# Patient Record
Sex: MALE | Race: WHITE | Employment: UNEMPLOYED | URBAN - METROPOLITAN AREA
[De-identification: names, ages, dates, MRNs, and addresses within clinical notes are randomized per-mention and may not be internally consistent; named-entity substitution may affect disease eponyms.]

---

## 2017-01-01 ENCOUNTER — CLINICAL SUPPORT (OUTPATIENT)
Dept: CARDIOLOGY CLINIC | Age: 52
End: 2017-01-01

## 2017-01-01 ENCOUNTER — HOME HEALTH ADMISSION (OUTPATIENT)
Dept: HOME HEALTH SERVICES | Facility: HOME HEALTH | Age: 52
End: 2017-01-01
Payer: MEDICARE

## 2017-01-01 ENCOUNTER — APPOINTMENT (OUTPATIENT)
Dept: CT IMAGING | Age: 52
DRG: 871 | End: 2017-01-01
Attending: EMERGENCY MEDICINE
Payer: MEDICARE

## 2017-01-01 ENCOUNTER — HOME CARE VISIT (OUTPATIENT)
Dept: SCHEDULING | Facility: HOME HEALTH | Age: 52
End: 2017-01-01
Payer: MEDICARE

## 2017-01-01 ENCOUNTER — HOSPITAL ENCOUNTER (INPATIENT)
Age: 52
LOS: 4 days | Discharge: HOME HEALTH | DRG: 871 | End: 2017-11-24
Attending: EMERGENCY MEDICINE | Admitting: INTERNAL MEDICINE
Payer: MEDICARE

## 2017-01-01 ENCOUNTER — APPOINTMENT (OUTPATIENT)
Dept: GENERAL RADIOLOGY | Age: 52
DRG: 871 | End: 2017-01-01
Attending: INTERNAL MEDICINE
Payer: MEDICARE

## 2017-01-01 ENCOUNTER — HOSPITAL ENCOUNTER (EMERGENCY)
Age: 52
Discharge: HOME | End: 2017-06-19
Attending: EMERGENCY MEDICINE
Payer: MEDICARE

## 2017-01-01 ENCOUNTER — APPOINTMENT (OUTPATIENT)
Dept: ULTRASOUND IMAGING | Age: 52
DRG: 603 | End: 2017-01-01
Attending: EMERGENCY MEDICINE
Payer: MEDICARE

## 2017-01-01 ENCOUNTER — HOSPITAL ENCOUNTER (INPATIENT)
Age: 52
LOS: 8 days | Discharge: HOME HEALTH | DRG: 603 | End: 2017-06-27
Attending: EMERGENCY MEDICINE | Admitting: HOSPITALIST
Payer: MEDICARE

## 2017-01-01 ENCOUNTER — APPOINTMENT (OUTPATIENT)
Dept: GENERAL RADIOLOGY | Age: 52
DRG: 603 | End: 2017-01-01
Attending: EMERGENCY MEDICINE
Payer: MEDICARE

## 2017-01-01 ENCOUNTER — APPOINTMENT (OUTPATIENT)
Dept: GENERAL RADIOLOGY | Age: 52
DRG: 603 | End: 2017-01-01
Attending: INTERNAL MEDICINE
Payer: MEDICARE

## 2017-01-01 ENCOUNTER — APPOINTMENT (OUTPATIENT)
Dept: GENERAL RADIOLOGY | Age: 52
DRG: 871 | End: 2017-01-01
Attending: EMERGENCY MEDICINE
Payer: MEDICARE

## 2017-01-01 ENCOUNTER — OFFICE VISIT (OUTPATIENT)
Dept: CARDIOLOGY CLINIC | Age: 52
End: 2017-01-01

## 2017-01-01 VITALS
HEIGHT: 64 IN | DIASTOLIC BLOOD PRESSURE: 86 MMHG | HEART RATE: 76 BPM | OXYGEN SATURATION: 94 % | SYSTOLIC BLOOD PRESSURE: 120 MMHG | RESPIRATION RATE: 20 BRPM

## 2017-01-01 VITALS
TEMPERATURE: 97.5 F | OXYGEN SATURATION: 93 % | DIASTOLIC BLOOD PRESSURE: 68 MMHG | BODY MASS INDEX: 53.78 KG/M2 | HEART RATE: 75 BPM | SYSTOLIC BLOOD PRESSURE: 112 MMHG | WEIGHT: 315 LBS | HEIGHT: 64 IN

## 2017-01-01 VITALS
HEART RATE: 74 BPM | TEMPERATURE: 96.9 F | OXYGEN SATURATION: 93 % | RESPIRATION RATE: 18 BRPM | DIASTOLIC BLOOD PRESSURE: 70 MMHG | SYSTOLIC BLOOD PRESSURE: 110 MMHG

## 2017-01-01 VITALS
RESPIRATION RATE: 16 BRPM | DIASTOLIC BLOOD PRESSURE: 60 MMHG | SYSTOLIC BLOOD PRESSURE: 120 MMHG | TEMPERATURE: 97.3 F | HEART RATE: 75 BPM | OXYGEN SATURATION: 98 %

## 2017-01-01 VITALS
SYSTOLIC BLOOD PRESSURE: 130 MMHG | OXYGEN SATURATION: 95 % | TEMPERATURE: 97.5 F | RESPIRATION RATE: 18 BRPM | DIASTOLIC BLOOD PRESSURE: 70 MMHG | HEART RATE: 76 BPM

## 2017-01-01 VITALS — HEART RATE: 88 BPM | SYSTOLIC BLOOD PRESSURE: 124 MMHG | OXYGEN SATURATION: 98 % | DIASTOLIC BLOOD PRESSURE: 76 MMHG

## 2017-01-01 VITALS
DIASTOLIC BLOOD PRESSURE: 70 MMHG | TEMPERATURE: 98.3 F | OXYGEN SATURATION: 95 % | HEART RATE: 68 BPM | SYSTOLIC BLOOD PRESSURE: 130 MMHG | RESPIRATION RATE: 18 BRPM

## 2017-01-01 VITALS
OXYGEN SATURATION: 97 % | RESPIRATION RATE: 18 BRPM | SYSTOLIC BLOOD PRESSURE: 97 MMHG | HEART RATE: 75 BPM | DIASTOLIC BLOOD PRESSURE: 55 MMHG | TEMPERATURE: 96 F | BODY MASS INDEX: 54.11 KG/M2 | WEIGHT: 315 LBS

## 2017-01-01 VITALS
OXYGEN SATURATION: 93 % | DIASTOLIC BLOOD PRESSURE: 58 MMHG | HEART RATE: 76 BPM | SYSTOLIC BLOOD PRESSURE: 119 MMHG | RESPIRATION RATE: 16 BRPM

## 2017-01-01 VITALS
HEART RATE: 74 BPM | OXYGEN SATURATION: 93 % | RESPIRATION RATE: 18 BRPM | TEMPERATURE: 97.3 F | SYSTOLIC BLOOD PRESSURE: 115 MMHG | DIASTOLIC BLOOD PRESSURE: 80 MMHG

## 2017-01-01 VITALS
SYSTOLIC BLOOD PRESSURE: 120 MMHG | RESPIRATION RATE: 16 BRPM | DIASTOLIC BLOOD PRESSURE: 82 MMHG | OXYGEN SATURATION: 93 % | HEART RATE: 84 BPM

## 2017-01-01 VITALS
SYSTOLIC BLOOD PRESSURE: 118 MMHG | HEART RATE: 79 BPM | DIASTOLIC BLOOD PRESSURE: 58 MMHG | RESPIRATION RATE: 16 BRPM | OXYGEN SATURATION: 98 %

## 2017-01-01 VITALS
DIASTOLIC BLOOD PRESSURE: 68 MMHG | TEMPERATURE: 97.6 F | OXYGEN SATURATION: 95 % | HEART RATE: 76 BPM | SYSTOLIC BLOOD PRESSURE: 112 MMHG | RESPIRATION RATE: 18 BRPM

## 2017-01-01 VITALS
RESPIRATION RATE: 18 BRPM | OXYGEN SATURATION: 95 % | TEMPERATURE: 97 F | HEART RATE: 76 BPM | SYSTOLIC BLOOD PRESSURE: 130 MMHG | DIASTOLIC BLOOD PRESSURE: 60 MMHG

## 2017-01-01 VITALS
OXYGEN SATURATION: 95 % | TEMPERATURE: 96.8 F | DIASTOLIC BLOOD PRESSURE: 70 MMHG | SYSTOLIC BLOOD PRESSURE: 108 MMHG | HEART RATE: 75 BPM

## 2017-01-01 VITALS
DIASTOLIC BLOOD PRESSURE: 60 MMHG | HEART RATE: 76 BPM | OXYGEN SATURATION: 97 % | RESPIRATION RATE: 15 BRPM | SYSTOLIC BLOOD PRESSURE: 119 MMHG

## 2017-01-01 VITALS
SYSTOLIC BLOOD PRESSURE: 130 MMHG | OXYGEN SATURATION: 95 % | HEART RATE: 76 BPM | TEMPERATURE: 97 F | DIASTOLIC BLOOD PRESSURE: 60 MMHG

## 2017-01-01 VITALS
SYSTOLIC BLOOD PRESSURE: 110 MMHG | HEART RATE: 77 BPM | DIASTOLIC BLOOD PRESSURE: 70 MMHG | OXYGEN SATURATION: 97 % | TEMPERATURE: 96.9 F

## 2017-01-01 VITALS
SYSTOLIC BLOOD PRESSURE: 101 MMHG | WEIGHT: 315 LBS | OXYGEN SATURATION: 90 % | DIASTOLIC BLOOD PRESSURE: 54 MMHG | HEART RATE: 75 BPM | BODY MASS INDEX: 53.78 KG/M2 | TEMPERATURE: 98 F | HEIGHT: 64 IN | RESPIRATION RATE: 18 BRPM

## 2017-01-01 DIAGNOSIS — D72.829 LEUKOCYTOSIS, UNSPECIFIED TYPE: ICD-10-CM

## 2017-01-01 DIAGNOSIS — R00.1 BRADYCARDIA: ICD-10-CM

## 2017-01-01 DIAGNOSIS — Z95.0 PACEMAKER: ICD-10-CM

## 2017-01-01 DIAGNOSIS — Z95.0 S/P CARDIAC PACEMAKER PROCEDURE: Primary | ICD-10-CM

## 2017-01-01 DIAGNOSIS — Q87.11 PRADER-WILLI SYNDROME: ICD-10-CM

## 2017-01-01 DIAGNOSIS — I49.5 SSS (SICK SINUS SYNDROME) (HCC): Primary | ICD-10-CM

## 2017-01-01 DIAGNOSIS — E03.9 HYPOTHYROIDISM, UNSPECIFIED TYPE: ICD-10-CM

## 2017-01-01 DIAGNOSIS — I48.91 ATRIAL FIBRILLATION WITH SLOW VENTRICULAR RESPONSE (HCC): ICD-10-CM

## 2017-01-01 DIAGNOSIS — L03.115 CELLULITIS OF RIGHT LEG: Primary | ICD-10-CM

## 2017-01-01 DIAGNOSIS — T68.XXXA HYPOTHERMIA, INITIAL ENCOUNTER: Primary | ICD-10-CM

## 2017-01-01 LAB
ALBUMIN SERPL BCP-MCNC: 1.9 G/DL (ref 3.5–5)
ALBUMIN SERPL-MCNC: 2.2 G/DL (ref 3.5–5)
ALBUMIN SERPL-MCNC: 2.4 G/DL (ref 3.5–5)
ALBUMIN/GLOB SERPL: 0.3 {RATIO} (ref 1.1–2.2)
ALBUMIN/GLOB SERPL: 0.4 {RATIO} (ref 1.1–2.2)
ALBUMIN/GLOB SERPL: 0.5 {RATIO} (ref 1.1–2.2)
ALP SERPL-CCNC: 76 U/L (ref 45–117)
ALP SERPL-CCNC: 79 U/L (ref 45–117)
ALP SERPL-CCNC: 82 U/L (ref 45–117)
ALT SERPL-CCNC: 33 U/L (ref 12–78)
ALT SERPL-CCNC: 34 U/L (ref 12–78)
ALT SERPL-CCNC: 36 U/L (ref 12–78)
AMMONIA PLAS-SCNC: 47 UMOL/L
ANION GAP BLD CALC-SCNC: 1 MMOL/L (ref 5–15)
ANION GAP BLD CALC-SCNC: 16 MMOL/L (ref 5–15)
ANION GAP BLD CALC-SCNC: 3 MMOL/L (ref 5–15)
ANION GAP BLD CALC-SCNC: 4 MMOL/L (ref 5–15)
ANION GAP BLD CALC-SCNC: 4 MMOL/L (ref 5–15)
ANION GAP BLD CALC-SCNC: 5 MMOL/L (ref 5–15)
ANION GAP BLD CALC-SCNC: 5 MMOL/L (ref 5–15)
ANION GAP BLD CALC-SCNC: 6 MMOL/L (ref 5–15)
ANION GAP SERPL CALC-SCNC: 3 MMOL/L (ref 5–15)
ANION GAP SERPL CALC-SCNC: 4 MMOL/L (ref 5–15)
ANION GAP SERPL CALC-SCNC: 4 MMOL/L (ref 5–15)
ANION GAP SERPL CALC-SCNC: 5 MMOL/L (ref 5–15)
ANION GAP SERPL CALC-SCNC: 6 MMOL/L (ref 5–15)
APPEARANCE UR: CLEAR
AST SERPL W P-5'-P-CCNC: 47 U/L (ref 15–37)
AST SERPL-CCNC: 39 U/L (ref 15–37)
AST SERPL-CCNC: 48 U/L (ref 15–37)
ATRIAL RATE: 78 BPM
BACTERIA SPEC CULT: ABNORMAL
BACTERIA SPEC CULT: ABNORMAL
BACTERIA SPEC CULT: NORMAL
BASOPHILS # BLD AUTO: 0 K/UL (ref 0–0.1)
BASOPHILS # BLD: 0 % (ref 0–1)
BASOPHILS # BLD: 0 K/UL (ref 0–0.1)
BASOPHILS NFR BLD: 0 % (ref 0–1)
BILIRUB SERPL-MCNC: 0.3 MG/DL (ref 0.2–1)
BILIRUB SERPL-MCNC: 0.4 MG/DL (ref 0.2–1)
BILIRUB SERPL-MCNC: 0.6 MG/DL (ref 0.2–1)
BILIRUB UR QL: NEGATIVE
BUN SERPL-MCNC: 16 MG/DL (ref 6–20)
BUN SERPL-MCNC: 17 MG/DL (ref 6–20)
BUN SERPL-MCNC: 18 MG/DL (ref 6–20)
BUN SERPL-MCNC: 20 MG/DL (ref 6–20)
BUN SERPL-MCNC: 21 MG/DL (ref 6–20)
BUN SERPL-MCNC: 22 MG/DL (ref 6–20)
BUN SERPL-MCNC: 23 MG/DL (ref 6–20)
BUN SERPL-MCNC: 25 MG/DL (ref 6–20)
BUN/CREAT SERPL: 25 (ref 12–20)
BUN/CREAT SERPL: 33 (ref 12–20)
BUN/CREAT SERPL: 35 (ref 12–20)
BUN/CREAT SERPL: 37 (ref 12–20)
BUN/CREAT SERPL: 39 (ref 12–20)
BUN/CREAT SERPL: 42 (ref 12–20)
BUN/CREAT SERPL: 42 (ref 12–20)
BUN/CREAT SERPL: 46 (ref 12–20)
BUN/CREAT SERPL: 49 (ref 12–20)
BUN/CREAT SERPL: 50 (ref 12–20)
BUN/CREAT SERPL: 51 (ref 12–20)
BUN/CREAT SERPL: 51 (ref 12–20)
BUN/CREAT SERPL: 52 (ref 12–20)
CALCIUM SERPL-MCNC: 7.7 MG/DL (ref 8.5–10.1)
CALCIUM SERPL-MCNC: 8 MG/DL (ref 8.5–10.1)
CALCIUM SERPL-MCNC: 8 MG/DL (ref 8.5–10.1)
CALCIUM SERPL-MCNC: 8.1 MG/DL (ref 8.5–10.1)
CALCIUM SERPL-MCNC: 8.1 MG/DL (ref 8.5–10.1)
CALCIUM SERPL-MCNC: 8.2 MG/DL (ref 8.5–10.1)
CALCIUM SERPL-MCNC: 8.2 MG/DL (ref 8.5–10.1)
CALCIUM SERPL-MCNC: 8.3 MG/DL (ref 8.5–10.1)
CALCIUM SERPL-MCNC: 8.4 MG/DL (ref 8.5–10.1)
CALCIUM SERPL-MCNC: 8.5 MG/DL (ref 8.5–10.1)
CALCIUM SERPL-MCNC: 8.7 MG/DL (ref 8.5–10.1)
CALCIUM SERPL-MCNC: 8.7 MG/DL (ref 8.5–10.1)
CALCIUM SERPL-MCNC: 9.1 MG/DL (ref 8.5–10.1)
CALCULATED R AXIS, ECG10: 29 DEGREES
CALCULATED T AXIS, ECG11: 77 DEGREES
CHLORIDE SERPL-SCNC: 101 MMOL/L (ref 97–108)
CHLORIDE SERPL-SCNC: 102 MMOL/L (ref 97–108)
CHLORIDE SERPL-SCNC: 102 MMOL/L (ref 97–108)
CHLORIDE SERPL-SCNC: 103 MMOL/L (ref 97–108)
CHLORIDE SERPL-SCNC: 104 MMOL/L (ref 97–108)
CHLORIDE SERPL-SCNC: 104 MMOL/L (ref 97–108)
CHLORIDE SERPL-SCNC: 105 MMOL/L (ref 97–108)
CHLORIDE SERPL-SCNC: 105 MMOL/L (ref 97–108)
CHLORIDE SERPL-SCNC: 106 MMOL/L (ref 97–108)
CHLORIDE SERPL-SCNC: 107 MMOL/L (ref 97–108)
CHLORIDE SERPL-SCNC: 110 MMOL/L (ref 97–108)
CHLORIDE SERPL-SCNC: 111 MMOL/L (ref 97–108)
CHLORIDE SERPL-SCNC: 99 MMOL/L (ref 97–108)
CO2 SERPL-SCNC: 21 MMOL/L (ref 21–32)
CO2 SERPL-SCNC: 30 MMOL/L (ref 21–32)
CO2 SERPL-SCNC: 30 MMOL/L (ref 21–32)
CO2 SERPL-SCNC: 31 MMOL/L (ref 21–32)
CO2 SERPL-SCNC: 32 MMOL/L (ref 21–32)
CO2 SERPL-SCNC: 33 MMOL/L (ref 21–32)
CO2 SERPL-SCNC: 34 MMOL/L (ref 21–32)
CO2 SERPL-SCNC: 36 MMOL/L (ref 21–32)
CO2 SERPL-SCNC: 36 MMOL/L (ref 21–32)
COLOR UR: NORMAL
CORTIS 1H P CHAL SERPL-MCNC: 25.6 UG/DL
CORTIS 30M P CHAL SERPL-MCNC: 19.8 UG/DL
CORTIS BS SERPL-MCNC: 9.1 UG/DL
CREAT SERPL-MCNC: 0.41 MG/DL (ref 0.7–1.3)
CREAT SERPL-MCNC: 0.43 MG/DL (ref 0.7–1.3)
CREAT SERPL-MCNC: 0.43 MG/DL (ref 0.7–1.3)
CREAT SERPL-MCNC: 0.44 MG/DL (ref 0.7–1.3)
CREAT SERPL-MCNC: 0.46 MG/DL (ref 0.7–1.3)
CREAT SERPL-MCNC: 0.47 MG/DL (ref 0.7–1.3)
CREAT SERPL-MCNC: 0.48 MG/DL (ref 0.7–1.3)
CREAT SERPL-MCNC: 0.52 MG/DL (ref 0.7–1.3)
CREAT SERPL-MCNC: 0.52 MG/DL (ref 0.7–1.3)
CREAT SERPL-MCNC: 0.59 MG/DL (ref 0.7–1.3)
CREAT SERPL-MCNC: 0.65 MG/DL (ref 0.7–1.3)
DATE LAST DOSE: ABNORMAL
DIAGNOSIS, 93000: NORMAL
DIFFERENTIAL METHOD BLD: ABNORMAL
EOSINOPHIL # BLD: 0 K/UL (ref 0–0.4)
EOSINOPHIL # BLD: 0.1 K/UL (ref 0–0.4)
EOSINOPHIL # BLD: 0.2 K/UL (ref 0–0.4)
EOSINOPHIL # BLD: 0.3 K/UL (ref 0–0.4)
EOSINOPHIL NFR BLD: 0 % (ref 0–7)
EOSINOPHIL NFR BLD: 1 % (ref 0–7)
EOSINOPHIL NFR BLD: 1 % (ref 0–7)
EOSINOPHIL NFR BLD: 2 % (ref 0–7)
EOSINOPHIL NFR BLD: 3 % (ref 0–7)
EOSINOPHIL NFR BLD: 4 % (ref 0–7)
ERYTHROCYTE [DISTWIDTH] IN BLOOD BY AUTOMATED COUNT: 14.9 % (ref 11.5–14.5)
ERYTHROCYTE [DISTWIDTH] IN BLOOD BY AUTOMATED COUNT: 15 % (ref 11.5–14.5)
ERYTHROCYTE [DISTWIDTH] IN BLOOD BY AUTOMATED COUNT: 15.2 % (ref 11.5–14.5)
ERYTHROCYTE [DISTWIDTH] IN BLOOD BY AUTOMATED COUNT: 15.3 % (ref 11.5–14.5)
ERYTHROCYTE [DISTWIDTH] IN BLOOD BY AUTOMATED COUNT: 15.3 % (ref 11.5–14.5)
ERYTHROCYTE [DISTWIDTH] IN BLOOD BY AUTOMATED COUNT: 15.7 % (ref 11.5–14.5)
ERYTHROCYTE [DISTWIDTH] IN BLOOD BY AUTOMATED COUNT: 15.8 % (ref 11.5–14.5)
ERYTHROCYTE [DISTWIDTH] IN BLOOD BY AUTOMATED COUNT: 15.9 % (ref 11.5–14.5)
GLOBULIN SER CALC-MCNC: 4.9 G/DL (ref 2–4)
GLOBULIN SER CALC-MCNC: 5 G/DL (ref 2–4)
GLOBULIN SER CALC-MCNC: 5.9 G/DL (ref 2–4)
GLUCOSE BLD STRIP.AUTO-MCNC: 108 MG/DL (ref 65–100)
GLUCOSE BLD STRIP.AUTO-MCNC: 110 MG/DL (ref 65–100)
GLUCOSE BLD STRIP.AUTO-MCNC: 139 MG/DL (ref 65–100)
GLUCOSE BLD STRIP.AUTO-MCNC: 139 MG/DL (ref 65–100)
GLUCOSE BLD STRIP.AUTO-MCNC: 71 MG/DL (ref 65–100)
GLUCOSE BLD STRIP.AUTO-MCNC: 72 MG/DL (ref 65–100)
GLUCOSE BLD STRIP.AUTO-MCNC: 79 MG/DL (ref 65–100)
GLUCOSE BLD STRIP.AUTO-MCNC: 81 MG/DL (ref 65–100)
GLUCOSE BLD STRIP.AUTO-MCNC: 84 MG/DL (ref 65–100)
GLUCOSE BLD STRIP.AUTO-MCNC: 93 MG/DL (ref 65–100)
GLUCOSE BLD STRIP.AUTO-MCNC: 96 MG/DL (ref 65–100)
GLUCOSE SERPL-MCNC: 124 MG/DL (ref 65–100)
GLUCOSE SERPL-MCNC: 51 MG/DL (ref 65–100)
GLUCOSE SERPL-MCNC: 57 MG/DL (ref 65–100)
GLUCOSE SERPL-MCNC: 62 MG/DL (ref 65–100)
GLUCOSE SERPL-MCNC: 62 MG/DL (ref 65–100)
GLUCOSE SERPL-MCNC: 66 MG/DL (ref 65–100)
GLUCOSE SERPL-MCNC: 80 MG/DL (ref 65–100)
GLUCOSE SERPL-MCNC: 81 MG/DL (ref 65–100)
GLUCOSE SERPL-MCNC: 84 MG/DL (ref 65–100)
GLUCOSE SERPL-MCNC: 84 MG/DL (ref 65–100)
GLUCOSE SERPL-MCNC: 91 MG/DL (ref 65–100)
GLUCOSE UR STRIP.AUTO-MCNC: NEGATIVE MG/DL
HCT VFR BLD AUTO: 31.1 % (ref 36.6–50.3)
HCT VFR BLD AUTO: 33.5 % (ref 36.6–50.3)
HCT VFR BLD AUTO: 33.7 % (ref 36.6–50.3)
HCT VFR BLD AUTO: 34 % (ref 36.6–50.3)
HCT VFR BLD AUTO: 34 % (ref 36.6–50.3)
HCT VFR BLD AUTO: 35.3 % (ref 36.6–50.3)
HCT VFR BLD AUTO: 36.5 % (ref 36.6–50.3)
HCT VFR BLD AUTO: 37.4 % (ref 36.6–50.3)
HGB BLD-MCNC: 10.5 G/DL (ref 12.1–17)
HGB BLD-MCNC: 10.7 G/DL (ref 12.1–17)
HGB BLD-MCNC: 11 G/DL (ref 12.1–17)
HGB BLD-MCNC: 11.2 G/DL (ref 12.1–17)
HGB BLD-MCNC: 11.4 G/DL (ref 12.1–17)
HGB BLD-MCNC: 12.4 G/DL (ref 12.1–17)
HGB UR QL STRIP: NEGATIVE
INR PPP: 1.2 (ref 0.9–1.1)
KETONES UR QL STRIP.AUTO: NEGATIVE MG/DL
LACTATE SERPL-SCNC: 1.2 MMOL/L (ref 0.4–2)
LACTATE SERPL-SCNC: 1.3 MMOL/L (ref 0.4–2)
LEUKOCYTE ESTERASE UR QL STRIP.AUTO: NEGATIVE
LYMPHOCYTES # BLD AUTO: 2 % (ref 12–49)
LYMPHOCYTES # BLD AUTO: 7 % (ref 12–49)
LYMPHOCYTES # BLD AUTO: 8 % (ref 12–49)
LYMPHOCYTES # BLD: 0.3 K/UL (ref 0.8–3.5)
LYMPHOCYTES # BLD: 0.5 K/UL (ref 0.8–3.5)
LYMPHOCYTES # BLD: 0.6 K/UL (ref 0.8–3.5)
LYMPHOCYTES # BLD: 0.6 K/UL (ref 0.8–3.5)
LYMPHOCYTES # BLD: 0.7 K/UL (ref 0.8–3.5)
LYMPHOCYTES # BLD: 0.7 K/UL (ref 0.8–3.5)
LYMPHOCYTES # BLD: 0.8 K/UL (ref 0.8–3.5)
LYMPHOCYTES # BLD: 0.9 K/UL (ref 0.8–3.5)
LYMPHOCYTES NFR BLD: 11 % (ref 12–49)
LYMPHOCYTES NFR BLD: 16 % (ref 12–49)
LYMPHOCYTES NFR BLD: 17 % (ref 12–49)
LYMPHOCYTES NFR BLD: 17 % (ref 12–49)
LYMPHOCYTES NFR BLD: 19 % (ref 12–49)
MAGNESIUM SERPL-MCNC: 2 MG/DL (ref 1.6–2.4)
MAGNESIUM SERPL-MCNC: 2.1 MG/DL (ref 1.6–2.4)
MAGNESIUM SERPL-MCNC: 2.1 MG/DL (ref 1.6–2.4)
MAGNESIUM SERPL-MCNC: 2.2 MG/DL (ref 1.6–2.4)
MCH RBC QN AUTO: 29 PG (ref 26–34)
MCH RBC QN AUTO: 29.3 PG (ref 26–34)
MCH RBC QN AUTO: 29.6 PG (ref 26–34)
MCH RBC QN AUTO: 30.8 PG (ref 26–34)
MCH RBC QN AUTO: 30.9 PG (ref 26–34)
MCH RBC QN AUTO: 31.2 PG (ref 26–34)
MCH RBC QN AUTO: 31.3 PG (ref 26–34)
MCH RBC QN AUTO: 31.9 PG (ref 26–34)
MCHC RBC AUTO-ENTMCNC: 31.2 G/DL (ref 30–36.5)
MCHC RBC AUTO-ENTMCNC: 31.5 G/DL (ref 30–36.5)
MCHC RBC AUTO-ENTMCNC: 31.7 G/DL (ref 30–36.5)
MCHC RBC AUTO-ENTMCNC: 32.4 G/DL (ref 30–36.5)
MCHC RBC AUTO-ENTMCNC: 32.6 G/DL (ref 30–36.5)
MCHC RBC AUTO-ENTMCNC: 32.8 G/DL (ref 30–36.5)
MCHC RBC AUTO-ENTMCNC: 33.2 G/DL (ref 30–36.5)
MCHC RBC AUTO-ENTMCNC: 33.8 G/DL (ref 30–36.5)
MCV RBC AUTO: 92.9 FL (ref 80–99)
MCV RBC AUTO: 93.2 FL (ref 80–99)
MCV RBC AUTO: 93.4 FL (ref 80–99)
MCV RBC AUTO: 94.2 FL (ref 80–99)
MCV RBC AUTO: 94.5 FL (ref 80–99)
MCV RBC AUTO: 94.7 FL (ref 80–99)
MCV RBC AUTO: 95.2 FL (ref 80–99)
MCV RBC AUTO: 95.2 FL (ref 80–99)
MONOCYTES # BLD: 0.2 K/UL (ref 0–1)
MONOCYTES # BLD: 0.3 K/UL (ref 0–1)
MONOCYTES # BLD: 0.3 K/UL (ref 0–1)
MONOCYTES # BLD: 0.5 K/UL (ref 0–1)
MONOCYTES # BLD: 0.6 K/UL (ref 0–1)
MONOCYTES # BLD: 0.8 K/UL (ref 0–1)
MONOCYTES NFR BLD AUTO: 2 % (ref 5–13)
MONOCYTES NFR BLD AUTO: 5 % (ref 5–13)
MONOCYTES NFR BLD AUTO: 7 % (ref 5–13)
MONOCYTES NFR BLD: 11 % (ref 5–13)
MONOCYTES NFR BLD: 12 % (ref 5–13)
MONOCYTES NFR BLD: 12 % (ref 5–13)
MONOCYTES NFR BLD: 5 % (ref 5–13)
MONOCYTES NFR BLD: 6 % (ref 5–13)
NEUTS SEG # BLD: 14.3 K/UL (ref 1.8–8)
NEUTS SEG # BLD: 2.4 K/UL (ref 1.8–8)
NEUTS SEG # BLD: 2.8 K/UL (ref 1.8–8)
NEUTS SEG # BLD: 2.9 K/UL (ref 1.8–8)
NEUTS SEG # BLD: 3.1 K/UL (ref 1.8–8)
NEUTS SEG # BLD: 3.9 K/UL (ref 1.8–8)
NEUTS SEG # BLD: 7.7 K/UL (ref 1.8–8)
NEUTS SEG # BLD: 9.2 K/UL (ref 1.8–8)
NEUTS SEG NFR BLD AUTO: 82 % (ref 32–75)
NEUTS SEG NFR BLD AUTO: 87 % (ref 32–75)
NEUTS SEG NFR BLD AUTO: 96 % (ref 32–75)
NEUTS SEG NFR BLD: 66 % (ref 32–75)
NEUTS SEG NFR BLD: 68 % (ref 32–75)
NEUTS SEG NFR BLD: 70 % (ref 32–75)
NEUTS SEG NFR BLD: 75 % (ref 32–75)
NEUTS SEG NFR BLD: 82 % (ref 32–75)
NITRITE UR QL STRIP.AUTO: NEGATIVE
NRBC # BLD: 0 K/UL (ref 0–0.01)
NRBC BLD-RTO: 0 PER 100 WBC
PH UR STRIP: 7 [PH] (ref 5–8)
PLATELET # BLD AUTO: 100 K/UL (ref 150–400)
PLATELET # BLD AUTO: 105 K/UL (ref 150–400)
PLATELET # BLD AUTO: 110 K/UL (ref 150–400)
PLATELET # BLD AUTO: 110 K/UL (ref 150–400)
PLATELET # BLD AUTO: 113 K/UL (ref 150–400)
PLATELET # BLD AUTO: 120 K/UL (ref 150–400)
PLATELET # BLD AUTO: 163 K/UL (ref 150–400)
PLATELET # BLD AUTO: 85 K/UL (ref 150–400)
POTASSIUM SERPL-SCNC: 3.1 MMOL/L (ref 3.5–5.1)
POTASSIUM SERPL-SCNC: 3.3 MMOL/L (ref 3.5–5.1)
POTASSIUM SERPL-SCNC: 3.6 MMOL/L (ref 3.5–5.1)
POTASSIUM SERPL-SCNC: 3.7 MMOL/L (ref 3.5–5.1)
POTASSIUM SERPL-SCNC: 3.8 MMOL/L (ref 3.5–5.1)
POTASSIUM SERPL-SCNC: 3.9 MMOL/L (ref 3.5–5.1)
POTASSIUM SERPL-SCNC: 4 MMOL/L (ref 3.5–5.1)
POTASSIUM SERPL-SCNC: 4.3 MMOL/L (ref 3.5–5.1)
POTASSIUM SERPL-SCNC: 4.8 MMOL/L (ref 3.5–5.1)
PROT SERPL-MCNC: 7.1 G/DL (ref 6.4–8.2)
PROT SERPL-MCNC: 7.4 G/DL (ref 6.4–8.2)
PROT SERPL-MCNC: 7.8 G/DL (ref 6.4–8.2)
PROT UR STRIP-MCNC: NEGATIVE MG/DL
PROTHROMBIN TIME: 12 SEC (ref 9–11.1)
Q-T INTERVAL, ECG07: 478 MS
QRS DURATION, ECG06: 164 MS
QTC CALCULATION (BEZET), ECG08: 533 MS
RBC # BLD AUTO: 3.29 M/UL (ref 4.1–5.7)
RBC # BLD AUTO: 3.52 M/UL (ref 4.1–5.7)
RBC # BLD AUTO: 3.56 M/UL (ref 4.1–5.7)
RBC # BLD AUTO: 3.57 M/UL (ref 4.1–5.7)
RBC # BLD AUTO: 3.65 M/UL (ref 4.1–5.7)
RBC # BLD AUTO: 3.78 M/UL (ref 4.1–5.7)
RBC # BLD AUTO: 3.93 M/UL (ref 4.1–5.7)
RBC # BLD AUTO: 3.97 M/UL (ref 4.1–5.7)
RBC MORPH BLD: ABNORMAL
RBC MORPH BLD: ABNORMAL
REPORTED DOSE,DOSE: ABNORMAL UNITS
REPORTED DOSE/TIME,TMG: 900
SERVICE CMNT-IMP: ABNORMAL
SERVICE CMNT-IMP: NORMAL
SODIUM SERPL-SCNC: 139 MMOL/L (ref 136–145)
SODIUM SERPL-SCNC: 140 MMOL/L (ref 136–145)
SODIUM SERPL-SCNC: 141 MMOL/L (ref 136–145)
SODIUM SERPL-SCNC: 142 MMOL/L (ref 136–145)
SODIUM SERPL-SCNC: 146 MMOL/L (ref 136–145)
SODIUM SERPL-SCNC: 146 MMOL/L (ref 136–145)
SP GR UR REFRACTOMETRY: 1.01 (ref 1–1.03)
T4 FREE SERPL-MCNC: 1.4 NG/DL (ref 0.8–1.5)
TROPONIN I SERPL-MCNC: <0.04 NG/ML
TSH SERPL DL<=0.05 MIU/L-ACNC: 2.12 UIU/ML (ref 0.36–3.74)
UROBILINOGEN UR QL STRIP.AUTO: 1 EU/DL (ref 0.2–1)
VANCOMYCIN TROUGH SERPL-MCNC: 24 UG/ML (ref 5–10)
VENTRICULAR RATE, ECG03: 75 BPM
WBC # BLD AUTO: 11.2 K/UL (ref 4.1–11.1)
WBC # BLD AUTO: 14.9 K/UL (ref 4.1–11.1)
WBC # BLD AUTO: 3.3 K/UL (ref 4.1–11.1)
WBC # BLD AUTO: 4.2 K/UL (ref 4.1–11.1)
WBC # BLD AUTO: 4.2 K/UL (ref 4.1–11.1)
WBC # BLD AUTO: 4.6 K/UL (ref 4.1–11.1)
WBC # BLD AUTO: 4.7 K/UL (ref 4.1–11.1)
WBC # BLD AUTO: 8.9 K/UL (ref 4.1–11.1)

## 2017-01-01 PROCEDURE — 74011250637 HC RX REV CODE- 250/637: Performed by: INTERNAL MEDICINE

## 2017-01-01 PROCEDURE — 77030018719 HC DRSG PTCH ANTIMIC J&J -A

## 2017-01-01 PROCEDURE — 74011250637 HC RX REV CODE- 250/637: Performed by: HOSPITALIST

## 2017-01-01 PROCEDURE — 36415 COLL VENOUS BLD VENIPUNCTURE: CPT | Performed by: INTERNAL MEDICINE

## 2017-01-01 PROCEDURE — 3331090001 HH PPS REVENUE CREDIT

## 2017-01-01 PROCEDURE — 65270000015 HC RM PRIVATE ONCOLOGY

## 2017-01-01 PROCEDURE — 02HV33Z INSERTION OF INFUSION DEVICE INTO SUPERIOR VENA CAVA, PERCUTANEOUS APPROACH: ICD-10-PCS | Performed by: HOSPITALIST

## 2017-01-01 PROCEDURE — 87040 BLOOD CULTURE FOR BACTERIA: CPT | Performed by: EMERGENCY MEDICINE

## 2017-01-01 PROCEDURE — 80048 BASIC METABOLIC PNL TOTAL CA: CPT | Performed by: HOSPITALIST

## 2017-01-01 PROCEDURE — 83735 ASSAY OF MAGNESIUM: CPT | Performed by: INTERNAL MEDICINE

## 2017-01-01 PROCEDURE — 71010 XR CHEST PORT: CPT

## 2017-01-01 PROCEDURE — 3331090002 HH PPS REVENUE DEBIT

## 2017-01-01 PROCEDURE — 36415 COLL VENOUS BLD VENIPUNCTURE: CPT | Performed by: HOSPITALIST

## 2017-01-01 PROCEDURE — 74011250636 HC RX REV CODE- 250/636: Performed by: EMERGENCY MEDICINE

## 2017-01-01 PROCEDURE — 74011250636 HC RX REV CODE- 250/636: Performed by: HOSPITALIST

## 2017-01-01 PROCEDURE — 74011000258 HC RX REV CODE- 258: Performed by: INTERNAL MEDICINE

## 2017-01-01 PROCEDURE — 97116 GAIT TRAINING THERAPY: CPT | Performed by: PHYSICAL THERAPIST

## 2017-01-01 PROCEDURE — 82962 GLUCOSE BLOOD TEST: CPT

## 2017-01-01 PROCEDURE — 85025 COMPLETE CBC W/AUTO DIFF WBC: CPT | Performed by: INTERNAL MEDICINE

## 2017-01-01 PROCEDURE — G0151 HHCP-SERV OF PT,EA 15 MIN: HCPCS

## 2017-01-01 PROCEDURE — 85610 PROTHROMBIN TIME: CPT | Performed by: EMERGENCY MEDICINE

## 2017-01-01 PROCEDURE — 80048 BASIC METABOLIC PNL TOTAL CA: CPT | Performed by: INTERNAL MEDICINE

## 2017-01-01 PROCEDURE — 97530 THERAPEUTIC ACTIVITIES: CPT | Performed by: PHYSICAL THERAPIST

## 2017-01-01 PROCEDURE — 80202 ASSAY OF VANCOMYCIN: CPT | Performed by: INTERNAL MEDICINE

## 2017-01-01 PROCEDURE — 96375 TX/PRO/DX INJ NEW DRUG ADDON: CPT

## 2017-01-01 PROCEDURE — G0157 HHC PT ASSISTANT EA 15: HCPCS

## 2017-01-01 PROCEDURE — 74011000258 HC RX REV CODE- 258: Performed by: EMERGENCY MEDICINE

## 2017-01-01 PROCEDURE — 65660000000 HC RM CCU STEPDOWN

## 2017-01-01 PROCEDURE — 74011000250 HC RX REV CODE- 250: Performed by: INTERNAL MEDICINE

## 2017-01-01 PROCEDURE — 93971 EXTREMITY STUDY: CPT

## 2017-01-01 PROCEDURE — 74011250636 HC RX REV CODE- 250/636: Performed by: INTERNAL MEDICINE

## 2017-01-01 PROCEDURE — 77010033678 HC OXYGEN DAILY

## 2017-01-01 PROCEDURE — 97161 PT EVAL LOW COMPLEX 20 MIN: CPT | Performed by: PHYSICAL THERAPIST

## 2017-01-01 PROCEDURE — 74011000258 HC RX REV CODE- 258: Performed by: HOSPITALIST

## 2017-01-01 PROCEDURE — 73552 X-RAY EXAM OF FEMUR 2/>: CPT

## 2017-01-01 PROCEDURE — 82140 ASSAY OF AMMONIA: CPT | Performed by: EMERGENCY MEDICINE

## 2017-01-01 PROCEDURE — 80053 COMPREHEN METABOLIC PANEL: CPT | Performed by: EMERGENCY MEDICINE

## 2017-01-01 PROCEDURE — 99283 EMERGENCY DEPT VISIT LOW MDM: CPT

## 2017-01-01 PROCEDURE — G8979 MOBILITY GOAL STATUS: HCPCS

## 2017-01-01 PROCEDURE — 71020 XR CHEST PA LAT: CPT

## 2017-01-01 PROCEDURE — 36592 COLLECT BLOOD FROM PICC: CPT

## 2017-01-01 PROCEDURE — 74011000250 HC RX REV CODE- 250: Performed by: EMERGENCY MEDICINE

## 2017-01-01 PROCEDURE — 81003 URINALYSIS AUTO W/O SCOPE: CPT | Performed by: EMERGENCY MEDICINE

## 2017-01-01 PROCEDURE — G8980 MOBILITY D/C STATUS: HCPCS

## 2017-01-01 PROCEDURE — 36569 INSJ PICC 5 YR+ W/O IMAGING: CPT | Performed by: INTERNAL MEDICINE

## 2017-01-01 PROCEDURE — G0152 HHCP-SERV OF OT,EA 15 MIN: HCPCS

## 2017-01-01 PROCEDURE — 96365 THER/PROPH/DIAG IV INF INIT: CPT

## 2017-01-01 PROCEDURE — 84443 ASSAY THYROID STIM HORMONE: CPT | Performed by: INTERNAL MEDICINE

## 2017-01-01 PROCEDURE — C1751 CATH, INF, PER/CENT/MIDLINE: HCPCS

## 2017-01-01 PROCEDURE — 82533 TOTAL CORTISOL: CPT | Performed by: INTERNAL MEDICINE

## 2017-01-01 PROCEDURE — 76937 US GUIDE VASCULAR ACCESS: CPT

## 2017-01-01 PROCEDURE — G8978 MOBILITY CURRENT STATUS: HCPCS | Performed by: PHYSICAL THERAPIST

## 2017-01-01 PROCEDURE — 93005 ELECTROCARDIOGRAM TRACING: CPT

## 2017-01-01 PROCEDURE — 97162 PT EVAL MOD COMPLEX 30 MIN: CPT

## 2017-01-01 PROCEDURE — 85025 COMPLETE CBC W/AUTO DIFF WBC: CPT | Performed by: EMERGENCY MEDICINE

## 2017-01-01 PROCEDURE — 97530 THERAPEUTIC ACTIVITIES: CPT

## 2017-01-01 PROCEDURE — 96367 TX/PROPH/DG ADDL SEQ IV INF: CPT

## 2017-01-01 PROCEDURE — 77030018786 HC NDL GD F/USND BARD -B

## 2017-01-01 PROCEDURE — 400013 HH SOC

## 2017-01-01 PROCEDURE — 70450 CT HEAD/BRAIN W/O DYE: CPT

## 2017-01-01 PROCEDURE — 36415 COLL VENOUS BLD VENIPUNCTURE: CPT | Performed by: EMERGENCY MEDICINE

## 2017-01-01 PROCEDURE — 83735 ASSAY OF MAGNESIUM: CPT | Performed by: EMERGENCY MEDICINE

## 2017-01-01 PROCEDURE — 94761 N-INVAS EAR/PLS OXIMETRY MLT: CPT

## 2017-01-01 PROCEDURE — 97116 GAIT TRAINING THERAPY: CPT

## 2017-01-01 PROCEDURE — 84439 ASSAY OF FREE THYROXINE: CPT | Performed by: INTERNAL MEDICINE

## 2017-01-01 PROCEDURE — 80053 COMPREHEN METABOLIC PANEL: CPT | Performed by: INTERNAL MEDICINE

## 2017-01-01 PROCEDURE — 83605 ASSAY OF LACTIC ACID: CPT | Performed by: EMERGENCY MEDICINE

## 2017-01-01 PROCEDURE — G0299 HHS/HOSPICE OF RN EA 15 MIN: HCPCS

## 2017-01-01 PROCEDURE — G8978 MOBILITY CURRENT STATUS: HCPCS

## 2017-01-01 PROCEDURE — G8979 MOBILITY GOAL STATUS: HCPCS | Performed by: PHYSICAL THERAPIST

## 2017-01-01 PROCEDURE — 75810000275 HC EMERGENCY DEPT VISIT NO LEVEL OF CARE

## 2017-01-01 PROCEDURE — 99285 EMERGENCY DEPT VISIT HI MDM: CPT

## 2017-01-01 PROCEDURE — 84484 ASSAY OF TROPONIN QUANT: CPT | Performed by: EMERGENCY MEDICINE

## 2017-01-01 PROCEDURE — 96366 THER/PROPH/DIAG IV INF ADDON: CPT

## 2017-01-01 PROCEDURE — 3331090003 HH PPS REVENUE ADJ

## 2017-01-01 RX ORDER — DEXTROSE 50 % IN WATER (D50W) INTRAVENOUS SYRINGE
25
Status: COMPLETED | OUTPATIENT
Start: 2017-01-01 | End: 2017-01-01

## 2017-01-01 RX ORDER — SODIUM CHLORIDE 0.9 % (FLUSH) 0.9 %
10 SYRINGE (ML) INJECTION AS NEEDED
Status: DISCONTINUED | OUTPATIENT
Start: 2017-01-01 | End: 2017-01-01 | Stop reason: HOSPADM

## 2017-01-01 RX ORDER — VANCOMYCIN 2 GRAM/500 ML IN 0.9 % SODIUM CHLORIDE INTRAVENOUS
2000 ONCE
Status: DISCONTINUED | OUTPATIENT
Start: 2017-01-01 | End: 2017-01-01

## 2017-01-01 RX ORDER — OXYCODONE HYDROCHLORIDE 5 MG/1
5 TABLET ORAL
Status: DISCONTINUED | OUTPATIENT
Start: 2017-01-01 | End: 2017-01-01 | Stop reason: HOSPADM

## 2017-01-01 RX ORDER — ACETAMINOPHEN 325 MG/1
650 TABLET ORAL
Status: DISCONTINUED | OUTPATIENT
Start: 2017-01-01 | End: 2017-01-01 | Stop reason: HOSPADM

## 2017-01-01 RX ORDER — MICONAZOLE NITRATE 20 MG/G
CREAM TOPICAL 2 TIMES DAILY
Status: DISCONTINUED | OUTPATIENT
Start: 2017-01-01 | End: 2017-01-01 | Stop reason: HOSPADM

## 2017-01-01 RX ORDER — SODIUM CHLORIDE 0.9 % (FLUSH) 0.9 %
10 SYRINGE (ML) INJECTION EVERY 24 HOURS
Status: DISCONTINUED | OUTPATIENT
Start: 2017-01-01 | End: 2017-01-01 | Stop reason: HOSPADM

## 2017-01-01 RX ORDER — POTASSIUM CHLORIDE 1.5 G/1.77G
40 POWDER, FOR SOLUTION ORAL 2 TIMES DAILY WITH MEALS
Status: DISCONTINUED | OUTPATIENT
Start: 2017-01-01 | End: 2017-01-01 | Stop reason: HOSPADM

## 2017-01-01 RX ORDER — MELATONIN
1000 DAILY
Status: DISCONTINUED | OUTPATIENT
Start: 2017-01-01 | End: 2017-01-01 | Stop reason: HOSPADM

## 2017-01-01 RX ORDER — LEVOTHYROXINE SODIUM 100 UG/1
100 TABLET ORAL
COMMUNITY
End: 2017-01-01

## 2017-01-01 RX ORDER — BISACODYL 5 MG
5 TABLET, DELAYED RELEASE (ENTERIC COATED) ORAL DAILY PRN
Status: DISCONTINUED | OUTPATIENT
Start: 2017-01-01 | End: 2017-01-01 | Stop reason: HOSPADM

## 2017-01-01 RX ORDER — POTASSIUM CHLORIDE 750 MG/1
40 TABLET, FILM COATED, EXTENDED RELEASE ORAL EVERY 4 HOURS
Status: COMPLETED | OUTPATIENT
Start: 2017-01-01 | End: 2017-01-01

## 2017-01-01 RX ORDER — BUMETANIDE 0.25 MG/ML
1 INJECTION INTRAMUSCULAR; INTRAVENOUS EVERY 12 HOURS
Status: DISCONTINUED | OUTPATIENT
Start: 2017-01-01 | End: 2017-01-01 | Stop reason: HOSPADM

## 2017-01-01 RX ORDER — NYSTATIN 100000 [USP'U]/G
POWDER TOPICAL
COMMUNITY
End: 2017-01-01 | Stop reason: SDUPTHER

## 2017-01-01 RX ORDER — DEXTROSE 50 % IN WATER (D50W) INTRAVENOUS SYRINGE
12.5-25 AS NEEDED
Status: DISCONTINUED | OUTPATIENT
Start: 2017-01-01 | End: 2017-01-01 | Stop reason: RX

## 2017-01-01 RX ORDER — LANOLIN ALCOHOL/MO/W.PET/CERES
325 CREAM (GRAM) TOPICAL
Status: DISCONTINUED | OUTPATIENT
Start: 2017-01-01 | End: 2017-01-01 | Stop reason: HOSPADM

## 2017-01-01 RX ORDER — POTASSIUM CHLORIDE 750 MG/1
40 TABLET, FILM COATED, EXTENDED RELEASE ORAL DAILY
Status: DISCONTINUED | OUTPATIENT
Start: 2017-01-01 | End: 2017-01-01

## 2017-01-01 RX ORDER — SODIUM CHLORIDE 9 MG/ML
1000 INJECTION, SOLUTION INTRAVENOUS ONCE
Status: COMPLETED | OUTPATIENT
Start: 2017-01-01 | End: 2017-01-01

## 2017-01-01 RX ORDER — SODIUM CHLORIDE 0.9 % (FLUSH) 0.9 %
5-10 SYRINGE (ML) INJECTION EVERY 8 HOURS
Status: DISCONTINUED | OUTPATIENT
Start: 2017-01-01 | End: 2017-01-01 | Stop reason: HOSPADM

## 2017-01-01 RX ORDER — LEVOTHYROXINE SODIUM 75 UG/1
100 TABLET ORAL
Status: ON HOLD | COMMUNITY
End: 2017-01-01

## 2017-01-01 RX ORDER — SODIUM CHLORIDE 9 MG/ML
75 INJECTION, SOLUTION INTRAVENOUS CONTINUOUS
Status: DISPENSED | OUTPATIENT
Start: 2017-01-01 | End: 2017-01-01

## 2017-01-01 RX ORDER — CEFUROXIME AXETIL 500 MG/1
500 TABLET ORAL 2 TIMES DAILY
Qty: 10 TAB | Refills: 0 | Status: SHIPPED | OUTPATIENT
Start: 2017-01-01 | End: 2018-01-01

## 2017-01-01 RX ORDER — DOXYCYCLINE HYCLATE 100 MG
100 TABLET ORAL EVERY 12 HOURS
Status: DISCONTINUED | OUTPATIENT
Start: 2017-01-01 | End: 2017-01-01 | Stop reason: HOSPADM

## 2017-01-01 RX ORDER — ENOXAPARIN SODIUM 100 MG/ML
40 INJECTION SUBCUTANEOUS EVERY 12 HOURS
Status: DISCONTINUED | OUTPATIENT
Start: 2017-01-01 | End: 2017-01-01 | Stop reason: HOSPADM

## 2017-01-01 RX ORDER — DEXTROSE MONOHYDRATE AND SODIUM CHLORIDE 5; .45 G/100ML; G/100ML
100 INJECTION, SOLUTION INTRAVENOUS CONTINUOUS
Status: DISCONTINUED | OUTPATIENT
Start: 2017-01-01 | End: 2017-01-01

## 2017-01-01 RX ORDER — NYSTATIN 100000 [USP'U]/G
POWDER TOPICAL 2 TIMES DAILY
Status: DISCONTINUED | OUTPATIENT
Start: 2017-01-01 | End: 2017-01-01

## 2017-01-01 RX ORDER — SODIUM CHLORIDE 0.9 % (FLUSH) 0.9 %
5-10 SYRINGE (ML) INJECTION AS NEEDED
Status: DISCONTINUED | OUTPATIENT
Start: 2017-01-01 | End: 2017-01-01 | Stop reason: HOSPADM

## 2017-01-01 RX ORDER — SODIUM CHLORIDE 0.9 % (FLUSH) 0.9 %
10-30 SYRINGE (ML) INJECTION AS NEEDED
Status: DISCONTINUED | OUTPATIENT
Start: 2017-01-01 | End: 2017-01-01 | Stop reason: HOSPADM

## 2017-01-01 RX ORDER — ENOXAPARIN SODIUM 100 MG/ML
40 INJECTION SUBCUTANEOUS EVERY 24 HOURS
Status: DISCONTINUED | OUTPATIENT
Start: 2017-01-01 | End: 2017-01-01 | Stop reason: DRUGHIGH

## 2017-01-01 RX ORDER — LEVOTHYROXINE SODIUM 75 UG/1
75 TABLET ORAL
Status: DISCONTINUED | OUTPATIENT
Start: 2017-01-01 | End: 2017-01-01 | Stop reason: HOSPADM

## 2017-01-01 RX ORDER — DEXTROSE MONOHYDRATE 100 MG/ML
125-250 INJECTION, SOLUTION INTRAVENOUS AS NEEDED
Status: DISCONTINUED | OUTPATIENT
Start: 2017-01-01 | End: 2017-01-01 | Stop reason: HOSPADM

## 2017-01-01 RX ORDER — MAGNESIUM SULFATE 100 %
4 CRYSTALS MISCELLANEOUS AS NEEDED
Status: DISCONTINUED | OUTPATIENT
Start: 2017-01-01 | End: 2017-01-01 | Stop reason: HOSPADM

## 2017-01-01 RX ORDER — ONDANSETRON 2 MG/ML
4 INJECTION INTRAMUSCULAR; INTRAVENOUS
Status: DISCONTINUED | OUTPATIENT
Start: 2017-01-01 | End: 2017-01-01 | Stop reason: HOSPADM

## 2017-01-01 RX ORDER — VANCOMYCIN/0.9 % SOD CHLORIDE 1.5G/250ML
1500 PLASTIC BAG, INJECTION (ML) INTRAVENOUS EVERY 8 HOURS
Status: DISCONTINUED | OUTPATIENT
Start: 2017-01-01 | End: 2017-01-01

## 2017-01-01 RX ORDER — POTASSIUM CHLORIDE 750 MG/1
40 TABLET, FILM COATED, EXTENDED RELEASE ORAL 2 TIMES DAILY
Status: DISCONTINUED | OUTPATIENT
Start: 2017-01-01 | End: 2017-01-01

## 2017-01-01 RX ORDER — SODIUM CHLORIDE 0.9 % (FLUSH) 0.9 %
10-40 SYRINGE (ML) INJECTION EVERY 8 HOURS
Status: DISCONTINUED | OUTPATIENT
Start: 2017-01-01 | End: 2017-01-01 | Stop reason: HOSPADM

## 2017-01-01 RX ORDER — NYSTATIN 100000 [USP'U]/G
100000 POWDER TOPICAL 2 TIMES DAILY
Status: DISCONTINUED | OUTPATIENT
Start: 2017-01-01 | End: 2017-01-01 | Stop reason: HOSPADM

## 2017-01-01 RX ORDER — DOCUSATE SODIUM 100 MG/1
100 CAPSULE, LIQUID FILLED ORAL 2 TIMES DAILY
Status: DISCONTINUED | OUTPATIENT
Start: 2017-01-01 | End: 2017-01-01 | Stop reason: HOSPADM

## 2017-01-01 RX ORDER — HEPARIN 100 UNIT/ML
300 SYRINGE INTRAVENOUS AS NEEDED
Status: DISCONTINUED | OUTPATIENT
Start: 2017-01-01 | End: 2017-01-01 | Stop reason: HOSPADM

## 2017-01-01 RX ORDER — LEVOFLOXACIN 5 MG/ML
750 INJECTION, SOLUTION INTRAVENOUS
Status: DISCONTINUED | OUTPATIENT
Start: 2017-01-01 | End: 2017-01-01 | Stop reason: ALTCHOICE

## 2017-01-01 RX ORDER — VANCOMYCIN 2 GRAM/500 ML IN 0.9 % SODIUM CHLORIDE INTRAVENOUS
2000
Status: DISCONTINUED | OUTPATIENT
Start: 2017-01-01 | End: 2017-01-01

## 2017-01-01 RX ORDER — ENOXAPARIN SODIUM 100 MG/ML
40 INJECTION SUBCUTANEOUS EVERY 24 HOURS
Status: DISCONTINUED | OUTPATIENT
Start: 2017-01-01 | End: 2017-01-01

## 2017-01-01 RX ORDER — NYSTATIN 100000 [USP'U]/G
POWDER TOPICAL 4 TIMES DAILY
Qty: 1 BOTTLE | Refills: 1 | Status: SHIPPED | OUTPATIENT
Start: 2017-01-01 | End: 2017-01-01

## 2017-01-01 RX ORDER — CEPHALEXIN 750 MG/1
750 CAPSULE ORAL 4 TIMES DAILY
Qty: 20 CAP | Refills: 0 | Status: SHIPPED | OUTPATIENT
Start: 2017-01-01 | End: 2017-01-01

## 2017-01-01 RX ORDER — DOXYCYCLINE 100 MG/1
100 CAPSULE ORAL 2 TIMES DAILY
Qty: 10 CAP | Refills: 0 | Status: SHIPPED | OUTPATIENT
Start: 2017-01-01 | End: 2018-01-01

## 2017-01-01 RX ORDER — BISMUTH SUBSALICYLATE 262 MG
1 TABLET,CHEWABLE ORAL DAILY
COMMUNITY
End: 2017-01-01

## 2017-01-01 RX ORDER — LEVOTHYROXINE SODIUM 100 UG/1
100 TABLET ORAL
Status: DISCONTINUED | OUTPATIENT
Start: 2017-01-01 | End: 2017-01-01 | Stop reason: HOSPADM

## 2017-01-01 RX ORDER — GLUCOSAMINE SULFATE 1500 MG
1000 POWDER IN PACKET (EA) ORAL DAILY
COMMUNITY
End: 2018-01-01

## 2017-01-01 RX ORDER — DEXAMETHASONE SODIUM PHOSPHATE 4 MG/ML
4 INJECTION, SOLUTION INTRA-ARTICULAR; INTRALESIONAL; INTRAMUSCULAR; INTRAVENOUS; SOFT TISSUE EVERY 6 HOURS
Status: DISCONTINUED | OUTPATIENT
Start: 2017-01-01 | End: 2017-01-01

## 2017-01-01 RX ADMIN — CEFTRIAXONE 1 G: 1 INJECTION, POWDER, FOR SOLUTION INTRAMUSCULAR; INTRAVENOUS at 18:10

## 2017-01-01 RX ADMIN — DEXTROSE MONOHYDRATE 12.5 G: 25 INJECTION, SOLUTION INTRAVENOUS at 12:01

## 2017-01-01 RX ADMIN — ENOXAPARIN SODIUM 40 MG: 40 INJECTION SUBCUTANEOUS at 20:15

## 2017-01-01 RX ADMIN — SODIUM CHLORIDE 3 G: 900 INJECTION, SOLUTION INTRAVENOUS at 06:50

## 2017-01-01 RX ADMIN — POTASSIUM CHLORIDE 40 MEQ: 750 TABLET, FILM COATED, EXTENDED RELEASE ORAL at 21:38

## 2017-01-01 RX ADMIN — DOCUSATE SODIUM 100 MG: 100 CAPSULE, LIQUID FILLED ORAL at 08:26

## 2017-01-01 RX ADMIN — VITAMIN D, TAB 1000IU (100/BT) 1000 UNITS: 25 TAB at 09:25

## 2017-01-01 RX ADMIN — MICONAZOLE NITRATE: 20 CREAM TOPICAL at 09:32

## 2017-01-01 RX ADMIN — CEFTRIAXONE 1 G: 1 INJECTION, POWDER, FOR SOLUTION INTRAMUSCULAR; INTRAVENOUS at 18:59

## 2017-01-01 RX ADMIN — Medication 10 ML: at 22:15

## 2017-01-01 RX ADMIN — BUMETANIDE 1 MG: 0.25 INJECTION INTRAMUSCULAR; INTRAVENOUS at 05:12

## 2017-01-01 RX ADMIN — NYSTATIN 100000 UNITS: 100000 POWDER TOPICAL at 08:54

## 2017-01-01 RX ADMIN — POTASSIUM CHLORIDE 40 MEQ: 1.5 POWDER, FOR SOLUTION ORAL at 08:14

## 2017-01-01 RX ADMIN — Medication 1 CAPSULE: at 13:10

## 2017-01-01 RX ADMIN — LEVOTHYROXINE SODIUM 75 MCG: 75 TABLET ORAL at 09:32

## 2017-01-01 RX ADMIN — Medication 10 ML: at 03:48

## 2017-01-01 RX ADMIN — Medication 10 ML: at 22:11

## 2017-01-01 RX ADMIN — LEVOTHYROXINE SODIUM 75 MCG: 75 TABLET ORAL at 08:15

## 2017-01-01 RX ADMIN — MICONAZOLE NITRATE: 20 CREAM TOPICAL at 16:16

## 2017-01-01 RX ADMIN — Medication 1 CAPSULE: at 09:21

## 2017-01-01 RX ADMIN — SODIUM CHLORIDE 75 ML/HR: 900 INJECTION, SOLUTION INTRAVENOUS at 20:55

## 2017-01-01 RX ADMIN — Medication 10 ML: at 22:13

## 2017-01-01 RX ADMIN — Medication 1 CAPSULE: at 09:07

## 2017-01-01 RX ADMIN — Medication 10 ML: at 15:51

## 2017-01-01 RX ADMIN — Medication 10 ML: at 23:13

## 2017-01-01 RX ADMIN — NYSTATIN: 100000 POWDER TOPICAL at 09:44

## 2017-01-01 RX ADMIN — Medication 10 ML: at 21:31

## 2017-01-01 RX ADMIN — Medication 10 ML: at 14:59

## 2017-01-01 RX ADMIN — DOCUSATE SODIUM 100 MG: 100 CAPSULE, LIQUID FILLED ORAL at 08:32

## 2017-01-01 RX ADMIN — Medication 10 ML: at 22:02

## 2017-01-01 RX ADMIN — FERROUS SULFATE TAB 325 MG (65 MG ELEMENTAL FE) 325 MG: 325 (65 FE) TAB at 17:55

## 2017-01-01 RX ADMIN — Medication 10 ML: at 21:13

## 2017-01-01 RX ADMIN — ENOXAPARIN SODIUM 40 MG: 40 INJECTION SUBCUTANEOUS at 04:11

## 2017-01-01 RX ADMIN — LEVOTHYROXINE SODIUM 75 MCG: 75 TABLET ORAL at 08:02

## 2017-01-01 RX ADMIN — Medication 10 ML: at 06:50

## 2017-01-01 RX ADMIN — Medication 10 ML: at 04:16

## 2017-01-01 RX ADMIN — VANCOMYCIN HYDROCHLORIDE 1500 MG: 10 INJECTION, POWDER, LYOPHILIZED, FOR SOLUTION INTRAVENOUS at 09:14

## 2017-01-01 RX ADMIN — VANCOMYCIN HYDROCHLORIDE 1500 MG: 10 INJECTION, POWDER, LYOPHILIZED, FOR SOLUTION INTRAVENOUS at 18:56

## 2017-01-01 RX ADMIN — Medication 10 ML: at 19:00

## 2017-01-01 RX ADMIN — BUMETANIDE 1 MG: 0.25 INJECTION INTRAMUSCULAR; INTRAVENOUS at 04:20

## 2017-01-01 RX ADMIN — Medication 20 ML: at 04:56

## 2017-01-01 RX ADMIN — Medication 10 ML: at 22:10

## 2017-01-01 RX ADMIN — CEFTRIAXONE SODIUM 2 G: 2 INJECTION, POWDER, FOR SOLUTION INTRAMUSCULAR; INTRAVENOUS at 14:50

## 2017-01-01 RX ADMIN — SODIUM CHLORIDE 3 G: 900 INJECTION, SOLUTION INTRAVENOUS at 09:36

## 2017-01-01 RX ADMIN — FERROUS SULFATE TAB 325 MG (65 MG ELEMENTAL FE) 325 MG: 325 (65 FE) TAB at 18:10

## 2017-01-01 RX ADMIN — VITAMIN D, TAB 1000IU (100/BT) 1000 UNITS: 25 TAB at 08:54

## 2017-01-01 RX ADMIN — Medication 40 ML: at 05:47

## 2017-01-01 RX ADMIN — SODIUM CHLORIDE 3 G: 900 INJECTION, SOLUTION INTRAVENOUS at 22:06

## 2017-01-01 RX ADMIN — SODIUM CHLORIDE 3 G: 900 INJECTION, SOLUTION INTRAVENOUS at 04:12

## 2017-01-01 RX ADMIN — MICONAZOLE NITRATE: 20 CREAM TOPICAL at 17:25

## 2017-01-01 RX ADMIN — VANCOMYCIN HYDROCHLORIDE 1500 MG: 10 INJECTION, POWDER, LYOPHILIZED, FOR SOLUTION INTRAVENOUS at 00:51

## 2017-01-01 RX ADMIN — Medication 1 CAPSULE: at 09:36

## 2017-01-01 RX ADMIN — NYSTATIN: 100000 POWDER TOPICAL at 09:06

## 2017-01-01 RX ADMIN — ENOXAPARIN SODIUM 40 MG: 40 INJECTION SUBCUTANEOUS at 09:32

## 2017-01-01 RX ADMIN — ENOXAPARIN SODIUM 40 MG: 40 INJECTION SUBCUTANEOUS at 02:40

## 2017-01-01 RX ADMIN — NYSTATIN: 100000 POWDER TOPICAL at 17:59

## 2017-01-01 RX ADMIN — ENOXAPARIN SODIUM 40 MG: 40 INJECTION SUBCUTANEOUS at 18:10

## 2017-01-01 RX ADMIN — LEVOTHYROXINE SODIUM 100 MCG: 100 TABLET ORAL at 09:25

## 2017-01-01 RX ADMIN — VITAMIN D, TAB 1000IU (100/BT) 1000 UNITS: 25 TAB at 08:27

## 2017-01-01 RX ADMIN — NYSTATIN 100000 UNITS: 100000 POWDER TOPICAL at 22:39

## 2017-01-01 RX ADMIN — LEVOTHYROXINE SODIUM 75 MCG: 75 TABLET ORAL at 09:07

## 2017-01-01 RX ADMIN — ENOXAPARIN SODIUM 40 MG: 40 INJECTION SUBCUTANEOUS at 22:05

## 2017-01-01 RX ADMIN — Medication 10 ML: at 18:04

## 2017-01-01 RX ADMIN — ENOXAPARIN SODIUM 40 MG: 40 INJECTION SUBCUTANEOUS at 08:15

## 2017-01-01 RX ADMIN — ENOXAPARIN SODIUM 40 MG: 40 INJECTION SUBCUTANEOUS at 18:58

## 2017-01-01 RX ADMIN — Medication 1 CAPSULE: at 09:32

## 2017-01-01 RX ADMIN — Medication 10 ML: at 21:30

## 2017-01-01 RX ADMIN — DOCUSATE SODIUM 100 MG: 100 CAPSULE, LIQUID FILLED ORAL at 19:00

## 2017-01-01 RX ADMIN — ENOXAPARIN SODIUM 40 MG: 40 INJECTION SUBCUTANEOUS at 09:21

## 2017-01-01 RX ADMIN — ENOXAPARIN SODIUM 40 MG: 40 INJECTION SUBCUTANEOUS at 08:33

## 2017-01-01 RX ADMIN — NYSTATIN 100000 UNITS: 100000 POWDER TOPICAL at 09:33

## 2017-01-01 RX ADMIN — CEFTRIAXONE 1 G: 1 INJECTION, POWDER, FOR SOLUTION INTRAMUSCULAR; INTRAVENOUS at 17:45

## 2017-01-01 RX ADMIN — ENOXAPARIN SODIUM 40 MG: 40 INJECTION SUBCUTANEOUS at 20:02

## 2017-01-01 RX ADMIN — DOXYCYCLINE HYCLATE 100 MG: 100 TABLET, COATED ORAL at 08:27

## 2017-01-01 RX ADMIN — BUMETANIDE 1 MG: 0.25 INJECTION INTRAMUSCULAR; INTRAVENOUS at 04:55

## 2017-01-01 RX ADMIN — SODIUM CHLORIDE 3 G: 900 INJECTION, SOLUTION INTRAVENOUS at 03:18

## 2017-01-01 RX ADMIN — Medication 10 ML: at 15:00

## 2017-01-01 RX ADMIN — SODIUM CHLORIDE 3 G: 900 INJECTION, SOLUTION INTRAVENOUS at 08:32

## 2017-01-01 RX ADMIN — Medication 10 ML: at 17:25

## 2017-01-01 RX ADMIN — Medication 10 ML: at 04:17

## 2017-01-01 RX ADMIN — Medication 10 ML: at 16:15

## 2017-01-01 RX ADMIN — ENOXAPARIN SODIUM 40 MG: 40 INJECTION SUBCUTANEOUS at 19:31

## 2017-01-01 RX ADMIN — Medication 10 ML: at 05:00

## 2017-01-01 RX ADMIN — ENOXAPARIN SODIUM 40 MG: 40 INJECTION SUBCUTANEOUS at 17:51

## 2017-01-01 RX ADMIN — POTASSIUM CHLORIDE 40 MEQ: 1.5 POWDER, FOR SOLUTION ORAL at 17:23

## 2017-01-01 RX ADMIN — Medication 10 ML: at 13:49

## 2017-01-01 RX ADMIN — CEFTRIAXONE SODIUM 2 G: 2 INJECTION, POWDER, FOR SOLUTION INTRAMUSCULAR; INTRAVENOUS at 16:09

## 2017-01-01 RX ADMIN — ACETAMINOPHEN 650 MG: 325 TABLET, FILM COATED ORAL at 21:38

## 2017-01-01 RX ADMIN — DEXTROSE MONOHYDRATE AND SODIUM CHLORIDE 100 ML/HR: 5; .45 INJECTION, SOLUTION INTRAVENOUS at 12:37

## 2017-01-01 RX ADMIN — SODIUM CHLORIDE 3 G: 900 INJECTION, SOLUTION INTRAVENOUS at 14:07

## 2017-01-01 RX ADMIN — NYSTATIN: 100000 POWDER TOPICAL at 11:37

## 2017-01-01 RX ADMIN — SODIUM CHLORIDE 3 G: 900 INJECTION, SOLUTION INTRAVENOUS at 02:37

## 2017-01-01 RX ADMIN — Medication 10 ML: at 16:10

## 2017-01-01 RX ADMIN — Medication 10 ML: at 22:32

## 2017-01-01 RX ADMIN — CEFTRIAXONE SODIUM 2 G: 2 INJECTION, POWDER, FOR SOLUTION INTRAMUSCULAR; INTRAVENOUS at 14:59

## 2017-01-01 RX ADMIN — ENOXAPARIN SODIUM 40 MG: 40 INJECTION SUBCUTANEOUS at 20:26

## 2017-01-01 RX ADMIN — VITAMIN D, TAB 1000IU (100/BT) 1000 UNITS: 25 TAB at 09:32

## 2017-01-01 RX ADMIN — SODIUM CHLORIDE 75 ML/HR: 900 INJECTION, SOLUTION INTRAVENOUS at 10:40

## 2017-01-01 RX ADMIN — VANCOMYCIN HYDROCHLORIDE 3000 MG: 10 INJECTION, POWDER, LYOPHILIZED, FOR SOLUTION INTRAVENOUS at 16:53

## 2017-01-01 RX ADMIN — LEVOTHYROXINE SODIUM 75 MCG: 75 TABLET ORAL at 08:26

## 2017-01-01 RX ADMIN — POTASSIUM CHLORIDE 40 MEQ: 750 TABLET, FILM COATED, EXTENDED RELEASE ORAL at 17:57

## 2017-01-01 RX ADMIN — ENOXAPARIN SODIUM 40 MG: 40 INJECTION SUBCUTANEOUS at 14:35

## 2017-01-01 RX ADMIN — SODIUM CHLORIDE 3 G: 900 INJECTION, SOLUTION INTRAVENOUS at 13:39

## 2017-01-01 RX ADMIN — ENOXAPARIN SODIUM 40 MG: 40 INJECTION SUBCUTANEOUS at 13:11

## 2017-01-01 RX ADMIN — ENOXAPARIN SODIUM 40 MG: 40 INJECTION SUBCUTANEOUS at 05:39

## 2017-01-01 RX ADMIN — ENOXAPARIN SODIUM 40 MG: 40 INJECTION SUBCUTANEOUS at 06:49

## 2017-01-01 RX ADMIN — Medication 1 CAPSULE: at 08:15

## 2017-01-01 RX ADMIN — ENOXAPARIN SODIUM 40 MG: 40 INJECTION SUBCUTANEOUS at 06:03

## 2017-01-01 RX ADMIN — Medication 10 ML: at 16:16

## 2017-01-01 RX ADMIN — Medication 10 ML: at 05:01

## 2017-01-01 RX ADMIN — BUMETANIDE 1 MG: 0.25 INJECTION INTRAMUSCULAR; INTRAVENOUS at 15:31

## 2017-01-01 RX ADMIN — DEXTROSE MONOHYDRATE AND SODIUM CHLORIDE 100 ML/HR: 5; .45 INJECTION, SOLUTION INTRAVENOUS at 20:07

## 2017-01-01 RX ADMIN — LEVOTHYROXINE SODIUM 100 MCG: 100 TABLET ORAL at 06:49

## 2017-01-01 RX ADMIN — SODIUM CHLORIDE 3 G: 900 INJECTION, SOLUTION INTRAVENOUS at 21:38

## 2017-01-01 RX ADMIN — Medication 10 ML: at 14:08

## 2017-01-01 RX ADMIN — ENOXAPARIN SODIUM 40 MG: 40 INJECTION SUBCUTANEOUS at 09:36

## 2017-01-01 RX ADMIN — Medication 10 ML: at 22:09

## 2017-01-01 RX ADMIN — Medication 10 ML: at 05:39

## 2017-01-01 RX ADMIN — Medication 60 ML: at 03:19

## 2017-01-01 RX ADMIN — Medication 10 ML: at 07:26

## 2017-01-01 RX ADMIN — VANCOMYCIN HYDROCHLORIDE 2000 MG: 10 INJECTION, POWDER, LYOPHILIZED, FOR SOLUTION INTRAVENOUS at 10:24

## 2017-01-01 RX ADMIN — CEFTRIAXONE SODIUM 2 G: 2 INJECTION, POWDER, FOR SOLUTION INTRAMUSCULAR; INTRAVENOUS at 16:14

## 2017-01-01 RX ADMIN — Medication 10 ML: at 13:11

## 2017-01-01 RX ADMIN — BUMETANIDE 1 MG: 0.25 INJECTION INTRAMUSCULAR; INTRAVENOUS at 16:14

## 2017-01-01 RX ADMIN — DOXYCYCLINE HYCLATE 100 MG: 100 TABLET, COATED ORAL at 09:25

## 2017-01-01 RX ADMIN — DOXYCYCLINE HYCLATE 100 MG: 100 TABLET, COATED ORAL at 16:15

## 2017-01-01 RX ADMIN — LEVOTHYROXINE SODIUM 75 MCG: 75 TABLET ORAL at 09:36

## 2017-01-01 RX ADMIN — SODIUM CHLORIDE 3 G: 900 INJECTION, SOLUTION INTRAVENOUS at 20:33

## 2017-01-01 RX ADMIN — Medication 10 ML: at 16:11

## 2017-01-01 RX ADMIN — POTASSIUM CHLORIDE 40 MEQ: 750 TABLET, FILM COATED, EXTENDED RELEASE ORAL at 16:10

## 2017-01-01 RX ADMIN — SODIUM CHLORIDE 3 G: 900 INJECTION, SOLUTION INTRAVENOUS at 09:06

## 2017-01-01 RX ADMIN — NYSTATIN 100000 UNITS: 100000 POWDER TOPICAL at 18:25

## 2017-01-01 RX ADMIN — ENOXAPARIN SODIUM 40 MG: 40 INJECTION SUBCUTANEOUS at 21:01

## 2017-01-01 RX ADMIN — DOXYCYCLINE HYCLATE 100 MG: 100 TABLET, COATED ORAL at 22:10

## 2017-01-01 RX ADMIN — Medication 1 CAPSULE: at 08:30

## 2017-01-01 RX ADMIN — MICONAZOLE NITRATE: 20 CREAM TOPICAL at 08:16

## 2017-01-01 RX ADMIN — BUMETANIDE 1 MG: 0.25 INJECTION INTRAMUSCULAR; INTRAVENOUS at 15:00

## 2017-01-01 RX ADMIN — FERROUS SULFATE TAB 325 MG (65 MG ELEMENTAL FE) 325 MG: 325 (65 FE) TAB at 18:25

## 2017-01-01 RX ADMIN — NYSTATIN 100000 UNITS: 100000 POWDER TOPICAL at 18:11

## 2017-01-01 RX ADMIN — MICONAZOLE NITRATE: 20 CREAM TOPICAL at 08:35

## 2017-01-01 RX ADMIN — CEFTRIAXONE 1 G: 1 INJECTION, POWDER, FOR SOLUTION INTRAMUSCULAR; INTRAVENOUS at 19:31

## 2017-01-01 RX ADMIN — SODIUM CHLORIDE 1000 ML: 900 INJECTION, SOLUTION INTRAVENOUS at 16:53

## 2017-01-01 RX ADMIN — ENOXAPARIN SODIUM 40 MG: 40 INJECTION SUBCUTANEOUS at 14:06

## 2017-01-01 RX ADMIN — LEVOTHYROXINE SODIUM 75 MCG: 75 TABLET ORAL at 08:08

## 2017-01-01 RX ADMIN — NYSTATIN: 100000 POWDER TOPICAL at 18:02

## 2017-01-01 RX ADMIN — POTASSIUM CHLORIDE 40 MEQ: 750 TABLET, FILM COATED, EXTENDED RELEASE ORAL at 16:25

## 2017-01-01 RX ADMIN — Medication 30 ML: at 02:37

## 2017-01-01 RX ADMIN — MICONAZOLE NITRATE: 20 CREAM TOPICAL at 12:26

## 2017-01-01 RX ADMIN — CEFEPIME HYDROCHLORIDE 2 G: 2 INJECTION, POWDER, FOR SOLUTION INTRAVENOUS at 14:40

## 2017-01-01 RX ADMIN — Medication 20 ML: at 04:26

## 2017-01-01 RX ADMIN — SODIUM CHLORIDE 3 G: 900 INJECTION, SOLUTION INTRAVENOUS at 21:15

## 2017-01-01 RX ADMIN — COSYNTROPIN 0.25 MG: 0.25 INJECTION, POWDER, LYOPHILIZED, FOR SOLUTION INTRAMUSCULAR; INTRAVENOUS at 21:09

## 2017-01-01 RX ADMIN — MICONAZOLE NITRATE: 20 CREAM TOPICAL at 16:30

## 2017-01-01 RX ADMIN — NYSTATIN 100000 UNITS: 100000 POWDER TOPICAL at 22:11

## 2017-01-01 RX ADMIN — VANCOMYCIN HYDROCHLORIDE 2000 MG: 10 INJECTION, POWDER, LYOPHILIZED, FOR SOLUTION INTRAVENOUS at 05:16

## 2017-01-01 RX ADMIN — SODIUM CHLORIDE 3 G: 900 INJECTION, SOLUTION INTRAVENOUS at 14:10

## 2017-01-01 RX ADMIN — CEFTRIAXONE 1 G: 1 INJECTION, POWDER, FOR SOLUTION INTRAMUSCULAR; INTRAVENOUS at 16:14

## 2017-01-01 RX ADMIN — NYSTATIN 100000 UNITS: 100000 POWDER TOPICAL at 09:26

## 2017-01-01 RX ADMIN — NYSTATIN 100000 UNITS: 100000 POWDER TOPICAL at 08:33

## 2017-01-01 RX ADMIN — Medication 10 ML: at 21:38

## 2017-01-01 RX ADMIN — Medication 10 ML: at 21:14

## 2017-01-01 RX ADMIN — Medication 10 ML: at 05:11

## 2017-01-01 RX ADMIN — Medication 10 ML: at 18:03

## 2017-01-01 RX ADMIN — BUMETANIDE 1 MG: 0.25 INJECTION INTRAMUSCULAR; INTRAVENOUS at 16:10

## 2017-01-01 RX ADMIN — ENOXAPARIN SODIUM 40 MG: 40 INJECTION SUBCUTANEOUS at 07:26

## 2017-01-01 RX ADMIN — DOXYCYCLINE HYCLATE 100 MG: 100 TABLET, COATED ORAL at 21:30

## 2017-01-01 RX ADMIN — LEVOTHYROXINE SODIUM 100 MCG: 100 TABLET ORAL at 08:27

## 2017-01-01 RX ADMIN — Medication 30 ML: at 05:11

## 2017-01-01 RX ADMIN — LEVOTHYROXINE SODIUM 100 MCG: 100 TABLET ORAL at 09:32

## 2017-06-19 PROBLEM — L03.90 CELLULITIS: Status: ACTIVE | Noted: 2017-01-01

## 2017-06-19 NOTE — ED PROVIDER NOTES
HPI     Past Medical History:   Diagnosis Date    A-fib ([facility] [address]     Hypothyroid     Other ill-defined conditions(799.89)     obesity    Other ill-defined conditions(799.89)     mentally disabled    Prader-Willi syndrome     S/P cardiac pacemaker procedure 2/15/2013    heart block       Past Surgical History:   Procedure Laterality Date    HX HEENT      eye surgery as a baby    HX PACEMAKER           Family History:   Problem Relation Age of Onset    Hypertension Mother     Heart Disease Father        Social History     Social History    Marital status: SINGLE     Spouse name: N/A    Number of children: N/A    Years of education: N/A     Occupational History    Not on file. Social History Main Topics    Smoking status: Never Smoker    Smokeless tobacco: Not on file    Alcohol use No    Drug use: No    Sexual activity: No     Other Topics Concern    Not on file     Social History Narrative         ALLERGIES: Review of patient's allergies indicates no known allergies. Review of Systems    There were no vitals filed for this visit.          Physical Exam     MDM  ED Course       Procedures

## 2017-06-19 NOTE — PROGRESS NOTES
Pharmacy Automatic Renal Dosing Protocol - Antimicrobials      Indication for Antimicrobials: Cellulitis  Current Regimen of Each Antimicrobial (Start Day & Day of Therapy):  Vancomycin 3000 mg then 1500 mg Q8H ( day 1)  Unasyn 3 grams Q8H ( day 1)    Significant cultures: None        CAPD, Intermittent Hemodialysis or Renal Replacement Therapy: no  Paralysis, amputations, malnutrition: No  Recent Labs      17   1152   CREA  0.65*   BUN  16   WBC  14.9*     Temp (24hrs), Av.7 °F (36.5 °C), Min:97.7 °F (36.5 °C), Max:97.7 °F (36.5 °C)    Creatinine Clearance (ml/min): 112      Goal Vancomycin Level(s): 10-15 mcg/ml      Impression/Plan: Vancomycin 3000 mg loading dose then 1500 mg Q8H with anticipated trough of 15 mcg/ml. Levels be ordered twice weekly due to extreme weight. Pharmacy will follow daily and adjust doses of monitored medications as appropriate for renal function and/or serum levels.     Thank you,  Sonido Stanford, [facility]

## 2017-06-19 NOTE — IP AVS SNAPSHOT
[address] [facility] 
[phone] Patient: Iesha Daily MRN: [id number] :1965 You are allergic to the following No active allergies Recent Documentation Height Weight BMI  
  
  
 1.626 m 148.5 kg 56.2 kg/m2 Emergency Contacts  (Rel.) Home Phone Work Phone Mobile Phone Kelly Ellis (Mother) [phone] -- -- About your hospitalization You were admitted on:  2017 You last received care in the:  [address] You were discharged on:  2017 Why you were hospitalized Your primary diagnosis was:  Not on File Your diagnoses also included:  Cellulitis Providers Seen During Your Hospitalizations Provider Role Specialty Primary office phone Kanu Ruby MD Attending Provider Emergency Medicine [phone] Tr Ruiz MD Attending Provider Internal Medicine [phone] Erika Heath MD Attending Provider Internal Medicine [phone] Your Primary Care Physician (PCP) Primary Care Physician Office Phone Office Fax Leti Sol [phone] Follow-up Information Follow up With Details Comments Contact Info Kelle Adler MD On 7/10/2017 Dr. Mauricio Laura will call patient with appointment time [address] [facility] 
[phone] Current Discharge Medication List  
  
START taking these medications Dose & Instructions Dispensing Information Comments Morning Noon Evening Bedtime  
 cephalexin 750 mg capsule Commonly known as:  Mamta Villalpando Your last dose was: Your next dose is:    
   
   
 Dose:  750 mg Take 1 Cap by mouth four (4) times daily for 5 days. Quantity:  20 Cap Refills:  0 L. acidoph & paracasei- S therm- Bifido 8 billion cell Cap cap Commonly known as:  MARCO-Q/RISAQUAD Your last dose was: Your next dose is:    
   
   
 Dose:  1 Cap Take 1 Cap by mouth daily for 5 days. Quantity:  5 Cap Refills:  0 CONTINUE these medications which have CHANGED Dose & Instructions Dispensing Information Comments Morning Noon Evening Bedtime * nystatin powder Commonly known as:  MYCOSTATIN What changed:  Another medication with the same name was added. Make sure you understand how and when to take each. Your last dose was: Your next dose is:    
   
   
 Apply  to affected area four (4) times daily as needed. Refills:  0  
     
   
   
   
  
 * nystatin powder Commonly known as:  MYCOSTATIN What changed: You were already taking a medication with the same name, and this prescription was added. Make sure you understand how and when to take each. Your last dose was: Your next dose is:    
   
   
 Apply  to affected area four (4) times daily. Quantity:  1 Bottle Refills:  1  
     
   
   
   
  
 * Notice: This list has 2 medication(s) that are the same as other medications prescribed for you. Read the directions carefully, and ask your doctor or other care provider to review them with you. CONTINUE these medications which have NOT CHANGED Dose & Instructions Dispensing Information Comments Morning Noon Evening Bedtime  
 levothyroxine 100 mcg tablet Commonly known as:  SYNTHROID Your last dose was: Your next dose is:    
   
   
 Dose:  100 mcg Take 100 mcg by mouth Daily (before breakfast). Refills:  0  
     
   
   
   
  
 multivitamin tablet Commonly known as:  ONE A DAY Your last dose was: Your next dose is:    
   
   
 Dose:  1 Tab Take 1 Tab by mouth daily. Refills:  0  
     
   
   
   
  
 VITAMIN D3 1,000 unit Cap Generic drug:  cholecalciferol Your last dose was: Your next dose is:    
   
   
 Dose:  1000 Units Take 1,000 Units by mouth daily. Refills:  0 Where to Get Your Medications Information on where to get these meds will be given to you by the nurse or doctor. ! Ask your nurse or doctor about these medications  
  cephalexin 750 mg capsule L. acidoph & paracasei- S therm- Bifido 8 billion cell Cap cap  
 nystatin powder Discharge Instructions HOSPITALIST DISCHARGE INSTRUCTIONS 
 
NAME: Gloria Gonsales :  1965 MRN:  [id number] Date/Time:  2017 11:55 AM 
 
ADMIT DATE: 2017 DISCHARGE DATE: 2017 DISCHARGE DIAGNOSIS: 
Acute RLE cellulitis POA - improved s/p IV Antibiotics in hospital, now cont PO kelfex x 5 more days 
in setting of chronic LE lymphedema Antibiotic associated diarrhea - cont probiotics with Antibiotics Intertrigo - nystatin Prader-Willi syndrome with mental retardation. Hypothyroidism- cont synthroid Morbid Obesity / OHS on home oxygen Hx Afib, s/p PPM 
  
 
Active Problems: 
  Cellulitis (2017) MEDICATIONS: 
As per medication reconciliation  list 
· It is important that you take the medication exactly as they are prescribed. · Keep your medication in the bottles provided by the pharmacist and keep a list of the medication names, dosages, and times to be taken in your wallet. · Do not take other medications without consulting your doctor. Pain Management: per above medications What to do at [facility] Recommended diet:  Cardiac Diet Recommended activity: Activity as tolerated If you have questions regarding the hospital related prescriptions or hospital related issues please call Emperatriz Spencer at .  
 
If you experience any of the following symptoms then please call your primary care physician or return to the emergency room if you cannot get hold of your doctor: 
Fever, chills, nausea, vomiting, diarrhea, change in mentation, falling, bleeding, shortness of breath, Follow Up: 
Dr. Elisa Arias MD  you are to call and set up an appointment to see them in 7-10 days. [address] [address] services for PT as arranged Information obtained by : 
I understand that if any problems occur once I am at home I am to contact my physician. I understand and acknowledge receipt of the instructions indicated above. Physician's or R.N.'s Signature                                                                  Date/Time Patient or Representative Signature                                                          Date/Time Discharge Orders None [facility] Announcement We are excited to announce that we are making your provider's discharge notes available to you in [facility]. You will see these notes when they are completed and signed by the physician that discharged you from your recent hospital stay. If you have any questions or concerns about any information you see in [facility], please call the Health Information Department where you were seen or reach out to your Primary Care Provider for more information about your plan of care. Introducing [facility]! Jenny Finn introduces [facility] patient portal. Now you can access parts of your medical record, email your doctor's office, and request medication refills online. 1. In your internet browser, go to [email or web address] 2. Click on the First Time User? Click Here link in the Sign In box.  You will see the New Member Sign Up page. 3. Enter your [employer] Access Code exactly as it appears below. You will not need to use this code after youve completed the sign-up process. If you do not sign up before the expiration date, you must request a new code. · [employer] Access Code: [email or web address] Expires: 9/17/2017 10:28 AM 
 
4. Enter the last four digits of your Social Security Number (xxxx) and Date of Birth (mm/dd/yyyy) as indicated and click Submit. You will be taken to the next sign-up page. 5. Create a Topix ID. This will be your [employer]ix login ID and cannot be changed, so think of one that is secure and easy to remember. 6. Create a [employer]ix password. You can change your password at any time. 7. Enter your Password Reset Question and Answer. This can be used at a later time if you forget your password. 8. Enter your e-mail address. You will receive e-mail notification when new information is available in [address] 9. Click Sign Up. You can now view and download portions of your medical record. 10. Click the Download Summary menu link to download a portable copy of your medical information. If you have questions, please visit the Frequently Asked Questions section of the [employer] website. Remember, [employer] is NOT to be used for urgent needs. For medical emergencies, dial 911. Now available from your iPhone and Android! General Information Please provide this summary of care documentation to your next provider. Patient Signature:  ____________________________________________________________ Date:  ____________________________________________________________  
  
Sylvester Elke Provider Signature:  ____________________________________________________________ Date:  ____________________________________________________________

## 2017-06-19 NOTE — H&P
Hospitalist Admission Note    NAME: Gloria Gonsales   :  1965   MRN:  [id number]     Date/Time:  2017 6:51 PM    Patient PCP: Joan Juárez, MD  ________________________________________________________________________    My assessment of this patient's clinical condition and my plan of care is as follows. Assessment / Plan:  R LE cellulites   + leukocytosis, lactic acid is normal   C/w Empiric Vanco + Unasyn  If not improving consider CT to r/o abscess    Follow BC   Wound care consult   Duplex US: negative for DVT   PT/OT     Thrombocytopenia  Baseline plt count is unknown   Monitor closely    Hypoglycemia   BMP with BS 62   No h/o DM   Check TSH  C/w D5W, wean as able     Hypothyroidism  Continue synthroid     H/o PPM   Obesity, Body mass index is 56.2 kg/(m^2). Code Status: Full code d/w mother   Surrogate Decision Maker: mother     DVT Prophylaxis: Lovenox   GI Prophylaxis: not indicated    Baseline: lives with mother; MR at baseline; ambulating with walker; uses a lift to climb stairs        Subjective:   CHIEF COMPLAINT: R LE redness     HISTORY OF PRESENT ILLNESS:     Gloria Gonsales is a 46 y.o.  male who presents with above complaint. Pt has h/o MR and cannot contribute much to the history. History was obtained from mother at bedside. Per mother, pt started with R LE redness last evening. Redness started at the thigh area and spread down to ankle over night. Pt has h/o recurrent cellulitis. No fever/chills. Pt at baseline is ambulating with walker.      Vs: 97.7 °F (36.5 °C) - 75 - 110/65 - 16 - 92% RA     We were asked to admit for work up and evaluation of the above problems.      Past Medical History:   Diagnosis Date    Hypothyroidism     Mental retardation         Surgical history: none per mother     Social History   Substance Use Topics    Smoking status: Not on file    Smokeless tobacco: Not on file    Alcohol use Not on file        Family history: per mother no Fx of DM     No Known Allergies     Prior to Admission medications    Medication Sig Start Date End Date Taking? Authorizing Provider   levothyroxine (SYNTHROID) 75 mcg tablet Take  by mouth Daily (before breakfast). Yes Historical Provider       REVIEW OF SYSTEMS:     I am not able to complete the review of systems because: The patient is intubated and sedated    The patient has altered mental status due to his acute medical problems    The patient has baseline aphasia from prior stroke(s)   y The patient has baseline MR and is not reliable historian    The patient is in acute medical distress and unable to provide information             Objective:   VITALS:    Visit Vitals    /65 (BP 1 Location: Left arm, BP Patient Position: At rest)    Pulse 75    Temp 97.7 °F (36.5 °C)    Resp 16    Ht 5' 4\" (1.626 m)    Wt 148.5 kg (327 lb 6.1 oz)    SpO2 92%    BMI 56.2 kg/m2       PHYSICAL EXAM:    General:    Alert, cooperative, no distress, appears stated age. Obese    HEENT: Atraumatic, anicteric sclerae, pink conjunctivae     No oral ulcers, mucosa moist, throat clear, dentition fair  Neck:  Supple, symmetrical,  thyroid: non tender  Lungs:   Clear to auscultation bilaterally. No Wheezing or Rhonchi. No rales. Chest wall:  No tenderness  No Accessory muscle use. Heart:   Regular  rhythm,  No  murmur   No edema  Abdomen:   Soft, non-tender. Not distended. Bowel sounds normal  Extremities: No cyanosis. No clubbing,      Skin turgor normal, Capillary refill normal, Radial dial pulse 2+                          R LE erythema from ankle up to groin area, + thigh medial side induration wo fluctuation, + warm to touch   Skin:     Not pale. Not Jaundiced  No rashes   Psych:  Poor insight. Not depressed. Not anxious or agitated. Neurologic: EOMs intact. No facial asymmetry. Not communicating but appropriately answering yes/no questions with nodding his head.  Symmetrical strength, awake/alert/following commands     _______________________________________________________________________  Care Plan discussed with:    Comments   Patient y    Family  y Mother bedside    RN y    Care Manager                    Consultant:  lynn ED provider    _______________________________________________________________________  Expected  Disposition:   Home with Family    HH/PT/OT/RN y   SNF/LTC    BETZAIDA    ________________________________________________________________________  TOTAL TIME:  72  Minutes    Critical Care Provided     Minutes non procedure based      Comments    y Reviewed previous records    y Discussion with patient and/or family and questions answered       ________________________________________________________________________  Signed: Lamont Carrillo MD    Procedures: see electronic medical records for all procedures/Xrays and details which were not copied into this note but were reviewed prior to creation of Plan. LAB DATA REVIEWED:    Recent Results (from the past 24 hour(s))   CBC WITH AUTOMATED DIFF    Collection Time: 06/19/17 11:52 AM   Result Value Ref Range    WBC 14.9 (H) 4.1 - 11.1 K/uL    RBC 3.97 (L) 4.10 - 5.70 M/uL    HGB 12.4 12.1 - 17.0 g/dL    HCT 37.4 36.6 - 50.3 %    MCV 94.2 80.0 - 99.0 FL    MCH 31.2 26.0 - 34.0 PG    MCHC 33.2 30.0 - 36.5 g/dL    RDW 15.9 (H) 11.5 - 14.5 %    PLATELET 474 (L) 459 - 400 K/uL    NEUTROPHILS 96 (H) 32 - 75 %    LYMPHOCYTES 2 (L) 12 - 49 %    MONOCYTES 2 (L) 5 - 13 %    EOSINOPHILS 0 0 - 7 %    BASOPHILS 0 0 - 1 %    ABS. NEUTROPHILS 14.3 (H) 1.8 - 8.0 K/UL    ABS. LYMPHOCYTES 0.3 (L) 0.8 - 3.5 K/UL    ABS. MONOCYTES 0.3 0.0 - 1.0 K/UL    ABS. EOSINOPHILS 0.0 0.0 - 0.4 K/UL    ABS.  BASOPHILS 0.0 0.0 - 0.1 K/UL    RBC COMMENTS NORMOCYTIC, NORMOCHROMIC     METABOLIC PANEL, COMPREHENSIVE    Collection Time: 06/19/17 11:52 AM   Result Value Ref Range    Sodium 140 136 - 145 mmol/L    Potassium 4.0 3.5 - 5.1 mmol/L    Chloride 103 97 - 108 mmol/L    CO2 21 21 - 32 mmol/L    Anion gap 16 (H) 5 - 15 mmol/L    Glucose 62 (L) 65 - 100 mg/dL    BUN 16 6 - 20 MG/DL    Creatinine 0.65 (L) 0.70 - 1.30 MG/DL    BUN/Creatinine ratio 25 (H) 12 - 20      GFR est AA >60 >60 ml/min/1.73m2    GFR est non-AA >60 >60 ml/min/1.73m2    Calcium 7.7 (L) 8.5 - 10.1 MG/DL    Bilirubin, total 0.6 0.2 - 1.0 MG/DL    ALT (SGPT) 33 12 - 78 U/L    AST (SGOT) 47 (H) 15 - 37 U/L    Alk.  phosphatase 82 45 - 117 U/L    Protein, total 7.8 6.4 - 8.2 g/dL    Albumin 1.9 (L) 3.5 - 5.0 g/dL    Globulin 5.9 (H) 2.0 - 4.0 g/dL    A-G Ratio 0.3 (L) 1.1 - 2.2     PROTHROMBIN TIME + INR    Collection Time: 06/19/17 11:52 AM   Result Value Ref Range    INR 1.2 (H) 0.9 - 1.1      Prothrombin time 12.0 (H) 9.0 - 11.1 sec   LACTIC ACID, PLASMA    Collection Time: 06/19/17  4:13 PM   Result Value Ref Range    Lactic acid 1.2 0.4 - 2.0 MMOL/L

## 2017-06-19 NOTE — ED PROVIDER NOTES
HPI Comments: Roxi Arredondo is a 46 y.o. male with PMHx significant for Cellulitis and a pacemaker, who presents via EMS to [facility] ER ED with cc of a rash that has spread from his right leg to his thigh since yesterday. Per the pt's mother, pt has a hx of cellulitis in October, and notes that it is currently not as red or warm as it was before. She also notes that pt eats normally and is able to ambulate, but was much slower today when attempting to ambulate. Pt currently has no complaints, although his mother is concerned about his urine being slightly darkened. Mother specifically denies fever, vomiting, or dysuria. PCP:No primary care provider on file. Social Hx: - smoking; - EtOH; - illicit drug use    There are no other complains, changes, or physical findings at this time. The history is provided by a parent and the patient. No  was used. No past medical history on file. No past surgical history on file. No family history on file. Social History     Social History    Marital status: SINGLE     Spouse name: N/A    Number of children: N/A    Years of education: N/A     Occupational History    Not on file. Social History Main Topics    Smoking status: Not on file    Smokeless tobacco: Not on file    Alcohol use Not on file    Drug use: Not on file    Sexual activity: Not on file     Other Topics Concern    Not on file     Social History Narrative         ALLERGIES: Review of patient's allergies indicates no known allergies. Review of Systems   Constitutional: Negative for chills and fever. HENT: Negative for congestion. Eyes: Negative for visual disturbance. Respiratory: Negative for chest tightness. Cardiovascular: Negative for chest pain and leg swelling. Gastrointestinal: Negative for abdominal pain and vomiting. Endocrine: Negative for polyuria. Genitourinary: Negative for dysuria and frequency.    Musculoskeletal: Negative for myalgias. Skin: Positive for rash. Negative for color change. Allergic/Immunologic: Negative for immunocompromised state. Neurological: Negative for numbness. Patient Vitals for the past 12 hrs:   Temp Pulse Resp BP SpO2   06/19/17 1030 97.7 °F (36.5 °C) 75 16 110/65 92 %     Physical Exam     Nursing note and vitals reviewed. General appearance: non-toxic, NAD  Eyes: conjunctiva normal, anicteric sclera  HEENT: lips slightly dry  Pulmonary: clear to auscultation bilaterally. Cardiac: normal rate and regular rhythm, no murmurs, gallops, or rubs, 2+DP pulses, 2+ radial pulses  Abdomen: soft, nontender, obese, bowel sounds present  MSK: chronic lymphedema B/L LE's;  No crepitus, compartments are soft. Neuro: Alert, answers questions w/ mother's assistance; minimal verbal communication  Skin: capillary refill brisk,  erythema and warmth noted on proximal right thigh, lateral thigh to knee; blanchable; no petechiae; no vesicles or bullae. Intertriginous erythema noted on upper torso suspicious for cutaneous candidiasis. MDM  Number of Diagnoses or Management Options  Cellulitis of right leg:   Leukocytosis, unspecified type:   Diagnosis management comments: DDx: Cellulitis, DVT, Stasis dermatitis, contact dermatitis from lymphedema dressings. RLE erythema fairly extensive. Given elevated WBC's, hx of recurrent cellulitis requiring IV Abx, will admit to hospitalist for Abx therapy.         Amount and/or Complexity of Data Reviewed  Clinical lab tests: ordered and reviewed  Tests in the radiology section of CPT®: ordered and reviewed  Obtain history from someone other than the patient: yes (Mother)  Review and summarize past medical records: yes  Discuss the patient with other providers: yes (Hospitalist)    Patient Progress  Patient progress: stable    ED Course       Procedures    LABORATORY TESTS:  Recent Results (from the past 12 hour(s))   CBC WITH AUTOMATED DIFF    Collection Time: 06/19/17 11:52 AM   Result Value Ref Range    WBC 14.9 (H) 4.1 - 11.1 K/uL    RBC 3.97 (L) 4.10 - 5.70 M/uL    HGB 12.4 12.1 - 17.0 g/dL    HCT 37.4 36.6 - 50.3 %    MCV 94.2 80.0 - 99.0 FL    MCH 31.2 26.0 - 34.0 PG    MCHC 33.2 30.0 - 36.5 g/dL    RDW 15.9 (H) 11.5 - 14.5 %    PLATELET 439 (L) 649 - 400 K/uL    NEUTROPHILS 96 (H) 32 - 75 %    LYMPHOCYTES 2 (L) 12 - 49 %    MONOCYTES 2 (L) 5 - 13 %    EOSINOPHILS 0 0 - 7 %    BASOPHILS 0 0 - 1 %    ABS. NEUTROPHILS 14.3 (H) 1.8 - 8.0 K/UL    ABS. LYMPHOCYTES 0.3 (L) 0.8 - 3.5 K/UL    ABS. MONOCYTES 0.3 0.0 - 1.0 K/UL    ABS. EOSINOPHILS 0.0 0.0 - 0.4 K/UL    ABS. BASOPHILS 0.0 0.0 - 0.1 K/UL    RBC COMMENTS NORMOCYTIC, NORMOCHROMIC     METABOLIC PANEL, COMPREHENSIVE    Collection Time: 06/19/17 11:52 AM   Result Value Ref Range    Sodium 140 136 - 145 mmol/L    Potassium 4.0 3.5 - 5.1 mmol/L    Chloride 103 97 - 108 mmol/L    CO2 21 21 - 32 mmol/L    Anion gap 16 (H) 5 - 15 mmol/L    Glucose 62 (L) 65 - 100 mg/dL    BUN 16 6 - 20 MG/DL    Creatinine 0.65 (L) 0.70 - 1.30 MG/DL    BUN/Creatinine ratio 25 (H) 12 - 20      GFR est AA >60 >60 ml/min/1.73m2    GFR est non-AA >60 >60 ml/min/1.73m2    Calcium 7.7 (L) 8.5 - 10.1 MG/DL    Bilirubin, total 0.6 0.2 - 1.0 MG/DL    ALT (SGPT) 33 12 - 78 U/L    AST (SGOT) 47 (H) 15 - 37 U/L    Alk.  phosphatase 82 45 - 117 U/L    Protein, total 7.8 6.4 - 8.2 g/dL    Albumin 1.9 (L) 3.5 - 5.0 g/dL    Globulin 5.9 (H) 2.0 - 4.0 g/dL    A-G Ratio 0.3 (L) 1.1 - 2.2     PROTHROMBIN TIME + INR    Collection Time: 06/19/17 11:52 AM   Result Value Ref Range    INR 1.2 (H) 0.9 - 1.1      Prothrombin time 12.0 (H) 9.0 - 11.1 sec   LACTIC ACID, PLASMA    Collection Time: 06/19/17  4:13 PM   Result Value Ref Range    Lactic acid 1.2 0.4 - 2.0 MMOL/L   GLUCOSE, POC    Collection Time: 06/19/17  8:17 PM   Result Value Ref Range    Glucose (POC) 110 (H) 65 - 100 mg/dL    Performed by [address] [place] [address], POC    Collection Time: 06/19/17  9:36 PM Result Value Ref Range    Glucose (POC) 108 (H) 65 - 100 mg/dL    Performed by David Crockett        IMAGING RESULTS:     INDICATION: Right leg swelling and pain      EXAMINATION: US DOPPLER VENOUS EXTREMITY, right     COMPARISON: None     FINDINGS:     Duplex venous Doppler examination was performed from the bilateral inguinal  ligaments to the mid-calf regions. Spectral analysis and color wave-form  imaging were performed. There is no deep venous thrombosis.     IMPRESSION  IMPRESSION:     No evidence for right lower extremity acute DVT. Limited technically          EXAM: XR FEMUR RT 2 VS     INDICATION: Right lower extremity erythema without trauma.     COMPARISON: None.     FINDINGS: 6 images of AP and lateral views of the right femur. Bones are  osteopenic. Study is limited by patient body habitus. No acute fracture or  dislocation. No lytic or blastic lesion. Right hip osteoarthritis is mild. Right  knee osteoarthritis is at least mild. No radiodense foreign body or soft tissue  gas.     IMPRESSION  IMPRESSION:      Limited study. No fracture or evidence of osteomyelitis. No radiodense foreign  body.                         MEDICATIONS GIVEN:  Medications   cefTRIAXone (ROCEPHIN) 1 g in 0.9% sodium chloride (MBP/ADV) 50 mL (not administered)   vancomycin (VANCOCIN) 3,000 mg in 0.9% sodium chloride 500 mL IVPB (not administered)   PATIENT HEIGHT NEEDED-please enter info in patient chart (not administered)   0.9% sodium chloride infusion 1,000 mL (not administered)       IMPRESSION:  1. Cellulitis of right leg    2. Leukocytosis, unspecified type        PLAN:  1. Admit to Hospitalist    ADMIT NOTE:  3:20 PM  Patient is being admitted to the hospital by Dr. Sherman Krishnan. The results of their tests and reasons for their admission have been discussed with them and/or available family. They convey agreement and understanding for the need to be admitted and for their admission diagnosis.   Consultation has been made with the inpatient physician specialist for hospitalization. ATTESTATION:  This note is prepared by Lázaro Alatorre, acting as Scribe for Rylee Jacob. Reyna Gamino MD.    Rylee Jacob. Reyna Gamino MD: The scribe's documentation has been prepared under my direction and personally reviewed by me in its entirety. I confirm that the note above accurately reflects all work, treatment, procedures, and medical decision making performed by me.

## 2017-06-20 NOTE — PROGRESS NOTES
PICC (Peripherally Inserted Central Catheter) line insertion  procedure note     Procedure explained to patient and his Mother  along with risks and benefits and  Patient and his Mother  agreed to proceed. Informed consent obtained from patient's Mother. Patient teaching completed. Timeout completed. Pre-procedure assessment done. Maximum sterile barrier precautions observed throughout procedure. Lidocaine 1%  3.0    ml SQ given prior to cannulation. Cannulated   Cephalic  vein using ultrasound guidance and modified seldinger technique. Inserted   5  Israeli  double  lumen PICC to   Right  arm using Restore Water Tip Location system. Blood return verified and flushed with 20 ml normal saline in each port. Sterile dressing applied with Biopatch, StatLock and occlusive dressing as per protocol. Curos caps applied to each port. Patient tolerated procedure well with minimal blood loss. Patient education material provided. PICC procedure performed by  : Danay Abraham RN. PICC nurse  Assisted by  : Tre Wren RN. PICC nurse  Reason for access : Limited vascular access   Complications related to insertion  : none  Total Trimmed Length    46 cm   External Length :  0 cm (HUB)   PICC line site arm circumference:   47  cm   PICC catheter occupies    15  %  of vein  Type of PICC: Bard Power PICC SOLO  Ref # :  [id number]   Lot # :  [id number]  Expiration Date :  07/31/2018  Portable Chest X-Ray ordered. Pt has  afib  rhythm. Primary nurse   Aniya MERLOS aware not use until  PICC Tip placement  Is confirmed by  Radiologist.    1750--XRAY Done. 1810--XRAY read by Radiologist Dr. Mario Moreira MD. With the following results:   IMPRESSION: Right arm PICC line appears to be in satisfactory position    1817--Informed Carly Gaston, Primary Nurse that PICC is ok to use.      Danay Abraham RN, PICC Nurse, Vascular Access Team

## 2017-06-20 NOTE — WOUND CARE
Wound care consult from Dr Alex Padilla for \"cellulitis\". Patient is a 45 y/o CM with cellulitis of RLE from ankle to hip, with no open wounds. 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 People with this condition are often short in stature, have light skin, poor muscle tone, suffer from mental retardation and are frequently obese. They are typically very friendly, but frequently find it hard to control their emotions if frustration or stress occurs). He has had hospitalizations in past for cellulitis secondary to fluid in BLE. Patient is well known to DOMITILA Shin [facility], but there is NO information in chart review or hx???? Patient receiving IV abxs per hospitalist MD, good skin care and pressure prevention needed from nursing staff. Recommend:  Bariatric bed: patient is 5'4\" and weighs 327 lbs (standard bed has his legs pressed into bed rails). Nurse supervisor called and patient to be moved out of Oncology where rooms are too small in order to get him on a Bariatric bed. Patient will be placed on our in-house Total care Bariatric bed. Skin care: keep skin and skin folds clean and dry. Apply moisturizing lotion to BLE.   Herb Michelle RN, [place], zone ph# [phone]

## 2017-06-20 NOTE — ROUTINE PROCESS
TRANSFER - OUT REPORT:    Verbal report given to [facility], RN(name) on [employer]  being transferred to 1116(unit) for routine progression of care       Report consisted of patients Situation, Background, Assessment and   Recommendations(SBAR). Information from the following report(s) SBAR, ED Summary, [facility] and Recent Results was reviewed with the receiving nurse. Lines:       Opportunity for questions and clarification was provided.       Patient transported with:   [email or web address]

## 2017-06-20 NOTE — PROGRESS NOTES
Visited by Tien [place] Partner 6/20/17.     Rhea Kennedy, Lead [facility] Paging Service  [phone])

## 2017-06-20 NOTE — PROGRESS NOTES
Occupational Therapy  Orders received and medical record reviewed. Pt is 46year old with MR, h/o Prader Willi syndrome, now admitted with cellulitis. He is cared for by his mother-she sponges bathes him and dresses him and performs all IADLs. 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 Pt  Was supine in bed upon arrival, his mother was present. Pt's mother spoke for him, as he declined to participate in any OOB activities becoming fretful; his mother reports that once pt doesn't want to do something, there is no convincing him otherwise. Per mother, past medical records are not in the chart. He has had PT/OT at home several years ago. Pt's mother is looking into caregiving services through her . Since pt is generally dependent for self care, will sign off as there are no OT needs at this time. Functional mobility can be addressed by PT, if needed. Pt was independent using his RW for all functional mobility at home and in his work place PTA at baseline. Will discharge OT services at this time.   20 minutes

## 2017-06-20 NOTE — PROGRESS NOTES
Pharmacy Medication Reconciliation     The patient was interviewed regarding current PTA medication list, use and drug allergies;  patient present in room and obtained permission from patient to discuss drug regimen with visitor(s) present. The patient was questioned regarding use of any other inhalers, topical products, over the counter medications, herbal medications, vitamin products or ophthalmic/nasal/otic medication use. Allergy Update: None    Recommendations/Findings: The following amendments were made to the patient's active medication list on file at [facility]:   1) Additions: nystatin powder     2) Deletions: None    3) Changes: Levothyroxine to 100 mcg      -Clarified PTA med list with patient and mother. PTA medication list was corrected to the following:     Prior to Admission Medications   Prescriptions Last Dose Informant Patient Reported? Taking? cholecalciferol (VITAMIN D3) 1,000 unit cap 6/18/2017 at 0900  Yes Yes   Sig: Take 1,000 Units by mouth daily. levothyroxine (SYNTHROID) 100 mcg tablet   Yes Yes   Sig: Take 100 mcg by mouth Daily (before breakfast). multivitamin (ONE A DAY) tablet 6/18/2017 at 0900  Yes Yes   Sig: Take 1 Tab by mouth daily. nystatin (MYCOSTATIN) powder 6/13/2017 at Unknown time  Yes Yes   Sig: Apply  to affected area four (4) times daily as needed.       Facility-Administered Medications: None          Thank you,  Raul Cox, CARSOND

## 2017-06-20 NOTE — PROGRESS NOTES
Pharmacy Automatic Renal Dosing Protocol - Antimicrobials      Indication for Antimicrobials: Cellulitis  Current Regimen of Each Antimicrobial (Start Day & Day of Therapy):  Vancomycin 3000 mg then 1500 mg Q8H ( day 2)  Unasyn 3 grams Q8H ( day 2)    Significant cultures:   - blood - GPC 1/3 btl - pending    CAPD, Intermittent Hemodialysis or Renal Replacement Therapy: no  Paralysis, amputations, malnutrition: No  Recent Labs      17   1152   CREA  0.65*   BUN  16   WBC  14.9*     Temp (24hrs), Av.9 °F (36.6 °C), Min:97.6 °F (36.4 °C), Max:98.2 °F (36.8 °C)    Creatinine Clearance (ml/min): 112      Goal Vancomycin Level(s): 10-15 mcg/ml  Vancomycin Trough:  ordered for  at 1700     Impression/Plan:   - Vancomycin trough ordered for  prior to 1700 dose  - Unasyn changed to 3 gm IV q6h for indication and renal funciton        Pharmacy will follow daily and adjust doses of monitored medications as appropriate for renal function and/or serum levels. Thank you,  Julia Brock, ESTHELA         Renal Dosing Tables on the Pharmacy Web Site                Pharmacist will perform automatic renal dosage adjustment for certain medications   Antimicrobials: acyclovir, aminoglycosides, amoxicillin, amoxicillin/clavulanate, ampicillin, ampicillin/sulbactam, aztreonam, cefazolin, cefepime, cefotetan, ceftazidime, ciprofloxacin, colistin, daptomycin, doripenem, ertapenem,  ethambutol, fluconazole, imipenem/cilastatin, levofloxacin, linezolid, meropenem, oseltamivir, piperacillin/tazobactam, pyrazinamide, valacyclovir, vancomycin   Creatinine Clearance will be calculated using the Cockroft-Gault equation, using the lessor of ideal or actual body weight.  Monitoring will continue daily for any medication that is changed and appropriate adjustments will be made as needed for the duration of therapy.    Upon initiation of renal dosing or when a dosage adjustment is made, the pharmacist will write a progress note with the indication, calculated creatinine clearance, and dosing regimen. The pharmacist will use per protocol when changing orders.

## 2017-06-20 NOTE — PROGRESS NOTES
Pt. Admitted from the ER with Dx. Cellulitis RLE . He started with redness of the  lower leg which spread to thigh. Pt. had this before in October , 2016. He is now on IV antbx . Dopplers Neg. For DVT. Pt. Has MR and lives with his mother. She is his caregiver. He is able to ambulate with a RW , and uses a lift at home to get up the stairs. Pt. Does have a brother who lives out of town. CM will follow and assist, as needed. [facility],,[phone]--[phone]    Care Management Interventions  PCP Verified by CM: Yes (Dr. Dorina Langston)  Mode of Transport at Discharge: Other (see comment) (family)  Transition of Care Consult (CM Consult): Discharge Planning  MyChart Signup: No  Discharge Durable Medical Equipment: No (RW)  Health Maintenance Reviewed: Yes  Physical Therapy Consult: Yes  Occupational Therapy Consult: Yes  Speech Therapy Consult: No  Current Support Network: Other, Relative's Home (lives with mother--MR pt.)  Confirm Follow Up Transport: Family  Plan discussed with Pt/Family/Caregiver:  Yes

## 2017-06-20 NOTE — PROGRESS NOTES
Physical Therapy  Consult received and chart reviewed. Attempting to see patient for PT evaluation. Patient is morbidly obese with h/o MR and Prader Willi syndrome and will not communicate with therapists today. Mother present and providing PLOF. Patient is modified independent with functional mobility. He uses a bariatric RW for ambulation. 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 Patient lives on the first floor of their 2 story home and uses a lift to enter the home. His mother is his sole caregiver at this time. Patient is able to self feed but is dependent for bathing and dressing. Will continue to follow to attempt evaluation, however if patient unwilling to participate in therapy in hospital setting, recommend HHPT to assess mobility in his familiar environment.   Isiah Parent, PT

## 2017-06-20 NOTE — PROGRESS NOTES
Hospitalist Progress Note    NAME: Colby Frederick   :  1965   MRN:  [id number]     Interim Hospital Summary: 46 y.o. male whom presented on 2017 with      Assessment / Plan:    RLE cellulits in setting of chronic LE lymphedema  GPC bacteremia?  -continue empiric vanco and unasyn. Follow up on blood cultures, temp and WBC  -LE Duplex no DVT  -PT OT eval and treat    Prader-Willi syndrome with mental retardation. -nonverbal during interview but mom says he can talk and walk with walker     Morbid Obesity / OHS on home oxygen  -Per mother, he had a sleep study and it was abnormal, but in the low range. She did not take back to followup for BiPAP or CPAP. -continue home O2 2L at night prn     Hx Afib, s/p PPM  -not on meds     Hypothyroidism  -continue synthroid    Body mass index is 56.2 kg/(m^2). Code status: Full  Prophylaxis: Lovenox  Recommended Disposition: Home w/Family       Subjective:     Chief Complaint / Reason for Physician Visit  Follow up of cellulitis, obesity, CRF  Chart reviewed in detail. Discussed with RN events overnight. Non verbal.   No appetite today per mom    Review of Systems:  Symptom Y/N Comments  Symptom Y/N Comments   Fever/Chills n   Chest Pain     Poor Appetite    Edema     Cough    Abdominal Pain     Sputum    Joint Pain     SOB/CLEMENTE n   Pruritis/Rash     Nausea/vomit    Tolerating PT/OT     Diarrhea    Tolerating Diet     Constipation    Other       Could NOT obtain due to:      PO intake: No data found. Objective:     VITALS:   Last 24hrs VS reviewed since prior progress note.  Most recent are:  Patient Vitals for the past 24 hrs:   Temp Pulse Resp BP SpO2   17 0648 97.6 °F (36.4 °C) 76 16 112/59 91 %   17 2133 98.2 °F (36.8 °C) 75 18 115/59 93 %   17 -  16 103/66 96 %       Intake/Output Summary (Last 24 hours) at 17 1301  Last data filed at 17 5248   Gross per 24 hour   Intake 0 ml   Output              200 ml   Net             -200 ml        PHYSICAL EXAM:  General: WD, WN. Alert, no acute distress    EENT:  EOMI. Anicteric sclerae. MMM  Resp:  CTA bilaterally, no wheezing or rales. No accessory muscle use  CV:  Regular  rhythm,  (+) bilateral LE massive lymphedema  GI:  Soft, Non distended, Non tender.  +Bowel sounds  Neurologic:  Awake, does not answer my questions. Does not follow commands. Psych:   Not anxious nor agitated  Skin:  (+) extensive erythematous rash involving entire right upper thigh (mostly posterior medial location) and extends down leg and up right flank area. Sl warm and tender    Reviewed most current lab test results and cultures  YES  Reviewed most current radiology test results   YES  Review and summation of old records today    NO  Reviewed patient's current orders and MAR    YES  PMH/SH reviewed - no change compared to H&P  ________________________________________________________________________  Care Plan discussed with:    Comments   Patient     Family  x mom   RN     Care Manager     Consultant                        Multidiciplinary team rounds were held today with , nursing, pharmacist and clinical coordinator. Patient's plan of care was discussed; medications were reviewed and discharge planning was addressed. ________________________________________________________________________  Total NON critical care TIME: 30    Minutes    Total CRITICAL CARE TIME Spent:   Minutes non procedure based      Comments   >50% of visit spent in counseling and coordination of care x     This includes time during multidisciplinary rounds if indicated above   ________________________________________________________________________  Von Glover MD     Procedures: see electronic medical records for all procedures/Xrays and details which were not copied into this note but were reviewed prior to creation of Plan.       LABS:  I reviewed today's most current labs and imaging studies.   Pertinent labs include:  Recent Labs      06/19/17   1152   WBC  14.9*   HGB  12.4   HCT  37.4   PLT  110*     Recent Labs      06/19/17   1152   NA  140   K  4.0   CL  103   CO2  21   GLU  62*   BUN  16   CREA  0.65*   CA  7.7*   ALB  1.9*   TBILI  0.6   SGOT  47*   ALT  33   INR  1.2*

## 2017-06-20 NOTE — PROGRESS NOTES
Oncology Nursing Communication Tool      7:47 PM  6/20/2017     Bedside and Verbal shift change report given to GAURANG Dozier (incoming nurse) by Sharon Bragg (outgoing nurse) on Gurvinder Guevara a 46 y.o. male who was admitted on 6/19/2017 10:21 AM. Report included the following information SBAR, Kardex, Procedure Summary, Intake/Output, MAR and Accordion. Significant changes during shift: PICC line inserted      Issues for physician to address:           Code Status: Full Code     Infections: No current active infections     Allergies: Review of patient's allergies indicates no known allergies. Current diet: DIET REGULAR       Pain Controlled [x] yes [] no   Bowel Movement [] yes [x] no   Last Bowel Movement (date)                 Vital Signs:   Patient Vitals for the past 12 hrs:   Temp Pulse Resp BP SpO2   06/20/17 1356 97.9 °F (36.6 °C) 75 18 105/64 92 %      Intake & Output:     Intake/Output Summary (Last 24 hours) at 06/20/17 1947  Last data filed at 06/20/17 8139   Gross per 24 hour   Intake                0 ml   Output              200 ml   Net             -200 ml      Laboratory Results:     Recent Results (from the past 12 hour(s))   GLUCOSE, POC    Collection Time: 06/20/17 11:16 AM   Result Value Ref Range    Glucose (POC) 93 65 - 100 mg/dL    Performed by [employer], POC    Collection Time: 06/20/17  4:15 PM   Result Value Ref Range    Glucose (POC) 96 65 - 100 mg/dL    Performed by [address] [place] [address] for questions and clarifications were given to the incoming nurse. Patient's bed is in low position, side rails x2, door open PRN, call bell within reach and patient not in distress.       Sharon Bragg

## 2017-06-20 NOTE — PROGRESS NOTES
TRANSFER - IN REPORT:    Verbal report received from [place](name) on Yina Whalen  being received from ED(unit) for routine progression of care      Report consisted of patients Situation, Background, Assessment and   Recommendations(SBAR). Information from the following report(s) SBAR, Kardex, ED Summary, Procedure Summary, Intake/Output, MAR, Accordion, Recent Results and Med Rec Status was reviewed with the receiving nurse. Opportunity for questions and clarification was provided. Nurse to recheck BG then transfer to unit.

## 2017-06-20 NOTE — PROGRESS NOTES
0715: Attempt lab draws without success. Pt is difficult stick, PIV placed in ED to L shoulder after multiple attempts; pt has needed PICC in the past.  Will pass on to day shift nurse.

## 2017-06-21 NOTE — PROGRESS NOTES
Pharmacy  Vancomycin Dosing     Pharmacy consulted to dose vancomycin for this 46 y.o. male being treated for Cellulitis    Day of Therapy 3()  Recommended length of therapy:     Current dose and frequency: Vancomycin 1500 mg every 8 hr  Measured / Extrapolated levels:   Goal levels: 10 - 15    Other current antimicrobials: Unasyn 3 gm q8h ()    Significant cultures: pending    Recent Labs      17   1152   CREA  0.65*   BUN  16     Temp (24hrs), Av.8 °F (36.6 °C), Min:97.6 °F (36.4 °C), Max:98 °F (36.7 °C)    Creatinine Clearance (Creatinine Clearance (ml/min)): 180    Impression/Plan: Vancomycin dose is adjusted to Vancomycin 2000 mg every 24 hrs to target a goal level 10 -15 (13.32)      Pharmacy will follow daily and adjust as appropriate.     Thanks for the consult,  Dany Campbell, PHARMD

## 2017-06-21 NOTE — PROGRESS NOTES
19:43  Bedside shift change report given to Marco Serrano RN (oncoming nurse) by Carmela Alvarado RN (offgoing nurse). Report included the following information SBAR, Kardex, MAR and Recent Results. 07:15  Bedside shift change report given to Itz Husain (oncoming nurse) by Marco Serrano RN (offgoing nurse). Report included the following information SBAR, Kardex, MAR and Recent Results.

## 2017-06-21 NOTE — PROGRESS NOTES
Oncology Nursing Communication Tool      6:14 PM  6/21/2017     Bedside shift change report given to Yraely Dave RN (incoming nurse) by Miki Gonzales RN (outgoing nurse) on Sienna Haas a 46 y.o. male who was admitted on 6/19/2017 10:21 AM. Report included the following information SBAR, Kardex, Intake/Output, MAR, Accordion and Recent Results. Significant changes during shift: Seen by PT today; Walked down hallway using walker; Nystatin powder to skin folds x2 during shift      Issues for physician to address: No issue at this time            Code Status: Full Code     Infections: No current active infections     Allergies: Review of patient's allergies indicates no known allergies. Current diet: DIET REGULAR       Pain Controlled [x] yes [] no   Bowel Movement [x] yes [] no   Last Bowel Movement (date)     6/21/17            Vital Signs:   Patient Vitals for the past 12 hrs:   Temp Pulse Resp BP SpO2   06/21/17 1416 97.6 °F (36.4 °C) 74 16 136/70 92 %      Intake & Output:     Intake/Output Summary (Last 24 hours) at 06/21/17 1814  Last data filed at 06/21/17 1309   Gross per 24 hour   Intake             1140 ml   Output              201 ml   Net              939 ml      Laboratory Results:     Recent Results (from the past 12 hour(s))   GLUCOSE, POC    Collection Time: 06/21/17  7:21 AM   Result Value Ref Range    Glucose (POC) 79 65 - 100 mg/dL    Performed by Maisha Reynaga    GLUCOSE, POC    Collection Time: 06/21/17  7:39 AM   Result Value Ref Range    Glucose (POC) 84 65 - 100 mg/dL    Performed by Maisha Reynaga               Opportunity for questions and clarifications were given to the incoming nurse. Patient's bed is in low position, side rails x2, door open PRN, call bell within reach and patient not in distress.       Miki Gonzales RN

## 2017-06-21 NOTE — PROGRESS NOTES
Problem: Mobility Impaired (Adult and Pediatric)  Goal: *Acute Goals and Plan of Care (Insert Text)  Physical Therapy Goals  Initiated 6/21/2017  1. Patient will move from supine to sit and sit to supine , scoot up and down and roll side to side in bed with supervision/set-up within 7 day(s). 2. Patient will transfer from bed to chair and chair to bed with supervision/set-up using the least restrictive device within 7 day(s). 3. Patient will perform sit to stand with supervision/set-up within 7 day(s). 4. Patient will ambulate with supervision/set-up for 150 feet with the least restrictive device within 7 day(s). PHYSICAL THERAPY EVALUATION  Patient: Madi Morris (54 y.o. male)  Date: 6/21/2017  Primary Diagnosis: Cellulitis          ASSESSMENT :  Based on the objective data described below, the patient presents with generalized weakness and impaired functional mobility, balance and endurance. Patient with h/o Prader Willi syndrome with MR and is super morbidly obese. Patient requiring min A for bed mobility and transfers. He was able to ambulate 100 feet using RW with CGA. Gait is slightly unsteady demonstrating short shuffled steps. Patient noted to step outside walker frame secondary to body habitus. Patient is near his baseline for functional mobility. Will follow during hospitalization and recommend HHPT for safety evaluation and family training following discharge. .     Patient will benefit from skilled intervention to address the above impairments.   Patients rehabilitation potential is considered to be Fair  Factors which may influence rehabilitation potential include:   [ ]         None noted  [X]         Mental ability/status  [X]         Medical condition  [ ]         Home/family situation and support systems  [ ]         Safety awareness  [ ]         Pain tolerance/management  [ ]         Other:        PLAN :  Recommendations and Planned Interventions:  [ ]           Bed Mobility Training [ ]    Neuromuscular Re-Education  [ ]           Transfer Training                   [ ]    Orthotic/Prosthetic Training  [ ]           Gait Training                         [ ]    Modalities  [ ]           Therapeutic Exercises           [ ]    Edema Management/Control  [ ]           Therapeutic Activities            [ ]    Patient and Family Training/Education  [ ]           Other (comment):     Frequency/Duration: Patient will be followed by physical therapy  4 times a week to address goals. Discharge Recommendations: Home Health  Further Equipment Recommendations for Discharge: has appropriate equipment       SUBJECTIVE:   Patient stated It doesn't hurt. - when asked how legs are feeling      OBJECTIVE DATA SUMMARY:   HISTORY:    Past Medical History:   Diagnosis Date    Hypothyroidism      Mental retardation     History reviewed. No pertinent surgical history. 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; lives with mother who is his sole caretaker        Home Situation  Home Environment: Private residence  # Steps to Enter:  (lift to enter home)  One/Two Story Residence: Two story, live on 1st floor  Living Alone: No  Support Systems: Friends \ neighbors, Family member(s)  Patient Expects to be Discharged to[de-identified] Private residence  Current DME Used/Available at Home: Walker, rolling, Oxygen, portable, Grab bars  Tub or Shower Type:  (sponge bathes only)     EXAMINATION/PRESENTATION/DECISION MAKING:   Critical Behavior:  Neurologic State: Alert  Orientation Level: Oriented to person  Cognition: Follows commands     Hearing:   Auditory  Auditory Impairment: None     Range Of Motion:  AROM: Generally decreased, functional (limited by body habitus)                       Strength:    Strength: Generally decreased, functional                    Tone & Sensation:   Tone: Normal                              Coordination:  Coordination: Generally decreased, functional  Vision:      Functional Mobility:  Bed Mobility:     Supine to Sit: Minimum assistance; Additional time  Sit to Supine: Minimum assistance; Additional time  Scooting: Supervision; Additional time  Transfers:  Sit to Stand: Minimum assistance;Assist x2; Additional time  Stand to Sit: Contact guard assistance                       Balance:   Sitting: Intact  Standing: Impaired  Standing - Static: Good;Constant support  Standing - Dynamic : Fair (using RW fro support)  Ambulation/Gait Training:  Distance (ft): 100 Feet (ft)  Assistive Device: Walker, rolling  Ambulation - Level of Assistance: Contact guard assistance        Gait Abnormalities: Decreased step clearance (steps outside walker frame 2/2 body habitus)        Base of Support: widened     Speed/Sabiha: Pace decreased (<100 feet/min); Shuffled  Step Length: Left shortened;Right shortened      Gait is slightly unsteady using bariatric RW for support; patient demonstrates short shuffled steps and stepping outside of walker secondary to habitus; no overt LOB noted        Functional Measure:     Elder Mobility Scale      8/20            EMS and G-code impairment scale:  Percentage of impairment CH  0% CI  1-19% CJ  20-39% CK  40-59% CL  60-79% CM  80-99% CN  100%   EMS Score 0-20 20 17-19 13-16 9-12 5-8 1-4 0      Scores under 10  generally these patients are dependent in mobility maneuvers; require help with  basic ADL, such as transfers, toileting and dressing. Scores between 10  13  generally these patients are borderline in terms of safe mobility and  independence in ADL i.e. they require some help with some mobility maneuvers. Scores over 14  Generally these patients are able to perform mobility maneuvers alone and safely  and are independent in basic ADL. G codes:   In compliance with CMSs Claims Based Outcome Reporting, the following G-code set was chosen for this patient based on their primary functional limitation being treated: The outcome measure chosen to determine the severity of the functional limitation was the Elderly Mobility scale with a score of 8/20 which was correlated with the impairment scale. · Mobility - Walking and Moving Around:               - CURRENT STATUS:    CL - 60%-79% impaired, limited or restricted               - GOAL STATUS:           CI - 1%-19% impaired, limited or restricted               - D/C STATUS:                       ---------------To be determined---------------            Pain:  Pain Scale 1: Numeric (0 - 10)  Pain Intensity 1: 0           After treatment:   [ ]         Patient left in no apparent distress sitting up in chair  [X]         Patient left in no apparent distress in bed  [X]         Call bell left within reach  [X]         Nursing notified  [X]         Caregiver present  [ ]         Bed alarm activated      COMMUNICATION/EDUCATION:   The patients plan of care was discussed with: Registered Nurse.  [X]         Fall prevention education was provided and the patient/caregiver indicated understanding. [X]         Patient/family have participated as able in goal setting and plan of care. [X]         Patient/family agree to work toward stated goals and plan of care. [ ]         Patient understands intent and goals of therapy, but is neutral about his/her participation. [ ]         Patient is unable to participate in goal setting and plan of care.      Thank you for this referral.  Iesha Paris, PT   Time Calculation: 25 mins

## 2017-06-21 NOTE — PROGRESS NOTES
Hospitalist Progress Note    NAME: Rola Ellis   :  1965   MRN:  [id number]     Interim Hospital Summary: 46 y.o. male whom presented on 2017 with      Assessment / Plan:    RLE cellulits in setting of chronic LE lymphedema  -continue empiric vanco and unasyn. Blood culture CoNS likely contaminant. Leukocytosis resolved. No fever today. Will check MRSA screen  -LE Duplex no DVT  -PT OT eval and treat    Intertrigo  -nystatin powder    Prader-Willi syndrome with mental retardation. -nonverbal during interview but mom says he can talk and walk with walker     Morbid Obesity / OHS on home oxygen  -Per mother, he had a sleep study and it was abnormal, but in the low range. She did not take back to followup for BiPAP or CPAP. -continue home O2 2L at night prn     Hx Afib, s/p PPM  -not on meds     Hypothyroidism  -continue synthroid    Body mass index is 56.2 kg/(m^2). Code status: Full  Prophylaxis: Lovenox  Recommended Disposition: Home w/Family       Subjective:     Chief Complaint / Reason for Physician Visit  Follow up of cellulitis, obesity, CRF  Chart reviewed in detail. Discussed with RN events overnight. Eating better today. Looks good,  Playing with ipad. Review of Systems:  Symptom Y/N Comments  Symptom Y/N Comments   Fever/Chills n   Chest Pain     Poor Appetite    Edema     Cough    Abdominal Pain     Sputum    Joint Pain     SOB/CLEMENTE n   Pruritis/Rash     Nausea/vomit    Tolerating PT/OT     Diarrhea    Tolerating Diet     Constipation    Other       Could NOT obtain due to:      PO intake:   Patient Vitals for the past 72 hrs:   % Diet Eaten   17 2106 90 %     Objective:     VITALS:   Last 24hrs VS reviewed since prior progress note.  Most recent are:  Patient Vitals for the past 24 hrs:   Temp Pulse Resp BP SpO2   17 0553 97.6 °F (36.4 °C) 75 18 112/57 92 %   17 2059 98 °F (36.7 °C) 77 18 113/51 95 % 06/20/17 1356 97.9 °F (36.6 °C) 75 18 105/64 92 %       Intake/Output Summary (Last 24 hours) at 06/21/17 1242  Last data filed at 06/21/17 0557   Gross per 24 hour   Intake             2240 ml   Output              200 ml   Net             2040 ml        PHYSICAL EXAM:  General: WD, WN. Alert, no acute distress    EENT:  EOMI. Anicteric sclerae. MMM  Resp:  CTA bilaterally, no wheezing or rales. No accessory muscle use  CV:  Regular  rhythm,  (+) bilateral LE massive lymphedema  GI:  Soft, Non distended, Non tender.  +Bowel sounds  Neurologic:  Awake, does not answer my questions. Does not follow commands. Psych:   Not anxious nor agitated  Skin:  (+) extensive erythematous rash involving entire right upper thigh (mostly posterior medial location) and extends down leg and up right flank area. The rash is better and starting to fade    Reviewed most current lab test results and cultures  YES  Reviewed most current radiology test results   YES  Review and summation of old records today    NO  Reviewed patient's current orders and MAR    YES  PMH/ reviewed - no change compared to H&P  ________________________________________________________________________  Care Plan discussed with:    Comments   Patient     Family  x mom   RN     Care Manager     Consultant                        Multidiciplinary team rounds were held today with , nursing, pharmacist and clinical coordinator. Patient's plan of care was discussed; medications were reviewed and discharge planning was addressed.      ________________________________________________________________________  Total NON critical care TIME:  25   Minutes    Total CRITICAL CARE TIME Spent:   Minutes non procedure based      Comments   >50% of visit spent in counseling and coordination of care x     This includes time during multidisciplinary rounds if indicated above   ________________________________________________________________________  [address] Nixon Venegas MD     Procedures: see electronic medical records for all procedures/Xrays and details which were not copied into this note but were reviewed prior to creation of Plan. LABS:  I reviewed today's most current labs and imaging studies.   Pertinent labs include:  Recent Labs      06/21/17 0228  06/19/17   1152   WBC  8.9  14.9*   HGB  10.5*  12.4   HCT  31.1*  37.4   PLT  120*  110*     Recent Labs      06/21/17 0228  06/19/17   1152   NA  141  140   K  3.3*  4.0   CL  107  103   CO2  33*  21   GLU  84  62*   BUN  22*  16   CREA  0.48*  0.65*   CA  8.3*  7.7*   ALB   --   1.9*   TBILI   --   0.6   SGOT   --   47*   ALT   --   33   INR   --   1.2*

## 2017-06-21 NOTE — PROGRESS NOTES
Oncology Nursing Communication Tool      7:47 PM  6/21/2017     Bedside and Verbal shift change report given to Otis RN (incoming nurse) by Rebecca Underwood RN (outgoing nurse) on Vignesh Plazas a 46 y.o. male who was admitted on 6/19/2017 10:21 AM. Report included the following information SBAR, Kardex, Procedure Summary, Intake/Output, MAR and Accordion. Significant changes during shift: none    Issues for physician to address: none          Code Status: Full Code     Infections: No current active infections     Allergies: Review of patient's allergies indicates no known allergies. Current diet: DIET REGULAR       Pain Controlled [x] yes [] no   Bowel Movement [] yes [x] no   Last Bowel Movement (date)     6/17/17            Vital Signs:     Patient Vitals for the past 12 hrs:   Temp Pulse Resp BP SpO2   06/21/17 0553 97.6 °F (36.4 °C) 75 18 112/57 92 %   06/20/17 2059 98 °F (36.7 °C) 77 18 113/51 95 %      Intake & Output:       Intake/Output Summary (Last 24 hours) at 06/21/17 0847  Last data filed at 06/21/17 0557   Gross per 24 hour   Intake             2240 ml   Output              200 ml   Net             2040 ml      Laboratory Results:     Recent Results (from the past 12 hour(s))   GLUCOSE, POC    Collection Time: 06/20/17  8:54 PM   Result Value Ref Range    Glucose (POC) 139 (H) 65 - 100 mg/dL    Performed by Tea Arellano    CBC WITH AUTOMATED DIFF    Collection Time: 06/21/17  2:28 AM   Result Value Ref Range    WBC 8.9 4.1 - 11.1 K/uL    RBC 3.29 (L) 4.10 - 5.70 M/uL    HGB 10.5 (L) 12.1 - 17.0 g/dL    HCT 31.1 (L) 36.6 - 50.3 %    MCV 94.5 80.0 - 99.0 FL    MCH 31.9 26.0 - 34.0 PG    MCHC 33.8 30.0 - 36.5 g/dL    RDW 15.8 (H) 11.5 - 14.5 %    PLATELET 455 (L) 082 - 400 K/uL    NEUTROPHILS 87 (H) 32 - 75 %    LYMPHOCYTES 7 (L) 12 - 49 %    MONOCYTES 5 5 - 13 %    EOSINOPHILS 1 0 - 7 %    BASOPHILS 0 0 - 1 %    ABS. NEUTROPHILS 7.7 1.8 - 8.0 K/UL    ABS. LYMPHOCYTES 0.6 (L) 0.8 - 3.5 K/UL    ABS. MONOCYTES 0.5 0.0 - 1.0 K/UL    ABS. EOSINOPHILS 0.1 0.0 - 0.4 K/UL    ABS. BASOPHILS 0.0 0.0 - 0.1 K/UL   METABOLIC PANEL, BASIC    Collection Time: 06/21/17  2:28 AM   Result Value Ref Range    Sodium 141 136 - 145 mmol/L    Potassium 3.3 (L) 3.5 - 5.1 mmol/L    Chloride 107 97 - 108 mmol/L    CO2 33 (H) 21 - 32 mmol/L    Anion gap 1 (L) 5 - 15 mmol/L    Glucose 84 65 - 100 mg/dL    BUN 22 (H) 6 - 20 MG/DL    Creatinine 0.48 (L) 0.70 - 1.30 MG/DL    BUN/Creatinine ratio 46 (H) 12 - 20      GFR est AA >60 >60 ml/min/1.73m2    GFR est non-AA >60 >60 ml/min/1.73m2    Calcium 8.3 (L) 8.5 - 10.1 MG/DL   GLUCOSE, POC    Collection Time: 06/21/17  7:21 AM   Result Value Ref Range    Glucose (POC) 79 65 - 100 mg/dL    Performed by Francesco Art    GLUCOSE, POC    Collection Time: 06/21/17  7:39 AM   Result Value Ref Range    Glucose (POC) 84 65 - 100 mg/dL    Performed by Francesco Art               Opportunity for questions and clarifications were given to the incoming nurse. Patient's bed is in low position, side rails x2, door open PRN, call bell within reach and patient not in distress.       Jean Marie Mccormick RN

## 2017-06-21 NOTE — PROGRESS NOTES
PICC site and dressing check 24 hours post insertion :     PICC line site and dressing clean, dry and intact. No complications noted.     [facility] Kailyn RN  Vascular Access Team

## 2017-06-22 NOTE — PROGRESS NOTES
Oncology Nursing Communication Tool      1:53 PM  6/22/2017     Bedside shift change report given to Isaías Carpio RN (incoming nurse) by Nessa Bowling RN (outgoing nurse) on Job Matthew a 46 y.o. male who was admitted on 6/19/2017 10:21 AM. Report included the following information SBAR, Kardex, Intake/Output, MAR, Accordion and Recent Results. Significant changes during shift: Patient up with PT today; Nystatin x2 in skin folds throughout shift;       Issues for physician to address:           Code Status: Full Code     Infections: No current active infections     Allergies: Review of patient's allergies indicates no known allergies. Current diet: DIET REGULAR       Pain Controlled [x] yes [] no   Bowel Movement [x] yes [] no   Last Bowel Movement (date) 6/21/17            Vital Signs:   Patient Vitals for the past 12 hrs:   Temp Pulse Resp BP SpO2   06/22/17 0459 97.3 °F (36.3 °C) 74 20 144/65 92 %      Intake & Output:   No intake or output data in the 24 hours ending 06/22/17 1353   Laboratory Results:     Recent Results (from the past 12 hour(s))   METABOLIC PANEL, BASIC    Collection Time: 06/22/17  5:17 AM   Result Value Ref Range    Sodium 146 (H) 136 - 145 mmol/L    Potassium 3.1 (L) 3.5 - 5.1 mmol/L    Chloride 110 (H) 97 - 108 mmol/L    CO2 30 21 - 32 mmol/L    Anion gap 6 5 - 15 mmol/L    Glucose 81 65 - 100 mg/dL    BUN 23 (H) 6 - 20 MG/DL    Creatinine 0.47 (L) 0.70 - 1.30 MG/DL    BUN/Creatinine ratio 49 (H) 12 - 20      GFR est AA >60 >60 ml/min/1.73m2    GFR est non-AA >60 >60 ml/min/1.73m2    Calcium 8.2 (L) 8.5 - 10.1 MG/DL              Opportunity for questions and clarifications were given to the incoming nurse. Patient's bed is in low position, side rails x2, door open PRN, call bell within reach and patient not in distress.       Nessa Bowling RN

## 2017-06-22 NOTE — PROGRESS NOTES
Hospitalist Progress Note    NAME: Madi Morris   :  1965   MRN:  [id number]     Interim Hospital Summary: 46 y.o. male whom presented on 2017 with      Assessment / Plan:    RLE cellulits in setting of chronic LE lymphedema  -Blood culture 1/3 CoNS, likely contaminant. -LE Duplex no DVT  -no more fever. Leukocytosis resolved. MRSA screen negative so will stop vancomycin. Continue Unasyn. Rash has improved peripherally but more pronounced centrally. Has appearance of strep     Hypokalemia  -repleting. Check Mg    Intertrigo  -nystatin powder    Prader-Willi syndrome with mental retardation. -nonverbal during interview but mom says he can talk and walk with walker     Morbid Obesity / OHS on home oxygen  -Per mother, he had a sleep study and it was abnormal, but in the low range. She did not take back to followup for BiPAP or CPAP. -continue home O2 2L at night prn     Hx Afib, s/p PPM  -not on meds     Hypothyroidism  -continue synthroid    Body mass index is 56.2 kg/(m^2). Code status: Full  Prophylaxis: Lovenox  Recommended Disposition: Home w/Family       Subjective:     Chief Complaint / Reason for Physician Visit  Follow up of cellulitis, obesity, CRF  Chart reviewed in detail. Discussed with RN events overnight. Eating better today. Looks good,  Playing with ipad. No new complaints    Review of Systems:  Symptom Y/N Comments  Symptom Y/N Comments   Fever/Chills n   Chest Pain     Poor Appetite    Edema     Cough    Abdominal Pain     Sputum    Joint Pain     SOB/CLEMENTE n   Pruritis/Rash     Nausea/vomit    Tolerating PT/OT     Diarrhea    Tolerating Diet     Constipation    Other       Could NOT obtain due to:      PO intake:   Patient Vitals for the past 72 hrs:   % Diet Eaten   17 2106 90 %     Objective:     VITALS:   Last 24hrs VS reviewed since prior progress note.  Most recent are:  Patient Vitals for the past 24 hrs:   Temp Pulse Resp BP SpO2   06/22/17 1400 98.3 °F (36.8 °C) 75 16 130/75 94 %   06/22/17 0459 97.3 °F (36.3 °C) 74 20 144/65 92 %   06/21/17 2330 99.2 °F (37.3 °C) 75 18 122/61 92 %     No intake or output data in the 24 hours ending 06/22/17 1610     PHYSICAL EXAM:  General: WD, WN. Alert, no acute distress    EENT:  EOMI. Anicteric sclerae. MMM  Resp:  CTA bilaterally, no wheezing or rales. No accessory muscle use  CV:  Regular  rhythm,  (+) bilateral LE massive lymphedema  GI:  Soft, Non distended, Non tender.  +Bowel sounds  Neurologic:  Awake, does not answer my questions. Does not follow commands. Psych:   Not anxious nor agitated  Skin:  (+) extensive erythematous rash involving entire right upper thigh (mostly posterior medial location) and extends down leg and up right flank area. The rash is better and starting to fade (upper thigh medially, flank area and mid shin level)    Reviewed most current lab test results and cultures  YES  Reviewed most current radiology test results   YES  Review and summation of old records today    NO  Reviewed patient's current orders and MAR    YES  PMH/SH reviewed - no change compared to H&P  ________________________________________________________________________  Care Plan discussed with:    Comments   Patient     Family  x mom   RN     Care Manager     Consultant                        Multidiciplinary team rounds were held today with , nursing, pharmacist and clinical coordinator. Patient's plan of care was discussed; medications were reviewed and discharge planning was addressed.      ________________________________________________________________________  Total NON critical care TIME:  25   Minutes    Total CRITICAL CARE TIME Spent:   Minutes non procedure based      Comments   >50% of visit spent in counseling and coordination of care x     This includes time during multidisciplinary rounds if indicated above ________________________________________________________________________  Colby Richard MD     Procedures: see electronic medical records for all procedures/Xrays and details which were not copied into this note but were reviewed prior to creation of Plan. LABS:  I reviewed today's most current labs and imaging studies.   Pertinent labs include:  Recent Labs      06/21/17 0228   WBC  8.9   HGB  10.5*   HCT  31.1*   PLT  120*     Recent Labs      06/22/17 0517  06/21/17 0228   NA  146*  141   K  3.1*  3.3*   CL  110*  107   CO2  30  33*   GLU  81  84   BUN  23*  22*   CREA  0.47*  0.48*   CA  8.2*  8.3*

## 2017-06-22 NOTE — PROGRESS NOTES
Problem: Mobility Impaired (Adult and Pediatric)  Goal: *Acute Goals and Plan of Care (Insert Text)  Physical Therapy Goals  Initiated 6/21/2017  1. Patient will move from supine to sit and sit to supine , scoot up and down and roll side to side in bed with supervision/set-up within 7 day(s). 2. Patient will transfer from bed to chair and chair to bed with supervision/set-up using the least restrictive device within 7 day(s). 3. Patient will perform sit to stand with supervision/set-up within 7 day(s). 4. Patient will ambulate with supervision/set-up for 150 feet with the least restrictive device within 7 day(s). PHYSICAL THERAPY TREATMENT  Patient: Dora Yanez (30 y.o. male)  Date: 6/22/2017  Diagnosis: Cellulitis <principal problem not specified>       Precautions:  falls      ASSESSMENT: Patient agreeable to therapy today. He was able to transfer to EOB with CGA with improved ability to bring legs off edge of bed. Patient incontinent of diarrhea once standing and was able to stand for greater than 5 minutes using RW for support while being cleaned. Patient then able to ambulate 100 feet in strange using RW for support- gait remains unsteady with widened base of support and short shuffled steps. Current gait abnormalities appear to be patient's baseline. Patient requiring increased assistance to return to supine today requiring mod A of 2 to bring legs onto bed. Continue to recommend HHPT following discharge. Progression toward goals:  [ ]    Improving appropriately and progressing toward goals  [X]    Improving slowly and progressing toward goals  [ ]    Not making progress toward goals and plan of care will be adjusted       PLAN:  Patient continues to benefit from skilled intervention to address the above impairments. Continue treatment per established plan of care.   Discharge Recommendations:  Home Health  Further Equipment Recommendations for Discharge:  Has appropriate equipment SUBJECTIVE:   Patient stated I will take a walk.       OBJECTIVE DATA SUMMARY:   Critical Behavior:  Neurologic State: Alert  Orientation Level: Oriented to place, Oriented to person (Hx MR)  Cognition: Follows commands     Functional Mobility Training:  Bed Mobility:     Supine to Sit: Minimum assistance; Additional time  Sit to Supine: Moderate assistance;Assist x2; Additional time (to assist wtih B LEs)  Scooting: Contact guard assistance; Additional time        Transfers:  Sit to Stand: Minimum assistance; Moderate assistance;Assist x2  Stand to Sit: Contact guard assistance                             Balance:  Sitting: Impaired  Sitting - Static: Good (unsupported)  Sitting - Dynamic: Fair (occasional)  Standing: Impaired  Standing - Static: Good;Constant support  Standing - Dynamic : Fair (using RW for support)  Ambulation/Gait Training:  Distance (ft): 100 Feet (ft)  Assistive Device: Walker, rolling  Ambulation - Level of Assistance: Contact guard assistance        Gait Abnormalities: Decreased step clearance;Trunk sway increased        Base of Support: Widened     Speed/Sabiha: Pace decreased (<100 feet/min); Shuffled; Slow  Step Length: Left shortened;Right shortened      Gait is unsteady overall demonstrating widened base of support and short shuffled steps; patient stepping outside walker frame secondary to body habitus. Once standing patient had an episode of diarrhea and was able to stand for approximately 7 minutes using RW for support while being cleaned up.          Pain:  Pain Scale 1: Numeric (0 - 10)  Pain Intensity 1: 0           After treatment:   [ ]    Patient left in no apparent distress sitting up in chair  [X]    Patient left in no apparent distress in bed  [X]    Call bell left within reach  [X]    Nursing notified  [X]    Caregiver present  [ ]    Bed alarm activated      COMMUNICATION/COLLABORATION:   The patients plan of care was discussed with: Registered Nurse and Certified Nursing Assistant/Patient Care Technician     Rosaura Almonte, PT   Time Calculation: 33 mins

## 2017-06-23 NOTE — PROGRESS NOTES
Problem: Mobility Impaired (Adult and Pediatric)  Goal: *Acute Goals and Plan of Care (Insert Text)  Physical Therapy Goals  Initiated 6/21/2017  1. Patient will move from supine to sit and sit to supine , scoot up and down and roll side to side in bed with supervision/set-up within 7 day(s). 2. Patient will transfer from bed to chair and chair to bed with supervision/set-up using the least restrictive device within 7 day(s). 3. Patient will perform sit to stand with supervision/set-up within 7 day(s). 4. Patient will ambulate with supervision/set-up for 150 feet with the least restrictive device within 7 day(s). PHYSICAL THERAPY TREATMENT  Patient: Abimael Huitron (87 y.o. male)  Date: 6/23/2017  Diagnosis: Cellulitis <principal problem not specified>       Precautions:   falls      ASSESSMENT: Patient demonstrating increasing difficulty with supine<>sit requiring increased assistance to move B LEs, R>L. Patient also demonstrating increasing difficulty transitioning into standing. Gait remains at baseline and patient able to ambulate 100 feet in halls but patient refusing to walk further distances. Recommend patient sit EOB several times/day over weekend. Depending on further performance with recommend HHPT vs SNF. Patient would perform best if able to return to his familiar home environment with HHPT secondary to h/o MR, but if patient continues to decline with functional mobility uncertain if patient's mother will be able to care for patient. PLAN:  Patient continues to benefit from skilled intervention to address the above impairments. Continue treatment per established plan of care.   Discharge Recommendations:  Home Health vs SNF depending on further performance with therapy  Further Equipment Recommendations for Discharge:  Has appropriate equipment             OBJECTIVE DATA SUMMARY:   Critical Behavior:  Neurologic State: Alert  Orientation Level: Oriented to person, Oriented to place  Cognition: Decreased attention/concentration, Follows commands     Functional Mobility Training:  Bed Mobility:     Supine to Sit: Minimum assistance;Assist x2  Sit to Supine: Moderate assistance;Maximum assistance;Assist x1  Scooting: Contact guard assistance        Transfers:  Sit to Stand: Moderate assistance;Minimum assistance;Assist x2  Stand to Sit: Contact guard assistance                             Balance:  Sitting: Impaired  Sitting - Static: Good (unsupported)  Sitting - Dynamic: Fair (occasional)  Standing: Impaired  Standing - Static: Good;Constant support  Standing - Dynamic : Fair (using RW fro support)  Ambulation/Gait Training:  Distance (ft): 100 Feet (ft)  Assistive Device: Walker, rolling (bariatric)  Ambulation - Level of Assistance: Contact guard assistance        Gait Abnormalities: Decreased step clearance;Trunk sway increased        Base of Support: Widened     Speed/Sabiha: Pace decreased (<100 feet/min); Shuffled; Slow  Step Length: Left shortened;Right shortened      gait remains unsteady but no LOB noted           After treatment:   [ ]    Patient left in no apparent distress sitting up in chair  [X]    Patient left in no apparent distress in bed  [X]    Call bell left within reach  [X]    Nursing notified  [X]    Caregiver present  [ ]    Bed alarm activated      COMMUNICATION/COLLABORATION:   The patients plan of care was discussed with: Registered Nurse and Rehabilitation Attendant     Natacha Joyce, PT   Time Calculation: 25 mins

## 2017-06-23 NOTE — PROGRESS NOTES
Hospitalist Progress Note    NAME: Marv Acevedo   :  1965   MRN:  [id number]     Interim Hospital Summary: 46 y.o. male whom presented on 2017 with      Assessment / Plan:    Diarrhea  -4 loose stools since this AM.  Likely antibiotic associated. Will stop Unasyn. -probiotics. Monitor off Unasyn    RLE cellulits in setting of chronic LE lymphedema  -Blood culture 1/3 CoNS, likely contaminant. -LE Duplex no DVT  -no more fever. Leukocytosis resolved. MRSA screen negative   -right leg rash looks worse today but no fever. Rash looks more like strep. Will stop Unasyn given diarrhea and lack of improvement (?worse) and switch to IV Rocephin.  -repeat CBC in AM.   Re culture if spikes fever  -IV bumex to try decrease lymphedema    Hypokalemia  -repleting. Check Mg    Intertrigo  -nystatin powder    Prader-Willi syndrome with mental retardation. -nonverbal during interview but mom says he can talk and walk with walker     Morbid Obesity / OHS on home oxygen  -Per mother, he had a sleep study and it was abnormal, but in the low range. She did not take back to followup for BiPAP or CPAP. -continue home O2 2L at night prn     Hx Afib, s/p PPM  -not on meds     Hypothyroidism  -continue synthroid    Body mass index is 56.2 kg/(m^2). Code status: Full  Prophylaxis: Lovenox  Recommended Disposition: Home w/Family                   Subjective:     Chief Complaint / Reason for Physician Visit  Follow up of cellulitis, obesity, CRF  Chart reviewed in detail. Discussed with RN events overnight. Eating better today.    Rash looks worse in some areas    Review of Systems:  Symptom Y/N Comments  Symptom Y/N Comments   Fever/Chills n   Chest Pain     Poor Appetite    Edema yy    Cough    Abdominal Pain     Sputum    Joint Pain     SOB/CLEMENTE n   Pruritis/Rash y    Nausea/vomit    Tolerating PT/OT     Diarrhea    Tolerating Diet     Constipation    Other Could NOT obtain due to:      PO intake:   Patient Vitals for the past 72 hrs:   % Diet Eaten   06/20/17 2106 90 %     Objective:     VITALS:   Last 24hrs VS reviewed since prior progress note. Most recent are:  Patient Vitals for the past 24 hrs:   Temp Pulse Resp BP SpO2   06/23/17 1245 97.6 °F (36.4 °C) 73 18 103/54 93 %   06/23/17 0539 97.9 °F (36.6 °C) 75 18 130/63 92 %   06/22/17 2127 98.9 °F (37.2 °C) 75 18 123/63 96 %     No intake or output data in the 24 hours ending 06/23/17 1433     PHYSICAL EXAM:  General: WD, WN. Alert, no acute distress    EENT:  EOMI. Anicteric sclerae. MMM  Resp:  CTA bilaterally, no wheezing or rales. No accessory muscle use  CV:  Regular  rhythm,  (+) bilateral LE massive lymphedema  GI:  Soft, Non distended, Non tender.  +Bowel sounds  Neurologic:  Awake, does not answer my questions. Does not follow commands. Psych:   Not anxious nor agitated  Skin:  (+) extensive erythematous rash involving entire right upper thigh (mostly posterior medial location) and extends down leg and up right flank area. The rash is better and starting to fade (upper thigh medially, flank area and mid shin level)    Reviewed most current lab test results and cultures  YES  Reviewed most current radiology test results   YES  Review and summation of old records today    NO  Reviewed patient's current orders and MAR    YES  PMH/ reviewed - no change compared to H&P  ________________________________________________________________________  Care Plan discussed with:    Comments   Patient     Family  x mom   RN     Care Manager     Consultant                        Multidiciplinary team rounds were held today with , nursing, pharmacist and clinical coordinator. Patient's plan of care was discussed; medications were reviewed and discharge planning was addressed.      ________________________________________________________________________  Total NON critical care TIME:  25 Minutes    Total CRITICAL CARE TIME Spent:   Minutes non procedure based      Comments   >50% of visit spent in counseling and coordination of care x     This includes time during multidisciplinary rounds if indicated above   ________________________________________________________________________  Katerin Reeves MD     Procedures: see electronic medical records for all procedures/Xrays and details which were not copied into this note but were reviewed prior to creation of Plan. LABS:  I reviewed today's most current labs and imaging studies.   Pertinent labs include:  Recent Labs      06/21/17 0228   WBC  8.9   HGB  10.5*   HCT  31.1*   PLT  120*     Recent Labs      06/23/17   0337  06/22/17   0517  06/21/17 0228   NA  146*  146*  141   K  3.6  3.1*  3.3*   CL  111*  110*  107   CO2  31  30  33*   GLU  81  81  84   BUN  18  23*  22*   CREA  0.43*  0.47*  0.48*   CA  8.0*  8.2*  8.3*   MG  2.1   --    --

## 2017-06-23 NOTE — WOUND CARE
Wound care consult from staff nurse for \"patients bottom, possible DTI, need air mattress\". Patient is a well known to DOMITILA Anderson (see note 6/2017) fro chronic lymphedema with Prader Willi syndrome. Patient has developed some diarrhea from abxs given for cellulitis of RLE and because of his body habitus he has MASD between buttocks (NO DTI) and yeast type rash in skin folds of back, under left arm and behind left knee. Nystatin powder ordered by MD is not working and will need to be switched to Miconazole cream to clear up yeast rash and Secura (has miconazole in it)between buttocks to heal and protect skin.   Sam Low RN, CWON, zone ph# [phone]

## 2017-06-24 NOTE — PROGRESS NOTES
Oncology Nursing Communication Tool      1:53 PM  6/24/2017     Bedside shift change report given to Shayla Zendejas RN (incoming nurse) by Joe Tripathi RN (outgoing nurse) on Genny Gandhi a 46 y.o. male who was admitted on 6/19/2017 10:21 AM. Report included the following information SBAR, Kardex, Intake/Output, MAR, Accordion and Recent Results. Significant changes during shift: none      Issues for physician to address: none          Code Status: Full Code     Infections: No current active infections     Allergies: Review of patient's allergies indicates no known allergies.      Current diet: DIET REGULAR       Pain Controlled [x] yes [] no   Bowel Movement [x] yes [] no   Last Bowel Movement (date) 6/23/17            Vital Signs:     Patient Vitals for the past 12 hrs:   Temp Pulse Resp BP SpO2   06/24/17 0416 98.2 °F (36.8 °C) 74 18 110/55 93 %   06/23/17 2109 - - - 111/48 -   06/23/17 2107 97.7 °F (36.5 °C) 74 - 119/47 97 %      Intake & Output:       Intake/Output Summary (Last 24 hours) at 06/24/17 0721  Last data filed at 06/24/17 0558   Gross per 24 hour   Intake              100 ml   Output             1975 ml   Net            -1875 ml      Laboratory Results:     Recent Results (from the past 12 hour(s))   METABOLIC PANEL, BASIC    Collection Time: 06/24/17  5:48 AM   Result Value Ref Range    Sodium 142 136 - 145 mmol/L    Potassium 3.6 3.5 - 5.1 mmol/L    Chloride 106 97 - 108 mmol/L    CO2 31 21 - 32 mmol/L    Anion gap 5 5 - 15 mmol/L    Glucose 91 65 - 100 mg/dL    BUN 17 6 - 20 MG/DL    Creatinine 0.52 (L) 0.70 - 1.30 MG/DL    BUN/Creatinine ratio 33 (H) 12 - 20      GFR est AA >60 >60 ml/min/1.73m2    GFR est non-AA >60 >60 ml/min/1.73m2    Calcium 8.1 (L) 8.5 - 10.1 MG/DL   MAGNESIUM    Collection Time: 06/24/17  5:48 AM   Result Value Ref Range    Magnesium 2.1 1.6 - 2.4 mg/dL   CBC WITH AUTOMATED DIFF    Collection Time: 06/24/17  5:48 AM   Result Value Ref Range    WBC 11. 2 (H) 4.1 - 11.1 K/uL    RBC 3.56 (L) 4.10 - 5.70 M/uL    HGB 11.0 (L) 12.1 - 17.0 g/dL    HCT 33.7 (L) 36.6 - 50.3 %    MCV 94.7 80.0 - 99.0 FL    MCH 30.9 26.0 - 34.0 PG    MCHC 32.6 30.0 - 36.5 g/dL    RDW 15.7 (H) 11.5 - 14.5 %    PLATELET 225 086 - 945 K/uL    NEUTROPHILS 82 (H) 32 - 75 %    LYMPHOCYTES 8 (L) 12 - 49 %    MONOCYTES 7 5 - 13 %    EOSINOPHILS 3 0 - 7 %    BASOPHILS 0 0 - 1 %    ABS. NEUTROPHILS 9.2 (H) 1.8 - 8.0 K/UL    ABS. LYMPHOCYTES 0.9 0.8 - 3.5 K/UL    ABS. MONOCYTES 0.8 0.0 - 1.0 K/UL    ABS. EOSINOPHILS 0.3 0.0 - 0.4 K/UL    ABS. BASOPHILS 0.0 0.0 - 0.1 K/UL              Opportunity for questions and clarifications were given to the incoming nurse. Patient's bed is in low position, side rails x2, door open PRN, call bell within reach and patient not in distress.       Nena Saleh RN

## 2017-06-24 NOTE — PROGRESS NOTES
Hospitalist Progress Note    NAME: Madi Morris   :  1965   MRN:  [id number]     Interim Hospital Summary: 46 y.o. male whom presented on 2017 with      Assessment / Plan:    RLE cellulits in setting of chronic LE lymphedema  -Blood culture 1/3 CoNS, likely contaminant. -LE Duplex no DVT  -IV bumex to try decrease lymphedema  -continue Rocephin. Looks better today. Erythema is fading    Antibiotic associated diarrhea  -resolved after stopping unasyn    Intertrigo  -nystatin powder    Prader-Willi syndrome with mental retardation. -nonverbal during interview but mom says he can talk and walk with walker     Morbid Obesity / OHS on home oxygen  -Per mother, he had a sleep study and it was abnormal, but in the low range. She did not take back to followup for BiPAP or CPAP. -continue home O2 2L at night prn     Hx Afib, s/p PPM  -not on meds     Hypothyroidism  -continue synthroid    Body mass index is 56.2 kg/(m^2). Code status: Full  Prophylaxis: Lovenox  Recommended Disposition: Home w/Family         Subjective:     Chief Complaint / Reason for Physician Visit  Follow up of cellulitis, obesity, CRF  Chart reviewed in detail. Discussed with RN events overnight. Eating okay. Rash looks better  Diarrhea resolved    Review of Systems:  Symptom Y/N Comments  Symptom Y/N Comments   Fever/Chills n   Chest Pain     Poor Appetite    Edema yy    Cough    Abdominal Pain     Sputum    Joint Pain     SOB/CLEMENTE n   Pruritis/Rash y    Nausea/vomit    Tolerating PT/OT     Diarrhea n   Tolerating Diet     Constipation    Other       Could NOT obtain due to:      PO intake:   No data found. Objective:     VITALS:   Last 24hrs VS reviewed since prior progress note.  Most recent are:  Patient Vitals for the past 24 hrs:   Temp Pulse Resp BP SpO2   17 0416 98.2 °F (36.8 °C) 74 18 110/55 93 %   17 - - - 111/48 -   17 97.7 °F (36.5 °C) 74 - 119/47 97 %   06/23/17 1245 97.6 °F (36.4 °C) 73 18 103/54 93 %       Intake/Output Summary (Last 24 hours) at 06/24/17 0915  Last data filed at 06/24/17 0558   Gross per 24 hour   Intake              100 ml   Output             1975 ml   Net            -1875 ml        PHYSICAL EXAM:  General: WD, WN. Alert, no acute distress    EENT:  EOMI. Anicteric sclerae. MMM  Resp:  CTA bilaterally, no wheezing or rales. No accessory muscle use  CV:  Regular  rhythm,  (+) bilateral LE massive lymphedema  GI:  Soft, Non distended, Non tender.  +Bowel sounds  Neurologic:  Awake, does not answer my questions. Does not follow commands. Psych:   Not anxious nor agitated  Skin:  (+) extensive erythematous rash involving entire right upper thigh (mostly posterior medial location) and extends down leg and up right flank area. The rash is better and starting to fade (upper thigh medially, flank area and mid shin level)    Reviewed most current lab test results and cultures  YES  Reviewed most current radiology test results   YES  Review and summation of old records today    NO  Reviewed patient's current orders and MAR    YES  PMH/ reviewed - no change compared to H&P  ________________________________________________________________________  Care Plan discussed with:    Comments   Patient     Family  x mom   RN     Care Manager     Consultant                        Multidiciplinary team rounds were held today with , nursing, pharmacist and clinical coordinator. Patient's plan of care was discussed; medications were reviewed and discharge planning was addressed.      ________________________________________________________________________  Total NON critical care TIME:  25   Minutes    Total CRITICAL CARE TIME Spent:   Minutes non procedure based      Comments   >50% of visit spent in counseling and coordination of care x     This includes time during multidisciplinary rounds if indicated above ________________________________________________________________________  Geraldo Carranza MD     Procedures: see electronic medical records for all procedures/Xrays and details which were not copied into this note but were reviewed prior to creation of Plan. LABS:  I reviewed today's most current labs and imaging studies.   Pertinent labs include:  Recent Labs      06/24/17 0548   WBC  11.2*   HGB  11.0*   HCT  33.7*   PLT  163     Recent Labs      06/24/17   0548  06/23/17   0337  06/22/17   0517   NA  142  146*  146*   K  3.6  3.6  3.1*   CL  106  111*  110*   CO2  31  31  30   GLU  91  81  81   BUN  17  18  23*   CREA  0.52*  0.43*  0.47*   CA  8.1*  8.0*  8.2*   MG  2.1  2.1   --

## 2017-06-24 NOTE — PROGRESS NOTES
Oncology Nursing Communication Tool      6:53 PM  6/24/2017     Bedside shift change report given to Miguel Dobbs (incoming nurse) by Kala Salgado RN (outgoing nurse) on Martha Clayton a 46 y.o. male who was admitted on 6/19/2017 10:21 AM. Report included the following information SBAR. Significant changes during shift:       Issues for physician to address:             Code Status: Full Code     Infections: No current active infections     Allergies: Review of patient's allergies indicates no known allergies. Current diet: DIET REGULAR       Pain Controlled [x] yes [] no   Bowel Movement [] yes [x] no   Last Bowel Movement (date)             Vital Signs:   Patient Vitals for the past 12 hrs:   Temp Pulse Resp BP SpO2   06/24/17 1338 97.4 °F (36.3 °C) 75 18 137/69 95 %      Intake & Output:     Intake/Output Summary (Last 24 hours) at 06/24/17 1853  Last data filed at 06/24/17 1103   Gross per 24 hour   Intake                0 ml   Output             2475 ml   Net            -2475 ml      Laboratory Results:   No results found for this or any previous visit (from the past 12 hour(s)). Opportunity for questions and clarifications were given to the incoming nurse. Patient's bed is in low position, side rails x2, door open PRN, call bell within reach and patient not in distress.       Kala Salgado RN

## 2017-06-25 NOTE — PROGRESS NOTES
Oncology Nursing Communication Tool      7:06 PM  6/25/2017     Bedside shift change report given to Harish Arnold RN (incoming nurse) by Adina Raymond (outgoing nurse) on Maribeth Almendarez a 46 y.o. male who was admitted on 6/19/2017 10:21 AM. Report included the following information SBAR, Kardex, Intake/Output, MAR and Recent Results. Significant changes during shift:       Issues for physician to address:             Code Status: Full Code     Infections: No current active infections     Allergies: Review of patient's allergies indicates no known allergies. Current diet: DIET REGULAR       Pain Controlled [] yes [] no   Bowel Movement [] yes [] no   Last Bowel Movement (date)                 Vital Signs:   Patient Vitals for the past 12 hrs:   Temp Pulse Resp BP SpO2   06/25/17 1418 97.6 °F (36.4 °C) 74 18 113/65 94 %      Intake & Output:     Intake/Output Summary (Last 24 hours) at 06/25/17 1906  Last data filed at 06/25/17 1000   Gross per 24 hour   Intake                0 ml   Output             2300 ml   Net            -2300 ml      Laboratory Results:   No results found for this or any previous visit (from the past 12 hour(s)). Opportunity for questions and clarifications were given to the incoming nurse. Patient's bed is in low position, side rails x2, door open PRN, call bell within reach and patient not in distress.       Adina Raymond

## 2017-06-25 NOTE — PROGRESS NOTES
Hospitalist Progress Note    NAME: Gurvinder Guevara   :  1965   MRN:  [id number]     Interim Hospital Summary: 46 y.o. male whom presented on 2017 with      Assessment / Plan:    RLE cellulits in setting of chronic LE lymphedema  -Blood culture 1/3 CoNS, likely contaminant. -LE Duplex no DVT  -continue IV bumex to try decrease amount of lymphedema  -continue Rocephin. Continues to improve (see photo)    Antibiotic associated diarrhea  -resolved after stopping unasyn  -probiotics    Intertrigo  -nystatin powder    Prader-Willi syndrome with mental retardation. -nonverbal during interview but mom says he can talk and walk with walker     Morbid Obesity / OHS on home oxygen  -Per mother, he had a sleep study and it was abnormal, but in the low range. She did not take back to followup for BiPAP or CPAP. -continue home O2 2L at night prn     Hx Afib, s/p PPM  -not on meds     Hypothyroidism  -continue synthroid    Body mass index is 56.2 kg/(m^2). Code status: Full  Prophylaxis: Lovenox  Recommended Disposition: Home w/Family           Subjective:     Chief Complaint / Reason for Physician Visit  Follow up of cellulitis, obesity, CRF  Chart reviewed in detail. Discussed with RN events overnight. Eating okay. Rash looks better  Diarrhea resolved    Review of Systems:  Symptom Y/N Comments  Symptom Y/N Comments   Fever/Chills n   Chest Pain     Poor Appetite    Edema y    Cough    Abdominal Pain     Sputum    Joint Pain     SOB/CLEMENTE n   Pruritis/Rash y    Nausea/vomit    Tolerating PT/OT     Diarrhea n   Tolerating Diet     Constipation    Other       Could NOT obtain due to:      PO intake:   No data found. Objective:     VITALS:   Last 24hrs VS reviewed since prior progress note.  Most recent are:  Patient Vitals for the past 24 hrs:   Temp Pulse Resp BP SpO2   17 0554 97.6 °F (36.4 °C) 74 18 121/66 94 %   17 0512 - 96 - 104/57 -   06/24/17 2107 98.2 °F (36.8 °C) 74 18 120/53 94 %   06/24/17 1338 97.4 °F (36.3 °C) 75 18 137/69 95 %       Intake/Output Summary (Last 24 hours) at 06/25/17 0915  Last data filed at 06/25/17 0454   Gross per 24 hour   Intake                0 ml   Output             2200 ml   Net            -2200 ml        PHYSICAL EXAM:  General: WD, WN. Alert, no acute distress    EENT:  EOMI. Anicteric sclerae. MMM  Resp:  CTA bilaterally, no wheezing or rales. No accessory muscle use  CV:  Regular  rhythm,  (+) bilateral LE massive lymphedema  GI:  Soft, Non distended, Non tender.  +Bowel sounds  Neurologic:  Awake, does not answer my questions. Does not follow commands. Psych:   Not anxious nor agitated  Skin:  (+) extensive erythematous rash involving entire right upper thigh (mostly posterior medial location) and extends down leg and up right flank area. The rash is better and starting to fade (upper thigh medially, flank area and mid shin level)    Reviewed most current lab test results and cultures  YES  Reviewed most current radiology test results   YES  Review and summation of old records today    NO  Reviewed patient's current orders and MAR    YES  PMH/SH reviewed - no change compared to H&P  ________________________________________________________________________  Care Plan discussed with:    Comments   Patient     Family  x mom   RN     Care Manager     Consultant                        Multidiciplinary team rounds were held today with , nursing, pharmacist and clinical coordinator. Patient's plan of care was discussed; medications were reviewed and discharge planning was addressed.      ________________________________________________________________________  Total NON critical care TIME:  25   Minutes    Total CRITICAL CARE TIME Spent:   Minutes non procedure based      Comments   >50% of visit spent in counseling and coordination of care x     This includes time during multidisciplinary rounds if indicated above   ________________________________________________________________________  Fantasma Orr MD     Procedures: see electronic medical records for all procedures/Xrays and details which were not copied into this note but were reviewed prior to creation of Plan. LABS:  I reviewed today's most current labs and imaging studies.   Pertinent labs include:  Recent Labs      06/24/17   0548   WBC  11.2*   HGB  11.0*   HCT  33.7*   PLT  163     Recent Labs      06/25/17   0510  06/24/17   0548  06/23/17   0337   NA  140  142  146*   K  3.6  3.6  3.6   CL  102  106  111*   CO2  34*  31  31   GLU  80  91  81   BUN  20  17  18   CREA  0.48*  0.52*  0.43*   CA  8.1*  8.1*  8.0*   MG   --   2.1  2.1

## 2017-06-25 NOTE — ROUTINE PROCESS
Oncology Nursing Communication Tool      8:00 AM  6/25/2017     Bedside shift change report given to Jes Cramer RN (incoming nurse) by Kael Payton RN (outgoing nurse) on Branden Primer a 46 y.o. male who was admitted on 6/19/2017 10:21 AM. Report included the following information SBAR, Kardex, Intake/Output, MAR and Recent Results. Significant changes during shift: none      Issues for physician to address: none            Code Status: Full Code     Infections: No current active infections     Allergies: Review of patient's allergies indicates no known allergies. Current diet: DIET REGULAR       Pain Controlled [x] yes [] no   Bowel Movement [] yes [x] no   Last Bowel Movement (date) 6/24/17            Vital Signs:   Patient Vitals for the past 12 hrs:   Temp Pulse Resp BP SpO2   06/25/17 0554 97.6 °F (36.4 °C) 74 18 121/66 94 %   06/25/17 0512 - 96 - 104/57 -   06/24/17 2107 98.2 °F (36.8 °C) 74 18 120/53 94 %      Intake & Output:     Intake/Output Summary (Last 24 hours) at 06/25/17 0800  Last data filed at 06/25/17 0454   Gross per 24 hour   Intake                0 ml   Output             2200 ml   Net            -2200 ml      Laboratory Results:     Recent Results (from the past 12 hour(s))   METABOLIC PANEL, BASIC    Collection Time: 06/25/17  5:10 AM   Result Value Ref Range    Sodium 140 136 - 145 mmol/L    Potassium 3.6 3.5 - 5.1 mmol/L    Chloride 102 97 - 108 mmol/L    CO2 34 (H) 21 - 32 mmol/L    Anion gap 4 (L) 5 - 15 mmol/L    Glucose 80 65 - 100 mg/dL    BUN 20 6 - 20 MG/DL    Creatinine 0.48 (L) 0.70 - 1.30 MG/DL    BUN/Creatinine ratio 42 (H) 12 - 20      GFR est AA >60 >60 ml/min/1.73m2    GFR est non-AA >60 >60 ml/min/1.73m2    Calcium 8.1 (L) 8.5 - 10.1 MG/DL              Opportunity for questions and clarifications were given to the incoming nurse.  Patient's bed is in low position, side rails x2, door open PRN, call bell within reach and patient not in distress.       James Macias RN

## 2017-06-26 NOTE — PROGRESS NOTES
Oncology Nursing Communication Tool      6:00 PM  6/26/2017     Bedside shift change report given to Austen Cano RN (incoming nurse) by Karely Zee RN (outgoing nurse) on Rola Ellis a 46 y.o. male who was admitted on 6/19/2017 10:21 AM. Report included the following information SBAR, Kardex, Intake/Output, MAR, Accordion and Recent Results. Significant changes during shift: D/C tomorrow      Issues for physician to address: No issues at this time Thank You            Code Status: Full Code     Infections: No current active infections     Allergies: Review of patient's allergies indicates no known allergies. Current diet: DIET REGULAR       Pain Controlled [x] yes [] no   Bowel Movement [x] yes [] no   Last Bowel Movement (date) 6/26/17            Vital Signs:   Patient Vitals for the past 12 hrs:   Temp Pulse BP SpO2   06/26/17 1444 98.3 °F (36.8 °C) 75 112/76 95 %      Intake & Output:     Intake/Output Summary (Last 24 hours) at 06/26/17 1800  Last data filed at 06/26/17 1727   Gross per 24 hour   Intake                0 ml   Output             3800 ml   Net            -3800 ml      Laboratory Results:   No results found for this or any previous visit (from the past 12 hour(s)). Opportunity for questions and clarifications were given to the incoming nurse. Patient's bed is in low position, side rails x2, door open PRN, call bell within reach and patient not in distress.       Karely Zee RN

## 2017-06-26 NOTE — PROGRESS NOTES
Hospitalist Progress Note    NAME: Iesha Daily   :  1965   MRN:  [id number]     Interim Hospital Summary: 46 y.o. male whom presented on 2017 with      Assessment / Plan:    RLE cellulits in setting of chronic LE lymphedema  -Blood culture 1/3 CoNS, likely contaminant. -LE Duplex no DVT  -continue IV bumex to try decrease amount of lymphedema  -rash is now starting to improve. Will continue Rocephin for another day and can transition to PO tomorrow and discharge. Discussed with mom. DC planning HH PT     Antibiotic associated diarrhea  -resolved after stopping unasyn  -probiotics    Intertrigo  -nystatin powder    Prader-Willi syndrome with mental retardation. -nonverbal mostly but mom says he can talk and walk with walker     Morbid Obesity / OHS on home oxygen  -Per mother, he had a sleep study and it was abnormal, but in the low range. She did not take back to followup for BiPAP or CPAP. Continue home O2 2L at night prn     Hx Afib, s/p PPM  -not on meds     Hypothyroidism  -continue synthroid    Body mass index is 56.2 kg/(m^2). Code status: Full  Prophylaxis: Lovenox  Recommended Disposition: Home w/Family           Subjective:     Chief Complaint / Reason for Physician Visit  Follow up of cellulitis, obesity, CRF  Chart reviewed in detail. Discussed with RN events overnight. Eating okay. Rash looks much better today    Review of Systems:  Symptom Y/N Comments  Symptom Y/N Comments   Fever/Chills n   Chest Pain     Poor Appetite    Edema y    Cough    Abdominal Pain     Sputum    Joint Pain     SOB/CLEMENTE n   Pruritis/Rash y    Nausea/vomit    Tolerating PT/OT     Diarrhea   some  Tolerating Diet     Constipation    Other       Could NOT obtain due to:      PO intake:   No data found. Objective:     VITALS:   Last 24hrs VS reviewed since prior progress note.  Most recent are:  Patient Vitals for the past 24 hrs:   Temp Pulse Resp BP SpO2   06/26/17 0520 98.3 °F (36.8 °C) 75 20 105/43 90 %   06/26/17 0455 - 75 - 105/43 -   06/25/17 2204 98.8 °F (37.1 °C) 79 22 109/55 92 %   06/25/17 1418 97.6 °F (36.4 °C) 74 18 113/65 94 %       Intake/Output Summary (Last 24 hours) at 06/26/17 1131  Last data filed at 06/26/17 1028   Gross per 24 hour   Intake                0 ml   Output             2900 ml   Net            -2900 ml        PHYSICAL EXAM:  General: WD, WN. Alert, no acute distress    EENT:  EOMI. Anicteric sclerae. MMM  Resp:  CTA bilaterally, no wheezing or rales. No accessory muscle use  CV:  Regular  rhythm,  (+) bilateral LE massive lymphedema - improved  GI:  Soft, Non distended, Non tender.  +Bowel sounds  Neurologic:  Awake, does not answer my questions. Does not follow commands. Psych:   Not anxious nor agitated  Skin:  (+) extensive erythematous rash involving entire right upper thigh (mostly posterior medial location) and extends down leg and up right flank area. The rash is better and starting to fade (upper thigh medially, flank area and mid shin level) -  Overall >50% better    Reviewed most current lab test results and cultures  YES  Reviewed most current radiology test results   YES  Review and summation of old records today    NO  Reviewed patient's current orders and MAR    YES  PMH/ reviewed - no change compared to H&P  ________________________________________________________________________  Care Plan discussed with:    Comments   Patient     Family  x mom   RN x    Care Manager     Consultant                        Multidiciplinary team rounds were held today with , nursing, pharmacist and clinical coordinator. Patient's plan of care was discussed; medications were reviewed and discharge planning was addressed.      ________________________________________________________________________  Total NON critical care TIME:  25   Minutes    Total CRITICAL CARE TIME Spent:   Minutes non procedure based      Comments   >50% of visit spent in counseling and coordination of care x     This includes time during multidisciplinary rounds if indicated above   ________________________________________________________________________  Fantasma Orr MD     Procedures: see electronic medical records for all procedures/Xrays and details which were not copied into this note but were reviewed prior to creation of Plan. LABS:  I reviewed today's most current labs and imaging studies.   Pertinent labs include:  Recent Labs      06/24/17   0548   WBC  11.2*   HGB  11.0*   HCT  33.7*   PLT  163     Recent Labs      06/26/17   0451  06/25/17   0510  06/24/17   0548   NA  140  140  142   K  3.7  3.6  3.6   CL  99  102  106   CO2  36*  34*  31   GLU  81  80  91   BUN  22*  20  17   CREA  0.43*  0.48*  0.52*   CA  8.2*  8.1*  8.1*   MG  2.2   --   2.1

## 2017-06-26 NOTE — PROGRESS NOTES
Problem: Mobility Impaired (Adult and Pediatric)  Goal: *Acute Goals and Plan of Care (Insert Text)  Physical Therapy Goals  Initiated 6/21/2017  1. Patient will move from supine to sit and sit to supine , scoot up and down and roll side to side in bed with supervision/set-up within 7 day(s). 2. Patient will transfer from bed to chair and chair to bed with supervision/set-up using the least restrictive device within 7 day(s). 3. Patient will perform sit to stand with supervision/set-up within 7 day(s). 4. Patient will ambulate with supervision/set-up for 150 feet with the least restrictive device within 7 day(s). PHYSICAL THERAPY TREATMENT  Patient: Gurvinder Guevara (83 y.o. male)  Date: 6/26/2017  Diagnosis: Cellulitis <principal problem not specified>         ASSESSMENT: Patient demonstrating improved ability to transition to edge of bed and to transfer to standing. Requiring CGA to min A for OOB mobility but mod A of 2 to help transition B LES back into bed. Gait was initially antalgic but improved with distance walked. Gait is mildly unsteady but this appears to be patient's baseline. Recommend HHPT following discharge to assess safety in home environment and for family training. Progression toward goals:  [ ]    Improving appropriately and progressing toward goals  [X]    Improving slowly and progressing toward goals  [ ]    Not making progress toward goals and plan of care will be adjusted       PLAN:  Patient continues to benefit from skilled intervention to address the above impairments. Continue treatment per established plan of care.   Discharge Recommendations:  Home Health  Further Equipment Recommendations for Discharge:  Has appropriate equipment             OBJECTIVE DATA SUMMARY:   Critical Behavior:  Neurologic State: Alert  Orientation Level: Disoriented to situation, Oriented to place, Oriented to person  Cognition: Follows commands     Functional Mobility Training:  Bed Mobility: Supine to Sit: Minimum assistance;Assist x1;Additional time;Contact guard assistance  Sit to Supine: Moderate assistance;Assist x2 (to support B LEs)  Scooting: Contact guard assistance; Additional time        Transfers:  Sit to Stand: Minimum assistance;Assist x2  Stand to Sit: Contact guard assistance                             Balance:  Sitting: Impaired  Sitting - Static: Good (unsupported)  Sitting - Dynamic: Fair (occasional)  Standing: Impaired  Standing - Static: Good;Constant support  Standing - Dynamic : Fair (using RW fro support)  Ambulation/Gait Training:  Distance (ft): 100 Feet (ft)  Assistive Device: Walker, rolling  Ambulation - Level of Assistance: Contact guard assistance        Gait Abnormalities: Antalgic;Decreased step clearance;Trendelenburg;Trunk sway increased (antalgia decreasing with distance walked)        Base of Support: Widened     Speed/Sabiha: Pace decreased (<100 feet/min)  Step Length: Left shortened;Right shortened      Gait slow and mildly unsteady demonstrating decreased step clearance and increased antalgia today. Antalgia improved with distance walked.  No vert LOB noted     Pain:  Pain Scale 1: Numeric (0 - 10)  Pain Intensity 1: 0        After treatment:   [ ]    Patient left in no apparent distress sitting up in chair  [X]    Patient left in no apparent distress in bed  [X]    Call bell left within reach  [X]    Nursing notified  [X]    Caregiver present  [X]    Bed alarm activated      COMMUNICATION/COLLABORATION:   The patients plan of care was discussed with: Registered Nurse,  and Rehabilitation Attendant     Elaine Enrique PT   Time Calculation: 30 mins

## 2017-06-27 NOTE — DISCHARGE SUMMARY
Hospitalist Discharge Summary     Patient ID:  Maribeth Almendarez  [id number]  42 y.o.  1965    PCP on record: Marcos Jordan MD    Admit date: 6/19/2017  Discharge date and time: 6/27/2017      DISCHARGE DIAGNOSIS:    Acute RLE cellulitis POA - improved s/p IV Antibiotics in hospital, now cont PO kelfex x 5 more days  in setting of chronic LE lymphedema  Antibiotic associated diarrhea - cont probiotics with Antibiotics  Intertrigo - nystatin  Prader-Willi syndrome with mental retardation. Hypothyroidism- cont synthroid  Morbid Obesity / OHS on home oxygen  Hx Afib, s/p PPM      CONSULTATIONS:  None    Excerpted HPI from H&P of Rashida Hill MD:  46 y.o.  male who presents with above complaint. Pt has h/o MR and cannot contribute much to the history. History was obtained from mother at bedside. Per mother, pt started with R LE redness last evening. Redness started at the thigh area and spread down to ankle over night. Pt has h/o recurrent cellulitis. No fever/chills. Pt at baseline is ambulating with walker.       Vs: 97.7 °F (36.5 °C) - 75 - 110/65 - 16 - 92% RA      We were asked to admit for work up and evaluation of the above problems. ______________________________________________________________________  DISCHARGE SUMMARY/HOSPITAL COURSE:  for full details see H&P, daily progress notes, labs, consult notes. RLE cellulits in setting of chronic LE lymphedema  -Blood culture 1/3 CoNS, likely contaminant. -LE Duplex no DVT  -continue IV bumex to try decrease amount of lymphedema  -rash is now starting to improve. Will continue Rocephin for another day and can transition to PO tomorrow and discharge. Discussed with mom. DC planning  PT      Antibiotic associated diarrhea  -resolved after stopping unasyn  -probiotics     Intertrigo  -nystatin powder     Prader-Willi syndrome with mental retardation.   -nonverbal mostly but mom says he can talk and walk with walker      Morbid Obesity / OHS on home oxygen  -Per mother, he had a sleep study and it was abnormal, but in the low range. She did not take back to followup for BiPAP or CPAP. Continue home O2 2L at night prn      Hx Afib, s/p PPM  -not on meds      Hypothyroidism  -continue synthroid     Body mass index is 56.2 kg/(m^2). _______________________________________________________________________  Patient seen and examined by me on discharge day. Pertinent Findings:  Gen:    Not in distress  Chest: Clear lungs  CVS:   Regular rhythm. No edema  Abd:  Soft, not distended, not tender  Neuro:  Alert, oriented x 1, At baseline MS as per mother  _______________________________________________________________________  DISCHARGE MEDICATIONS:   Current Discharge Medication List      START taking these medications    Details   cephalexin (KEFLEX) 750 mg capsule Take 1 Cap by mouth four (4) times daily for 5 days. Qty: 20 Cap, Refills: 0      !! nystatin (MYCOSTATIN) powder Apply  to affected area four (4) times daily. Qty: 1 Bottle, Refills: 1      L. acidoph & paracasei- S therm- Bifido (MARCO-Q/RISAQUAD) 8 billion cell cap cap Take 1 Cap by mouth daily for 5 days. Qty: 5 Cap, Refills: 0       !! - Potential duplicate medications found. Please discuss with provider. CONTINUE these medications which have NOT CHANGED    Details   !! nystatin (MYCOSTATIN) powder Apply  to affected area four (4) times daily as needed. levothyroxine (SYNTHROID) 100 mcg tablet Take 100 mcg by mouth Daily (before breakfast). cholecalciferol (VITAMIN D3) 1,000 unit cap Take 1,000 Units by mouth daily. multivitamin (ONE A DAY) tablet Take 1 Tab by mouth daily. !! - Potential duplicate medications found. Please discuss with provider.           My Recommended Diet, Activity, Wound Care, and follow-up labs are listed in the patient's Discharge Insturctions which I have personally completed and reviewed.     _______________________________________________________________________  DISPOSITION:    Home with Family:    Home with HH/PT/OT/RN: x   SNF/LTC:    BETZAIDA:    OTHER:        Condition at Discharge:  Stable  _______________________________________________________________________  Follow up with:   PCP : Lena Martinez MD  Follow-up Information     Follow up With Details [address] [place] [address], [address] [place] [address]  [phone]                Total time in minutes spent coordinating this discharge (includes going over instructions, follow-up, prescriptions, and preparing report for sign off to her PCP) :  36 minutes    Signed:  Radha Gonsales MD

## 2017-06-27 NOTE — DISCHARGE INSTRUCTIONS
HOSPITALIST DISCHARGE INSTRUCTIONS    NAME: Catherine Plasencia   :  1965   MRN:  [id number]     Date/Time:  2017 11:55 AM    ADMIT DATE: 2017     DISCHARGE DATE: 2017     DISCHARGE DIAGNOSIS:  Acute RLE cellulitis POA - improved s/p IV Antibiotics in hospital, now cont PO kelfex x 5 more days  in setting of chronic LE lymphedema  Antibiotic associated diarrhea - cont probiotics with Antibiotics  Intertrigo - nystatin  Prader-Willi syndrome with mental retardation. Hypothyroidism- cont synthroid  Morbid Obesity / OHS on home oxygen  Hx Afib, s/p PPM       Active Problems:    Cellulitis (2017)         MEDICATIONS:  As per medication reconciliation  list  · It is important that you take the medication exactly as they are prescribed. · Keep your medication in the bottles provided by the pharmacist and keep a list of the medication names, dosages, and times to be taken in your wallet. · Do not take other medications without consulting your doctor. Pain Management: per above medications    What to do at Home    Recommended diet:  Cardiac Diet    Recommended activity: Activity as tolerated    If you have questions regarding the hospital related prescriptions or hospital related issues please call [facility] at [phone]. If you experience any of the following symptoms then please call your primary care physician or return to the emergency room if you cannot get hold of your doctor:  Fever, chills, nausea, vomiting, diarrhea, change in mentation, falling, bleeding, shortness of breath,     Follow Up:  Dr. Elisa Arias MD  you are to call and set up an appointment to see them in 7-10 days. [facility] [place] [address] services for PT as arranged    Information obtained by :  I understand that if any problems occur once I am at home I am to contact my physician. I understand and acknowledge receipt of the instructions indicated above. Physician's or R.N.'s Signature                                                                  Date/Time                                                                                                                                              Patient or Representative Signature                                                          Date/Time

## 2017-06-27 NOTE — PROGRESS NOTES
Patient is tready for discharge. Pulled patients PICC Line per policy. Patient tolerated well. Went over discharge instructions with patients family member, medications teaching and side effects discussed with family member. Wheelchair Graciella Leather to transport patient home.

## 2017-06-27 NOTE — PROGRESS NOTES
PCP follow- up with Dr. Colletta Callander has been scheduled for July 10. Dr. Colletta Callander will be going to the patients home for the appointment and will call patient with a time. Dr. Colletta Callander will be out the office the first week of July.

## 2017-06-27 NOTE — PROGRESS NOTES
Home Health Care Discharge Planning: [facility]  Face to Face Encounter      NAME: Branden Trimble   :  1965   MRN:  [id number]     Primary Diagnosis: R Leg cellulitis , Generalized weakness    Date of Face to Face:  2017 11:57 AM                                  Face to Face Encounter findings are related to primary reason for home care:   YES    1. I certify that the patient needs intermittent skilled nursing care, physical therapy and/or speech therapy. I will not be following this patient in the Community and Dr. Sienna Frank MD will be responsible for signing the [address] 2. Initial Orders for Care:  Skilled Nursing and Physical Therapy    3. I certify that this patient is homebound because of illness or injury, need the aid of supportive devices such as crutches, canes, wheelchairs, and walkers; the use of special transportation; or the assistance of another person in order to leave their place of residence. There exists a normal inability to leave home and leaving home requires a considerable and taxing effort. 4. I certify that this patient is under my care and that I had a Face-to-Face Encounter that meets the physician Face-to-Face Encounter requirements. Document the physical findings from the Face-to-Face Encounter that support the need for skilled services:  Has new diagnosis that requires skilled nursing teaching and intervention , Has new medications that requires skilled nursing teaching and monitoring for understanding and compliance , Needs skilled safety assessment and interventions  and Has new finding of weakness and altered mobility that requires skilled physical therapy services for evaluation and interventions.      Lacie Castrejon MD  Discharging Physician  Office: [phone]  Fax:   [phone]

## 2017-06-27 NOTE — PROGRESS NOTES
Pt. For discharge today back home with his mother. He will have [facility] for HHPT--including family training. A WC Van--UIQ- [phone] has been set up for 130pm. They will call the nurses' station and will bring a large WC to the front lobby. Nurse aware of time and mother informed also. -- Glenn Berrios, [facility]--[phone]    Care Management Interventions  PCP Verified by CM: Yes (Dr. Melissa Fortune)  Mode of Transport at Discharge: Other (see comment) (family)  Transition of Care Consult (CM Consult): Discharge Planning  MyChart Signup: No  Discharge Durable Medical Equipment: No (RW)  Health Maintenance Reviewed: Yes  Physical Therapy Consult: Yes  Occupational Therapy Consult: Yes  Speech Therapy Consult: No  Current Support Network:  Other, Relative's Home (lives with mother--MR pt.)  Confirm Follow Up Transport: [address] discussed with Pt/Family/Caregiver: Yes  Freedom of Choice Offered: Yes  Discharge Location  Discharge Placement: Home with home health [facility]

## 2017-08-08 PROBLEM — I49.9 IRREGULAR HEART BEAT: Status: ACTIVE | Noted: 2017-01-01

## 2017-08-08 NOTE — PROGRESS NOTES
Subjective:      Shaun Ledesma is a 46 y.o. male is here for annual f/u regarding hx of heart block with pacemaker and afib. Pt's caregiver denies any cardiac complaints. Pt unable to provide ROS d/t cognitive impairment. Patient Active Problem List    Diagnosis Date Noted    Irregular heart beat 08/08/2017    Sepsis ([facility] 09/24/2016    Cellulitis of right lower leg 10/16/2015    Hypokalemia 10/16/2015    Hypercapnia 12/04/2014    Cellulitis 08/24/2014    Cellulitis of leg, right 06/12/2014    S/P cardiac pacemaker procedure 02/15/2013    Atrial fibrillation with slow ventricular response ([facility] 02/13/2013    Bradycardia 02/13/2013    Hypoglycemia.  Due to levaquin 03/31/2011    Bacteruria- Enterococcus 03/31/2011    Hypothermia 03/30/2011    Pancytopenia 03/30/2011    Obesity, morbid (more than 100 lbs over ideal weight or BMI > 40) ([facility] 03/30/2011    Cellulitis of leg 03/30/2011    Sepsis ([facility] 03/29/2011    Hypothyroidism 03/29/2011    Prader-Willi syndrome 03/29/2011    Cerumen impaction 03/29/2011    Other encephalopathy 03/29/2011      Paz Lemus MD  Past Medical History:   Diagnosis Date    A-fib [facility])     Hypothyroid     Other ill-defined conditions     obesity    Other ill-defined conditions     mentally disabled    Prader-Willi syndrome     S/P cardiac pacemaker procedure 2/15/2013    heart block      Past Surgical History:   Procedure Laterality Date    HX HEENT      eye surgery as a baby    HX PACEMAKER       No Known Allergies   Family History   Problem Relation Age of Onset    Hypertension Mother     Heart Disease Father     negative for cardiac disease  Social History     Social History    Marital status: SINGLE     Spouse name: N/A    Number of children: N/A    Years of education: N/A     Social History Main Topics    Smoking status: Never Smoker    Smokeless tobacco: Never Used    Alcohol use No    Drug use: No    Sexual activity: No Other Topics Concern    None     Social History Narrative     Current Outpatient Prescriptions   Medication Sig    cranberry extract (CRANBERRY) 450 mg tab Take 1 Tab by mouth daily.  aspirin (ASPIRIN) 325 mg tablet Take 325 mg by mouth every evening.  ascorbic acid (VITAMIN C) 500 mg tablet Take 500 mg by mouth every evening.  ferrous sulfate (IRON) 325 mg (65 mg iron) tablet Take 325 mg by mouth daily (after dinner).  cholecalciferol (VITAMIN D3) 1,000 unit tablet Take 1,000 Units by mouth every evening.  multivitamin (ONE A DAY) tablet Take 1 tablet by mouth every evening.  nystatin (MYCOSTATIN) powder Apply 100,000 Units to affected area two (2) times a day.  levothyroxine (SYNTHROID) 100 mcg tablet Take 100 mcg by mouth Daily (before breakfast).  cephALEXin (KEFLEX) 500 mg capsule Take 1 Cap by mouth four (4) times daily.  trimethoprim-sulfamethoxazole (BACTRIM, SEPTRA)  mg per tablet Take 2 Tabs by mouth two (2) times a day.  acetaminophen-codeine (TYLENOL #3) 300-30 mg per tablet Take 1 Tab by mouth every four (4) hours as needed for Pain. Max Daily Amount: 6 Tabs. No current facility-administered medications for this visit. Vitals:    08/08/17 0849   BP: 120/86   Pulse: 76   Resp: 20   SpO2: 94%   Height: 5' 4\" (1.626 m)       I have reviewed the nurses notes, vitals, problem list, allergy list, medical history, family, social history and medications. Review of Symptoms:  Per caregiver     General: No noted excessive weight gain or loss. Respiratory: No noted shortness of breath, CLEMENTE, wheezing or stridor. Cardiovascular: No noted precordial pain, edema or PND  Musculoskeletal: No noted pain or swelling from muscles or joints  Neurologic: No noted tremor or sensory motor disturbance  Skin: No noted rash, itching or texture change. Physical Exam:      General: Well developed, in no acute distress.   HEENT: Eyes - PERRL, no jvd  Heart:  Normal S1/S2 negative S3 or S4. Regular, no murmur, gallop or rub.   Respiratory: Clear bilaterally x 4, no wheezing or rales  Abdomen:   Soft, non-tender, bowel sounds are active.   Extremities:  Chronic lymphedema, normal cap refill, no cyanosis. Musculoskeletal: No clubbing  Neuro: A&Ox3, speech clear, gait stable. Skin: Skin color is normal. Non diaphoretic  Vascular: 2+ pulses symmetric in all extremities    Cardiographics    Ekg: V pacing    Medtronic pacemaker R V lead only- 99% V paced  No events  5.5 year battery life      Results for orders placed or performed during the hospital encounter of 12/04/14   EKG, 12 LEAD, INITIAL   Result Value Ref Range    Ventricular Rate 75 BPM    Atrial Rate 73 BPM    QRS Duration 196 ms    Q-T Interval 462 ms    QTC Calculation (Bezet) 515 ms    Calculated R Axis -168 degrees    Calculated T Axis 78 degrees    Diagnosis       Electronic ventricular pacemaker  Confirmed by Jose Luis Gr ([id number]) on 12/5/2014 9:03:50 AM         Lab Results   Component Value Date/Time    WBC 7.9 09/27/2016 03:58 AM    HGB 11.3 09/27/2016 03:58 AM    HCT 34.9 09/27/2016 03:58 AM    PLATELET 239 40/28/8155 03:58 AM    MCV 93.8 09/27/2016 03:58 AM      Lab Results   Component Value Date/Time    Sodium 150 09/27/2016 03:58 AM    Potassium 3.7 09/27/2016 03:58 AM    Chloride 117 09/27/2016 03:58 AM    CO2 26 09/27/2016 03:58 AM    Anion gap 7 09/27/2016 03:58 AM    Glucose 100 09/27/2016 03:58 AM    BUN 21 09/27/2016 03:58 AM    Creatinine 0.74 09/27/2016 03:58 AM    BUN/Creatinine ratio 28 09/27/2016 03:58 AM    GFR est AA >60 09/27/2016 03:58 AM    GFR est non-AA >60 09/27/2016 03:58 AM    Calcium 7.8 09/27/2016 03:58 AM    Bilirubin, total 0.3 09/27/2016 03:58 AM    AST (SGOT) 42 09/27/2016 03:58 AM    Alk.  phosphatase 58 09/27/2016 03:58 AM    Protein, total 6.3 09/27/2016 03:58 AM    Albumin 1.9 09/27/2016 03:58 AM    Globulin 4.4 09/27/2016 03:58 AM    A-G Ratio 0.4 09/27/2016 03:58 AM    ALT (SGPT) 37 09/27/2016 03:58 AM         Assessment:     Assessment:        ICD-10-CM ICD-9-CM    1. SSS (sick sinus syndrome) (HCC) I49.5 427.81 AMB POC EKG ROUTINE W/ 12 LEADS, INTER & REP   2. Prader-Willi syndrome Q87.1 759.81    3. Atrial fibrillation with slow ventricular response (HCC) I48.91 427.31    4. Pacemaker Z95.0 V45.01    5. Hypothyroidism, unspecified type E03.9 244.9      Orders Placed This Encounter    AMB POC EKG ROUTINE W/ 12 LEADS, INTER & REP     Order Specific Question:   Reason for Exam:     Answer:   routine        Plan:   Mr Aleksandr Brown is here today for f/u regarding SSS/afib with slow VR with pacemaker in situ for heart block. His caregiver denies adverse cardiac symptoms. He remains V paced. His device shows no events with V pacing 99%. He is maintained on ASA d/t fall risk. Continue medical management for hypothyroidism. I will evaluate with an echo as he is 99% V paced. F/U in one year with Dr Hernan Khan, device clinic per their instructions. Thank you for allowing me to participate in Rayne Bosworth 's care.     Smitha Hallman MD, [facility]

## 2017-11-20 NOTE — H&P
Hospitalist Admission Note    NAME: Gill Stearns   :  1965   MRN:  [id number]     Date/Time:  2017 5:45 PM    Patient PCP: Marion Casey MD  ________________________________________________________________________    My assessment of this patient's clinical condition and my plan of care is as follows. Assessment / Plan:  Hypothermia of unclear etiology  SIRs (hypothermic, leukopenia)  -T 93.3 at presentation. So far, have not isolate etiology  -CXR neg for acute process, head CT neg for acute process, UA neg. Pt's mentation appears to be at baseline per pt's mother at bedside. -pt has erythema under both armpits and both side of breast (chronic per pt's mom)  -admit to PCU and monitor for arrythmia  -will obtain cosyntropin test to r/o AI, check TSH, FT4.    -empiric rx for AI with IV dexamethasone  -empiric rocephin for ? cellutlitis (although b/l)  -pt received abx in the ER, will hold for now as does not appear to be cellulitis     Intertrigo  -cont' nystatin    Prader-Willi syndrome with mental retardation  Hypothyroidism- cont synthroid  Morbid Obesity / [employer] [employer] home oxygen  Hx Afib, s/p PPM    Code Status: Full (discussed with pt's mother)  Surrogate Decision Maker: pt's mom  DVT Prophylaxis: lovenox  GI Prophylaxis: not indicated        Subjective:   CHIEF COMPLAINT:     HISTORY OF PRESENT ILLNESS:     Justin Orr is a 46 y.o.  male with pmhx significant for Prader-Willi syndrome with mental retardation, hypothyroidism, hx of pacemaker due to heart block and atrial fibrillation, obesity hypoventilation syndrome, present to ED due to intermittent confuse (not at baseline), progressive worsening of generalized weakness for one week. Pt is not a reliable historian (baseline per mother) and there was not much hx obtained at bedside as pt's mom was upset due to the wait time in the ER.   She asked me to chart review and request that I don't ask anymore questions. Per pt's mom, pt's mentation is currently at baseline. Pt was brought to the ER due to generalized weakness, unable to ambulate with walker, and has had difficulty getting up from chair. Pt reportedly was having intermittent confusion, saying things that does not make sense to his mom. Mother states she was not aware of any new changes/symptoms at home. Was not aware of hypothermia at prior to admission. No new evidence of infection. (erythema under b/l breast and armpits appears to be chronic for years now). Vitals: T93.3, P 75, /70, SpO2 94%  Labs: glucose 124, NH3 47, WBC 3.3, Hgb 11.2, Hct 35, PLT 85, UA neg  Head CT neg, CXR showed cardiac pacemaker. No airspace disease. We were asked to admit for work up and evaluation of the above problems. Past Medical History:   Diagnosis Date    A-fib ([facility] [address]     Hypothyroid     Hypothyroidism     Mental retardation     Other ill-defined conditions     obesity    Other ill-defined conditions     mentally disabled    Prader-Willi syndrome     S/P cardiac pacemaker procedure 2/15/2013    heart block        Past Surgical History:   Procedure Laterality Date    HX HEENT      eye surgery as a baby    HX PACEMAKER         Social History   Substance Use Topics    Smoking status: Never Smoker    Smokeless tobacco: Never Used    Alcohol use No        Family History   Problem Relation Age of Onset    Hypertension Mother     Heart Disease Father    *No Known Allergies     Prior to Admission medications    Medication Sig Start Date End Date Taking? Authorizing Provider   cholecalciferol (VITAMIN D3) 1,000 unit cap Take 1,000 Units by mouth daily. Yes Historical Provider   cranberry extract (CRANBERRY) 450 mg tab Take 1 Tab by mouth daily. Yes Historical Provider   aspirin (ASPIRIN) 325 mg tablet Take 325 mg by mouth every evening. Yes Historical Provider   ascorbic acid (VITAMIN C) 500 mg tablet Take 500 mg by mouth every evening. Yes Historical Provider   ferrous sulfate (IRON) 325 mg (65 mg iron) tablet Take 325 mg by mouth daily (after dinner). Yes Historical Provider   multivitamin (ONE A DAY) tablet Take 1 tablet by mouth every evening. Yes Historical Provider   nystatin (MYCOSTATIN) powder Apply 100,000 Units to affected area two (2) times a day. 4/2/11  Yes Newt Dakin, MD   levothyroxine (SYNTHROID) 100 mcg tablet Take 100 mcg by mouth Daily (before breakfast). Yes Marquita Juárez MD       REVIEW OF SYSTEMS:     I am not able to complete the review of systems because:    The patient is intubated and sedated    The patient has altered mental status due to his acute medical problems    The patient has baseline aphasia from prior stroke(s)    The patient has baseline dementia and is not reliable historian    The patient is in acute medical distress and unable to provide information   x Pt has hx of Prader-Willi syndrome with mental retardation       Total of 12 systems reviewed as follows:       POSITIVE= underlined text  Negative = text not underlined  General:  fever, chills, sweats, generalized weakness, weight loss/gain,      loss of appetite   Eyes:    blurred vision, eye pain, loss of vision, double vision  ENT:    rhinorrhea, pharyngitis   Respiratory:   cough, sputum production, SOB, CLEMENTE, wheezing, pleuritic pain   Cardiology:   chest pain, palpitations, orthopnea, PND, edema, syncope   Gastrointestinal:  abdominal pain , N/V, diarrhea, dysphagia, constipation, bleeding   Genitourinary:  frequency, urgency, dysuria, hematuria, incontinence   Muskuloskeletal :  arthralgia, myalgia, back pain  Hematology:  easy bruising, nose or gum bleeding, lymphadenopathy   Dermatological: rash, ulceration, pruritis, color change / jaundice  Endocrine:   hot flashes or polydipsia   Neurological:  headache, dizziness, confusion, focal weakness, paresthesia,     Speech difficulties, memory loss, gait difficulty  Psychological: Feelings of anxiety, depression, agitation    Objective:   VITALS:    Visit Vitals    /66    Pulse 75    Temp (!) 93.1 °F (33.9 °C)    Resp 15    Wt 148.3 kg (327 lb)    SpO2 91%    BMI 56.13 kg/m2       PHYSICAL EXAM:    General:    Alert, cooperative, no distress, appears stated age. HEENT: Atraumatic, anicteric sclerae, pink conjunctivae     No oral ulcers, mucosa moist, throat clear, dentition fair  Neck:  Supple, symmetrical,  thyroid: non tender  Lungs:   Clear to auscultation bilaterally. No Wheezing or Rhonchi. No rales. Chest wall:  No tenderness  No Accessory muscle use. Heart:   Regular  rhythm,  No  murmur   B/l lymphadema  Abdomen:   Soft, non-tender. Not distended. Bowel sounds normal  Extremities: No cyanosis. No clubbing,      Skin turgor normal, Capillary refill normal, Radial dial pulse 2+  Skin:     B/l erythema on b/l breast/armpit  Not pale. Not Jaundiced    Psych:  Unable to do  Neurologic: AAOx0, not verbal during my encounter    _______________________________________________________________________  Care Plan discussed with:    Comments   Patient x    Family  x Pt's mom   RN x    Care Manager                    Consultant:      _______________________________________________________________________  Expected  Disposition:   Home with Family    HH/PT/OT/RN x   SNF/LTC    BETZAIDA    ________________________________________________________________________  TOTAL TIME:  72 Minutes    Critical Care Provided     Minutes non procedure based      Comments    x Reviewed previous records   >50% of visit spent in counseling and coordination of care  Discussion with patient and/or family and questions answered       ________________________________________________________________________  Signed: Gilford Lack, MD    Procedures: see electronic medical records for all procedures/Xrays and details which were not copied into this note but were reviewed prior to creation of Plan.     LAB DATA REVIEWED: Recent Results (from the past 24 hour(s))   GLUCOSE, POC    Collection Time: 11/20/17 10:47 AM   Result Value Ref Range    Glucose (POC) 71 65 - 100 mg/dL    Performed by Sabrina Galeana \"Chris\"    GLUCOSE, POC    Collection Time: 11/20/17 11:07 AM   Result Value Ref Range    Glucose (POC) 72 65 - 100 mg/dL    Performed by Sabrina Galeana \"Chris\"    EKG, 12 LEAD, INITIAL    Collection Time: 11/20/17 11:09 AM   Result Value Ref Range    Ventricular Rate 75 BPM    Atrial Rate 78 BPM    QRS Duration 164 ms    Q-T Interval 478 ms    QTC Calculation (Bezet) 533 ms    Calculated R Axis 29 degrees    Calculated T Axis 77 degrees    Diagnosis       Ventricular-paced rhythm  No previous ECGs available     LACTIC ACID    Collection Time: 11/20/17 12:17 PM   Result Value Ref Range    Lactic acid 1.3 0.4 - 2.0 MMOL/L   METABOLIC PANEL, COMPREHENSIVE    Collection Time: 11/20/17 12:17 PM   Result Value Ref Range    Sodium 139 136 - 145 mmol/L    Potassium 4.3 3.5 - 5.1 mmol/L    Chloride 104 97 - 108 mmol/L    CO2 31 21 - 32 mmol/L    Anion gap 4 (L) 5 - 15 mmol/L    Glucose 124 (H) 65 - 100 mg/dL    BUN 18 6 - 20 MG/DL    Creatinine 0.52 (L) 0.70 - 1.30 MG/DL    BUN/Creatinine ratio 35 (H) 12 - 20      GFR est AA >60 >60 ml/min/1.73m2    GFR est non-AA >60 >60 ml/min/1.73m2    Calcium 8.0 (L) 8.5 - 10.1 MG/DL    Bilirubin, total 0.3 0.2 - 1.0 MG/DL    ALT (SGPT) 36 12 - 78 U/L    AST (SGOT) 48 (H) 15 - 37 U/L    Alk.  phosphatase 76 45 - 117 U/L    Protein, total 7.1 6.4 - 8.2 g/dL    Albumin 2.2 (L) 3.5 - 5.0 g/dL    Globulin 4.9 (H) 2.0 - 4.0 g/dL    A-G Ratio 0.4 (L) 1.1 - 2.2     CBC WITH AUTOMATED DIFF    Collection Time: 11/20/17 12:17 PM   Result Value Ref Range    WBC 3.3 (L) 4.1 - 11.1 K/uL    RBC 3.78 (L) 4.10 - 5.70 M/uL    HGB 11.2 (L) 12.1 - 17.0 g/dL    HCT 35.3 (L) 36.6 - 50.3 %    MCV 93.4 80.0 - 99.0 FL    MCH 29.6 26.0 - 34.0 PG    MCHC 31.7 30.0 - 36.5 g/dL    RDW 15.2 (H) 11.5 - 14.5 %    PLATELET 85 (L) 847 - 400 K/uL    NEUTROPHILS 75 32 - 75 %    LYMPHOCYTES 17 12 - 49 %    MONOCYTES 5 5 - 13 %    EOSINOPHILS 3 0 - 7 %    BASOPHILS 0 0 - 1 %    ABS. NEUTROPHILS 2.4 1.8 - 8.0 K/UL    ABS. LYMPHOCYTES 0.6 (L) 0.8 - 3.5 K/UL    ABS. MONOCYTES 0.2 0.0 - 1.0 K/UL    ABS. EOSINOPHILS 0.1 0.0 - 0.4 K/UL    ABS.  BASOPHILS 0.0 0.0 - 0.1 K/UL    RBC COMMENTS NORMOCYTIC, NORMOCHROMIC      DF SMEAR SCANNED     TROPONIN I    Collection Time: 11/20/17 12:17 PM   Result Value Ref Range    Troponin-I, Qt. <0.04 <0.05 ng/mL   MAGNESIUM    Collection Time: 11/20/17 12:17 PM   Result Value Ref Range    Magnesium 2.0 1.6 - 2.4 mg/dL   AMMONIA    Collection Time: 11/20/17 12:17 PM   Result Value Ref Range    Ammonia 47 (H) <32 UMOL/L   NUCLEATED RBC    Collection Time: 11/20/17 12:17 PM   Result Value Ref Range    NRBC 0.0 0  WBC    ABSOLUTE NRBC 0.00 0.00 - 0.01 K/uL   GLUCOSE, POC    Collection Time: 11/20/17 12:18 PM   Result Value Ref Range    Glucose (POC) 139 (H) 65 - 100 mg/dL    Performed by Paige Preston    URINALYSIS W/ RFLX MICROSCOPIC    Collection Time: 11/20/17  1:30 PM   Result Value Ref Range    Color YELLOW/STRAW      Appearance CLEAR CLEAR      Specific gravity 1.012 1.003 - 1.030      pH (UA) 7.0 5.0 - 8.0      Protein NEGATIVE  NEG mg/dL    Glucose NEGATIVE  NEG mg/dL    Ketone NEGATIVE  NEG mg/dL    Bilirubin NEGATIVE  NEG      Blood NEGATIVE  NEG      Urobilinogen 1.0 0.2 - 1.0 EU/dL    Nitrites NEGATIVE  NEG      Leukocyte Esterase NEGATIVE  NEG

## 2017-11-20 NOTE — ED PROVIDER NOTES
[facility] [address]  EMERGENCY DEPARTMENT HISTORY AND PHYSICAL EXAM       Date of Service: 11/20/2017   Patient Name: Dexter Salcedo   YOB: 1965  Medical Record Number: [id number]    History of Presenting Illness     Chief Complaint   Patient presents with    Altered mental status     The patient presents to the Emergency Department via EMS with intermittent confusion and increased weakness for one week. History Provided By:  Parent, records    Additional History:   Dexter Salcedo is a 46 y.o. male with PMhx significant for Prader-Willi syndrome, A-fib, pacemaker and hypothyroidism who presents via EMS to the ED with cc of generalized weakness and intermittent confusion. Per mother, pt has been having difficulty ambulating with his walker, and difficulty getting out of his chair at home. She states he occasionally \"says something that doesn't make sense,\" but otherwise is at baseline mentation. Per mother, pt states he \"feels fine. \" She reports pt was seen by his PCP (11/16/17) for similar symptoms with no significant treatment. Per records, pt has hx of hypothermia, sepsis, and AMS (3/2011); at time of examination, pt has temperature ~93. Mother denies pt has had fever, chills, vomiting, diarrhea, decrease appetite, syncope, cough, or hematuria. Limited history from patient due to Prader-Willi syndrome.         Social Hx: - Tobacco, - EtOH, - Illicit Drugs    Hx is otherwise limited, pt non-verbal.    Primary Care Provider: Jie Armando MD     Past History     Past Medical History:   Past Medical History:   Diagnosis Date    A-fib ([facility] [address]     Hypothyroid     Hypothyroidism     Mental retardation     Other ill-defined conditions     obesity    Other ill-defined conditions     mentally disabled    Prader-Willi syndrome     S/P cardiac pacemaker procedure 2/15/2013    heart block        Past Surgical History:   Past Surgical History:   Procedure Laterality Date    HX HEENT      eye surgery as a baby    HX PACEMAKER          Family History:   Family History   Problem Relation Age of Onset    Hypertension Mother     Heart Disease Father         Social History:   Social History   Substance Use Topics    Smoking status: Never Smoker    Smokeless tobacco: Never Used    Alcohol use No        Allergies:   No Known Allergies      Review of Systems   Review of Systems   Unable to perform ROS: Patient nonverbal       Physical Exam  Physical Exam   Constitutional: He appears well-developed and well-nourished. No distress. obese   HENT:   Head: Normocephalic and atraumatic. Eyes: EOM are normal. Right eye exhibits no discharge. Left eye exhibits no discharge. No scleral icterus. Neck: Normal range of motion. Neck supple. No tracheal deviation present. Cardiovascular: Normal rate, regular rhythm, normal heart sounds and intact distal pulses. Exam reveals no gallop and no friction rub. No murmur heard. Pulmonary/Chest: Effort normal and breath sounds normal. No respiratory distress. He has no wheezes. He has no rales. Abdominal: Soft. He exhibits no distension. There is no tenderness. Musculoskeletal: Normal range of motion. He exhibits no edema. Lymphadenopathy:     He has no cervical adenopathy. Neurological: He is alert. Non-verbal, facial symmetry, moving all extremities equally   Skin: Skin is warm and dry. No rash noted. Excoriated skin beneath breast tissue B/L and axilla BL; no erythema or warmth   Psychiatric: He has a normal mood and affect. Nursing note and vitals reviewed. Medical Decision Making   I am the first provider for this patient. I reviewed the vital signs, available nursing notes, past medical history, past surgical history, family history and social history. Old Medical Records: Old medical records. Nursing notes.      Provider Notes:   Patient presents to ED with generalized weakness and intermittent confusion x 1 week although patient is currently at baseline mental status. He is well appearing but hypothermic. Differential is broad and includes UTI, pneumonia, cellulitis, dehydration, electrolyte abnormality, TRINIDAD, ICH, ACS  - CBC, CMP, Mg, UA, ammonia (history of elevated ammonia levels)  - EKG, troponin  - Blood cultures  - CXR  - HCT    Disposition: Patient remained hypothermic during ED course despite Cece hugger. No clear source of infection - UA, CXR negative. Will cover with cefepime for potential early cellulitis and admit for further management. Critical Care Time:    CRITICAL CARE NOTE :    IMPENDING DETERIORATION -hypothermia  ASSOCIATED RISK FACTORS - hypothermia  MANAGEMENT- Bedside Assessment and Supervision of Care  INTERPRETATION -  Blood Pressure and labs, vital signs  INTERVENTIONS - Cece hugger and antibiotics  CASE REVIEW - Hospitalist, Nursing and Family  TREATMENT RESPONSE -Stable  PERFORMED BY - Self    I have spent 45 minutes of critical care time involved in lab review, consultations with specialist, family decision- making, bedside attention and documentation. During this entire length of time I was immediately available to the patient . Steve Cole MD      ED Course:  10:30 AM  Initial assessment performed. The patients presenting problems have been discussed, and they are in agreement with the care plan formulated and outlined with them. I have encouraged them to ask questions as they arise throughout their visit. Progress Notes:     CONSULT NOTE:   2:26 PM  Steve Cole MD spoke with Vicky Luevano MD,   Specialty: Hospitalist  Discussed pt's hx, disposition, and available diagnostic and imaging results. Reviewed care plans. Consultant will evaluate pt for admission.     Diagnostic Study Results     Labs -      Recent Results (from the past 12 hour(s))   GLUCOSE, POC    Collection Time: 11/20/17 10:47 AM   Result Value Ref Range    Glucose (POC) 71 65 - 100 mg/dL    Performed by Christal Sylvester \"Chris\"    GLUCOSE, POC    Collection Time: 11/20/17 11:07 AM   Result Value Ref Range    Glucose (POC) 72 65 - 100 mg/dL    Performed by Christal Sylvester \"Chris\"    EKG, 12 LEAD, INITIAL    Collection Time: 11/20/17 11:09 AM   Result Value Ref Range    Ventricular Rate 75 BPM    Atrial Rate 78 BPM    QRS Duration 164 ms    Q-T Interval 478 ms    QTC Calculation (Bezet) 533 ms    Calculated R Axis 29 degrees    Calculated T Axis 77 degrees    Diagnosis       Ventricular-paced rhythm  No previous ECGs available     LACTIC ACID    Collection Time: 11/20/17 12:17 PM   Result Value Ref Range    Lactic acid 1.3 0.4 - 2.0 MMOL/L   METABOLIC PANEL, COMPREHENSIVE    Collection Time: 11/20/17 12:17 PM   Result Value Ref Range    Sodium 139 136 - 145 mmol/L    Potassium 4.3 3.5 - 5.1 mmol/L    Chloride 104 97 - 108 mmol/L    CO2 31 21 - 32 mmol/L    Anion gap 4 (L) 5 - 15 mmol/L    Glucose 124 (H) 65 - 100 mg/dL    BUN 18 6 - 20 MG/DL    Creatinine 0.52 (L) 0.70 - 1.30 MG/DL    BUN/Creatinine ratio 35 (H) 12 - 20      GFR est AA >60 >60 ml/min/1.73m2    GFR est non-AA >60 >60 ml/min/1.73m2    Calcium 8.0 (L) 8.5 - 10.1 MG/DL    Bilirubin, total 0.3 0.2 - 1.0 MG/DL    ALT (SGPT) 36 12 - 78 U/L    AST (SGOT) 48 (H) 15 - 37 U/L    Alk. phosphatase 76 45 - 117 U/L    Protein, total 7.1 6.4 - 8.2 g/dL    Albumin 2.2 (L) 3.5 - 5.0 g/dL    Globulin 4.9 (H) 2.0 - 4.0 g/dL    A-G Ratio 0.4 (L) 1.1 - 2.2     CBC WITH AUTOMATED DIFF    Collection Time: 11/20/17 12:17 PM   Result Value Ref Range    WBC 3.3 (L) 4.1 - 11.1 K/uL    RBC 3.78 (L) 4.10 - 5.70 M/uL    HGB 11.2 (L) 12.1 - 17.0 g/dL    HCT 35.3 (L) 36.6 - 50.3 %    MCV 93.4 80.0 - 99.0 FL    MCH 29.6 26.0 - 34.0 PG    MCHC 31.7 30.0 - 36.5 g/dL    RDW 15.2 (H) 11.5 - 14.5 %    PLATELET 85 (L) 440 - 400 K/uL    NEUTROPHILS 75 32 - 75 %    LYMPHOCYTES 17 12 - 49 %    MONOCYTES 5 5 - 13 %    EOSINOPHILS 3 0 - 7 %    BASOPHILS 0 0 - 1 %    ABS. NEUTROPHILS 2.4 1.8 - 8.0 K/UL    ABS. LYMPHOCYTES 0.6 (L) 0.8 - 3.5 K/UL    ABS. MONOCYTES 0.2 0.0 - 1.0 K/UL    ABS. EOSINOPHILS 0.1 0.0 - 0.4 K/UL    ABS. BASOPHILS 0.0 0.0 - 0.1 K/UL    RBC COMMENTS NORMOCYTIC, NORMOCHROMIC      DF SMEAR SCANNED     TROPONIN I    Collection Time: 11/20/17 12:17 PM   Result Value Ref Range    Troponin-I, Qt. <0.04 <0.05 ng/mL   MAGNESIUM    Collection Time: 11/20/17 12:17 PM   Result Value Ref Range    Magnesium 2.0 1.6 - 2.4 mg/dL   AMMONIA    Collection Time: 11/20/17 12:17 PM   Result Value Ref Range    Ammonia 47 (H) <32 UMOL/L   NUCLEATED RBC    Collection Time: 11/20/17 12:17 PM   Result Value Ref Range    NRBC 0.0 0  WBC    ABSOLUTE NRBC 0.00 0.00 - 0.01 K/uL   GLUCOSE, POC    Collection Time: 11/20/17 12:18 PM   Result Value Ref Range    Glucose (POC) 139 (H) 65 - 100 mg/dL    Performed by Chilango Akhtar W/ RFLX MICROSCOPIC    Collection Time: 11/20/17  1:30 PM   Result Value Ref Range    Color YELLOW/STRAW      Appearance CLEAR CLEAR      Specific gravity 1.012 1.003 - 1.030      pH (UA) 7.0 5.0 - 8.0      Protein NEGATIVE  NEG mg/dL    Glucose NEGATIVE  NEG mg/dL    Ketone NEGATIVE  NEG mg/dL    Bilirubin NEGATIVE  NEG      Blood NEGATIVE  NEG      Urobilinogen 1.0 0.2 - 1.0 EU/dL    Nitrites NEGATIVE  NEG      Leukocyte Esterase NEGATIVE  NEG         Radiologic Studies -  The following have been ordered and reviewed:  CT Results  (Last 48 hours)               11/20/17 1240  CT HEAD WO CONT Final result    Impression:  IMPRESSION: Normal CT scan of the head without contrast                   Narrative:  EXAM:  CT HEAD WO CONT       INDICATION:   intermittent confusion x 1 week       COMPARISON: None. CONTRAST:  None. TECHNIQUE: Unenhanced CT of the head was performed using 5 mm images. Brain and   bone windows were generated.   CT dose reduction was achieved through use of a   standardized protocol tailored for this examination and automatic exposure   control for dose modulation. FINDINGS:   The ventricles and sulci are normal in size, shape and configuration and   midline. There is no significant white matter disease. There is no intracranial   hemorrhage, extra-axial collection, mass, mass effect or midline shift. The   basilar cisterns are open. No acute infarct is identified. The bone windows   demonstrate no abnormalities. The visualized portions of the paranasal sinuses   and mastoid air cells are clear. CXR Results  (Last 48 hours)               11/20/17 1123  XR CHEST PORT Final result    Impression:  IMPRESSION: Cardiac pacemaker. No airspace disease or other acute abnormality. Narrative:  EXAM:  XR CHEST PORT. INDICATION: Sepsis. COMPARISON: 6/20/2017. FINDINGS:    A portable AP radiograph of the chest was obtained at 1110 hours. There is a   pacemaker in the left chest.   Lines and tubes: The patient is on a cardiac monitor. Lungs: The lungs are clear. Pleura: There is no pneumothorax or pleural effusion. Mediastinum: The cardiac and mediastinal contours and pulmonary vascularity are   normal.   Bones and soft tissues: The bones and soft tissues are grossly within normal   limits. Vital Signs-Reviewed the patient's vital signs.    Patient Vitals for the past 12 hrs:   Temp Pulse Resp BP SpO2   11/20/17 1400 - 75 13 99/74 94 %   11/20/17 1345 - 75 10 103/71 94 %   11/20/17 1335 (!) 93.1 °F (33.9 °C) - - - -   11/20/17 1330 - 75 17 110/78 93 %   11/20/17 1315 - 75 15 110/71 93 %   11/20/17 1300 - 75 12 97/65 93 %   11/20/17 1034 (!) 93.3 °F (34.1 °C) 75 14 113/70 94 %       Medications Given in the ED:  Medications   sodium chloride (NS) flush 5-10 mL (not administered)   cefepime (MAXIPIME) 2 g in 0.9% sodium chloride (MBP/ADV) 100 mL (not administered)   dextrose (D50W) injection syrg 12.5 g (12.5 g IntraVENous Given 11/20/17 1201)       Pulse Oximetry Analysis - Normal 94% on RA     Cardiac Monitor:   Rate: 75  Rhythm: Paced      EKG interpretation: 11:09 AM  Rhythm: paced. Rate (approx.): 75; Axis: normal; SD interval: normal; QRS interval: prolonged; ST/T wave: nonspecific T wave abnormality; Other findings: QTc prolonged. Written by Nilda Cottrell ED Scribe, as dictated by Ann Marie Argueta MD    Diagnosis   Clinical Impression:   1. Hypothermia, initial encounter         Plan:  1: Admit to Hospitalist    Disposition Note:  ADMIT NOTE:  2:26 PM  The patient is being admitted to the hospital.  The results of their tests and reasons for their admission have been discussed with the patient and/or available family. They convey agreement and understanding for the need to be admitted and for their admission diagnosis. _______________________________   Attestations: This note is prepared by Nilda Cottrell, acting as Scribe for Ann Marie Argueta MD.    Ann Marie Argueta MD: The scribe's documentation has been prepared under my direction and personally reviewed by me in its entirety.  I confirm that the note above accurately reflects all work, treatment, procedures, and medical decision making performed by me.  _______________________________

## 2017-11-20 NOTE — ED NOTES
Patient arrived via ems. Patient has Prader Mariscal Arms syndrome and mother is caretaker.  She states patient is at baseline now but

## 2017-11-20 NOTE — ED NOTES
Mother assisted patient to provide urine specimen. She also states the patient is at his baseline. She says he had been less oriented than usual. Mother says he is not very verbal but does speak to her in short words. She also states he ambulates with a walker. Upon presentation patients rectal temp is 93.0. He does not indicate that anything hurts.

## 2017-11-20 NOTE — IP AVS SNAPSHOT
[address] [facility] 
[phone] Patient: Dorothy Cano MRN: [id number] :1965 About your hospitalization You were admitted on:  2017 You last received care in the:  [facility] 2 CARDIOPULMONARY CARE You were discharged on:  2017 Why you were hospitalized Your primary diagnosis was:  Hypothermia Your diagnoses also included:  Sepsis (Hcc), S/P Cardiac Pacemaker Procedure, Prader-Willi Syndrome, Obesity, Morbid (More Than 100 Lbs Over Ideal Weight Or Bmi > 40) (Hcc), Hypothyroidism, Atrial Fibrillation With Slow Ventricular Response (Hcc), Cap (Community Acquired Pneumonia), Intertrigo Things You Need To Do (next 8 weeks) Schedule an appointment with Jena Arnold MD as soon as possible for a visit in 1 week(s) Please call the office on Monday to schedule your appointment Phone:  [phone] Where:  [address] Follow up with [address] [place] [address] This is your home-health care provider. If you have not heard from them within 24 hours, please give them a call. Phone:  [phone] Where:  [address] [place] [address] Discharge Orders None A check rosette indicates which time of day the medication should be taken. My Medications TAKE these medications as instructed Instructions Each Dose to Equal  
 Morning Noon Evening Bedtime  
 ascorbic acid (vitamin C) 500 mg tablet Commonly known as:  VITAMIN C Your next dose is: Today Take 500 mg by mouth every evening. 500 mg  
    
   
   
  
   
  
 aspirin 325 mg tablet Commonly known as:  ASPIRIN Your next dose is: Today Take 325 mg by mouth every evening. 325 mg  
    
   
   
  
   
  
 cefUROXime 500 mg tablet Commonly known as:  CEFTIN Your next dose is: Today Take 1 Tab by mouth two (2) times a day. Indications: CAP  
 500 mg  
    
   
   
  
   
  
 Cranberry 450 mg Tab tablet Generic drug:  cranberry extract Your next dose is:  Tomorrow Take 1 Tab by mouth daily. 1 Tab  
    
  
   
   
   
  
 doxycycline 100 mg capsule Commonly known as:  Merilee Sides Your next dose is: Today Take 1 Cap by mouth two (2) times a day. Indications: CAP  
 100 mg Iron 325 mg (65 mg iron) tablet Generic drug:  ferrous sulfate Your next dose is: Today Take 325 mg by mouth daily (after dinner). 325 mg After dinner 
  
   
  
 l.acidoph-B.lactis-B.longum 460 mg (7.5-6- 1.5 bill. cell) Cap cap Commonly known as:  YFHEGGGH8 Your next dose is:  Tomorrow Take 1 Cap by mouth Daily (before breakfast). 1 Cap  
    
  
   
   
   
  
 multivitamin tablet Commonly known as:  ONE A DAY Your next dose is: Today Take 1 tablet by mouth every evening. 1 Tab  
    
   
   
  
   
  
 nystatin powder Commonly known as:  MYCOSTATIN Your next dose is: Today Apply 100,000 Units to affected area two (2) times a day. 064906 Units SYNTHROID 100 mcg tablet Generic drug:  levothyroxine Your next dose is:  Tomorrow Take 100 mcg by mouth Daily (before breakfast). 100 mcg VITAMIN D3 1,000 unit Cap Generic drug:  cholecalciferol Your next dose is:  Tomorrow Take 1,000 Units by mouth daily. 1000 Units Where to Get Your Medications These medications were sent to [facility] [address], [address] [place] [address] Phone:  [phone]  
  cefUROXime 500 mg tablet  
 doxycycline 100 mg capsule  
 l.acidoph-B.lactis-B.longum 460 mg (7.5-6- 1.5 bill. cell) Cap cap Discharge Instructions Patient Discharge Instructions Pt Name  [facility] Date of Birth 1965 Age  46 y.o. Medical Record Number  [id number] PCP Hemanth Bethea MD   
Admit date:  11/20/2017 @    [address] [place] [address] Room Number  2223/01 Date of Discharge 11/24/2017 Admission Diagnoses:     Hypothermia No Known Allergies You were admitted to [address] [place] [address] for  Hypothermia YOUR OTHER MEDICAL DIAGNOSES INCLUDE (BUT NOT LIMITED TO ): 
Present on Admission:  Hypothermia  Sepsis ([address]  S/P cardiac pacemaker procedure  Prader-Willi syndrome  Obesity, morbid (more than 100 lbs over ideal weight or BMI > 40) (HCC)  Hypothyroidism  Atrial fibrillation with slow ventricular response (HCC)  CAP (community acquired pneumonia)  Intertrigo DIET:  Regular Diet Recommended activity: Activity as tolerated Follow up : Follow-up Information Follow up With Details Comments Contact Info Hemanth Bethea MD Schedule an appointment as soon as possible for a visit to be seen within one week [address] [place] [address] 
[phone] Home health service has been arranged · It is important that you take the medication exactly as they are prescribed. · Keep your medication in the bottles provided by the pharmacist and keep a list of the medication names, dosages, and times to be taken in your wallet. · Do not take other medications without consulting your doctor. ADDITIONAL INFORMATION: If you experience any of the following symptoms or have any health problem not listed below, then please call your primary care physician or return to the emergency room if you cannot get hold of your doctor: Fever, chills, nausea, vomiting, diarrhea, change in mentation, falling, bleeding, shortness of breath.  
 
 
I understand that if any problems occur once I am discharged, I am supposed to call my Primary care physician for further care or seek help in the Emergency Department at the nearest Healthcare facility. I have had an opportunity to discuss my clinical issues with my doctor and nursing staff. I understand and acknowledge receipt of the above instructions. Physician's or R.N.'s Signature                                                            Date/Time Patient or Representative Signature                                                 Date/Time [employer] Announcement We are excited to announce that we are making your provider's discharge notes available to you in [employer]. You will see these notes when they are completed and signed by the physician that discharged you from your recent hospital stay. If you have any questions or concerns about any information you see in [employer], please call the Health Information Department where you were seen or reach out to your Primary Care Provider for more information about your plan of care. Introducing [facility] SERVICES! Lc Berrios introduces [employer] patient portal. Now you can access parts of your medical record, email your doctor's office, and request medication refills online. 1. In your internet browser, go to [email or web address] 2. Click on the First Time User? Click Here link in the Sign In box. You will see the New Member Sign Up page. 3. Enter your [employer] Access Code exactly as it appears below. You will not need to use this code after youve completed the sign-up process. If you do not sign up before the expiration date, you must request a new code. · [employer] Access Code: [email or web address] Expires: 2/22/2018 10:39 AM 
 
4. Enter the last four digits of your Social Security Number (xxxx) and Date of Birth (mm/dd/yyyy) as indicated and click Submit. You will be taken to the next sign-up page. 5. Create a [employer] ID. This will be your [employer] login ID and cannot be changed, so think of one that is secure and easy to remember. 6. Create a [employer] password. You can change your password at any time. 7. Enter your Password Reset Question and Answer. This can be used at a later time if you forget your password. 8. Enter your e-mail address. You will receive e-mail notification when new information is available in [address] 9. Click Sign Up. You can now view and download portions of your medical record. 10. Click the Download Summary menu link to download a portable copy of your medical information. If you have questions, please visit the Frequently Asked Questions section of the [employer] website. Remember, [employer] is NOT to be used for urgent needs. For medical emergencies, dial 911. Now available from your iPhone and Android! Unresulted Labs-Please follow up with your PCP about these lab tests Order Current Status CULTURE, BLOOD Preliminary result Providers Seen During Your Hospitalization Provider Specialty Primary office phone Charlie Bravo MD Emergency Medicine [phone] Amy Elliott MD Hospitalist [phone] Your Primary Care Physician (PCP) Primary Care Physician Office Phone Office Fax Bharati Sadler [phone] You are allergic to the following No active allergies Recent Documentation Weight BMI Smoking Status 143 kg 54.11 kg/m2 Never Smoker Emergency Contacts Name Discharge Info Relation Home Work Mobile Juancarlos Mae  Mother [14] [phone] [facility] DISCHARGE CAREGIVER [3] Parent [1] [phone] Fozia Whiting  Parent [1] [phone]    
 E,Fozia  Parent [1] [phone] Patient Belongings The following personal items are in your possession at time of discharge: 
  Dental Appliances: None  Visual Aid: None      Home Medications: None   Jewelry: None  Clothing: None    Other Valuables: None Please provide this summary of care documentation to your next provider. Signatures-by signing, you are acknowledging that this After Visit Summary has been reviewed with you and you have received a copy. Patient Signature:  ____________________________________________________________ Date:  ____________________________________________________________  
  
[facility] Provider Signature:  ____________________________________________________________ Date:  ____________________________________________________________

## 2017-11-21 NOTE — PROGRESS NOTES
PCU SHIFT NURSING NOTE      Bedside shift change report given to Roxanne Martin (oncoming nurse) by Rosey Salguero (offgoing nurse). Report included the following information SBAR, Kardex, Intake/Output, MAR and Recent Results. Shift Summary:   8873 - Pt's rectal temp was 94.5. LPaced pt on gilbert hugger for temperature regulation. 1400 - Pt off unit for xray. Transported by stretcher. 1445 - Pt returned to room. +BM noted per pt's mother. .   1552 - Removed gilbert hugger per pt request. Pt states he is hot. Rectal temp is normal at thie time. See flowsheet. Admission Date 11/20/2017   Admission Diagnosis Hypothermia   Consults None        Consults   []PT   []OT   []Speech   []Case Management      [] Palliative      Cardiac Monitoring Order   []Yes   []No     IV drips   []Yes    Drip:                            Dose:  Drip:                            Dose:  Drip:                            Dose:   []No     GI Prophylaxis   []Yes   []No         DVT Prophylaxis   SCDs:             Vasiliy stockings:         [] Medication   []Contraindicated   []None      Activity Level Activity Level: Bed Rest     Activity Assistance: Complete care   Purposeful Rounding every 1-2 hour? []Yes   Montes Score  Total Score: 3   Bed Alarm (If score 3 or >)   []Yes   [] Refused (See signed refusal form in chart)   Regan Score  Regan Score: 12   Regan Score (if score 14 or less)   []PMT consult   []Wound Care consult      []Specialty bed   [] Nutrition consult          Needs prior to discharge:   Home O2 required:    []Yes   []No    If yes, how much O2 required?     Other:    Last Bowel Movement:        Influenza Vaccine Received Flu Vaccine for Current Season (usually Sept-March): Yes        Pneumonia Vaccine           Diet Active Orders   Diet    DIET REGULAR      LDAs               Peripheral IV 11/20/17 Right  (Active)   Site Assessment Clean, dry, & intact 11/20/2017  9:00 PM   Phlebitis Assessment 0 11/20/2017  9:00 PM   Infiltration Assessment 0 11/20/2017  9:00 PM   Dressing Status New 11/20/2017  9:00 PM   Dressing Type Transparent;Tape 11/20/2017  9:00 PM   Hub Color/Line Status Blue;Patent; Flushed; Infusing 11/20/2017  9:00 PM                      Urinary Catheter      Intake & Output   Date 11/20/17 0700 - 11/21/17 0659 11/21/17 0700 - 11/22/17 0659   Shift 0140-2229 8521-4133 24 Hour Total 0700-1859 1900-0659 24 Hour Total   I  N  T  A  K  E   I.V.  (mL/kg/hr)  710  (0.4) 710  (0.2)         Volume (0.9% sodium chloride infusion)  660 660         Volume (cefTRIAXone (ROCEPHIN) 1 g in 0.9% sodium chloride (MBP/ADV) 50 mL)  50 50       Shift Total  (mL/kg)  710  (4.9) 710  (4.9)      O  U  T  P  U  T   Urine  (mL/kg/hr)  950  (0.5) 950  (0.3)         Urine Voided  950 950       Shift Total  (mL/kg)  950  (6.6) 950  (6.6)      NET  -240 -240      Weight (kg) 148. 3 144.9 144.9 144.9 144.9 144.9         Readmission Risk Assessment Tool Score Medium Risk            20       Total Score        3 Has Seen PCP in Last 6 Months (Yes=3, No=0)    4 IP Visits Last 12 Months (1-3=4, 4=9, >4=11)    9 Pt. Coverage (Medicare=5 , Medicaid, or Self-Pay=4)    4 Charlson Comorbidity Score (Age + Comorbid Conditions)        Criteria that do not apply:    . Living with Significant Other. Assisted Living. LTAC. SNF.  or   Rehab    Patient Length of Stay (>5 days = 3)       Expected Length of Stay - - -   Actual Length of Stay 1

## 2017-11-21 NOTE — ED NOTES
TRANSFER - OUT REPORT:    Verbal report given to [facility] RN(name) on [facility]  being transferred to PCU(unit) for routine progression of care       Report consisted of patients Situation, Background, Assessment and   Recommendations(SBAR). Information from the following report(s) SBAR, ED Summary, [facility] and Recent Results was reviewed with the receiving nurse. Lines:   Peripheral IV 11/20/17 Left Other(comment) (Active)        Opportunity for questions and clarification was provided.       Patient transported with:   Registered Nurse

## 2017-11-21 NOTE — PROGRESS NOTES
Pressure Ulcer Prevention Alert Received for Regan < 14 (moderate risk).        Care Plan/Interventions for Nursin. Complete Regan Pressure Ulcer Risk Scale and use sub scores to identify appropriate interventions. 2. Perform Assessment: skin, changes in LOC, visual cues for pain, monitor skin under medical devices  3. Respond to Reduced Sensory Perception: changes in LOC, check visual cues for pain, float heels, suspension boots, pressure redistribution bed/mattress/chair cushion, turning and reposition approximately every 2 hours (pillows & wedges), pad between skin to skin, turn & reposition  4. Manage Moisture: absorbent under pads, internal / external urinary device, internal /  external fecal device, minimize layers, contain wound drainage, access need for specialty bed, limit adult briefs, maintain skin hydration (lotion/cream), moisture barrier, offer toileting every hour  5. Promote Activity: increase time out of bed, chair cushion, PT/OT evaluation, trapeze to reposition, pressure redistribution bed/mattress/chair  6. Address Reduced Mobility: float heels / suspension boot, HOB 30 degrees or less, pressure redistribution bed/mattress/cushion, PT / OT evaluation, turn and reposition approximately every 2 hours (pillows & wedges)  7. Promote Nutrition: document food / fluid / supplement intake, encourage/assist with meals as needed  8. Reduce Friction and Shear: transferring/repositioning devices (lift/draw sheet), lift team/ patient mobility team, feet elevated on foot rest, minimize layers, foam dressing / transparent film / skin sealants, protective barrier creams and emollients, transfer aides (board, Vanda lift, ceiling lift, stand assist), HOB 30 degrees or less, trapeze to reposition.   Wound Care Team

## 2017-11-21 NOTE — PROGRESS NOTES
Spiritual Care Partner Volunteer visited patient in PCU on 11/21/17. Documented by:  Gilberto Simpson M.Div.    Paging Service [phone])

## 2017-11-21 NOTE — PROGRESS NOTES
PCU SHIFT NURSING NOTE    Shift Summary:   2030: SpO2 on RA 91%. Placed pt on 2L NC. Pt's mother/caretaker states he wears 2L O2 HS occasionally. 0600: Pt had an uneventful night. 0700: Bedside shift change report given to [address] (oncoming nurse) by Bell Acevedo RN (offgoing nurse). Report included the following information SBAR, Kardex, Intake/Output, MAR, Recent Results and Cardiac Rhythm paced. Admission Date 11/20/2017   Admission Diagnosis Hypothermia   Consults None        Consults   []PT   []OT   []Speech   [x]Case Management      [] Palliative      Cardiac Monitoring Order   [x]Yes   []No     IV drips   []Yes    Drip:                            Dose:  Drip:                            Dose:  Drip:                            Dose:   [x]No     GI Prophylaxis   []Yes   [x]No         DVT Prophylaxis   SCDs:             Vasiliy stockings:         [x] Medication   []Contraindicated   []None      Activity Level           Purposeful Rounding every 1-2 hour? [x]Yes   Montes Score  Total Score: 3   Bed Alarm (If score 3 or >)   [x]Yes   [] Refused (See signed refusal form in chart)   Regan Score      Regan Score (if score 14 or less)   []PMT consult   []Wound Care consult      [x]Specialty bed   [] Nutrition consult          Needs prior to discharge:   Home O2 required:    [x]Yes   []No    If yes, how much O2 required? 2L HS    Other:    Last Bowel Movement:        Influenza Vaccine Received Flu Vaccine for Current Season (usually Sept-March): Yes        Pneumonia Vaccine           Diet Active Orders   Diet    DIET REGULAR      LDAs               Peripheral IV 11/20/17 Left Other(comment) (Active)                      Urinary Catheter      Intake & Output        Readmission Risk Assessment Tool Score Medium Risk            20       Total Score        3 Has Seen PCP in Last 6 Months (Yes=3, No=0)    4 IP Visits Last 12 Months (1-3=4, 4=9, >4=11)    9 Pt.  Coverage (Medicare=5 , Medicaid, or Self-Pay=4) 4 Charlson Comorbidity Score (Age + Comorbid Conditions)        Criteria that do not apply:    . Living with Significant Other. Assisted Living. LTAC. SNF.  or   Rehab    Patient Length of Stay (>5 days = 3)       Expected Length of Stay - - -   Actual Length of Stay 0

## 2017-11-21 NOTE — PROGRESS NOTES
Hospitalist Progress Note    NAME: Ameena Davis   :  1965   MRN:  [id number]     Admit date: 2017    Today's date: 17    PCP: MD Champ Dutton M.D. Cell [phone]      Assessment / Plan:  Hypothermia POA  SIRS POA with hypothermic, leukopenia  Generalized weakness POA  T 93.3 at presentation, mom denies environmental exposures  pCXR neg for acute process, increasing cough, will check repeat PA/LAt CXR  head CT neg for acute process  UA negative, but no microscopic sent  Erythema on shins and armpits, mom feels is chronic  Normal cosyntropin test, stop decadron  Normal TSH  Empiric rocephin for ? Cellulitis, await repeat CXR  Wean gilbert hugger as tolerated  PT to see, ambulatory at baseline     Intertrigo POA  cont' nystatin     Prader-Willi syndrome with mental retardation POA  Hypothyroidism POA cont synthroid  Morbid Obesity/OHS POA  Chronic respiratory failure POA on 2 L NC  History of atrial fib  S/p PPM     Code Status: Full    Surrogate Decision Maker: pt's mom    DVT Prophylaxis: lovenox    GI Prophylaxis: not indicate    Body mass index is 54.83 kg/(m^2). Subjective:     Chief Complaint / Reason for Physician Visit f/u hypothermia  No pain or N/V  No diarrhea but mother says he had it prior with the antibiotics  Increased cough overnight  Seen with mother at bedside    Discussed with RN events overnight. Review of Systems:  Symptom Y/N Comments  Symptom Y/N Comments   Fever/Chills    Chest Pain     Poor Appetite    Edema     Cough    Abdominal Pain     Sputum    Joint Pain     SOB/CLEMENTE    Headache     Nausea/vomit    Tolerating PT/OT     Diarrhea    Tolerating Diet     Constipation    Other       Could NOT obtain due to: AMS     Objective:     VITALS:   Last 24hrs VS reviewed since prior progress note.  Most recent are:  Patient Vitals for the past 24 hrs:   Temp Pulse Resp BP SpO2   17 0721 - 75 18 97/63 99 %   17 0309 96 °F (35.6 °C) 75 16 104/69 96 %   11/20/17 2257 96.8 °F (36 °C) 75 18 103/56 98 %   11/20/17 2113 96.6 °F (35.9 °C) - - 97/53 98 %   11/20/17 2037 - - - - 97 %   11/20/17 1957 96.7 °F (35.9 °C) 75 18 99/54 91 %   11/20/17 1934 - 77 18 92/73 90 %   11/20/17 1930 - 77 19 91/53 91 %   11/20/17 1928 96.9 °F (36.1 °C) - - - -   11/20/17 1915 - 75 17 95/61 91 %   11/20/17 1900 - 75 16 98/53 91 %   11/20/17 1845 - 75 17 101/61 90 %   11/20/17 1830 - 75 13 99/60 91 %   11/20/17 1815 - 75 19 102/71 (!) 89 %   11/20/17 1800 - 75 21 113/78 92 %   11/20/17 1745 - 75 17 100/64 92 %   11/20/17 1715 - 75 13 99/64 92 %   11/20/17 1700 - 75 13 102/65 92 %   11/20/17 1645 - 75 15 100/66 91 %   11/20/17 1630 - 75 14 100/67 92 %   11/20/17 1615 - 75 12 95/66 91 %   11/20/17 1600 - 75 15 99/71 91 %   11/20/17 1545 - 75 13 98/69 92 %   11/20/17 1530 - 76 16 101/72 92 %   11/20/17 1515 - 75 14 100/71 92 %   11/20/17 1500 - 75 13 103/72 93 %   11/20/17 1445 - 75 15 103/73 93 %   11/20/17 1430 - 75 11 97/69 93 %   11/20/17 1400 - 75 13 99/74 94 %   11/20/17 1345 - 75 10 103/71 94 %   11/20/17 1335 (!) 93.1 °F (33.9 °C) - - - -   11/20/17 1330 - 75 17 110/78 93 %   11/20/17 1315 - 75 15 110/71 93 %   11/20/17 1300 - 75 12 97/65 93 %   11/20/17 1034 (!) 93.3 °F (34.1 °C) 75 14 113/70 94 %       Intake/Output Summary (Last 24 hours) at 11/21/17 1004  Last data filed at 11/21/17 0543   Gross per 24 hour   Intake              710 ml   Output              950 ml   Net             -240 ml        Wt Readings from Last 12 Encounters:   11/21/17 144.9 kg (319 lb 7.1 oz)   06/29/17 148.5 kg (327 lb 6.1 oz)   06/19/17 148.5 kg (327 lb 6.1 oz)   09/28/16 158.8 kg (350 lb)   09/25/16 146.7 kg (323 lb 6.6 oz)   10/16/15 149 kg (328 lb 7.8 oz)   12/04/14 157.9 kg (348 lb)   11/05/14 143.3 kg (316 lb)   10/14/14 143.3 kg (316 lb)   09/16/14 143.5 kg (316 lb 5.8 oz)   08/24/14 (!) 161.5 kg (356 lb)   06/12/14 156 kg (344 lb)       PHYSICAL EXAM:  General: WD, WN. Alert, cooperative, no acute distress, eating with mom feeding him    EENT:  PERRL. Anicteric sclerae. MMM  Neck:  No meningismus, no thyromegaly  Resp:  Rhonchi bilaterally, no wheezing or rales. No accessory muscle use  CV:  Regular  rhythm,  1+ edema  GI:  Soft, Non distended, Non tender.  +Bowel sounds, no rebound  LN:  No cervical or inguinal ANTWAN  Neurologic:  Alert, not really talking, non focal motor exam  Psych:   Not anxious nor agitated  Skin:  Mild erythema on shins bilaterally, in arm pits. No jaundice    Reviewed most current lab test results and cultures  YES  Reviewed most current radiology test results   YES  Review and summation of old records today    NO  Reviewed patient's current orders and MAR    YES  PMH/SH reviewed - no change compared to H&P  ________________________________________________________________________  Care Plan discussed with:    Comments   Patient x    Family  x Mother at bedside   RN x    Care Manager     Consultant                        Multidiciplinary team rounds were held today with , nursing, pharmacist and clinical coordinator. Patient's plan of care was discussed; medications were reviewed and discharge planning was addressed. ________________________________________________________________________  Total NON critical care TIME: 35   Minutes    Total CRITICAL CARE TIME Spent:   Minutes non procedure based      Comments   >50% of visit spent in counseling and coordination of care     ________________________________________________________________________  Trae Humphries MD     Procedures: see electronic medical records for all procedures/Xrays and details which were not copied into this note but were reviewed prior to creation of Plan. LABS:  I reviewed today's most current labs and imaging studies.   Pertinent labs include:  Recent Labs      11/21/17   0322  11/20/17   1217   WBC  4.7  3.3*   HGB  11.4*  11.2*   HCT 36.5*  35.3*   PLT  110*  85*     Recent Labs      11/21/17   0322  11/20/17   1217   NA  140  139   K  4.8  4.3   CL  102  104   CO2  33*  31   GLU  66  124*   BUN  18  18   CREA  0.46*  0.52*   CA  9.1  8.0*   MG   --   2.0   ALB   --   2.2*   TBILI   --   0.3   SGOT   --   48*   ALT   --   36

## 2017-11-21 NOTE — PROGRESS NOTES
TRANSFER - IN REPORT:    Pt arrives to PCU accompanied by his mother. Assessment completed upon patients arrival to unit and care assumed. Pt is shy according to his mother; he will often not answer staff but responds yes and no to her. Max assist to turn and position. Per pt's mother, pt is ambulatory at baseline c walker. Primary Nurse Trevon Fisher RN and Asia Devine RN performed a dual skin assessment on this patient Impairment noted- see wound doc flow sheet. Excoriation and moisture present bilaterally to all of the following: axilla, underneath breasts, underneath abdomen, at groin, and behind knee. Blanchable redness and small area of bleeding, likely from shear or friction, noted to sacrum. Applied mepilex.   Regan score is 12

## 2017-11-21 NOTE — PROGRESS NOTES
Verbal shift change report given to Aziza Mercedes RN (oncoming nurse) by Abbey Porter (offgoing nurse). Report included the following information SBAR, Kardex, ED Summary, Recent Results and Cardiac Rhythm a fib/paced. Pt to arrive from ED to PCU for routine progression of care. Cosyntropin ACTH stim test ordered STAT at 1730 not completed.

## 2017-11-21 NOTE — ED NOTES
Patient rectal temp now 96.9. Report called to floor. Report given to oncoming ed nurse who will transport patient. Nursing supervisor was made aware of need for bariatric bed by georgi charge nurse.

## 2017-11-22 NOTE — PROGRESS NOTES
PCU SHIFT NURSING NOTE      Bedside verbal shift change report given to Flor Craig (oncoming nurse) by Dianne Erwin (offgoing nurse). Report included the following information SBAR, Kardex, Intake/Output, MAR, Recent Results and Cardiac Rhythm V-Paced. Mother present in the room. Shift Summary:   2319: Bedside verbal shift report given to oncoming nurse Silvana Coyne to off going nurse Flor Craig. Report included: SBAR, Kardex, MAR, recent results, cardiac rhythm of V-Paced. Admission Date 11/20/2017   Admission Diagnosis Hypothermia   Consults None        Consults   []PT   []OT   []Speech   []Case Management      [] Palliative      Cardiac Monitoring Order   []Yes   []No     IV drips   []Yes    Drip:                            Dose:  Drip:                            Dose:  Drip:                            Dose:   []No     GI Prophylaxis   []Yes   []No         DVT Prophylaxis   SCDs:             Vasiliy stockings:         [] Medication   []Contraindicated   []None      Activity Level Activity Level: Bed Rest     Activity Assistance: Complete care   Purposeful Rounding every 1-2 hour? []Yes   Montes Score  Total Score: 1   Bed Alarm (If score 3 or >)   []Yes   [] Refused (See signed refusal form in chart)   Regan Score  Regan Score: 13   Regan Score (if score 14 or less)   []PMT consult   []Wound Care consult      []Specialty bed   [] Nutrition consult          Needs prior to discharge:   Home O2 required:    []Yes   []No    If yes, how much O2 required?     Other:    Last Bowel Movement: Last Bowel Movement Date: 11/21/17      Influenza Vaccine Received Flu Vaccine for Current Season (usually Sept-March): Yes        Pneumonia Vaccine           Diet Active Orders   Diet    DIET REGULAR      LDAs               Peripheral IV 11/20/17 Right  (Active)   Site Assessment Clean, dry, & intact 11/21/2017  7:50 PM   Phlebitis Assessment 0 11/21/2017  7:50 PM   Infiltration Assessment 0 11/21/2017  7:50 PM   Dressing Status Clean, dry, & intact 11/21/2017  7:50 PM   Dressing Type Tape;Transparent 11/21/2017  7:50 PM   Hub Color/Line Status Blue; Infusing 11/21/2017  7:50 PM                      Urinary Catheter      Intake & Output   Date 11/20/17 1900 - 11/21/17 0659 11/21/17 0700 - 11/22/17 0659   Shift 4309-8270 24 Hour Total 9063-9570 3809-9541 24 Hour Total   I  N  T  A  K  E   P.O.   840  840      P. O.   840  840    I.V.  (mL/kg/hr) 710 710 421.3  (0.2)  421.3      Volume (0.9% sodium chloride infusion) 660 660 371.3  371.3      Volume (cefTRIAXone (ROCEPHIN) 1 g in 0.9% sodium chloride (MBP/ADV) 50 mL) 50 50 50  50    Shift Total  (mL/kg) 710  (4.9) 710  (4.9) 1261.3  (8.7)  1261.3  (8.7)   O  U  T  P  U  T   Urine  (mL/kg/hr) 950 950         Urine Voided 950 950         Urine Occurrence(s)   3 x  3 x    Stool           Stool Occurrence(s)   1 x  1 x    Shift Total  (mL/kg) 950  (6.6) 950  (6.6)      NET -240 -240 1261.3  1261.3   Weight (kg) 144.9 144.9 144.9 144.9 144.9         Readmission Risk Assessment Tool Score Medium Risk            20       Total Score        3 Has Seen PCP in Last 6 Months (Yes=3, No=0)    4 IP Visits Last 12 Months (1-3=4, 4=9, >4=11)    9 Pt. Coverage (Medicare=5 , Medicaid, or Self-Pay=4)    4 Charlson Comorbidity Score (Age + Comorbid Conditions)        Criteria that do not apply:    . Living with Significant Other. Assisted Living. LTAC. SNF.  or   Rehab    Patient Length of Stay (>5 days = 3)       Expected Length of Stay 2d 14h   Actual Length of Stay 1

## 2017-11-22 NOTE — PROGRESS NOTES
Hospitalist Progress Note    NAME: Burton Acevedo   :  1965   MRN:  [id number]     Admit date: 2017    Today's date: 17    PCP: MD Aylin Isidro M.D. Cell [phone]      Assessment / Plan:  Hypothermia POA  Sepsis POA with hypothermic, leukopenia  Community-acquired pneumonia POA  Generalized weakness POA  T 93.3 at presentation, mom denies environmental exposures  CXR with ASD at bases, RUL, clinically picture fits with pneumonia  head CT neg for acute process  UA negative, but no microscopic sent  Erythema on shins and armpits, does not appear like cellulitis  Normal cosyntropin test, stop decadron  Normal TSH  IV ceftriaxone and doxycycline  Transfer out of stepdown  PT/OT     Intertrigo POA  cont' nystatin     Prader-Willi syndrome with mental retardation POA  Hypothyroidism POA cont synthroid  Morbid Obesity/OHS POA  Chronic respiratory failure POA on 2 L NC  History of atrial fib  S/p PPM     Code Status: Full    Surrogate Decision Maker: pt's mom    DVT Prophylaxis: lovenox    GI Prophylaxis: not indicate    Body mass index is 54.72 kg/(m^2). Subjective:     Chief Complaint / Reason for Physician Visit f/u hypothermia  Sleepy but more like himself, feeding himself lunch slowly  Off the gilbert hugger and temp stable  No diarrhea  Cough a bit worse than yesterday  Seen with mother at bedside    Discussed with RN events overnight. Review of Systems:  Symptom Y/N Comments  Symptom Y/N Comments   Fever/Chills    Chest Pain     Poor Appetite    Edema     Cough    Abdominal Pain     Sputum    Joint Pain     SOB/CLEMENTE    Headache     Nausea/vomit    Tolerating PT/OT     Diarrhea    Tolerating Diet     Constipation    Other       Could NOT obtain due to: AMS     Objective:     VITALS:   Last 24hrs VS reviewed since prior progress note.  Most recent are:  Patient Vitals for the past 24 hrs:   Temp Pulse Resp BP SpO2   17 0857 97.8 °F (36.6 °C) 75 18 109/58 95 %   11/22/17 0856 97.8 °F (36.6 °C) - - - -   11/22/17 0345 98.2 °F (36.8 °C) 75 16 95/51 95 %   11/21/17 2211 96.7 °F (35.9 °C) 75 18 99/56 97 %   11/21/17 1950 98.2 °F (36.8 °C) 75 16 96/59 96 %   11/21/17 1546 97.6 °F (36.4 °C) 75 18 99/60 96 %       Intake/Output Summary (Last 24 hours) at 11/22/17 1244  Last data filed at 11/21/17 2300   Gross per 24 hour   Intake              290 ml   Output                0 ml   Net              290 ml        Wt Readings from Last 12 Encounters:   11/22/17 144.6 kg (318 lb 12.6 oz)   06/29/17 148.5 kg (327 lb 6.1 oz)   06/19/17 148.5 kg (327 lb 6.1 oz)   09/28/16 158.8 kg (350 lb)   09/25/16 146.7 kg (323 lb 6.6 oz)   10/16/15 149 kg (328 lb 7.8 oz)   12/04/14 157.9 kg (348 lb)   11/05/14 143.3 kg (316 lb)   10/14/14 143.3 kg (316 lb)   09/16/14 143.5 kg (316 lb 5.8 oz)   08/24/14 (!) 161.5 kg (356 lb)   06/12/14 156 kg (344 lb)       PHYSICAL EXAM:  General: WD, WN. Alert, cooperative, no acute distress, slowing feeding himself  Neck:  No meningismus, no thyromegaly  Resp:  Rhonchi bilaterally, no wheezing or rales. No accessory muscle use  CV:  Regular  rhythm,  1+ edema  GI:  Soft, Non distended, Non tender.  +Bowel sounds, no rebound  LN:  No cervical or inguinal ANTWAN  Neurologic:  Alert, not really talking, non focal motor exam  Psych:   Not anxious nor agitated  Skin:  Mild erythema on shins bilaterally, in arm pits.   No jaundice    Reviewed most current lab test results and cultures  YES  Reviewed most current radiology test results   YES  Review and summation of old records today    NO  Reviewed patient's current orders and MAR    YES  PMH/ reviewed - no change compared to H&P  ________________________________________________________________________  Care Plan discussed with:    Comments   Patient x    Family  x Mother at bedside   RN x    Care Manager     Consultant                        Multidiciplinary team rounds were held today with case manager, nursing, pharmacist and clinical coordinator. Patient's plan of care was discussed; medications were reviewed and discharge planning was addressed. ________________________________________________________________________  Total NON critical care TIME: 25   Minutes    Total CRITICAL CARE TIME Spent:   Minutes non procedure based      Comments   >50% of visit spent in counseling and coordination of care     ________________________________________________________________________  Jose Enrique Haney MD     Procedures: see electronic medical records for all procedures/Xrays and details which were not copied into this note but were reviewed prior to creation of Plan. LABS:  I reviewed today's most current labs and imaging studies.   Pertinent labs include:  Recent Labs      11/22/17   0355  11/21/17   0322  11/20/17   1217   WBC  4.2  4.7  3.3*   HGB  10.7*  11.4*  11.2*   HCT  34.0*  36.5*  35.3*   PLT  105*  110*  85*     Recent Labs      11/22/17   0355  11/21/17   0322  11/20/17   1217   NA  141  140  139   K  4.3  4.8  4.3   CL  105  102  104   CO2  32  33*  31   GLU  62*  66  124*   BUN  22*  18  18   CREA  0.59*  0.46*  0.52*   CA  8.4*  9.1  8.0*   MG   --    --   2.0   ALB  2.4*   --   2.2*   TBILI  0.4   --   0.3   SGOT  39*   --   48*   ALT  34   --   36

## 2017-11-22 NOTE — PROGRESS NOTES
Bedside shift change report given to Denise Fontenot (oncoming nurse) by Mamta Mata RN (offgoing nurse). Report included the following information SBAR, Kardex, Intake/Output and Recent Results.

## 2017-11-22 NOTE — PROGRESS NOTES
PCU SHIFT NURSING NOTE      Bedside shift change report given to Salina Pruett (oncoming nurse) by Vin Jean (offgoing nurse). Report included the following information SBAR, Kardex, Procedure Summary, Intake/Output, MAR and Recent Results. Shift Summary:   0730 - Pt's Mother is at bedside bathing patient. 1230 - Pt presents with poor appetite and slightly more drowsy than he was this morning. VSS. Wakes easily, but falls back to sleep quickly. Mother at bedside. Waiting for PT to evaluate. 1720 - rechecked pt's temp per family request. Pt's mother is concerned about pts increased drowsiness. Afebrile. Pt's mother was unahppy to hear that pt has a tranfer to [address] Feels that \"it would be a waste to move him and cart all of his things to another room if he is going tomorrow. \" She also states that she is still waiting for Dr. Robin Jesus to return. tt messaged MD to express pt's family's concerns. Admission Date 11/20/2017   Admission Diagnosis Hypothermia   Consults None        Consults   []PT   []OT   []Speech   []Case Management      [] Palliative      Cardiac Monitoring Order   []Yes   []No     IV drips   []Yes    Drip:                            Dose:  Drip:                            Dose:  Drip:                            Dose:   []No     GI Prophylaxis   []Yes   []No         DVT Prophylaxis   SCDs:             Vasiliy stockings:         [] Medication   []Contraindicated   []None      Activity Level Activity Level: Bed Rest     Activity Assistance: Complete care   Purposeful Rounding every 1-2 hour? []Yes   Montes Score  Total Score: 1   Bed Alarm (If score 3 or >)   []Yes   [] Refused (See signed refusal form in chart)   Regan Score  Regan Score: 13   Regan Score (if score 14 or less)   []PMT consult   []Wound Care consult      []Specialty bed   [] Nutrition consult          Needs prior to discharge:   Home O2 required:    []Yes   []No    If yes, how much O2 required?     Other:    Last Bowel Movement: Last Bowel Movement Date: 11/21/17      Influenza Vaccine Received Flu Vaccine for Current Season (usually Sept-March): Yes        Pneumonia Vaccine           Diet Active Orders   Diet    DIET REGULAR      LDAs               Peripheral IV 11/20/17 Right  (Active)   Site Assessment Clean, dry, & intact 11/21/2017 11:00 PM   Phlebitis Assessment 0 11/21/2017 11:00 PM   Infiltration Assessment 0 11/21/2017 11:00 PM   Dressing Status Clean, dry, & intact 11/21/2017 11:00 PM   Dressing Type Tape;Transparent 11/21/2017 11:00 PM   Hub Color/Line Status Blue; Infusing 11/21/2017 11:00 PM                      Urinary Catheter      Intake & Output   Date 11/21/17 0700 - 11/22/17 0659 11/22/17 0700 - 11/23/17 0659   Shift 2284-3859 5614-9141 24 Hour Total 3639-4109 3692-4238 24 Hour Total   I  N  T  A  K  E   P.O. 840  840         P. O. 840  840       I.V.  (mL/kg/hr) 421.3  (0.2) 0  (0) 421.3  (0.1)         Volume (0.9% sodium chloride infusion) 371.3  371.3         Volume (cefTRIAXone (ROCEPHIN) 1 g in 0.9% sodium chloride (MBP/ADV) 50 mL) 50 0 50       Shift Total  (mL/kg) 1261.3  (8.7) 0  (0) 1261.3  (8.7)      O  U  T  P  U  T   Urine  (mL/kg/hr)            Urine Occurrence(s) 3 x  3 x       Stool            Stool Occurrence(s) 1 x  1 x       Shift Total  (mL/kg)         NET 1261.3 0 1261.3      Weight (kg) 144.9 144.6 144.6 144.6 144.6 144.6         Readmission Risk Assessment Tool Score Medium Risk            20       Total Score        3 Has Seen PCP in Last 6 Months (Yes=3, No=0)    4 IP Visits Last 12 Months (1-3=4, 4=9, >4=11)    9 Pt. Coverage (Medicare=5 , Medicaid, or Self-Pay=4)    4 Charlson Comorbidity Score (Age + Comorbid Conditions)        Criteria that do not apply:    . Living with Significant Other. Assisted Living. LTAC. SNF.  or   Rehab    Patient Length of Stay (>5 days = 3)       Expected Length of Stay 4d 21h   Actual Length of Stay 2

## 2017-11-23 NOTE — PROGRESS NOTES
Received report from Jeannie So, [facility]. Assumed care of patient.

## 2017-11-23 NOTE — PROGRESS NOTES
Hospitalist Progress Note    NAME: Burton Acevedo   :  1965   MRN:  [id number]     Admit date: 2017    Today's date: 17    PCP: MD Aylin Isidro M.D. Cell [id number]      Assessment / Plan:  Hypothermia POA resolved  Sepsis POA with hypothermic, leukopenia, improving  Community-acquired pneumonia POA  Generalized weakness POA able to get OOB with PT  T 93.3 at presentation, mom denies environmental exposures  CXR with ASD at bases, RUL, clinically picture fits with pneumonia  head CT neg for acute process  UA negative, but no microscopic sent  Erythema on shins and armpits, does not appear like cellulitis  Normal cosyntropin test, stop decadron  Normal TSH  IV ceftriaxone and doxycycline  PT/OT saw today  Plan home in Am if walks well on PO antibiotics     Intertrigo POA  cont' nystatin     Prader-Willi syndrome with mental retardation POA  Hypothyroidism POA cont synthroid  Morbid Obesity/OHS POA  Chronic respiratory failure POA on 2 L NC  History of atrial fib  S/p PPM     Code Status: Full    Surrogate Decision Maker: pt's mom    DVT Prophylaxis: lovenox    GI Prophylaxis: not indicate    Body mass index is 54.11 kg/(m^2). Subjective:     Chief Complaint / Reason for Physician Visit f/u hypothermia  No problems overnight  No diarrhea  A bit weak getting OOB with PT  Seen with mother at bedside    Discussed with RN events overnight. Review of Systems:  Symptom Y/N Comments  Symptom Y/N Comments   Fever/Chills    Chest Pain     Poor Appetite    Edema     Cough    Abdominal Pain     Sputum    Joint Pain     SOB/CLEMENTE    Headache     Nausea/vomit    Tolerating PT/OT     Diarrhea    Tolerating Diet     Constipation    Other       Could NOT obtain due to: AMS     Objective:     VITALS:   Last 24hrs VS reviewed since prior progress note.  Most recent are:  Patient Vitals for the past 24 hrs:   Temp Pulse Resp BP SpO2   17 0900 - - - 126/75 98 %   11/23/17 0733 97.6 °F (36.4 °C) 75 16 116/67 96 %   11/23/17 0317 98.2 °F (36.8 °C) 75 18 107/59 95 %   11/22/17 2318 98.3 °F (36.8 °C) 75 18 125/67 95 %   11/22/17 1953 98.5 °F (36.9 °C) 75 18 104/50 94 %   11/22/17 1701 98.4 °F (36.9 °C) 75 18 94/49 97 %       Intake/Output Summary (Last 24 hours) at 11/23/17 1117  Last data filed at 11/22/17 1701   Gross per 24 hour   Intake              240 ml   Output                0 ml   Net              240 ml        Wt Readings from Last 12 Encounters:   11/23/17 143 kg (315 lb 4.1 oz)   06/29/17 148.5 kg (327 lb 6.1 oz)   06/19/17 148.5 kg (327 lb 6.1 oz)   09/28/16 158.8 kg (350 lb)   09/25/16 146.7 kg (323 lb 6.6 oz)   10/16/15 149 kg (328 lb 7.8 oz)   12/04/14 157.9 kg (348 lb)   11/05/14 143.3 kg (316 lb)   10/14/14 143.3 kg (316 lb)   09/16/14 143.5 kg (316 lb 5.8 oz)   08/24/14 (!) 161.5 kg (356 lb)   06/12/14 156 kg (344 lb)       PHYSICAL EXAM:  General: WD, WN. Alert, cooperative, no acute distress, slowing feeding himself  Neck:  No meningismus, no thyromegaly  Resp:  Rhonchi bilaterally, no wheezing or rales. No accessory muscle use  CV:  Regular  rhythm,  1+ edema  GI:  Soft, Non distended, Non tender.  +Bowel sounds, no rebound  LN:  No cervical or inguinal ANTWAN  Neurologic:  Alert, not really talking, non focal motor exam  Psych:   Not anxious nor agitated  Skin:  Mild erythema on shins bilaterally, in arm pits.   No jaundice    Reviewed most current lab test results and cultures  YES  Reviewed most current radiology test results   YES  Review and summation of old records today    NO  Reviewed patient's current orders and MAR    YES  PMH/SH reviewed - no change compared to H&P  ________________________________________________________________________  Care Plan discussed with:    Comments   Patient x    Family  x Mother at bedside   RN x    Care Manager     Consultant                        Multidiciplinary team rounds were held today with , nursing, pharmacist and clinical coordinator. Patient's plan of care was discussed; medications were reviewed and discharge planning was addressed. ________________________________________________________________________  Total NON critical care TIME: 25   Minutes    Total CRITICAL CARE TIME Spent:   Minutes non procedure based      Comments   >50% of visit spent in counseling and coordination of care     ________________________________________________________________________  Casimiro Neville MD     Procedures: see electronic medical records for all procedures/Xrays and details which were not copied into this note but were reviewed prior to creation of Plan. LABS:  I reviewed today's most current labs and imaging studies.   Pertinent labs include:  Recent Labs      11/23/17   0325  11/22/17   0355  11/21/17   0322   WBC  4.2  4.2  4.7   HGB  11.0*  10.7*  11.4*   HCT  34.0*  34.0*  36.5*   PLT  113*  105*  110*     Recent Labs      11/23/17   0325  11/22/17   0355  11/21/17   0322  11/20/17   1217   NA  141  141  140  139   K  4.3  4.3  4.8  4.3   CL  105  105  102  104   CO2  30  32  33*  31   GLU  57*  62*  66  124*   BUN  22*  22*  18  18   CREA  0.44*  0.59*  0.46*  0.52*   CA  8.7  8.4*  9.1  8.0*   MG   --    --    --   2.0   ALB   --   2.4*   --   2.2*   TBILI   --   0.4   --   0.3   SGOT   --   39*   --   48*   ALT   --   34   --   36

## 2017-11-23 NOTE — PROGRESS NOTES
Bedside shift change report given to *** (oncoming nurse) by Justin Sanon RN (offgoing nurse). Report included the following information SBAR, Kardex, [facility], Recent Results and Cardiac Rhythm Paced. SHIFT SUMMARY:10:00 - Patient up to sofa with PT. Mother at his side assisting him with breakfast.  Good appetite. She states she in uncomfortable taking him home today as this is the first time he has gotten up. PT will see patient again tomorrow as re-evaluate DC then. 12:15 - Back to bed with assist of lift team.            [facility] NURSING NOTE   Admission Date 11/20/2017   Admission Diagnosis Hypothermia   Consults None      Cardiac Monitoring [x] Yes [] No      Purposeful Hourly Rounding [x] Yes    Octavio Score Total Score: 3   Octavio score 3 or > [x] Bed Alarm [] Avasys [] 1:1 sitter [] Patient refused (Place signed refusal form in chart)   Regan Score Regan Score: 15   Regan score 14 or < [] PMT consult [x] Wound Care consult    [x]  Specialty bed  [] Nutrition consult      Influenza Vaccine Received Flu Vaccine for Current Season (usually Sept-March): Yes           Oxygen needs? [] Room air Oxygen @  []1L    [x]2L    []3L   []4L    []5L   []6L     Use home O2?  [x] Yes, chronic use [] No  Perform O2 challenge test using  smartphrase (.Homeoxygen)      Last bowel movement Last Bowel Movement Date: 11/21/17      Urinary Catheter             LDAs               Peripheral IV 11/20/17 Right  (Active)   Site Assessment Clean, dry, & intact 11/23/2017  8:10 AM   Phlebitis Assessment 0 11/23/2017  8:10 AM   Infiltration Assessment 0 11/23/2017  8:10 AM   Dressing Status Clean, dry, & intact 11/23/2017  8:10 AM   Dressing Type Tape;Transparent 11/23/2017  8:10 AM   Hub Color/Line Status Blue;Capped 11/23/2017  8:10 AM                         Readmission Risk Assessment Tool Score Medium Risk            20       Total Score        3 Has Seen PCP in Last 6 Months (Yes=3, No=0)    4 IP Visits Last 12 Months (1-3=4, 4=9, >4=11)    9 Pt. Coverage (Medicare=5 , Medicaid, or Self-Pay=4)    4 Charlson Comorbidity Score (Age + Comorbid Conditions)        Criteria that do not apply:    . Living with Significant Other. Assisted Living. LTAC. SNF.  or   Rehab    Patient Length of Stay (>5 days = 3)       Expected Length of Stay 4d 21h   Actual Length of Stay 3

## 2017-11-23 NOTE — PROGRESS NOTES
Problem: Mobility Impaired (Adult and Pediatric)  Goal: *Acute Goals and Plan of Care (Insert Text)  Physical Therapy Goals  Initiated 11/23/2017  1. Patient will move from supine to sit and sit to supine  in bed with modified independence within 7 day(s). 2.  Patient will transfer from bed to chair and chair to bed with modified independence using the least restrictive device within 7 day(s). 3.  Patient will perform sit to stand with supervision/set-up within 7 day(s). 4.  Patient will ambulate with supervision/set-up for 80 feet with the least restrictive device within 7 day(s), returning to baseline level of independence. physical Therapy EVALUATION  Patient: Evelin Portillo (31 y.o. 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 He is cared for by his mother, and works daily at [employer]. Pts mother drives him to work daily. Pt has been in bed x 4 days and was admitted with significant weakness, unable to get out of chair. At baseline pt is nearly independent. Pt seen today and is able to ambulate with walker but only short distance and needs min to mod A getting OOB (though bariatric air bed in hospital - suggest his own environment and bed will be easier). Pt and mother need help with transfers. Pt is able to understand all commands, speech is compromised. Pt is cooperative , however when trying to get pt OOB using sheet to assist, pt  Very upset, screaming, cried out \"Let me do it myself\" intelligable only to mother. Pt seen today for transfers and gt. Talked with MD re going home today and whether mother could handle him. Mother anxious/fearful - MD to talk with her. Patient will benefit from skilled intervention to address the above impairments. Patients rehabilitation potential is considered to be Good  Factors which may influence rehabilitation potential include:   []         None noted  [x]         Mental ability/status  []         Medical condition  [x]         Home/family situation and support systems - elderly mother  [x]         Safety awareness  []         Pain tolerance/management  []         Other:      PLAN :  Recommendations and Planned Interventions:  [x]           Bed Mobility Training             []    Neuromuscular Re-Education  [x]           Transfer Training                   []    Orthotic/Prosthetic Training  [x]           Gait Training                         []    Modalities  []           Therapeutic Exercises           []    Edema Management/Control  [x]           Therapeutic Activities            [x]    Patient and Family Training/Education  []           Other (comment):    Frequency/Duration: Patient will be followed by physical therapy  daily to address goals.   Discharge Recommendations: None  Further Equipment Recommendations for Discharge: none     SUBJECTIVE:   Patient stated Let me do it myself!!.    OBJECTIVE DATA SUMMARY:   HISTORY:    Past Medical History:   Diagnosis Date    A-fib ([facility] [address]     Hypothyroid     Hypothyroidism     Mental retardation     Other ill-defined conditions     obesity    Other ill-defined conditions     mentally disabled    Prader-Willi syndrome     S/P cardiac pacemaker procedure 2/15/2013    heart block     Past Surgical History:   Procedure Laterality Date    HX HEENT      eye surgery as a baby    HX PACEMAKER       Prior Level of Function/Home Situation: independent, though limited activity, uses no AD at baseline, needs walker now  Personal factors and/or comorbidities impacting plan of care:     Home Situation  Home Environment: Private residence  One/Two Story Residence: Two story, live on 1st floor  Living Alone: No  Support Systems: Family member(s), Islam / rodolfo community  Patient Expects to be Discharged to[de-identified] Private residence  Current DME Used/Available at Home: Oxygen, portable used at night only    EXAMINATION/PRESENTATION/DECISION MAKING:   Critical Behavior:  Neurologic State: Alert, Confused, Eyes open spontaneously  Orientation Level: Disoriented to time, Oriented to person, Oriented to place  Cognition: Impaired decision making, Follows commands, Recognition of people/places     Hearing: Auditory  Auditory Impairment: None  Skin:    Edema:   Range Of Motion:  AROM: Generally decreased, functional                        Strength:    Strength: Generally decreased, functional   Low tone and only fair strength throughout                    Tone & Sensation:   Tone: Abnormal                              Coordination:  Coordination: Generally decreased, functional  Vision:      Functional Mobility:  Bed Mobility:  Rolling: Minimum assistance  Supine to Sit: Minimum assistance        Transfers:   Sit to stand: min A  Stand to sit: CGA                          Balance:   Sitting: Impaired  Sitting - Static: Good (unsupported)  Sitting - Dynamic: Fair (occasional)  Standing: Impaired  Standing - Static: Fair  Standing - Dynamic : Fair  Ambulation/Gait Training:  Distance (ft): 22 Feet (ft)  Assistive Device: Walker, rolling  Ambulation - Level of Assistance: Minimal assistance        Gait Abnormalities: Decreased step clearance;Trunk sway increased; Step to gait; Path deviations Pt prefers to keep walker out in front, and does not wlk into the waler, partly due to obesity, but also due to ER hip on R and severe toeing out. Pt drags LEs R>L, but this is nearly baseline. Right Side Weight Bearing: Full  Left Side Weight Bearing: Full  Base of Support: Widened     Speed/Sabiha: Pace decreased (<100 feet/min); Slow  Step Length: Right shortened;Left shortened                     Stairs: Therapeutic Exercises:       Functional Measure:    Elder Mobility Scale    7/20         EMS and G-code impairment scale:  Percentage of impairment CH  0% CI  1-19% CJ  20-39% CK  40-59% CL  60-79% CM  80-99% CN  100%   EMS Score 0-20 20 17-19 13-16 9-12 5-8 1-4 0      Scores under 10  generally these patients are dependent in mobility maneuvers; require help with  basic ADL, such as transfers, toileting and dressing. Scores between 10  13  generally these patients are borderline in terms of safe mobility and  independence in ADL i.e. they require some help with some mobility maneuvers. Scores over 14  Generally these patients are able to perform mobility maneuvers alone and safely  and are independent in basic ADL. G codes: In compliance with CMSs Claims Based Outcome Reporting, the following G-code set was chosen for this patient based on their primary functional limitation being treated: The outcome measure chosen to determine the severity of the functional limitation was the MEBS with a score of 7/20 which was correlated with the impairment scale.     ? Mobility - Walking and Moving Around:     - CURRENT STATUS: CL - 60%-79% impaired, limited or restricted    - GOAL STATUS: CK - 40%-59% impaired, limited or restricted    - D/C STATUS:  ---------------To be determined---------------      Physical Therapy Evaluation Charge Determination   History Examination Presentation Decision-Making   MEDIUM  Complexity : 1-2 comorbidities / personal factors will impact the outcome/ POC  MEDIUM Complexity : 3 Standardized tests and measures addressing body structure, function, activity limitation and / or participation in recreation  MEDIUM Complexity : Evolving with changing characteristics  MEDIUM Complexity : FOTO score of 26-74      Based on the above components, the patient evaluation is determined to be of the following complexity level: MEDIUM    Pain:  Pain Scale 1: Numeric (0 - 10)  Pain Intensity 1: 0              Activity Tolerance:   Limited endurance, at baseline and dimished more due to bedresrt  Please refer to the flowsheet for vital signs taken during this treatment. After treatment:   [x]         Patient left in no apparent distress sitting up in chair on bench in room with mother in room - pt not able to fit in bedside chair   []         Patient left in no apparent distress in bed  []         Call bell left within reach  [x]         Nursing notified  [x]         Caregiver present  []         Bed alarm activated    COMMUNICATION/EDUCATION:   The patients plan of care was discussed with: Registered Nurse and Physician.  []         Fall prevention education was provided and the patient/caregiver indicated understanding. [x]         Patient/family have participated as able in goal setting and plan of care. [x]         Patient/family agree to work toward stated goals and plan of care. []         Patient understands intent and goals of therapy, but is neutral about his/her participation. []         Patient is unable to participate in goal setting and plan of care.     Thank you for this referral.  Naren Avalos, PT   Time Calculation: 40 mins

## 2017-11-24 PROBLEM — L30.4 INTERTRIGO: Status: ACTIVE | Noted: 2017-01-01

## 2017-11-24 PROBLEM — J18.9 CAP (COMMUNITY ACQUIRED PNEUMONIA): Status: ACTIVE | Noted: 2017-01-01

## 2017-11-24 NOTE — DISCHARGE SUMMARY
Hospitalist Discharge Summary     Patient ID:  Tea Hurley  [id number]  58 y.o.  1965    PCP on record: Laury Darden MD    Admit date: 11/20/2017  Discharge date and time: 11/24/2017      DISCHARGE DIAGNOSIS:  Hypothermia POA  Sepsis POA with hypothermic, leukopenia  Community-acquired pneumonia POA  Generalized weakness POA    Intertrigo POA  Prader-Willi syndrome with mental retardation POA  Hypothyroidism POA   Morbid Obesity/OHS POA  Chronic respiratory failure POA   History of atrial fib  S/p PPM      CONSULTATIONS:  None    Excerpted HPI from H&P of Ashly Parson MD:  Ade Vieira is a 46 y.o.  male with pmhx significant for Prader-Willi syndrome with mental retardation, hypothyroidism, hx of pacemaker due to heart block and atrial fibrillation, obesity hypoventilation syndrome, present to ED due to intermittent confuse (not at baseline), progressive worsening of generalized weakness for one week. Pt is not a reliable historian (baseline per mother) and there was not much hx obtained at bedside as pt's mom was upset due to the wait time in the ER. She asked me to chart review and request that I don't ask anymore questions. Per pt's mom, pt's mentation is currently at baseline. Pt was brought to the ER due to generalized weakness, unable to ambulate with walker, and has had difficulty getting up from chair. Pt reportedly was having intermittent confusion, saying things that does not make sense to his mom. Mother states she was not aware of any new changes/symptoms at home. Was not aware of hypothermia at prior to admission. No new evidence of infection. (erythema under b/l breast and armpits appears to be chronic for years now). Vitals: T93.3, P 75, /70, SpO2 94%  Labs: glucose 124, NH3 47, WBC 3.3, Hgb 11.2, Hct 35, PLT 85, UA neg  Head CT neg, CXR showed cardiac pacemaker.   No airspace disease.    ______________________________________________________________________  DISCHARGE SUMMARY/HOSPITAL COURSE:  for full details see H&P, daily progress notes, labs, consult notes. Hypothermia POA  Sepsis POA with hypothermic, leukopenia  - resolved    Community-acquired pneumonia POA  Chronic respiratory failure POA  - will complete 7 days of ABT with Ceftin and Doxycycline  - CXR: Possible developing right upper lobe infiltrate, increased density  at the lung bases posteriorly. - pt uses O2 at night time @ 2L via n/c. Day time O2 sat greater than or equal to 90%    Generalized weakness POA  Prader-Willi syndrome with mental retardation POA  Morbid Obesity/OHS POA  - pt is progressing to his baseline, home health PT/OT service have arranged  - Head CT (-)      Intertrigo POA  - apply nystatin three times a day to skin folds after the skin care      Hypothyroidism POA   - cont with synthroid    History of atrial fib  S/p PPM  ______________________________________________________________________  Patient seen and examined by me on discharge day. Pertinent Findings:  Gen:    Not in distress  Chest: Clear lungs in apex with decreased breath sounds at bases  CVS:   Regular rhythm. No edema  Abd:  Soft, not distended, not tender  Neuro:  Alert, orient to himself only  _______________________________________________________________________  DISCHARGE MEDICATIONS:   Current Discharge Medication List      START taking these medications    Details   cefUROXime (CEFTIN) 500 mg tablet Take 1 Tab by mouth two (2) times a day. Indications: CAP  Qty: 10 Tab, Refills: 0      doxycycline (MONODOX) 100 mg capsule Take 1 Cap by mouth two (2) times a day. Indications: CAP  Qty: 10 Cap, Refills: 0      l.acidoph-B.lactis-B.longum (FLORAJEN3) 460 mg (7.5-6- 1.5 bill. cell) cap cap Take 1 Cap by mouth Daily (before breakfast).   Qty: 5 Cap, Refills: 0         CONTINUE these medications which have NOT CHANGED    Details cholecalciferol (VITAMIN D3) 1,000 unit cap Take 1,000 Units by mouth daily. cranberry extract (CRANBERRY) 450 mg tab Take 1 Tab by mouth daily. aspirin (ASPIRIN) 325 mg tablet Take 325 mg by mouth every evening. ascorbic acid (VITAMIN C) 500 mg tablet Take 500 mg by mouth every evening. ferrous sulfate (IRON) 325 mg (65 mg iron) tablet Take 325 mg by mouth daily (after dinner). multivitamin (ONE A DAY) tablet Take 1 tablet by mouth every evening. nystatin (MYCOSTATIN) powder Apply 100,000 Units to affected area two (2) times a day. Qty: 1 Bottle, Refills: 1      levothyroxine (SYNTHROID) 100 mcg tablet Take 100 mcg by mouth Daily (before breakfast). My Recommended Diet, Activity, Wound Care, and follow-up labs are listed in the patient's Discharge Insturctions which I have personally completed and reviewed.     _______________________________________________________________________  DISPOSITION:    Home with Family:    Home with HH/PT/OT/RN: y   SNF/LTC:    BETZAIDA:    OTHER:        Condition at Discharge:  Stable  _______________________________________________________________________  Follow up with:   PCP : Jie Armando MD  Follow-up Information     Follow up With Details Comments [address] [facility] MD Eddie Schedule an appointment as soon as possible for a visit to be seen within one week [facility]   [phone]                Total time in minutes spent coordinating this discharge (includes going over instructions, follow-up, prescriptions, and preparing report for sign off to her PCP) : 30 minutes    Signed:  Mayra Marcus NP

## 2017-11-24 NOTE — PROGRESS NOTES
Problem: Mobility Impaired (Adult and Pediatric)  Goal: *Acute Goals and Plan of Care (Insert Text)  Physical Therapy Goals  Initiated 11/23/2017  1. Patient will move from supine to sit and sit to supine  in bed with modified independence within 7 day(s). 2.  Patient will transfer from bed to chair and chair to bed with modified independence using the least restrictive device within 7 day(s). 3.  Patient will perform sit to stand with supervision/set-up within 7 day(s). 4.  Patient will ambulate with supervision/set-up for 80 feet with the least restrictive device within 7 day(s), returning to baseline level of independence. physical Therapy TREATMENT  Patient: Candice Novoa (85 y.o. male)  Date: 11/24/2017  Diagnosis: Hypothermia Hypothermia       Precautions:      ASSESSMENT:  Patient in bed upon arrival with patient's mother present. Patient's mother expressed frustration, saying patient sat up too long yesterday (2 hours) and was very weak getting back to bed. She is also concerned about patient going home without f/u therapy services. Patient able to perform bed mobility with CGA this date; some safety concerns noted due to large air mattress and tall bed. Patient then stood and ambulated with RW x 30 ft with CGA and SBA of second person for safety due to large body habitus. Patient ambulates with widened BHAVANA and increased trunk sway; patient's mother says patient tends to keep RW out in front of him at baseline. Patient left sitting up on couch in room with pillows propped to eat breakfast. Mother agreeable to let patient sit up for 45 min max; RN notified. Recommend HHPT at discharge for increasing strength, endurance, and home safety evaluation. Patient's mother in agreement; MD notified.    Progression toward goals:  []    Improving appropriately and progressing toward goals  [x]    Improving slowly and progressing toward goals  []    Not making progress toward goals and plan of care will be adjusted     PLAN:  Patient continues to benefit from skilled intervention to address the above impairments. Continue treatment per established plan of care. Discharge Recommendations:  Home Health  Further Equipment Recommendations for Discharge:  None, has RW     SUBJECTIVE:   Patient stated Ok.  Patient with baseline MR and minimally conversant. OBJECTIVE DATA SUMMARY:   Critical Behavior:  Neurologic State: Alert, Confused (MR)  Orientation Level: Oriented to person, Oriented to place  Cognition: Follows commands     Functional Mobility Training:  Bed Mobility:     Supine to Sit: Contact guard assistance; Additional time  Sit to Supine:  (NT - OOB to couch seat)           Transfers:  Sit to Stand: Contact guard assistance  Stand to Sit: Contact guard assistance                             Balance:  Sitting: Intact; With support  Standing: Impaired; With support  Standing - Static: Good  Standing - Dynamic : Fair  Ambulation/Gait Training:  Distance (ft): 30 Feet (ft)  Assistive Device: Gait belt;Walker, rolling  Ambulation - Level of Assistance: Contact guard assistance        Gait Abnormalities: Decreased step clearance;Trunk sway increased        Base of Support: Widened     Speed/Sabiha: Shuffled; Slow             Pain:  Pain Scale 1: Numeric (0 - 10)  Pain Intensity 1: 0              Activity Tolerance:   SpO2 90-92% during ambulation on room air. Please refer to the flowsheet for vital signs taken during this treatment.   After treatment:   [x]    Patient left in no apparent distress sitting up in chair  []    Patient left in no apparent distress in bed  [x]    Call bell left within reach  [x]    Nursing notified  [x]    Caregiver present  []    Bed alarm activated    COMMUNICATION/COLLABORATION:   The patients plan of care was discussed with: Occupational Therapist, Registered Nurse and Physician    Sharron Todd, PT   Time Calculation: 26 mins

## 2017-11-24 NOTE — FACE TO FACE
Face to Face Order for [address] Name  Lucia Reynolds   Date of Birth 1965   Age  46 y.o. Medical Record Number  [id number]   Room Number  2223/01   Admit date:  11/20/2017   Date of Face to Face: 11/24/2017     Admission Diagnosis:  Hypothermia     Diagnoses Present on Admission:   Hypothermia   Sepsis ([address]   S/P cardiac pacemaker procedure   Prader-Willi syndrome   Obesity, morbid (more than 100 lbs over ideal weight or BMI > 40) (HCC)   Hypothyroidism   Atrial fibrillation with slow ventricular response (HCC)   CAP (community acquired pneumonia)   Intertrigo     Past Medical History:   Diagnosis Date    [facility]     Hypothyroid     Hypothyroidism     Mental retardation     Other ill-defined conditions     obesity    Other ill-defined conditions     mentally disabled    Prader-Willi syndrome     S/P cardiac pacemaker procedure 2/15/2013    heart block      Visit Vitals    BP 97/55 (BP 1 Location: Left arm, BP Patient Position: At rest)    Pulse 75    Temp 96 °F (35.6 °C)    Resp 18    Wt 143 kg (315 lb 4.1 oz)    SpO2 97%    BMI 54.11 kg/m2         No Known Allergies          I certify that the patient needs home health care as prescribed below and I will not be following this patient in the Community.  I also certify that the patient is homebound as evidenced by    - Patient with strength deficits limiting the performance of all ADL's without caregiver assistance or the use of an assistive device. - Requires considerable and taxing effort to leave the home  and Requires the assistance of 1 or more persons to leave the home    - and also as noted in various sections of this medical record.  Dr. Karla Diamond MD  will be responsible for signing the Plan of Care and will follow/coordinate ongoing care.      Initial Orders for Care:  - skilled nursing care:  skilled observation/assessment, patient education  - physical therapy: strengthening, stretching/ROM, transfer training, gait/stair training and balance training  - occupational therapy:  ADL safety (ie. cooking, bathing, dressing), ROM and pt/caregiver education  -     - Report to Emir Fenton MD     I certify that I am taking care of the patient today (Please see hospital Discharge Records for details of clinical issues pertaining to this order).       ________________________________________________________________________  Josefina Polanco, MARCUS, FNP-C, APRN-BC  (electronically signed; no signature required )    Hospitalist, [address], [address] [address] [address] [facility]  Tel: [phone]

## 2017-11-24 NOTE — DISCHARGE INSTRUCTIONS
Patient Discharge Instructions     Pt Name  Jana Choi   Date of Birth 1965   Age  46 y.o. Medical Record Number  [id number]   PCP Katie Harding MD    Admit date:  11/20/2017 @    [place] [address]    Room Number  2223/01   Date of Discharge 11/24/2017     Admission Diagnoses:     Hypothermia        No Known Allergies     You were admitted to [address] for  Hypothermia    YOUR [place] (BUT NOT LIMITED TO ):  Present on Admission:   Hypothermia   Sepsis ([address]   S/P cardiac pacemaker procedure   Prader-Willi syndrome   Obesity, morbid (more than 100 lbs over ideal weight or BMI > 40) (HCC)   Hypothyroidism   Atrial fibrillation with slow ventricular response ([address]   CAP (community acquired pneumonia)   Intertrigo      DIET:  Regular Diet   Recommended activity: Activity as tolerated  Follow up : Follow-up Information     Follow up With Details Comments [address] MD Schedule an appointment as soon as possible for a visit to be seen within one week [address]  [phone]        Home health service has been arranged      · It is important that you take the medication exactly as they are prescribed. · Keep your medication in the bottles provided by the pharmacist and keep a list of the medication names, dosages, and times to be taken in your wallet. · Do not take other medications without consulting your doctor. ADDITIONAL INFORMATION: If you experience any of the following symptoms or have any health problem not listed below, then please call your primary care physician or return to the emergency room if you cannot get hold of your doctor: Fever, chills, nausea, vomiting, diarrhea, change in mentation, falling, bleeding, shortness of breath.       I understand that if any problems occur once I am discharged, I am supposed to call my Primary care physician for further care or seek help in the Emergency Department at the nearest Healthcare facility. I have had an opportunity to discuss my clinical issues with my doctor and nursing staff. I understand and acknowledge receipt of the above instructions.                                                                                                                                            Physician's or R.N.'s Signature                                                            Date/Time                                                                                                                                              Patient or Representative Signature                                                 Date/Time

## 2017-11-24 NOTE — PROGRESS NOTES
Attending Hospitalist Attestation:    I have personally seen and examined the patient, reviewed pertinent labs and imaging, and discussed the plan of care with NP Radha Alfonso. I reviewed and agree with the history, exam, assessment and plan as outlined in her note. Still weak, but overall better  Eating  Not able to verbalize complaints  Patient Vitals for the past 24 hrs:   Temp Pulse Resp BP SpO2   11/24/17 0758 96 °F (35.6 °C) 75 18 97/55 97 %   11/24/17 0429 95.8 °F (35.4 °C) 75 18 109/71 96 %   11/24/17 0239 - - - - 98 %   11/23/17 2316 97.4 °F (36.3 °C) 75 18 113/70 97 %   11/23/17 1954 97.4 °F (36.3 °C) 75 18 115/67 97 %   11/23/17 1521 97.4 °F (36.3 °C) 75 18 123/68 99 %   11/23/17 1154 97.4 °F (36.3 °C) 75 18 125/78 97 %     No distress  Lungs Crackles at bases bilaterally  Heart: RRR nl S1S2, no murmurs  Abdomen: soft, NT, ND, Positive BS, no rebound  Skin: No rashes  Neuro: Alert, non focal motor exam      Assessment/plan:    Hypothermia POA resolved  Sepsis POA with hypothermic, leukopenia, improving  Community-acquired pneumonia POA  Generalized weakness POA able to get OOB with PT  T 93.3 at presentation, mom denies environmental exposures  CXR with ASD at bases, RUL, clinically picture fits with pneumonia  head CT neg for acute process  UA negative, but no microscopic sent  Erythema on shins and armpits, does not appear like cellulitis  Normal cosyntropin test, stop decadron  Normal TSH  IV ceftriaxone and doxycycline  PT/OT saw today  D/C home on oral antibiotics      Intertrigo POA  cont' nystatin      Prader-Willi syndrome with mental retardation POA  Hypothyroidism POA cont synthroid  Morbid Obesity/OHS POA Body mass index is 54.11 kg/(m^2).   Chronic respiratory failure POA on 2 L NC  History of atrial fib  S/p PPM      Code Status: Full     Surrogate Decision Maker: pt's mom     DVT Prophylaxis: lovenox     GI Prophylaxis: not indicate       Additional time:    25  min rendering and coordinating care    Marie Clifton MD

## 2017-11-24 NOTE — PROGRESS NOTES
7:43 AM  Received bedside report from [facility].      9:56 AM  Patient sitting up in the chair on RA and saturating at 94%, BRINDA Amezquita informed. 11:48 AM reviewed discharge intructions and medications with the patient's mother. Ample time was given for any questions and concerns. Removed tele and PIV. Patient stable for discharge.

## 2017-11-24 NOTE — PROGRESS NOTES
OT note:  OT orders received and chart was reviewed. Pt cleared to be seen and OT talk to mother, and at this time per mother no OT needs. Pt is a sponge bath at the sink and mother states that they have a routine and do not have any problems with ADLs. Will sign off and recommendations of home care PT only at discharge.   Alma Shepard, OTL  Time spent 10 minutes

## 2017-11-24 NOTE — PROGRESS NOTES
Pt is a 45 yo  male admitted on 11/20/17 for hypothermia. Pt lives in a two-story house with his mother, who is his full-time caregiver. Per mother's report, pt uses chair lift in garage to get into house and has bedroom and bathroom on first floor. Pt has a history of [facility] PT/OT and [facility]. Pt is homebound and needs assistance with ADLs/IADLs. Pt has a RW at home. Pt's mother provides transportation for pt or uses [address] ([phone]). CM met with pt to verify demographic information and conduct initial assessment, discharge planning. Pt's mother Aly Pereyra) is bedside. Pt was eating breakfast while CM met with pt and family, mother provided information for assessment. Pt's mother confirmed that they would like to continue using [facility] PT/OT. CM sent referral to [facility] HH via [facility], pt accepted. FOC placed on bedside chart. CM to arrange transportation for pt through [address] 9:57AM  CM contacted [employer] to assist in arranging transportation for pt. CM left voicemail requesting follow-up as soon as possible. 10:20AM  CM informed specialist attempted to schedule PCP follow-up appointment but office is closed today. Information placed in AVS.     10:30AM  [employer] called back CM to arrange transportation for pt at discharge today. [employer] will pick-up pt from [facility] front main entrance at 1200 today. Floor nurse notified. Care Management Interventions  PCP Verified by CM:  Yes  Mode of Transport at Discharge: [place] [facility] ([address])  Transition of Care Consult (CM Consult): Home Health, Other, Discharge Planning [facility] PT/OT and [facility] in past)  [address]: Yes  Discharge Durable Medical Equipment: No (RW, chair lift at home)  Health Maintenance Reviewed: Yes  Physical Therapy Consult: Yes  Occupational Therapy Consult: Yes  Speech Therapy Consult: No  Current Support Network: Lives with Caregiver ([address] with chair lift for access to house; lives with full-time caregiver/mother)  Confirm Follow Up Transport: Family  Plan discussed with Pt/Family/Caregiver: Yes  Freedom of Choice Offered: Yes  Discharge Location  Discharge Placement: Home with family assistance (BS )    INDIA Moura, [address] [place] [address]  [phone]

## 2018-01-01 ENCOUNTER — HOME CARE VISIT (OUTPATIENT)
Dept: SCHEDULING | Facility: HOME HEALTH | Age: 53
End: 2018-01-01
Payer: MEDICARE

## 2018-01-01 ENCOUNTER — HOME CARE VISIT (OUTPATIENT)
Dept: HOSPICE | Facility: HOSPICE | Age: 53
End: 2018-01-01
Payer: MEDICARE

## 2018-01-01 ENCOUNTER — APPOINTMENT (OUTPATIENT)
Dept: CT IMAGING | Age: 53
DRG: 872 | End: 2018-01-01
Attending: INTERNAL MEDICINE
Payer: MEDICARE

## 2018-01-01 ENCOUNTER — APPOINTMENT (OUTPATIENT)
Dept: GENERAL RADIOLOGY | Age: 53
DRG: 871 | End: 2018-01-01
Attending: HOSPITALIST
Payer: MEDICARE

## 2018-01-01 ENCOUNTER — HOSPITAL ENCOUNTER (INPATIENT)
Age: 53
LOS: 2 days | DRG: 951 | End: 2018-04-11
Attending: INTERNAL MEDICINE | Admitting: INTERNAL MEDICINE
Payer: OTHER MISCELLANEOUS

## 2018-01-01 ENCOUNTER — APPOINTMENT (OUTPATIENT)
Dept: ULTRASOUND IMAGING | Age: 53
DRG: 872 | End: 2018-01-01
Attending: EMERGENCY MEDICINE
Payer: MEDICARE

## 2018-01-01 ENCOUNTER — APPOINTMENT (OUTPATIENT)
Dept: GENERAL RADIOLOGY | Age: 53
DRG: 871 | End: 2018-01-01
Attending: INTERNAL MEDICINE
Payer: MEDICARE

## 2018-01-01 ENCOUNTER — HOME CARE VISIT (OUTPATIENT)
Dept: HOME HEALTH SERVICES | Facility: HOME HEALTH | Age: 53
End: 2018-01-01
Payer: MEDICARE

## 2018-01-01 ENCOUNTER — APPOINTMENT (OUTPATIENT)
Dept: GENERAL RADIOLOGY | Age: 53
DRG: 872 | End: 2018-01-01
Attending: EMERGENCY MEDICINE
Payer: MEDICARE

## 2018-01-01 ENCOUNTER — APPOINTMENT (OUTPATIENT)
Dept: CT IMAGING | Age: 53
DRG: 871 | End: 2018-01-01
Attending: INTERNAL MEDICINE
Payer: MEDICARE

## 2018-01-01 ENCOUNTER — DOCUMENTATION ONLY (OUTPATIENT)
Dept: CASE MANAGEMENT | Age: 53
End: 2018-01-01

## 2018-01-01 ENCOUNTER — HOME HEALTH ADMISSION (OUTPATIENT)
Dept: HOME HEALTH SERVICES | Facility: HOME HEALTH | Age: 53
End: 2018-01-01
Payer: MEDICARE

## 2018-01-01 ENCOUNTER — HOSPITAL ENCOUNTER (INPATIENT)
Age: 53
LOS: 10 days | Discharge: HOSPICE | DRG: 871 | End: 2018-04-09
Attending: EMERGENCY MEDICINE | Admitting: INTERNAL MEDICINE
Payer: MEDICARE

## 2018-01-01 ENCOUNTER — HOSPICE ADMISSION (OUTPATIENT)
Dept: HOSPICE | Facility: HOSPICE | Age: 53
End: 2018-01-01
Payer: MEDICARE

## 2018-01-01 ENCOUNTER — APPOINTMENT (OUTPATIENT)
Dept: GENERAL RADIOLOGY | Age: 53
DRG: 871 | End: 2018-01-01
Attending: EMERGENCY MEDICINE
Payer: MEDICARE

## 2018-01-01 ENCOUNTER — HOSPITAL ENCOUNTER (INPATIENT)
Age: 53
LOS: 4 days | Discharge: HOME HEALTH | DRG: 872 | End: 2018-03-15
Attending: EMERGENCY MEDICINE | Admitting: INTERNAL MEDICINE
Payer: MEDICARE

## 2018-01-01 VITALS
RESPIRATION RATE: 18 BRPM | TEMPERATURE: 97 F | HEART RATE: 71 BPM | OXYGEN SATURATION: 90 % | SYSTOLIC BLOOD PRESSURE: 138 MMHG | DIASTOLIC BLOOD PRESSURE: 67 MMHG

## 2018-01-01 VITALS
HEART RATE: 75 BPM | OXYGEN SATURATION: 88 % | HEIGHT: 61 IN | DIASTOLIC BLOOD PRESSURE: 57 MMHG | WEIGHT: 315 LBS | TEMPERATURE: 96.9 F | BODY MASS INDEX: 59.47 KG/M2 | SYSTOLIC BLOOD PRESSURE: 123 MMHG | RESPIRATION RATE: 27 BRPM

## 2018-01-01 VITALS — HEART RATE: 76 BPM | OXYGEN SATURATION: 90 % | TEMPERATURE: 97.2 F

## 2018-01-01 VITALS — TEMPERATURE: 96.9 F | OXYGEN SATURATION: 85 % | HEART RATE: 75 BPM

## 2018-01-01 VITALS
RESPIRATION RATE: 16 BRPM | WEIGHT: 315 LBS | HEART RATE: 76 BPM | BODY MASS INDEX: 59.47 KG/M2 | DIASTOLIC BLOOD PRESSURE: 66 MMHG | HEIGHT: 61 IN | TEMPERATURE: 98 F | SYSTOLIC BLOOD PRESSURE: 120 MMHG | OXYGEN SATURATION: 93 %

## 2018-01-01 VITALS
OXYGEN SATURATION: 91 % | HEART RATE: 73 BPM | RESPIRATION RATE: 18 BRPM | SYSTOLIC BLOOD PRESSURE: 88 MMHG | TEMPERATURE: 96.3 F | DIASTOLIC BLOOD PRESSURE: 43 MMHG

## 2018-01-01 VITALS — OXYGEN SATURATION: 92 % | TEMPERATURE: 98 F | RESPIRATION RATE: 18 BRPM | HEART RATE: 80 BPM

## 2018-01-01 VITALS
HEART RATE: 78 BPM | OXYGEN SATURATION: 90 % | TEMPERATURE: 97.8 F | DIASTOLIC BLOOD PRESSURE: 70 MMHG | SYSTOLIC BLOOD PRESSURE: 118 MMHG | RESPIRATION RATE: 18 BRPM

## 2018-01-01 VITALS
RESPIRATION RATE: 17 BRPM | DIASTOLIC BLOOD PRESSURE: 70 MMHG | SYSTOLIC BLOOD PRESSURE: 118 MMHG | HEART RATE: 75 BPM | TEMPERATURE: 96.8 F | OXYGEN SATURATION: 97 %

## 2018-01-01 DIAGNOSIS — L03.115 CELLULITIS OF RIGHT LEG: Primary | ICD-10-CM

## 2018-01-01 DIAGNOSIS — G93.40 ACUTE ENCEPHALOPATHY: ICD-10-CM

## 2018-01-01 DIAGNOSIS — T68.XXXA HYPOTHERMIA, INITIAL ENCOUNTER: ICD-10-CM

## 2018-01-01 DIAGNOSIS — L03.317 CELLULITIS OF BUTTOCK, RIGHT: ICD-10-CM

## 2018-01-01 DIAGNOSIS — J96.02 ACUTE RESPIRATORY FAILURE WITH HYPERCAPNIA (HCC): Primary | ICD-10-CM

## 2018-01-01 LAB
ALBUMIN SERPL-MCNC: 1.8 G/DL (ref 3.5–5)
ALBUMIN SERPL-MCNC: 1.8 G/DL (ref 3.5–5)
ALBUMIN SERPL-MCNC: 2 G/DL (ref 3.5–5)
ALBUMIN SERPL-MCNC: 2.1 G/DL (ref 3.5–5)
ALBUMIN SERPL-MCNC: 2.1 G/DL (ref 3.5–5)
ALBUMIN SERPL-MCNC: 2.3 G/DL (ref 3.5–5)
ALBUMIN SERPL-MCNC: 2.3 G/DL (ref 3.5–5)
ALBUMIN SERPL-MCNC: 2.7 G/DL (ref 3.5–5)
ALBUMIN/GLOB SERPL: 0.3 {RATIO} (ref 1.1–2.2)
ALBUMIN/GLOB SERPL: 0.4 {RATIO} (ref 1.1–2.2)
ALBUMIN/GLOB SERPL: 0.5 {RATIO} (ref 1.1–2.2)
ALP SERPL-CCNC: 58 U/L (ref 45–117)
ALP SERPL-CCNC: 59 U/L (ref 45–117)
ALP SERPL-CCNC: 66 U/L (ref 45–117)
ALP SERPL-CCNC: 70 U/L (ref 45–117)
ALP SERPL-CCNC: 70 U/L (ref 45–117)
ALP SERPL-CCNC: 71 U/L (ref 45–117)
ALP SERPL-CCNC: 83 U/L (ref 45–117)
ALP SERPL-CCNC: 83 U/L (ref 45–117)
ALT SERPL-CCNC: 21 U/L (ref 12–78)
ALT SERPL-CCNC: 24 U/L (ref 12–78)
ALT SERPL-CCNC: 24 U/L (ref 12–78)
ALT SERPL-CCNC: 25 U/L (ref 12–78)
ALT SERPL-CCNC: 26 U/L (ref 12–78)
ALT SERPL-CCNC: 28 U/L (ref 12–78)
ALT SERPL-CCNC: 30 U/L (ref 12–78)
ALT SERPL-CCNC: 30 U/L (ref 12–78)
ALT SERPL-CCNC: 33 U/L (ref 12–78)
ALT SERPL-CCNC: 47 U/L (ref 12–78)
ANION GAP SERPL CALC-SCNC: 1 MMOL/L (ref 5–15)
ANION GAP SERPL CALC-SCNC: 10 MMOL/L (ref 5–15)
ANION GAP SERPL CALC-SCNC: 3 MMOL/L (ref 5–15)
ANION GAP SERPL CALC-SCNC: 4 MMOL/L (ref 5–15)
ANION GAP SERPL CALC-SCNC: 4 MMOL/L (ref 5–15)
ANION GAP SERPL CALC-SCNC: 5 MMOL/L (ref 5–15)
ANION GAP SERPL CALC-SCNC: 6 MMOL/L (ref 5–15)
ANION GAP SERPL CALC-SCNC: 6 MMOL/L (ref 5–15)
ANION GAP SERPL CALC-SCNC: 8 MMOL/L (ref 5–15)
ANION GAP SERPL CALC-SCNC: ABNORMAL MMOL/L (ref 5–15)
APPEARANCE UR: CLEAR
APPEARANCE UR: CLEAR
ARTERIAL PATENCY WRIST A: ABNORMAL
ARTERIAL PATENCY WRIST A: ABNORMAL
ARTERIAL PATENCY WRIST A: YES
AST SERPL-CCNC: 22 U/L (ref 15–37)
AST SERPL-CCNC: 26 U/L (ref 15–37)
AST SERPL-CCNC: 27 U/L (ref 15–37)
AST SERPL-CCNC: 29 U/L (ref 15–37)
AST SERPL-CCNC: 30 U/L (ref 15–37)
AST SERPL-CCNC: 33 U/L (ref 15–37)
AST SERPL-CCNC: 34 U/L (ref 15–37)
AST SERPL-CCNC: 36 U/L (ref 15–37)
AST SERPL-CCNC: 42 U/L (ref 15–37)
AST SERPL-CCNC: 51 U/L (ref 15–37)
ATRIAL RATE: 394 BPM
ATRIAL RATE: 76 BPM
BACTERIA SPEC CULT: ABNORMAL
BACTERIA SPEC CULT: ABNORMAL
BACTERIA SPEC CULT: NORMAL
BACTERIA URNS QL MICRO: NEGATIVE /HPF
BACTERIA URNS QL MICRO: NEGATIVE /HPF
BASE EXCESS BLDA CALC-SCNC: 13.7 MMOL/L
BASE EXCESS BLDA CALC-SCNC: 15.5 MMOL/L
BASE EXCESS BLDA CALC-SCNC: 17.3 MMOL/L
BASE EXCESS BLDA CALC-SCNC: 17.8 MMOL/L
BASE EXCESS BLDA CALC-SCNC: 2.4 MMOL/L
BASOPHILS # BLD: 0 K/UL (ref 0–0.1)
BASOPHILS # BLD: 0.1 K/UL (ref 0–0.1)
BASOPHILS NFR BLD: 0 % (ref 0–1)
BASOPHILS NFR BLD: 1 % (ref 0–1)
BDY SITE: ABNORMAL
BILIRUB SERPL-MCNC: 0.3 MG/DL (ref 0.2–1)
BILIRUB SERPL-MCNC: 0.4 MG/DL (ref 0.2–1)
BILIRUB SERPL-MCNC: 0.4 MG/DL (ref 0.2–1)
BILIRUB SERPL-MCNC: 0.5 MG/DL (ref 0.2–1)
BILIRUB SERPL-MCNC: 0.5 MG/DL (ref 0.2–1)
BILIRUB SERPL-MCNC: 0.6 MG/DL (ref 0.2–1)
BILIRUB SERPL-MCNC: 0.9 MG/DL (ref 0.2–1)
BILIRUB UR QL CFM: NEGATIVE
BILIRUB UR QL: NEGATIVE
BNP SERPL-MCNC: 2858 PG/ML (ref 0–125)
BNP SERPL-MCNC: 9256 PG/ML (ref 0–125)
BREATHS.SPONTANEOUS ON VENT: 14
BREATHS.SPONTANEOUS ON VENT: 15
BREATHS.SPONTANEOUS ON VENT: 20
BREATHS.SPONTANEOUS ON VENT: 28
BUN SERPL-MCNC: 20 MG/DL (ref 6–20)
BUN SERPL-MCNC: 21 MG/DL (ref 6–20)
BUN SERPL-MCNC: 22 MG/DL (ref 6–20)
BUN SERPL-MCNC: 22 MG/DL (ref 6–20)
BUN SERPL-MCNC: 23 MG/DL (ref 6–20)
BUN SERPL-MCNC: 23 MG/DL (ref 6–20)
BUN SERPL-MCNC: 26 MG/DL (ref 6–20)
BUN SERPL-MCNC: 27 MG/DL (ref 6–20)
BUN SERPL-MCNC: 27 MG/DL (ref 6–20)
BUN SERPL-MCNC: 31 MG/DL (ref 6–20)
BUN SERPL-MCNC: 36 MG/DL (ref 6–20)
BUN SERPL-MCNC: 39 MG/DL (ref 6–20)
BUN SERPL-MCNC: 45 MG/DL (ref 6–20)
BUN/CREAT SERPL: 10 (ref 12–20)
BUN/CREAT SERPL: 11 (ref 12–20)
BUN/CREAT SERPL: 14 (ref 12–20)
BUN/CREAT SERPL: 19 (ref 12–20)
BUN/CREAT SERPL: 25 (ref 12–20)
BUN/CREAT SERPL: 36 (ref 12–20)
BUN/CREAT SERPL: 37 (ref 12–20)
BUN/CREAT SERPL: 41 (ref 12–20)
BUN/CREAT SERPL: 44 (ref 12–20)
BUN/CREAT SERPL: 46 (ref 12–20)
BUN/CREAT SERPL: 49 (ref 12–20)
BUN/CREAT SERPL: 53 (ref 12–20)
CALCIUM SERPL-MCNC: 7.7 MG/DL (ref 8.5–10.1)
CALCIUM SERPL-MCNC: 7.9 MG/DL (ref 8.5–10.1)
CALCIUM SERPL-MCNC: 8 MG/DL (ref 8.5–10.1)
CALCIUM SERPL-MCNC: 8 MG/DL (ref 8.5–10.1)
CALCIUM SERPL-MCNC: 8.1 MG/DL (ref 8.5–10.1)
CALCIUM SERPL-MCNC: 8.2 MG/DL (ref 8.5–10.1)
CALCIUM SERPL-MCNC: 8.4 MG/DL (ref 8.5–10.1)
CALCIUM SERPL-MCNC: 8.7 MG/DL (ref 8.5–10.1)
CALCIUM SERPL-MCNC: 8.8 MG/DL (ref 8.5–10.1)
CALCIUM SERPL-MCNC: 9.1 MG/DL (ref 8.5–10.1)
CALCULATED R AXIS, ECG10: 180 DEGREES
CALCULATED R AXIS, ECG10: 43 DEGREES
CALCULATED T AXIS, ECG11: 32 DEGREES
CALCULATED T AXIS, ECG11: 55 DEGREES
CC UR VC: ABNORMAL
CHLORIDE SERPL-SCNC: 100 MMOL/L (ref 97–108)
CHLORIDE SERPL-SCNC: 100 MMOL/L (ref 97–108)
CHLORIDE SERPL-SCNC: 101 MMOL/L (ref 97–108)
CHLORIDE SERPL-SCNC: 102 MMOL/L (ref 97–108)
CHLORIDE SERPL-SCNC: 102 MMOL/L (ref 97–108)
CHLORIDE SERPL-SCNC: 103 MMOL/L (ref 97–108)
CHLORIDE SERPL-SCNC: 104 MMOL/L (ref 97–108)
CHLORIDE SERPL-SCNC: 108 MMOL/L (ref 97–108)
CHLORIDE SERPL-SCNC: 109 MMOL/L (ref 97–108)
CHLORIDE SERPL-SCNC: 110 MMOL/L (ref 97–108)
CHLORIDE SERPL-SCNC: 111 MMOL/L (ref 97–108)
CHLORIDE SERPL-SCNC: 97 MMOL/L (ref 97–108)
CHLORIDE SERPL-SCNC: 98 MMOL/L (ref 97–108)
CHLORIDE SERPL-SCNC: 99 MMOL/L (ref 97–108)
CO2 SERPL-SCNC: 30 MMOL/L (ref 21–32)
CO2 SERPL-SCNC: 30 MMOL/L (ref 21–32)
CO2 SERPL-SCNC: 31 MMOL/L (ref 21–32)
CO2 SERPL-SCNC: 32 MMOL/L (ref 21–32)
CO2 SERPL-SCNC: 33 MMOL/L (ref 21–32)
CO2 SERPL-SCNC: 33 MMOL/L (ref 21–32)
CO2 SERPL-SCNC: 34 MMOL/L (ref 21–32)
CO2 SERPL-SCNC: 34 MMOL/L (ref 21–32)
CO2 SERPL-SCNC: 35 MMOL/L (ref 21–32)
CO2 SERPL-SCNC: 35 MMOL/L (ref 21–32)
CO2 SERPL-SCNC: 36 MMOL/L (ref 21–32)
CO2 SERPL-SCNC: 39 MMOL/L (ref 21–32)
CO2 SERPL-SCNC: 42 MMOL/L (ref 21–32)
CO2 SERPL-SCNC: 43 MMOL/L (ref 21–32)
CO2 SERPL-SCNC: 44 MMOL/L (ref 21–32)
CO2 SERPL-SCNC: >45 MMOL/L (ref 21–32)
COLOR UR: ABNORMAL
COLOR UR: ABNORMAL
CREAT SERPL-MCNC: 0.4 MG/DL (ref 0.7–1.3)
CREAT SERPL-MCNC: 0.41 MG/DL (ref 0.7–1.3)
CREAT SERPL-MCNC: 0.45 MG/DL (ref 0.7–1.3)
CREAT SERPL-MCNC: 0.48 MG/DL (ref 0.7–1.3)
CREAT SERPL-MCNC: 0.49 MG/DL (ref 0.7–1.3)
CREAT SERPL-MCNC: 0.55 MG/DL (ref 0.7–1.3)
CREAT SERPL-MCNC: 0.62 MG/DL (ref 0.7–1.3)
CREAT SERPL-MCNC: 0.88 MG/DL (ref 0.7–1.3)
CREAT SERPL-MCNC: 1.24 MG/DL (ref 0.7–1.3)
CREAT SERPL-MCNC: 1.9 MG/DL (ref 0.7–1.3)
CREAT SERPL-MCNC: 2.37 MG/DL (ref 0.7–1.3)
CREAT SERPL-MCNC: 2.69 MG/DL (ref 0.7–1.3)
CREAT SERPL-MCNC: 3.05 MG/DL (ref 0.7–1.3)
CREAT SERPL-MCNC: 3.47 MG/DL (ref 0.7–1.3)
CREAT SERPL-MCNC: 3.68 MG/DL (ref 0.7–1.3)
CREAT SERPL-MCNC: 4.07 MG/DL (ref 0.7–1.3)
DATE LAST DOSE: ABNORMAL
DATE LAST DOSE: ABNORMAL
DIAGNOSIS, 93000: NORMAL
DIAGNOSIS, 93000: NORMAL
DIFFERENTIAL METHOD BLD: ABNORMAL
EOSINOPHIL # BLD: 0 K/UL (ref 0–0.4)
EOSINOPHIL # BLD: 0.1 K/UL (ref 0–0.4)
EOSINOPHIL # BLD: 0.2 K/UL (ref 0–0.4)
EOSINOPHIL #/AREA URNS HPF: NEGATIVE /[HPF]
EOSINOPHIL NFR BLD: 0 % (ref 0–7)
EOSINOPHIL NFR BLD: 1 % (ref 0–7)
EOSINOPHIL NFR BLD: 2 % (ref 0–7)
EOSINOPHIL NFR BLD: 3 % (ref 0–7)
EPAP/CPAP/PEEP, PAPEEP: 6
EPITH CASTS URNS QL MICRO: ABNORMAL /LPF
EPITH CASTS URNS QL MICRO: ABNORMAL /LPF
ERYTHROCYTE [DISTWIDTH] IN BLOOD BY AUTOMATED COUNT: 14.4 % (ref 11.5–14.5)
ERYTHROCYTE [DISTWIDTH] IN BLOOD BY AUTOMATED COUNT: 14.6 % (ref 11.5–14.5)
ERYTHROCYTE [DISTWIDTH] IN BLOOD BY AUTOMATED COUNT: 14.7 % (ref 11.5–14.5)
ERYTHROCYTE [DISTWIDTH] IN BLOOD BY AUTOMATED COUNT: 14.8 % (ref 11.5–14.5)
ERYTHROCYTE [DISTWIDTH] IN BLOOD BY AUTOMATED COUNT: 14.9 % (ref 11.5–14.5)
ERYTHROCYTE [DISTWIDTH] IN BLOOD BY AUTOMATED COUNT: 15 % (ref 11.5–14.5)
ERYTHROCYTE [DISTWIDTH] IN BLOOD BY AUTOMATED COUNT: 15.1 % (ref 11.5–14.5)
ERYTHROCYTE [DISTWIDTH] IN BLOOD BY AUTOMATED COUNT: 15.3 % (ref 11.5–14.5)
ERYTHROCYTE [DISTWIDTH] IN BLOOD BY AUTOMATED COUNT: 15.3 % (ref 11.5–14.5)
ERYTHROCYTE [DISTWIDTH] IN BLOOD BY AUTOMATED COUNT: 15.5 % (ref 11.5–14.5)
ERYTHROCYTE [DISTWIDTH] IN BLOOD BY AUTOMATED COUNT: 15.6 % (ref 11.5–14.5)
ERYTHROCYTE [DISTWIDTH] IN BLOOD BY AUTOMATED COUNT: 15.7 % (ref 11.5–14.5)
ERYTHROCYTE [DISTWIDTH] IN BLOOD BY AUTOMATED COUNT: 15.7 % (ref 11.5–14.5)
ERYTHROCYTE [SEDIMENTATION RATE] IN BLOOD: 73 MM/HR (ref 0–20)
FIO2 ON VENT: 100 %
FIO2 ON VENT: 40 %
FIO2 ON VENT: 40 %
FIO2 ON VENT: 50 %
GAS FLOW.O2 O2 DELIVERY SYS: 2 L/MIN
GAS FLOW.O2 SETTING OXYMISER: 20 L/MIN
GAS FLOW.O2 SETTING OXYMISER: 4 L/MIN
GAS FLOW.O2 SETTING OXYMISER: 4 L/MIN
GLOBULIN SER CALC-MCNC: 4.6 G/DL (ref 2–4)
GLOBULIN SER CALC-MCNC: 4.6 G/DL (ref 2–4)
GLOBULIN SER CALC-MCNC: 4.8 G/DL (ref 2–4)
GLOBULIN SER CALC-MCNC: 4.8 G/DL (ref 2–4)
GLOBULIN SER CALC-MCNC: 4.9 G/DL (ref 2–4)
GLOBULIN SER CALC-MCNC: 5 G/DL (ref 2–4)
GLOBULIN SER CALC-MCNC: 5.1 G/DL (ref 2–4)
GLOBULIN SER CALC-MCNC: 5.3 G/DL (ref 2–4)
GLOBULIN SER CALC-MCNC: 5.4 G/DL (ref 2–4)
GLOBULIN SER CALC-MCNC: 5.4 G/DL (ref 2–4)
GLUCOSE BLD STRIP.AUTO-MCNC: 102 MG/DL (ref 65–100)
GLUCOSE BLD STRIP.AUTO-MCNC: 102 MG/DL (ref 65–100)
GLUCOSE BLD STRIP.AUTO-MCNC: 110 MG/DL (ref 65–100)
GLUCOSE BLD STRIP.AUTO-MCNC: 112 MG/DL (ref 65–100)
GLUCOSE BLD STRIP.AUTO-MCNC: 113 MG/DL (ref 65–100)
GLUCOSE BLD STRIP.AUTO-MCNC: 118 MG/DL (ref 65–100)
GLUCOSE BLD STRIP.AUTO-MCNC: 119 MG/DL (ref 65–100)
GLUCOSE BLD STRIP.AUTO-MCNC: 120 MG/DL (ref 65–100)
GLUCOSE BLD STRIP.AUTO-MCNC: 126 MG/DL (ref 65–100)
GLUCOSE BLD STRIP.AUTO-MCNC: 129 MG/DL (ref 65–100)
GLUCOSE BLD STRIP.AUTO-MCNC: 134 MG/DL (ref 65–100)
GLUCOSE BLD STRIP.AUTO-MCNC: 136 MG/DL (ref 65–100)
GLUCOSE BLD STRIP.AUTO-MCNC: 138 MG/DL (ref 65–100)
GLUCOSE BLD STRIP.AUTO-MCNC: 143 MG/DL (ref 65–100)
GLUCOSE BLD STRIP.AUTO-MCNC: 158 MG/DL (ref 65–100)
GLUCOSE BLD STRIP.AUTO-MCNC: 169 MG/DL (ref 65–100)
GLUCOSE BLD STRIP.AUTO-MCNC: 58 MG/DL (ref 65–100)
GLUCOSE BLD STRIP.AUTO-MCNC: 66 MG/DL (ref 65–100)
GLUCOSE BLD STRIP.AUTO-MCNC: 67 MG/DL (ref 65–100)
GLUCOSE BLD STRIP.AUTO-MCNC: 67 MG/DL (ref 65–100)
GLUCOSE BLD STRIP.AUTO-MCNC: 69 MG/DL (ref 65–100)
GLUCOSE BLD STRIP.AUTO-MCNC: 69 MG/DL (ref 65–100)
GLUCOSE BLD STRIP.AUTO-MCNC: 72 MG/DL (ref 65–100)
GLUCOSE BLD STRIP.AUTO-MCNC: 73 MG/DL (ref 65–100)
GLUCOSE BLD STRIP.AUTO-MCNC: 78 MG/DL (ref 65–100)
GLUCOSE BLD STRIP.AUTO-MCNC: 82 MG/DL (ref 65–100)
GLUCOSE BLD STRIP.AUTO-MCNC: 83 MG/DL (ref 65–100)
GLUCOSE BLD STRIP.AUTO-MCNC: 88 MG/DL (ref 65–100)
GLUCOSE BLD STRIP.AUTO-MCNC: 90 MG/DL (ref 65–100)
GLUCOSE BLD STRIP.AUTO-MCNC: 91 MG/DL (ref 65–100)
GLUCOSE BLD STRIP.AUTO-MCNC: 91 MG/DL (ref 65–100)
GLUCOSE BLD STRIP.AUTO-MCNC: 92 MG/DL (ref 65–100)
GLUCOSE BLD STRIP.AUTO-MCNC: 94 MG/DL (ref 65–100)
GLUCOSE BLD STRIP.AUTO-MCNC: 96 MG/DL (ref 65–100)
GLUCOSE BLD STRIP.AUTO-MCNC: 96 MG/DL (ref 65–100)
GLUCOSE BLD STRIP.AUTO-MCNC: 99 MG/DL (ref 65–100)
GLUCOSE SERPL-MCNC: 103 MG/DL (ref 65–100)
GLUCOSE SERPL-MCNC: 113 MG/DL (ref 65–100)
GLUCOSE SERPL-MCNC: 115 MG/DL (ref 65–100)
GLUCOSE SERPL-MCNC: 123 MG/DL (ref 65–100)
GLUCOSE SERPL-MCNC: 124 MG/DL (ref 65–100)
GLUCOSE SERPL-MCNC: 125 MG/DL (ref 65–100)
GLUCOSE SERPL-MCNC: 150 MG/DL (ref 65–100)
GLUCOSE SERPL-MCNC: 74 MG/DL (ref 65–100)
GLUCOSE SERPL-MCNC: 77 MG/DL (ref 65–100)
GLUCOSE SERPL-MCNC: 79 MG/DL (ref 65–100)
GLUCOSE SERPL-MCNC: 83 MG/DL (ref 65–100)
GLUCOSE SERPL-MCNC: 84 MG/DL (ref 65–100)
GLUCOSE SERPL-MCNC: 85 MG/DL (ref 65–100)
GLUCOSE SERPL-MCNC: 89 MG/DL (ref 65–100)
GLUCOSE SERPL-MCNC: 89 MG/DL (ref 65–100)
GLUCOSE SERPL-MCNC: 90 MG/DL (ref 65–100)
GLUCOSE UR STRIP.AUTO-MCNC: NEGATIVE MG/DL
GLUCOSE UR STRIP.AUTO-MCNC: NEGATIVE MG/DL
HCO3 BLDA-SCNC: 34 MMOL/L (ref 22–26)
HCO3 BLDA-SCNC: 44 MMOL/L (ref 22–26)
HCO3 BLDA-SCNC: 45 MMOL/L (ref 22–26)
HCO3 BLDA-SCNC: 47 MMOL/L (ref 22–26)
HCO3 BLDA-SCNC: 48 MMOL/L (ref 22–26)
HCT VFR BLD AUTO: 30 % (ref 36.6–50.3)
HCT VFR BLD AUTO: 30.3 % (ref 36.6–50.3)
HCT VFR BLD AUTO: 30.5 % (ref 36.6–50.3)
HCT VFR BLD AUTO: 30.8 % (ref 36.6–50.3)
HCT VFR BLD AUTO: 31 % (ref 36.6–50.3)
HCT VFR BLD AUTO: 31.8 % (ref 36.6–50.3)
HCT VFR BLD AUTO: 31.9 % (ref 36.6–50.3)
HCT VFR BLD AUTO: 32.8 % (ref 36.6–50.3)
HCT VFR BLD AUTO: 33.1 % (ref 36.6–50.3)
HCT VFR BLD AUTO: 33.2 % (ref 36.6–50.3)
HCT VFR BLD AUTO: 33.4 % (ref 36.6–50.3)
HCT VFR BLD AUTO: 34.3 % (ref 36.6–50.3)
HCT VFR BLD AUTO: 34.4 % (ref 36.6–50.3)
HCT VFR BLD AUTO: 34.5 % (ref 36.6–50.3)
HCT VFR BLD AUTO: 36.9 % (ref 36.6–50.3)
HCV AB SERPL QL IA: NONREACTIVE
HCV COMMENT,HCGAC: NORMAL
HGB BLD-MCNC: 10 G/DL (ref 12.1–17)
HGB BLD-MCNC: 10.1 G/DL (ref 12.1–17)
HGB BLD-MCNC: 10.1 G/DL (ref 12.1–17)
HGB BLD-MCNC: 10.3 G/DL (ref 12.1–17)
HGB BLD-MCNC: 10.4 G/DL (ref 12.1–17)
HGB BLD-MCNC: 10.5 G/DL (ref 12.1–17)
HGB BLD-MCNC: 10.8 G/DL (ref 12.1–17)
HGB BLD-MCNC: 11.8 G/DL (ref 12.1–17)
HGB BLD-MCNC: 9.1 G/DL (ref 12.1–17)
HGB BLD-MCNC: 9.3 G/DL (ref 12.1–17)
HGB BLD-MCNC: 9.4 G/DL (ref 12.1–17)
HGB BLD-MCNC: 9.5 G/DL (ref 12.1–17)
HGB BLD-MCNC: 9.5 G/DL (ref 12.1–17)
HGB BLD-MCNC: 9.6 G/DL (ref 12.1–17)
HGB BLD-MCNC: 9.6 G/DL (ref 12.1–17)
HGB UR QL STRIP: NEGATIVE
HGB UR QL STRIP: NEGATIVE
HIV 1+2 AB+HIV1 P24 AG SERPL QL IA: NONREACTIVE
HIV12 RESULT COMMENT, HHIVC: NORMAL
IMM GRANULOCYTES # BLD: 0 K/UL (ref 0–0.04)
IMM GRANULOCYTES # BLD: 0.1 K/UL (ref 0–0.04)
IMM GRANULOCYTES # BLD: 0.2 K/UL (ref 0–0.04)
IMM GRANULOCYTES # BLD: 0.2 K/UL (ref 0–0.04)
IMM GRANULOCYTES NFR BLD AUTO: 0 % (ref 0–0.5)
IMM GRANULOCYTES NFR BLD AUTO: 1 % (ref 0–0.5)
IMM GRANULOCYTES NFR BLD AUTO: 2 % (ref 0–0.5)
INR PPP: 1.1 (ref 0.9–1.1)
INR PPP: 1.1 (ref 0.9–1.1)
IPAP/PIP, IPAPIP: 14
IPAP/PIP, IPAPIP: 20
IPAP/PIP, IPAPIP: 24
KETONES UR QL STRIP.AUTO: ABNORMAL MG/DL
KETONES UR QL STRIP.AUTO: NEGATIVE MG/DL
LACTATE BLD-SCNC: 1.5 MMOL/L (ref 0.4–2)
LACTATE SERPL-SCNC: 0.6 MMOL/L (ref 0.4–2)
LACTATE SERPL-SCNC: 0.7 MMOL/L (ref 0.4–2)
LACTATE SERPL-SCNC: 0.8 MMOL/L (ref 0.4–2)
LACTATE SERPL-SCNC: 1.5 MMOL/L (ref 0.4–2)
LACTATE SERPL-SCNC: 2.9 MMOL/L (ref 0.4–2)
LEUKOCYTE ESTERASE UR QL STRIP.AUTO: ABNORMAL
LEUKOCYTE ESTERASE UR QL STRIP.AUTO: ABNORMAL
LYMPHOCYTES # BLD: 0.2 K/UL (ref 0.8–3.5)
LYMPHOCYTES # BLD: 0.3 K/UL (ref 0.8–3.5)
LYMPHOCYTES # BLD: 0.4 K/UL (ref 0.8–3.5)
LYMPHOCYTES # BLD: 0.4 K/UL (ref 0.8–3.5)
LYMPHOCYTES # BLD: 0.5 K/UL (ref 0.8–3.5)
LYMPHOCYTES # BLD: 0.6 K/UL (ref 0.8–3.5)
LYMPHOCYTES NFR BLD: 3 % (ref 12–49)
LYMPHOCYTES NFR BLD: 4 % (ref 12–49)
LYMPHOCYTES NFR BLD: 5 % (ref 12–49)
LYMPHOCYTES NFR BLD: 5 % (ref 12–49)
LYMPHOCYTES NFR BLD: 6 % (ref 12–49)
LYMPHOCYTES NFR BLD: 7 % (ref 12–49)
LYMPHOCYTES NFR BLD: 8 % (ref 12–49)
LYMPHOCYTES NFR BLD: 9 % (ref 12–49)
MAGNESIUM SERPL-MCNC: 1.8 MG/DL (ref 1.6–2.4)
MAGNESIUM SERPL-MCNC: 1.9 MG/DL (ref 1.6–2.4)
MAGNESIUM SERPL-MCNC: 1.9 MG/DL (ref 1.6–2.4)
MAGNESIUM SERPL-MCNC: 2 MG/DL (ref 1.6–2.4)
MAGNESIUM SERPL-MCNC: 2 MG/DL (ref 1.6–2.4)
MAGNESIUM SERPL-MCNC: 2.1 MG/DL (ref 1.6–2.4)
MAGNESIUM SERPL-MCNC: 2.1 MG/DL (ref 1.6–2.4)
MCH RBC QN AUTO: 30.1 PG (ref 26–34)
MCH RBC QN AUTO: 30.3 PG (ref 26–34)
MCH RBC QN AUTO: 30.6 PG (ref 26–34)
MCH RBC QN AUTO: 30.7 PG (ref 26–34)
MCH RBC QN AUTO: 30.8 PG (ref 26–34)
MCH RBC QN AUTO: 30.9 PG (ref 26–34)
MCH RBC QN AUTO: 31 PG (ref 26–34)
MCH RBC QN AUTO: 31.1 PG (ref 26–34)
MCH RBC QN AUTO: 31.2 PG (ref 26–34)
MCHC RBC AUTO-ENTMCNC: 29.5 G/DL (ref 30–36.5)
MCHC RBC AUTO-ENTMCNC: 30.1 G/DL (ref 30–36.5)
MCHC RBC AUTO-ENTMCNC: 30.1 G/DL (ref 30–36.5)
MCHC RBC AUTO-ENTMCNC: 30.2 G/DL (ref 30–36.5)
MCHC RBC AUTO-ENTMCNC: 30.2 G/DL (ref 30–36.5)
MCHC RBC AUTO-ENTMCNC: 30.4 G/DL (ref 30–36.5)
MCHC RBC AUTO-ENTMCNC: 30.5 G/DL (ref 30–36.5)
MCHC RBC AUTO-ENTMCNC: 30.5 G/DL (ref 30–36.5)
MCHC RBC AUTO-ENTMCNC: 30.6 G/DL (ref 30–36.5)
MCHC RBC AUTO-ENTMCNC: 30.7 G/DL (ref 30–36.5)
MCHC RBC AUTO-ENTMCNC: 31.1 G/DL (ref 30–36.5)
MCHC RBC AUTO-ENTMCNC: 31.1 G/DL (ref 30–36.5)
MCHC RBC AUTO-ENTMCNC: 31.3 G/DL (ref 30–36.5)
MCHC RBC AUTO-ENTMCNC: 31.5 G/DL (ref 30–36.5)
MCHC RBC AUTO-ENTMCNC: 32 G/DL (ref 30–36.5)
MCV RBC AUTO: 100.3 FL (ref 80–99)
MCV RBC AUTO: 100.3 FL (ref 80–99)
MCV RBC AUTO: 100.6 FL (ref 80–99)
MCV RBC AUTO: 101.3 FL (ref 80–99)
MCV RBC AUTO: 101.3 FL (ref 80–99)
MCV RBC AUTO: 101.5 FL (ref 80–99)
MCV RBC AUTO: 101.5 FL (ref 80–99)
MCV RBC AUTO: 101.6 FL (ref 80–99)
MCV RBC AUTO: 102 FL (ref 80–99)
MCV RBC AUTO: 103.6 FL (ref 80–99)
MCV RBC AUTO: 96.3 FL (ref 80–99)
MCV RBC AUTO: 97.7 FL (ref 80–99)
MCV RBC AUTO: 98.4 FL (ref 80–99)
MCV RBC AUTO: 98.7 FL (ref 80–99)
MCV RBC AUTO: 99.1 FL (ref 80–99)
MONOCYTES # BLD: 0.2 K/UL (ref 0–1)
MONOCYTES # BLD: 0.3 K/UL (ref 0–1)
MONOCYTES # BLD: 0.4 K/UL (ref 0–1)
MONOCYTES # BLD: 0.4 K/UL (ref 0–1)
MONOCYTES # BLD: 0.5 K/UL (ref 0–1)
MONOCYTES # BLD: 0.6 K/UL (ref 0–1)
MONOCYTES # BLD: 0.7 K/UL (ref 0–1)
MONOCYTES # BLD: 0.7 K/UL (ref 0–1)
MONOCYTES NFR BLD: 12 % (ref 5–13)
MONOCYTES NFR BLD: 13 % (ref 5–13)
MONOCYTES NFR BLD: 14 % (ref 5–13)
MONOCYTES NFR BLD: 2 % (ref 5–13)
MONOCYTES NFR BLD: 3 % (ref 5–13)
MONOCYTES NFR BLD: 5 % (ref 5–13)
MONOCYTES NFR BLD: 6 % (ref 5–13)
MONOCYTES NFR BLD: 6 % (ref 5–13)
MONOCYTES NFR BLD: 8 % (ref 5–13)
MUCOUS THREADS URNS QL MICRO: ABNORMAL /LPF
NEUTS BAND NFR BLD MANUAL: 17 %
NEUTS SEG # BLD: 11.7 K/UL (ref 1.8–8)
NEUTS SEG # BLD: 3.2 K/UL (ref 1.8–8)
NEUTS SEG # BLD: 3.6 K/UL (ref 1.8–8)
NEUTS SEG # BLD: 3.8 K/UL (ref 1.8–8)
NEUTS SEG # BLD: 4.1 K/UL (ref 1.8–8)
NEUTS SEG # BLD: 4.5 K/UL (ref 1.8–8)
NEUTS SEG # BLD: 5.2 K/UL (ref 1.8–8)
NEUTS SEG # BLD: 6.6 K/UL (ref 1.8–8)
NEUTS SEG # BLD: 6.8 K/UL (ref 1.8–8)
NEUTS SEG # BLD: 7 K/UL (ref 1.8–8)
NEUTS SEG # BLD: 7.1 K/UL (ref 1.8–8)
NEUTS SEG # BLD: 7.4 K/UL (ref 1.8–8)
NEUTS SEG # BLD: 7.6 K/UL (ref 1.8–8)
NEUTS SEG # BLD: 8.2 K/UL (ref 1.8–8)
NEUTS SEG # BLD: 9.8 K/UL (ref 1.8–8)
NEUTS SEG NFR BLD: 74 % (ref 32–75)
NEUTS SEG NFR BLD: 75 % (ref 32–75)
NEUTS SEG NFR BLD: 77 % (ref 32–75)
NEUTS SEG NFR BLD: 79 % (ref 32–75)
NEUTS SEG NFR BLD: 81 % (ref 32–75)
NEUTS SEG NFR BLD: 87 % (ref 32–75)
NEUTS SEG NFR BLD: 88 % (ref 32–75)
NEUTS SEG NFR BLD: 88 % (ref 32–75)
NEUTS SEG NFR BLD: 90 % (ref 32–75)
NEUTS SEG NFR BLD: 90 % (ref 32–75)
NEUTS SEG NFR BLD: 93 % (ref 32–75)
NITRITE UR QL STRIP.AUTO: NEGATIVE
NITRITE UR QL STRIP.AUTO: NEGATIVE
NRBC # BLD: 0 K/UL (ref 0–0.01)
NRBC # BLD: 0.03 K/UL (ref 0–0.01)
NRBC # BLD: 0.04 K/UL (ref 0–0.01)
NRBC BLD-RTO: 0 PER 100 WBC
NRBC BLD-RTO: 0.7 PER 100 WBC
NRBC BLD-RTO: 0.9 PER 100 WBC
PCO2 BLDA: 56 MMHG (ref 35–45)
PCO2 BLDA: 85 MMHG (ref 35–45)
PCO2 BLDA: 89 MMHG (ref 35–45)
PCO2 BLDA: 92 MMHG (ref 35–45)
PCO2 BLDA: 96 MMHG (ref 35–45)
PH BLDA: 7.17 [PH] (ref 7.35–7.45)
PH BLDA: 7.32 [PH] (ref 7.35–7.45)
PH BLDA: 7.32 [PH] (ref 7.35–7.45)
PH BLDA: 7.37 [PH] (ref 7.35–7.45)
PH BLDA: 7.51 [PH] (ref 7.35–7.45)
PH UR STRIP: 5 [PH] (ref 5–8)
PH UR STRIP: 5.5 [PH] (ref 5–8)
PHOSPHATE SERPL-MCNC: 2.2 MG/DL (ref 2.6–4.7)
PHOSPHATE SERPL-MCNC: 3.2 MG/DL (ref 2.6–4.7)
PHOSPHATE SERPL-MCNC: 5.4 MG/DL (ref 2.6–4.7)
PHOSPHATE SERPL-MCNC: 5.6 MG/DL (ref 2.6–4.7)
PHOSPHATE SERPL-MCNC: 5.7 MG/DL (ref 2.6–4.7)
PHOSPHATE SERPL-MCNC: 6.5 MG/DL (ref 2.6–4.7)
PLATELET # BLD AUTO: 100 K/UL (ref 150–400)
PLATELET # BLD AUTO: 107 K/UL (ref 150–400)
PLATELET # BLD AUTO: 116 K/UL (ref 150–400)
PLATELET # BLD AUTO: 121 K/UL (ref 150–400)
PLATELET # BLD AUTO: 66 K/UL (ref 150–400)
PLATELET # BLD AUTO: 69 K/UL (ref 150–400)
PLATELET # BLD AUTO: 71 K/UL (ref 150–400)
PLATELET # BLD AUTO: 76 K/UL (ref 150–400)
PLATELET # BLD AUTO: 77 K/UL (ref 150–400)
PLATELET # BLD AUTO: 95 K/UL (ref 150–400)
PLATELET # BLD AUTO: 95 K/UL (ref 150–400)
PLATELET # BLD AUTO: 98 K/UL (ref 150–400)
PLATELET # BLD AUTO: 99 K/UL (ref 150–400)
PLATELET COMMENTS,PCOM: ABNORMAL
PMV BLD AUTO: 11.6 FL (ref 8.9–12.9)
PMV BLD AUTO: 11.6 FL (ref 8.9–12.9)
PMV BLD AUTO: 11.9 FL (ref 8.9–12.9)
PMV BLD AUTO: 12 FL (ref 8.9–12.9)
PMV BLD AUTO: 12.1 FL (ref 8.9–12.9)
PMV BLD AUTO: 12.1 FL (ref 8.9–12.9)
PMV BLD AUTO: 12.3 FL (ref 8.9–12.9)
PMV BLD AUTO: 12.3 FL (ref 8.9–12.9)
PMV BLD AUTO: 12.4 FL (ref 8.9–12.9)
PMV BLD AUTO: 12.6 FL (ref 8.9–12.9)
PMV BLD AUTO: 12.7 FL (ref 8.9–12.9)
PMV BLD AUTO: 12.8 FL (ref 8.9–12.9)
PMV BLD AUTO: 13 FL (ref 8.9–12.9)
PMV BLD AUTO: 13 FL (ref 8.9–12.9)
PO2 BLDA: 107 MMHG (ref 80–100)
PO2 BLDA: 75 MMHG (ref 80–100)
PO2 BLDA: 88 MMHG (ref 80–100)
PO2 BLDA: 88 MMHG (ref 80–100)
PO2 BLDA: 95 MMHG (ref 80–100)
POTASSIUM SERPL-SCNC: 3.4 MMOL/L (ref 3.5–5.1)
POTASSIUM SERPL-SCNC: 3.4 MMOL/L (ref 3.5–5.1)
POTASSIUM SERPL-SCNC: 3.5 MMOL/L (ref 3.5–5.1)
POTASSIUM SERPL-SCNC: 3.5 MMOL/L (ref 3.5–5.1)
POTASSIUM SERPL-SCNC: 3.6 MMOL/L (ref 3.5–5.1)
POTASSIUM SERPL-SCNC: 3.8 MMOL/L (ref 3.5–5.1)
POTASSIUM SERPL-SCNC: 3.8 MMOL/L (ref 3.5–5.1)
POTASSIUM SERPL-SCNC: 4 MMOL/L (ref 3.5–5.1)
POTASSIUM SERPL-SCNC: 4 MMOL/L (ref 3.5–5.1)
POTASSIUM SERPL-SCNC: 4.1 MMOL/L (ref 3.5–5.1)
POTASSIUM SERPL-SCNC: 4.1 MMOL/L (ref 3.5–5.1)
POTASSIUM SERPL-SCNC: 4.2 MMOL/L (ref 3.5–5.1)
POTASSIUM SERPL-SCNC: 4.3 MMOL/L (ref 3.5–5.1)
POTASSIUM SERPL-SCNC: 4.4 MMOL/L (ref 3.5–5.1)
POTASSIUM SERPL-SCNC: 4.4 MMOL/L (ref 3.5–5.1)
POTASSIUM SERPL-SCNC: 4.5 MMOL/L (ref 3.5–5.1)
PRESSURE SUPPORT SETTING VENT: 18 CM[H2O]
PROT SERPL-MCNC: 6.7 G/DL (ref 6.4–8.2)
PROT SERPL-MCNC: 6.8 G/DL (ref 6.4–8.2)
PROT SERPL-MCNC: 6.9 G/DL (ref 6.4–8.2)
PROT SERPL-MCNC: 7 G/DL (ref 6.4–8.2)
PROT SERPL-MCNC: 7.1 G/DL (ref 6.4–8.2)
PROT SERPL-MCNC: 7.7 G/DL (ref 6.4–8.2)
PROT SERPL-MCNC: 8.1 G/DL (ref 6.4–8.2)
PROT UR STRIP-MCNC: ABNORMAL MG/DL
PROT UR STRIP-MCNC: NEGATIVE MG/DL
PROTHROMBIN TIME: 10.7 SEC (ref 9–11.1)
PROTHROMBIN TIME: 11 SEC (ref 9–11.1)
Q-T INTERVAL, ECG07: 404 MS
Q-T INTERVAL, ECG07: 460 MS
QRS DURATION, ECG06: 164 MS
QRS DURATION, ECG06: 200 MS
QTC CALCULATION (BEZET), ECG08: 451 MS
QTC CALCULATION (BEZET), ECG08: 513 MS
RBC # BLD AUTO: 2.99 M/UL (ref 4.1–5.7)
RBC # BLD AUTO: 3.02 M/UL (ref 4.1–5.7)
RBC # BLD AUTO: 3.05 M/UL (ref 4.1–5.7)
RBC # BLD AUTO: 3.08 M/UL (ref 4.1–5.7)
RBC # BLD AUTO: 3.09 M/UL (ref 4.1–5.7)
RBC # BLD AUTO: 3.14 M/UL (ref 4.1–5.7)
RBC # BLD AUTO: 3.14 M/UL (ref 4.1–5.7)
RBC # BLD AUTO: 3.27 M/UL (ref 4.1–5.7)
RBC # BLD AUTO: 3.27 M/UL (ref 4.1–5.7)
RBC # BLD AUTO: 3.33 M/UL (ref 4.1–5.7)
RBC # BLD AUTO: 3.33 M/UL (ref 4.1–5.7)
RBC # BLD AUTO: 3.34 M/UL (ref 4.1–5.7)
RBC # BLD AUTO: 3.39 M/UL (ref 4.1–5.7)
RBC # BLD AUTO: 3.51 M/UL (ref 4.1–5.7)
RBC # BLD AUTO: 3.83 M/UL (ref 4.1–5.7)
RBC #/AREA URNS HPF: ABNORMAL /HPF (ref 0–5)
RBC #/AREA URNS HPF: ABNORMAL /HPF (ref 0–5)
RBC MORPH BLD: ABNORMAL
REPORTED DOSE,DOSE: ABNORMAL UNITS
REPORTED DOSE,DOSE: ABNORMAL UNITS
REPORTED DOSE/TIME,TMG: 1245
REPORTED DOSE/TIME,TMG: ABNORMAL
SAO2 % BLD: 94 % (ref 92–97)
SAO2 % BLD: 95 % (ref 92–97)
SAO2 % BLD: 96 % (ref 92–97)
SAO2 % BLD: 96 % (ref 92–97)
SAO2 % BLD: 98 % (ref 92–97)
SAO2% DEVICE SAO2% SENSOR NAME: ABNORMAL
SERVICE CMNT-IMP: ABNORMAL
SERVICE CMNT-IMP: NORMAL
SODIUM SERPL-SCNC: 138 MMOL/L (ref 136–145)
SODIUM SERPL-SCNC: 139 MMOL/L (ref 136–145)
SODIUM SERPL-SCNC: 140 MMOL/L (ref 136–145)
SODIUM SERPL-SCNC: 141 MMOL/L (ref 136–145)
SODIUM SERPL-SCNC: 141 MMOL/L (ref 136–145)
SODIUM SERPL-SCNC: 142 MMOL/L (ref 136–145)
SODIUM SERPL-SCNC: 143 MMOL/L (ref 136–145)
SODIUM SERPL-SCNC: 144 MMOL/L (ref 136–145)
SODIUM SERPL-SCNC: 147 MMOL/L (ref 136–145)
SODIUM SERPL-SCNC: 147 MMOL/L (ref 136–145)
SP GR UR REFRACTOMETRY: 1.02 (ref 1–1.03)
SP GR UR REFRACTOMETRY: 1.03 (ref 1–1.03)
SPECIMEN SITE: ABNORMAL
T4 FREE SERPL-MCNC: 1.4 NG/DL (ref 0.8–1.5)
TROPONIN I SERPL-MCNC: <0.04 NG/ML
TSH SERPL DL<=0.05 MIU/L-ACNC: 1.01 UIU/ML (ref 0.36–3.74)
UA: UC IF INDICATED,UAUC: ABNORMAL
UROBILINOGEN UR QL STRIP.AUTO: 0.2 EU/DL (ref 0.2–1)
UROBILINOGEN UR QL STRIP.AUTO: 1 EU/DL (ref 0.2–1)
VANCOMYCIN SERPL-MCNC: 42.1 UG/ML
VANCOMYCIN SERPL-MCNC: 47.2 UG/ML
VANCOMYCIN TROUGH SERPL-MCNC: 22.2 UG/ML (ref 5–10)
VANCOMYCIN TROUGH SERPL-MCNC: 40.8 UG/ML (ref 5–10)
VENTILATION MODE VENT: ABNORMAL
VENTRICULAR RATE, ECG03: 75 BPM
VENTRICULAR RATE, ECG03: 75 BPM
WBC # BLD AUTO: 11 K/UL (ref 4.1–11.1)
WBC # BLD AUTO: 12.7 K/UL (ref 4.1–11.1)
WBC # BLD AUTO: 4.3 K/UL (ref 4.1–11.1)
WBC # BLD AUTO: 4.5 K/UL (ref 4.1–11.1)
WBC # BLD AUTO: 5.1 K/UL (ref 4.1–11.1)
WBC # BLD AUTO: 5.2 K/UL (ref 4.1–11.1)
WBC # BLD AUTO: 6 K/UL (ref 4.1–11.1)
WBC # BLD AUTO: 6.4 K/UL (ref 4.1–11.1)
WBC # BLD AUTO: 7.3 K/UL (ref 4.1–11.1)
WBC # BLD AUTO: 7.4 K/UL (ref 4.1–11.1)
WBC # BLD AUTO: 7.5 K/UL (ref 4.1–11.1)
WBC # BLD AUTO: 7.6 K/UL (ref 4.1–11.1)
WBC # BLD AUTO: 8.4 K/UL (ref 4.1–11.1)
WBC # BLD AUTO: 8.7 K/UL (ref 4.1–11.1)
WBC # BLD AUTO: 9.3 K/UL (ref 4.1–11.1)
WBC URNS QL MICRO: ABNORMAL /HPF (ref 0–4)
WBC URNS QL MICRO: ABNORMAL /HPF (ref 0–4)

## 2018-01-01 PROCEDURE — 80053 COMPREHEN METABOLIC PANEL: CPT | Performed by: INTERNAL MEDICINE

## 2018-01-01 PROCEDURE — 74011000258 HC RX REV CODE- 258: Performed by: INTERNAL MEDICINE

## 2018-01-01 PROCEDURE — 85025 COMPLETE CBC W/AUTO DIFF WBC: CPT | Performed by: INTERNAL MEDICINE

## 2018-01-01 PROCEDURE — G0151 HHCP-SERV OF PT,EA 15 MIN: HCPCS

## 2018-01-01 PROCEDURE — 3331090002 HH PPS REVENUE DEBIT

## 2018-01-01 PROCEDURE — C1751 CATH, INF, PER/CENT/MIDLINE: HCPCS

## 2018-01-01 PROCEDURE — 83735 ASSAY OF MAGNESIUM: CPT | Performed by: EMERGENCY MEDICINE

## 2018-01-01 PROCEDURE — 77010033678 HC OXYGEN DAILY

## 2018-01-01 PROCEDURE — 77030018786 HC NDL GD F/USND BARD -B

## 2018-01-01 PROCEDURE — 74011250636 HC RX REV CODE- 250/636: Performed by: INTERNAL MEDICINE

## 2018-01-01 PROCEDURE — 74011000258 HC RX REV CODE- 258: Performed by: HOSPITALIST

## 2018-01-01 PROCEDURE — 74011250637 HC RX REV CODE- 250/637: Performed by: INTERNAL MEDICINE

## 2018-01-01 PROCEDURE — G8988 SELF CARE GOAL STATUS: HCPCS | Performed by: OCCUPATIONAL THERAPIST

## 2018-01-01 PROCEDURE — 0651 HSPC ROUTINE HOME CARE

## 2018-01-01 PROCEDURE — 97161 PT EVAL LOW COMPLEX 20 MIN: CPT

## 2018-01-01 PROCEDURE — 36415 COLL VENOUS BLD VENIPUNCTURE: CPT | Performed by: INTERNAL MEDICINE

## 2018-01-01 PROCEDURE — 83605 ASSAY OF LACTIC ACID: CPT

## 2018-01-01 PROCEDURE — G8987 SELF CARE CURRENT STATUS: HCPCS | Performed by: OCCUPATIONAL THERAPIST

## 2018-01-01 PROCEDURE — 74011250636 HC RX REV CODE- 250/636: Performed by: HOSPITALIST

## 2018-01-01 PROCEDURE — 71045 X-RAY EXAM CHEST 1 VIEW: CPT

## 2018-01-01 PROCEDURE — G0157 HHC PT ASSISTANT EA 15: HCPCS

## 2018-01-01 PROCEDURE — 85025 COMPLETE CBC W/AUTO DIFF WBC: CPT | Performed by: EMERGENCY MEDICINE

## 2018-01-01 PROCEDURE — 74011000250 HC RX REV CODE- 250: Performed by: HOSPITALIST

## 2018-01-01 PROCEDURE — 76010000093 HC SPECIAL PROCEDURE

## 2018-01-01 PROCEDURE — 65610000006 HC RM INTENSIVE CARE

## 2018-01-01 PROCEDURE — 77030009834 HC MSK O2 TRACH VYRM -A

## 2018-01-01 PROCEDURE — 65270000015 HC RM PRIVATE ONCOLOGY

## 2018-01-01 PROCEDURE — 87186 SC STD MICRODIL/AGAR DIL: CPT | Performed by: INTERNAL MEDICINE

## 2018-01-01 PROCEDURE — 93005 ELECTROCARDIOGRAM TRACING: CPT

## 2018-01-01 PROCEDURE — 3331090001 HH PPS REVENUE CREDIT

## 2018-01-01 PROCEDURE — 94660 CPAP INITIATION&MGMT: CPT

## 2018-01-01 PROCEDURE — 73701 CT LOWER EXTREMITY W/DYE: CPT

## 2018-01-01 PROCEDURE — 80048 BASIC METABOLIC PNL TOTAL CA: CPT | Performed by: INTERNAL MEDICINE

## 2018-01-01 PROCEDURE — 83880 ASSAY OF NATRIURETIC PEPTIDE: CPT | Performed by: INTERNAL MEDICINE

## 2018-01-01 PROCEDURE — 84484 ASSAY OF TROPONIN QUANT: CPT | Performed by: EMERGENCY MEDICINE

## 2018-01-01 PROCEDURE — 84100 ASSAY OF PHOSPHORUS: CPT | Performed by: INTERNAL MEDICINE

## 2018-01-01 PROCEDURE — 3331090003 HH PPS REVENUE ADJ

## 2018-01-01 PROCEDURE — 82803 BLOOD GASES ANY COMBINATION: CPT | Performed by: INTERNAL MEDICINE

## 2018-01-01 PROCEDURE — 74011250636 HC RX REV CODE- 250/636: Performed by: EMERGENCY MEDICINE

## 2018-01-01 PROCEDURE — 87040 BLOOD CULTURE FOR BACTERIA: CPT | Performed by: EMERGENCY MEDICINE

## 2018-01-01 PROCEDURE — G8978 MOBILITY CURRENT STATUS: HCPCS

## 2018-01-01 PROCEDURE — 76937 US GUIDE VASCULAR ACCESS: CPT

## 2018-01-01 PROCEDURE — 51798 US URINE CAPACITY MEASURE: CPT

## 2018-01-01 PROCEDURE — 74011250637 HC RX REV CODE- 250/637: Performed by: HOSPITALIST

## 2018-01-01 PROCEDURE — 36600 WITHDRAWAL OF ARTERIAL BLOOD: CPT | Performed by: INTERNAL MEDICINE

## 2018-01-01 PROCEDURE — 84443 ASSAY THYROID STIM HORMONE: CPT | Performed by: INTERNAL MEDICINE

## 2018-01-01 PROCEDURE — 80202 ASSAY OF VANCOMYCIN: CPT | Performed by: INTERNAL MEDICINE

## 2018-01-01 PROCEDURE — 82962 GLUCOSE BLOOD TEST: CPT

## 2018-01-01 PROCEDURE — 77030013033 HC MSK BPAP/CPAP MMKA -B

## 2018-01-01 PROCEDURE — 74011000250 HC RX REV CODE- 250

## 2018-01-01 PROCEDURE — 83735 ASSAY OF MAGNESIUM: CPT | Performed by: INTERNAL MEDICINE

## 2018-01-01 PROCEDURE — 36415 COLL VENOUS BLD VENIPUNCTURE: CPT | Performed by: EMERGENCY MEDICINE

## 2018-01-01 PROCEDURE — 83605 ASSAY OF LACTIC ACID: CPT | Performed by: EMERGENCY MEDICINE

## 2018-01-01 PROCEDURE — 94002 VENT MGMT INPAT INIT DAY: CPT

## 2018-01-01 PROCEDURE — 74011636320 HC RX REV CODE- 636/320: Performed by: GENERAL ACUTE CARE HOSPITAL

## 2018-01-01 PROCEDURE — 81001 URINALYSIS AUTO W/SCOPE: CPT | Performed by: EMERGENCY MEDICINE

## 2018-01-01 PROCEDURE — 51701 INSERT BLADDER CATHETER: CPT

## 2018-01-01 PROCEDURE — 94003 VENT MGMT INPAT SUBQ DAY: CPT

## 2018-01-01 PROCEDURE — 80048 BASIC METABOLIC PNL TOTAL CA: CPT | Performed by: EMERGENCY MEDICINE

## 2018-01-01 PROCEDURE — 400013 HH SOC

## 2018-01-01 PROCEDURE — 85652 RBC SED RATE AUTOMATED: CPT | Performed by: INTERNAL MEDICINE

## 2018-01-01 PROCEDURE — 82803 BLOOD GASES ANY COMBINATION: CPT | Performed by: EMERGENCY MEDICINE

## 2018-01-01 PROCEDURE — 74011250637 HC RX REV CODE- 250/637: Performed by: GENERAL ACUTE CARE HOSPITAL

## 2018-01-01 PROCEDURE — 65270000029 HC RM PRIVATE

## 2018-01-01 PROCEDURE — 84484 ASSAY OF TROPONIN QUANT: CPT | Performed by: INTERNAL MEDICINE

## 2018-01-01 PROCEDURE — 84100 ASSAY OF PHOSPHORUS: CPT | Performed by: EMERGENCY MEDICINE

## 2018-01-01 PROCEDURE — 77030011256 HC DRSG MEPILEX <16IN NO BORD MOLN -A

## 2018-01-01 PROCEDURE — 3336500001 HSPC ELECTION

## 2018-01-01 PROCEDURE — 87077 CULTURE AEROBIC IDENTIFY: CPT | Performed by: INTERNAL MEDICINE

## 2018-01-01 PROCEDURE — 87086 URINE CULTURE/COLONY COUNT: CPT | Performed by: INTERNAL MEDICINE

## 2018-01-01 PROCEDURE — 87389 HIV-1 AG W/HIV-1&-2 AB AG IA: CPT | Performed by: INTERNAL MEDICINE

## 2018-01-01 PROCEDURE — G8979 MOBILITY GOAL STATUS: HCPCS

## 2018-01-01 PROCEDURE — 97530 THERAPEUTIC ACTIVITIES: CPT

## 2018-01-01 PROCEDURE — 84439 ASSAY OF FREE THYROXINE: CPT | Performed by: INTERNAL MEDICINE

## 2018-01-01 PROCEDURE — 77030013079 HC BLNKT BAIR HGGR 3M -A

## 2018-01-01 PROCEDURE — 99285 EMERGENCY DEPT VISIT HI MDM: CPT

## 2018-01-01 PROCEDURE — 97116 GAIT TRAINING THERAPY: CPT

## 2018-01-01 PROCEDURE — 85610 PROTHROMBIN TIME: CPT | Performed by: INTERNAL MEDICINE

## 2018-01-01 PROCEDURE — G0299 HHS/HOSPICE OF RN EA 15 MIN: HCPCS

## 2018-01-01 PROCEDURE — 83880 ASSAY OF NATRIURETIC PEPTIDE: CPT | Performed by: EMERGENCY MEDICINE

## 2018-01-01 PROCEDURE — 86803 HEPATITIS C AB TEST: CPT | Performed by: INTERNAL MEDICINE

## 2018-01-01 PROCEDURE — 99284 EMERGENCY DEPT VISIT MOD MDM: CPT

## 2018-01-01 PROCEDURE — 76450000000

## 2018-01-01 PROCEDURE — 74011000258 HC RX REV CODE- 258: Performed by: EMERGENCY MEDICINE

## 2018-01-01 PROCEDURE — P9047 ALBUMIN (HUMAN), 25%, 50ML: HCPCS | Performed by: INTERNAL MEDICINE

## 2018-01-01 PROCEDURE — 77030038269 HC DRN EXT URIN PURWCK BARD -A

## 2018-01-01 PROCEDURE — 0T9B80Z DRAINAGE OF BLADDER WITH DRAINAGE DEVICE, VIA NATURAL OR ARTIFICIAL OPENING ENDOSCOPIC: ICD-10-PCS | Performed by: UROLOGY

## 2018-01-01 PROCEDURE — 80202 ASSAY OF VANCOMYCIN: CPT

## 2018-01-01 PROCEDURE — 74011250636 HC RX REV CODE- 250/636: Performed by: GENERAL ACUTE CARE HOSPITAL

## 2018-01-01 PROCEDURE — 85610 PROTHROMBIN TIME: CPT | Performed by: EMERGENCY MEDICINE

## 2018-01-01 PROCEDURE — 77030005563 HC CATH URETH INT MMGH -A

## 2018-01-01 PROCEDURE — 83605 ASSAY OF LACTIC ACID: CPT | Performed by: INTERNAL MEDICINE

## 2018-01-01 PROCEDURE — G0152 HHCP-SERV OF OT,EA 15 MIN: HCPCS

## 2018-01-01 PROCEDURE — 36600 WITHDRAWAL OF ARTERIAL BLOOD: CPT | Performed by: EMERGENCY MEDICINE

## 2018-01-01 PROCEDURE — 93971 EXTREMITY STUDY: CPT

## 2018-01-01 PROCEDURE — 93306 TTE W/DOPPLER COMPLETE: CPT

## 2018-01-01 PROCEDURE — 87205 SMEAR GRAM STAIN: CPT | Performed by: INTERNAL MEDICINE

## 2018-01-01 PROCEDURE — 97165 OT EVAL LOW COMPLEX 30 MIN: CPT | Performed by: OCCUPATIONAL THERAPIST

## 2018-01-01 PROCEDURE — G8989 SELF CARE D/C STATUS: HCPCS | Performed by: OCCUPATIONAL THERAPIST

## 2018-01-01 PROCEDURE — 80053 COMPREHEN METABOLIC PANEL: CPT | Performed by: EMERGENCY MEDICINE

## 2018-01-01 PROCEDURE — 96365 THER/PROPH/DIAG IV INF INIT: CPT

## 2018-01-01 RX ORDER — ONDANSETRON 2 MG/ML
2 INJECTION INTRAMUSCULAR; INTRAVENOUS
Status: DISCONTINUED | OUTPATIENT
Start: 2018-01-01 | End: 2018-01-01 | Stop reason: HOSPADM

## 2018-01-01 RX ORDER — NYSTATIN 100000 [USP'U]/G
POWDER TOPICAL 2 TIMES DAILY
COMMUNITY
End: 2018-01-01

## 2018-01-01 RX ORDER — HEPARIN SODIUM 5000 [USP'U]/ML
5000 INJECTION, SOLUTION INTRAVENOUS; SUBCUTANEOUS EVERY 8 HOURS
Status: DISCONTINUED | OUTPATIENT
Start: 2018-01-01 | End: 2018-01-01

## 2018-01-01 RX ORDER — DEXTROSE MONOHYDRATE AND SODIUM CHLORIDE 5; .9 G/100ML; G/100ML
75 INJECTION, SOLUTION INTRAVENOUS CONTINUOUS
Status: DISCONTINUED | OUTPATIENT
Start: 2018-01-01 | End: 2018-01-01

## 2018-01-01 RX ORDER — LEVOTHYROXINE SODIUM 100 UG/1
100 TABLET ORAL
Status: DISCONTINUED | OUTPATIENT
Start: 2018-01-01 | End: 2018-01-01 | Stop reason: HOSPADM

## 2018-01-01 RX ORDER — SCOLOPAMINE TRANSDERMAL SYSTEM 1 MG/1
1 PATCH, EXTENDED RELEASE TRANSDERMAL
Status: DISCONTINUED | OUTPATIENT
Start: 2018-01-01 | End: 2018-01-01 | Stop reason: HOSPADM

## 2018-01-01 RX ORDER — POTASSIUM CHLORIDE 750 MG/1
40 TABLET, FILM COATED, EXTENDED RELEASE ORAL
Status: DISCONTINUED | OUTPATIENT
Start: 2018-01-01 | End: 2018-01-01

## 2018-01-01 RX ORDER — BISACODYL 5 MG
5 TABLET, DELAYED RELEASE (ENTERIC COATED) ORAL DAILY PRN
Status: DISCONTINUED | OUTPATIENT
Start: 2018-01-01 | End: 2018-01-01 | Stop reason: HOSPADM

## 2018-01-01 RX ORDER — LEVOTHYROXINE SODIUM 25 UG/1
100 TABLET ORAL
Status: DISCONTINUED | OUTPATIENT
Start: 2018-01-01 | End: 2018-01-01

## 2018-01-01 RX ORDER — ALBUMIN HUMAN 250 G/1000ML
25 SOLUTION INTRAVENOUS ONCE
Status: COMPLETED | OUTPATIENT
Start: 2018-01-01 | End: 2018-01-01

## 2018-01-01 RX ORDER — POTASSIUM CHLORIDE 1.5 G/1.77G
40 POWDER, FOR SOLUTION ORAL
Status: COMPLETED | OUTPATIENT
Start: 2018-01-01 | End: 2018-01-01

## 2018-01-01 RX ORDER — HYDROMORPHONE HYDROCHLORIDE 2 MG/ML
0.4 INJECTION, SOLUTION INTRAMUSCULAR; INTRAVENOUS; SUBCUTANEOUS EVERY 4 HOURS
Status: DISCONTINUED | OUTPATIENT
Start: 2018-01-01 | End: 2018-01-01 | Stop reason: HOSPADM

## 2018-01-01 RX ORDER — SODIUM CHLORIDE 0.9 % (FLUSH) 0.9 %
10 SYRINGE (ML) INJECTION AS NEEDED
Status: DISCONTINUED | OUTPATIENT
Start: 2018-01-01 | End: 2018-01-01 | Stop reason: HOSPADM

## 2018-01-01 RX ORDER — FACIAL-BODY WIPES
10 EACH TOPICAL DAILY PRN
Status: DISCONTINUED | OUTPATIENT
Start: 2018-01-01 | End: 2018-01-01 | Stop reason: HOSPADM

## 2018-01-01 RX ORDER — VANCOMYCIN 2 GRAM/500 ML IN 0.9 % SODIUM CHLORIDE INTRAVENOUS
2000 EVERY 12 HOURS
Status: DISCONTINUED | OUTPATIENT
Start: 2018-01-01 | End: 2018-01-01

## 2018-01-01 RX ORDER — LEVOFLOXACIN 750 MG/1
750 TABLET ORAL EVERY 24 HOURS
Status: DISCONTINUED | OUTPATIENT
Start: 2018-01-01 | End: 2018-01-01 | Stop reason: HOSPADM

## 2018-01-01 RX ORDER — SODIUM CHLORIDE 0.9 % (FLUSH) 0.9 %
5-10 SYRINGE (ML) INJECTION EVERY 8 HOURS
Status: DISCONTINUED | OUTPATIENT
Start: 2018-01-01 | End: 2018-01-01 | Stop reason: SDUPTHER

## 2018-01-01 RX ORDER — ASCORBIC ACID 500 MG
500 TABLET ORAL EVERY EVENING
Status: DISCONTINUED | OUTPATIENT
Start: 2018-01-01 | End: 2018-01-01 | Stop reason: HOSPADM

## 2018-01-01 RX ORDER — VANCOMYCIN 2 GRAM/500 ML IN 0.9 % SODIUM CHLORIDE INTRAVENOUS
2000
Status: DISCONTINUED | OUTPATIENT
Start: 2018-01-01 | End: 2018-01-01

## 2018-01-01 RX ORDER — MICONAZOLE NITRATE 20 MG/G
CREAM TOPICAL 2 TIMES DAILY
Status: DISCONTINUED | OUTPATIENT
Start: 2018-01-01 | End: 2018-01-01 | Stop reason: HOSPADM

## 2018-01-01 RX ORDER — NYSTATIN 100000 [USP'U]/G
POWDER TOPICAL 2 TIMES DAILY
Status: DISCONTINUED | OUTPATIENT
Start: 2018-01-01 | End: 2018-01-01 | Stop reason: HOSPADM

## 2018-01-01 RX ORDER — SODIUM CHLORIDE 9 MG/ML
50 INJECTION, SOLUTION INTRAVENOUS
Status: COMPLETED | OUTPATIENT
Start: 2018-01-01 | End: 2018-01-01

## 2018-01-01 RX ORDER — LORAZEPAM 2 MG/ML
0.5 INJECTION INTRAMUSCULAR EVERY 4 HOURS
Status: DISCONTINUED | OUTPATIENT
Start: 2018-01-01 | End: 2018-01-01 | Stop reason: HOSPADM

## 2018-01-01 RX ORDER — LEVOFLOXACIN 750 MG/1
750 TABLET ORAL EVERY 24 HOURS
Qty: 3 TAB | Refills: 0 | Status: SHIPPED | OUTPATIENT
Start: 2018-01-01 | End: 2018-01-01

## 2018-01-01 RX ORDER — MUPIROCIN 20 MG/G
OINTMENT TOPICAL 2 TIMES DAILY
Status: COMPLETED | OUTPATIENT
Start: 2018-01-01 | End: 2018-01-01

## 2018-01-01 RX ORDER — GLYCOPYRROLATE 0.2 MG/ML
0.2 INJECTION INTRAMUSCULAR; INTRAVENOUS
Status: DISCONTINUED | OUTPATIENT
Start: 2018-01-01 | End: 2018-01-01 | Stop reason: HOSPADM

## 2018-01-01 RX ORDER — HYDROMORPHONE HYDROCHLORIDE 2 MG/ML
0.2 INJECTION, SOLUTION INTRAMUSCULAR; INTRAVENOUS; SUBCUTANEOUS EVERY 4 HOURS
Status: DISCONTINUED | OUTPATIENT
Start: 2018-01-01 | End: 2018-01-01

## 2018-01-01 RX ORDER — SODIUM CHLORIDE 0.9 % (FLUSH) 0.9 %
5 SYRINGE (ML) INJECTION AS NEEDED
Status: DISCONTINUED | OUTPATIENT
Start: 2018-01-01 | End: 2018-01-01 | Stop reason: HOSPADM

## 2018-01-01 RX ORDER — ENOXAPARIN SODIUM 100 MG/ML
40 INJECTION SUBCUTANEOUS EVERY 24 HOURS
Status: DISCONTINUED | OUTPATIENT
Start: 2018-01-01 | End: 2018-01-01 | Stop reason: DRUGHIGH

## 2018-01-01 RX ORDER — DEXTROSE 50 % IN WATER (D50W) INTRAVENOUS SYRINGE
12.5-25 AS NEEDED
Status: DISCONTINUED | OUTPATIENT
Start: 2018-01-01 | End: 2018-01-01 | Stop reason: HOSPADM

## 2018-01-01 RX ORDER — VANCOMYCIN/0.9 % SOD CHLORIDE 1.5G/250ML
1500 PLASTIC BAG, INJECTION (ML) INTRAVENOUS EVERY 12 HOURS
Status: DISCONTINUED | OUTPATIENT
Start: 2018-01-01 | End: 2018-01-01

## 2018-01-01 RX ORDER — SODIUM CHLORIDE 9 MG/ML
75 INJECTION, SOLUTION INTRAVENOUS CONTINUOUS
Status: DISCONTINUED | OUTPATIENT
Start: 2018-01-01 | End: 2018-01-01

## 2018-01-01 RX ORDER — LINEZOLID 600 MG/1
600 TABLET, FILM COATED ORAL 2 TIMES DAILY
Qty: 10 TAB | Refills: 0 | Status: SHIPPED | OUTPATIENT
Start: 2018-01-01 | End: 2018-01-01

## 2018-01-01 RX ORDER — MORPHINE SULFATE 10 MG/ML
2 INJECTION, SOLUTION INTRAMUSCULAR; INTRAVENOUS
Status: DISCONTINUED | OUTPATIENT
Start: 2018-01-01 | End: 2018-01-01 | Stop reason: HOSPADM

## 2018-01-01 RX ORDER — ENOXAPARIN SODIUM 100 MG/ML
40 INJECTION SUBCUTANEOUS EVERY 24 HOURS
Status: DISCONTINUED | OUTPATIENT
Start: 2018-01-01 | End: 2018-01-01

## 2018-01-01 RX ORDER — HYDROMORPHONE HYDROCHLORIDE 2 MG/ML
0.2 INJECTION, SOLUTION INTRAMUSCULAR; INTRAVENOUS; SUBCUTANEOUS
Status: DISCONTINUED | OUTPATIENT
Start: 2018-01-01 | End: 2018-01-01 | Stop reason: HOSPADM

## 2018-01-01 RX ORDER — HYDROCODONE BITARTRATE AND ACETAMINOPHEN 5; 325 MG/1; MG/1
1 TABLET ORAL
Status: DISCONTINUED | OUTPATIENT
Start: 2018-01-01 | End: 2018-01-01 | Stop reason: HOSPADM

## 2018-01-01 RX ORDER — FUROSEMIDE 10 MG/ML
40 INJECTION INTRAMUSCULAR; INTRAVENOUS
Status: COMPLETED | OUTPATIENT
Start: 2018-01-01 | End: 2018-01-01

## 2018-01-01 RX ORDER — SODIUM CHLORIDE 0.9 % (FLUSH) 0.9 %
5-10 SYRINGE (ML) INJECTION AS NEEDED
Status: DISCONTINUED | OUTPATIENT
Start: 2018-01-01 | End: 2018-01-01 | Stop reason: HOSPADM

## 2018-01-01 RX ORDER — KETOROLAC TROMETHAMINE 30 MG/ML
15 INJECTION, SOLUTION INTRAMUSCULAR; INTRAVENOUS
Status: DISCONTINUED | OUTPATIENT
Start: 2018-01-01 | End: 2018-01-01 | Stop reason: HOSPADM

## 2018-01-01 RX ORDER — LORAZEPAM 2 MG/ML
1 INJECTION INTRAMUSCULAR
Status: DISCONTINUED | OUTPATIENT
Start: 2018-01-01 | End: 2018-01-01 | Stop reason: HOSPADM

## 2018-01-01 RX ORDER — SODIUM CHLORIDE 0.9 % (FLUSH) 0.9 %
5-10 SYRINGE (ML) INJECTION EVERY 8 HOURS
Status: DISCONTINUED | OUTPATIENT
Start: 2018-01-01 | End: 2018-01-01 | Stop reason: HOSPADM

## 2018-01-01 RX ORDER — SODIUM CHLORIDE 9 MG/ML
100 INJECTION, SOLUTION INTRAVENOUS CONTINUOUS
Status: DISCONTINUED | OUTPATIENT
Start: 2018-01-01 | End: 2018-01-01

## 2018-01-01 RX ORDER — AZITHROMYCIN 250 MG/1
500 TABLET, FILM COATED ORAL EVERY 24 HOURS
Status: DISCONTINUED | OUTPATIENT
Start: 2018-01-01 | End: 2018-01-01

## 2018-01-01 RX ORDER — NALOXONE HYDROCHLORIDE 0.4 MG/ML
0.4 INJECTION, SOLUTION INTRAMUSCULAR; INTRAVENOUS; SUBCUTANEOUS AS NEEDED
Status: DISCONTINUED | OUTPATIENT
Start: 2018-01-01 | End: 2018-01-01 | Stop reason: HOSPADM

## 2018-01-01 RX ORDER — ENOXAPARIN SODIUM 100 MG/ML
40 INJECTION SUBCUTANEOUS EVERY 12 HOURS
Status: DISCONTINUED | OUTPATIENT
Start: 2018-01-01 | End: 2018-01-01

## 2018-01-01 RX ORDER — ACETAMINOPHEN 500 MG
500 TABLET ORAL
Status: DISCONTINUED | OUTPATIENT
Start: 2018-01-01 | End: 2018-01-01 | Stop reason: HOSPADM

## 2018-01-01 RX ORDER — ONDANSETRON 2 MG/ML
4 INJECTION INTRAMUSCULAR; INTRAVENOUS
Status: DISCONTINUED | OUTPATIENT
Start: 2018-01-01 | End: 2018-01-01 | Stop reason: HOSPADM

## 2018-01-01 RX ORDER — LEVOFLOXACIN 750 MG/1
750 TABLET ORAL EVERY 24 HOURS
Status: DISCONTINUED | OUTPATIENT
Start: 2018-01-01 | End: 2018-01-01

## 2018-01-01 RX ORDER — FUROSEMIDE 10 MG/ML
80 INJECTION INTRAMUSCULAR; INTRAVENOUS ONCE
Status: COMPLETED | OUTPATIENT
Start: 2018-01-01 | End: 2018-01-01

## 2018-01-01 RX ORDER — THERA TABS 400 MCG
1 TAB ORAL DAILY
Status: DISCONTINUED | OUTPATIENT
Start: 2018-01-01 | End: 2018-01-01 | Stop reason: HOSPADM

## 2018-01-01 RX ORDER — VANCOMYCIN/0.9 % SOD CHLORIDE 1 G/100 ML
1000 PLASTIC BAG, INJECTION (ML) INTRAVENOUS EVERY 12 HOURS
Status: DISCONTINUED | OUTPATIENT
Start: 2018-01-01 | End: 2018-01-01 | Stop reason: HOSPADM

## 2018-01-01 RX ORDER — SODIUM CHLORIDE 0.9 % (FLUSH) 0.9 %
10 SYRINGE (ML) INJECTION
Status: COMPLETED | OUTPATIENT
Start: 2018-01-01 | End: 2018-01-01

## 2018-01-01 RX ORDER — SODIUM CHLORIDE 0.9 % (FLUSH) 0.9 %
10 SYRINGE (ML) INJECTION EVERY 8 HOURS
Status: DISCONTINUED | OUTPATIENT
Start: 2018-01-01 | End: 2018-01-01 | Stop reason: HOSPADM

## 2018-01-01 RX ORDER — FOLIC ACID 1 MG/1
1 TABLET ORAL DAILY
Status: DISCONTINUED | OUTPATIENT
Start: 2018-01-01 | End: 2018-01-01

## 2018-01-01 RX ORDER — ASPIRIN 325 MG
325 TABLET ORAL EVERY EVENING
Status: DISCONTINUED | OUTPATIENT
Start: 2018-01-01 | End: 2018-01-01 | Stop reason: HOSPADM

## 2018-01-01 RX ORDER — ENOXAPARIN SODIUM 100 MG/ML
40 INJECTION SUBCUTANEOUS EVERY 12 HOURS
Status: DISCONTINUED | OUTPATIENT
Start: 2018-01-01 | End: 2018-01-01 | Stop reason: HOSPADM

## 2018-01-01 RX ORDER — MELATONIN
1000 DAILY
Status: DISCONTINUED | OUTPATIENT
Start: 2018-01-01 | End: 2018-01-01 | Stop reason: HOSPADM

## 2018-01-01 RX ORDER — DEXTROSE MONOHYDRATE 100 MG/ML
75 INJECTION, SOLUTION INTRAVENOUS CONTINUOUS
Status: DISCONTINUED | OUTPATIENT
Start: 2018-01-01 | End: 2018-01-01

## 2018-01-01 RX ORDER — NYSTATIN 100000 [USP'U]/G
100000 POWDER TOPICAL 2 TIMES DAILY
Status: DISCONTINUED | OUTPATIENT
Start: 2018-01-01 | End: 2018-01-01 | Stop reason: ALTCHOICE

## 2018-01-01 RX ORDER — LORAZEPAM 2 MG/ML
0.5 INJECTION INTRAMUSCULAR
Status: DISCONTINUED | OUTPATIENT
Start: 2018-01-01 | End: 2018-01-01 | Stop reason: HOSPADM

## 2018-01-01 RX ORDER — HYDROCORTISONE SODIUM SUCCINATE 100 MG/2ML
50 INJECTION, POWDER, FOR SOLUTION INTRAMUSCULAR; INTRAVENOUS EVERY 8 HOURS
Status: DISCONTINUED | OUTPATIENT
Start: 2018-01-01 | End: 2018-01-01

## 2018-01-01 RX ORDER — DEXTROSE 50 % IN WATER (D50W) INTRAVENOUS SYRINGE
Status: COMPLETED
Start: 2018-01-01 | End: 2018-01-01

## 2018-01-01 RX ORDER — SODIUM CHLORIDE 0.9 % (FLUSH) 0.9 %
5-10 SYRINGE (ML) INJECTION AS NEEDED
Status: DISCONTINUED | OUTPATIENT
Start: 2018-01-01 | End: 2018-01-01 | Stop reason: SDUPTHER

## 2018-01-01 RX ORDER — MAGNESIUM SULFATE 100 %
4 CRYSTALS MISCELLANEOUS AS NEEDED
Status: DISCONTINUED | OUTPATIENT
Start: 2018-01-01 | End: 2018-01-01 | Stop reason: HOSPADM

## 2018-01-01 RX ADMIN — Medication 10 ML: at 17:20

## 2018-01-01 RX ADMIN — ENOXAPARIN SODIUM 40 MG: 40 INJECTION SUBCUTANEOUS at 09:28

## 2018-01-01 RX ADMIN — Medication 10 ML: at 05:37

## 2018-01-01 RX ADMIN — MUPIROCIN: 20 OINTMENT TOPICAL at 17:52

## 2018-01-01 RX ADMIN — CASTOR OIL AND BALSAM, PERU: 788; 87 OINTMENT TOPICAL at 09:22

## 2018-01-01 RX ADMIN — NYSTATIN: 100000 POWDER TOPICAL at 09:53

## 2018-01-01 RX ADMIN — MICONAZOLE NITRATE: 20 CREAM TOPICAL at 09:14

## 2018-01-01 RX ADMIN — SODIUM CHLORIDE: 234 INJECTION, SOLUTION, CONCENTRATE INTRAVENOUS; SUBCUTANEOUS at 00:07

## 2018-01-01 RX ADMIN — VANCOMYCIN HYDROCHLORIDE 3000 MG: 10 INJECTION, POWDER, LYOPHILIZED, FOR SOLUTION INTRAVENOUS at 20:27

## 2018-01-01 RX ADMIN — ASPIRIN 325 MG: 325 TABLET ORAL at 18:22

## 2018-01-01 RX ADMIN — Medication 10 ML: at 10:00

## 2018-01-01 RX ADMIN — PIPERACILLIN SODIUM,TAZOBACTAM SODIUM 4.5 G: 4; .5 INJECTION, POWDER, FOR SOLUTION INTRAVENOUS at 06:09

## 2018-01-01 RX ADMIN — AMPICILLIN SODIUM AND SULBACTAM SODIUM 3 G: 2; 1 INJECTION, POWDER, FOR SOLUTION INTRAMUSCULAR; INTRAVENOUS at 09:33

## 2018-01-01 RX ADMIN — MICONAZOLE NITRATE: 20 CREAM TOPICAL at 17:40

## 2018-01-01 RX ADMIN — SODIUM CHLORIDE 500 ML: 900 INJECTION, SOLUTION INTRAVENOUS at 15:29

## 2018-01-01 RX ADMIN — SODIUM CHLORIDE 100 ML/HR: 900 INJECTION, SOLUTION INTRAVENOUS at 15:23

## 2018-01-01 RX ADMIN — Medication 10 ML: at 06:02

## 2018-01-01 RX ADMIN — Medication 10 ML: at 13:46

## 2018-01-01 RX ADMIN — PIPERACILLIN SODIUM,TAZOBACTAM SODIUM 3.38 G: 3; .375 INJECTION, POWDER, FOR SOLUTION INTRAVENOUS at 14:43

## 2018-01-01 RX ADMIN — Medication 10 ML: at 06:48

## 2018-01-01 RX ADMIN — VITAMIN D, TAB 1000IU (100/BT) 1000 UNITS: 25 TAB at 08:39

## 2018-01-01 RX ADMIN — Medication 10 ML: at 13:58

## 2018-01-01 RX ADMIN — HYDROMORPHONE HYDROCHLORIDE 0.2 MG: 2 INJECTION, SOLUTION INTRAMUSCULAR; INTRAVENOUS; SUBCUTANEOUS at 20:06

## 2018-01-01 RX ADMIN — PIPERACILLIN SODIUM,TAZOBACTAM SODIUM 4.5 G: 4; .5 INJECTION, POWDER, FOR SOLUTION INTRAVENOUS at 18:07

## 2018-01-01 RX ADMIN — CASTOR OIL AND BALSAM, PERU: 788; 87 OINTMENT TOPICAL at 08:36

## 2018-01-01 RX ADMIN — NYSTATIN: 100000 POWDER TOPICAL at 08:18

## 2018-01-01 RX ADMIN — AMPICILLIN SODIUM AND SULBACTAM SODIUM 3 G: 2; 1 INJECTION, POWDER, FOR SOLUTION INTRAMUSCULAR; INTRAVENOUS at 18:19

## 2018-01-01 RX ADMIN — Medication 10 ML: at 17:37

## 2018-01-01 RX ADMIN — DEXTROSE MONOHYDRATE AND SODIUM CHLORIDE 75 ML/HR: 5; .9 INJECTION, SOLUTION INTRAVENOUS at 06:55

## 2018-01-01 RX ADMIN — HEPARIN SODIUM 5000 UNITS: 5000 INJECTION, SOLUTION INTRAVENOUS; SUBCUTANEOUS at 13:59

## 2018-01-01 RX ADMIN — Medication 10 ML: at 17:14

## 2018-01-01 RX ADMIN — DEXTROSE MONOHYDRATE 75 ML/HR: 10 INJECTION, SOLUTION INTRAVENOUS at 09:18

## 2018-01-01 RX ADMIN — Medication 10 ML: at 21:43

## 2018-01-01 RX ADMIN — MUPIROCIN: 20 OINTMENT TOPICAL at 08:43

## 2018-01-01 RX ADMIN — ENOXAPARIN SODIUM 40 MG: 40 INJECTION SUBCUTANEOUS at 18:00

## 2018-01-01 RX ADMIN — DEXTROSE MONOHYDRATE 12.5 G: 25 INJECTION, SOLUTION INTRAVENOUS at 04:27

## 2018-01-01 RX ADMIN — NYSTATIN: 100000 POWDER TOPICAL at 08:15

## 2018-01-01 RX ADMIN — Medication 10 ML: at 14:56

## 2018-01-01 RX ADMIN — NYSTATIN: 100000 POWDER TOPICAL at 17:41

## 2018-01-01 RX ADMIN — CASTOR OIL AND BALSAM, PERU: 788; 87 OINTMENT TOPICAL at 08:55

## 2018-01-01 RX ADMIN — LEVOTHYROXINE SODIUM 100 MCG: 100 TABLET ORAL at 06:44

## 2018-01-01 RX ADMIN — NYSTATIN: 100000 POWDER TOPICAL at 09:42

## 2018-01-01 RX ADMIN — LEVOTHYROXINE SODIUM 100 MCG: 100 TABLET ORAL at 06:46

## 2018-01-01 RX ADMIN — HYDROMORPHONE HYDROCHLORIDE 0.2 MG: 2 INJECTION, SOLUTION INTRAMUSCULAR; INTRAVENOUS; SUBCUTANEOUS at 22:23

## 2018-01-01 RX ADMIN — DEXTROSE MONOHYDRATE 25 G: 25 INJECTION, SOLUTION INTRAVENOUS at 03:29

## 2018-01-01 RX ADMIN — LEVOTHYROXINE SODIUM 100 MCG: 25 TABLET ORAL at 06:15

## 2018-01-01 RX ADMIN — Medication 10 ML: at 21:10

## 2018-01-01 RX ADMIN — Medication 10 ML: at 06:10

## 2018-01-01 RX ADMIN — HYDROCORTISONE SODIUM SUCCINATE 50 MG: 100 INJECTION, POWDER, FOR SOLUTION INTRAMUSCULAR; INTRAVENOUS at 14:16

## 2018-01-01 RX ADMIN — VITAMIN D, TAB 1000IU (100/BT) 1000 UNITS: 25 TAB at 09:19

## 2018-01-01 RX ADMIN — AZITHROMYCIN MONOHYDRATE 500 MG: 500 INJECTION, POWDER, LYOPHILIZED, FOR SOLUTION INTRAVENOUS at 17:38

## 2018-01-01 RX ADMIN — VANCOMYCIN HYDROCHLORIDE 2000 MG: 10 INJECTION, POWDER, LYOPHILIZED, FOR SOLUTION INTRAVENOUS at 06:35

## 2018-01-01 RX ADMIN — Medication 10 ML: at 22:03

## 2018-01-01 RX ADMIN — OXYCODONE HYDROCHLORIDE AND ACETAMINOPHEN 500 MG: 500 TABLET ORAL at 18:20

## 2018-01-01 RX ADMIN — NYSTATIN: 100000 POWDER TOPICAL at 08:36

## 2018-01-01 RX ADMIN — CASTOR OIL AND BALSAM, PERU: 788; 87 OINTMENT TOPICAL at 18:22

## 2018-01-01 RX ADMIN — AMPICILLIN SODIUM AND SULBACTAM SODIUM 3 G: 2; 1 INJECTION, POWDER, FOR SOLUTION INTRAMUSCULAR; INTRAVENOUS at 04:10

## 2018-01-01 RX ADMIN — LEVOTHYROXINE SODIUM 100 MCG: 25 TABLET ORAL at 05:53

## 2018-01-01 RX ADMIN — SODIUM CHLORIDE: 234 INJECTION, SOLUTION, CONCENTRATE INTRAVENOUS; SUBCUTANEOUS at 08:19

## 2018-01-01 RX ADMIN — VANCOMYCIN HYDROCHLORIDE 3000 MG: 10 INJECTION, POWDER, LYOPHILIZED, FOR SOLUTION INTRAVENOUS at 12:20

## 2018-01-01 RX ADMIN — HYDROCORTISONE SODIUM SUCCINATE 50 MG: 100 INJECTION, POWDER, FOR SOLUTION INTRAMUSCULAR; INTRAVENOUS at 21:17

## 2018-01-01 RX ADMIN — HEPARIN SODIUM 5000 UNITS: 5000 INJECTION, SOLUTION INTRAVENOUS; SUBCUTANEOUS at 05:31

## 2018-01-01 RX ADMIN — DEXTROSE MONOHYDRATE 75 ML/HR: 10 INJECTION, SOLUTION INTRAVENOUS at 06:28

## 2018-01-01 RX ADMIN — ENOXAPARIN SODIUM 40 MG: 40 INJECTION SUBCUTANEOUS at 22:03

## 2018-01-01 RX ADMIN — Medication 10 ML: at 21:55

## 2018-01-01 RX ADMIN — DEXTROSE MONOHYDRATE 75 ML/HR: 10 INJECTION, SOLUTION INTRAVENOUS at 19:26

## 2018-01-01 RX ADMIN — NYSTATIN: 100000 POWDER TOPICAL at 18:09

## 2018-01-01 RX ADMIN — HYDROCORTISONE SODIUM SUCCINATE 50 MG: 100 INJECTION, POWDER, FOR SOLUTION INTRAMUSCULAR; INTRAVENOUS at 21:38

## 2018-01-01 RX ADMIN — VANCOMYCIN HYDROCHLORIDE 2000 MG: 10 INJECTION, POWDER, LYOPHILIZED, FOR SOLUTION INTRAVENOUS at 06:09

## 2018-01-01 RX ADMIN — NYSTATIN: 100000 POWDER TOPICAL at 08:44

## 2018-01-01 RX ADMIN — DEXTROSE MONOHYDRATE 75 ML/HR: 10 INJECTION, SOLUTION INTRAVENOUS at 18:03

## 2018-01-01 RX ADMIN — MICONAZOLE NITRATE: 20 CREAM TOPICAL at 09:53

## 2018-01-01 RX ADMIN — Medication 10 ML: at 06:13

## 2018-01-01 RX ADMIN — NYSTATIN 100000 UNITS: 100000 POWDER TOPICAL at 09:29

## 2018-01-01 RX ADMIN — SODIUM CHLORIDE: 234 INJECTION, SOLUTION, CONCENTRATE INTRAVENOUS; SUBCUTANEOUS at 12:51

## 2018-01-01 RX ADMIN — DEXTROSE MONOHYDRATE 12.5 G: 25 INJECTION, SOLUTION INTRAVENOUS at 20:32

## 2018-01-01 RX ADMIN — MICONAZOLE NITRATE: 20 CREAM TOPICAL at 21:09

## 2018-01-01 RX ADMIN — HYDROMORPHONE HYDROCHLORIDE 0.2 MG: 2 INJECTION, SOLUTION INTRAMUSCULAR; INTRAVENOUS; SUBCUTANEOUS at 08:37

## 2018-01-01 RX ADMIN — MUPIROCIN: 20 OINTMENT TOPICAL at 17:42

## 2018-01-01 RX ADMIN — LEVOFLOXACIN 750 MG: 750 TABLET, FILM COATED ORAL at 12:58

## 2018-01-01 RX ADMIN — AMPICILLIN SODIUM AND SULBACTAM SODIUM 3 G: 2; 1 INJECTION, POWDER, FOR SOLUTION INTRAMUSCULAR; INTRAVENOUS at 02:44

## 2018-01-01 RX ADMIN — Medication 10 ML: at 14:00

## 2018-01-01 RX ADMIN — SODIUM CHLORIDE 500 ML: 900 INJECTION, SOLUTION INTRAVENOUS at 05:21

## 2018-01-01 RX ADMIN — PIPERACILLIN SODIUM,TAZOBACTAM SODIUM 4.5 G: 4; .5 INJECTION, POWDER, FOR SOLUTION INTRAVENOUS at 21:34

## 2018-01-01 RX ADMIN — NYSTATIN: 100000 POWDER TOPICAL at 17:51

## 2018-01-01 RX ADMIN — FOLIC ACID 1 MG: 1 TABLET ORAL at 08:18

## 2018-01-01 RX ADMIN — FOLIC ACID 1 MG: 1 TABLET ORAL at 09:21

## 2018-01-01 RX ADMIN — LEVOFLOXACIN 750 MG: 750 TABLET, FILM COATED ORAL at 13:45

## 2018-01-01 RX ADMIN — SODIUM CHLORIDE 500 ML: 900 INJECTION, SOLUTION INTRAVENOUS at 13:26

## 2018-01-01 RX ADMIN — MICONAZOLE NITRATE: 20 CREAM TOPICAL at 17:31

## 2018-01-01 RX ADMIN — ASPIRIN 325 MG: 325 TABLET ORAL at 18:07

## 2018-01-01 RX ADMIN — MICONAZOLE NITRATE: 20 CREAM TOPICAL at 08:28

## 2018-01-01 RX ADMIN — AMPICILLIN SODIUM AND SULBACTAM SODIUM 3 G: 2; 1 INJECTION, POWDER, FOR SOLUTION INTRAMUSCULAR; INTRAVENOUS at 03:43

## 2018-01-01 RX ADMIN — PIPERACILLIN SODIUM,TAZOBACTAM SODIUM 4.5 G: 4; .5 INJECTION, POWDER, FOR SOLUTION INTRAVENOUS at 04:50

## 2018-01-01 RX ADMIN — Medication 10 ML: at 15:28

## 2018-01-01 RX ADMIN — FOLIC ACID 1 MG: 1 TABLET ORAL at 17:42

## 2018-01-01 RX ADMIN — MICONAZOLE NITRATE: 20 CREAM TOPICAL at 23:54

## 2018-01-01 RX ADMIN — HEPARIN SODIUM 5000 UNITS: 5000 INJECTION, SOLUTION INTRAVENOUS; SUBCUTANEOUS at 21:19

## 2018-01-01 RX ADMIN — OXYCODONE HYDROCHLORIDE AND ACETAMINOPHEN 500 MG: 500 TABLET ORAL at 18:22

## 2018-01-01 RX ADMIN — HYDROCORTISONE SODIUM SUCCINATE 50 MG: 100 INJECTION, POWDER, FOR SOLUTION INTRAMUSCULAR; INTRAVENOUS at 05:12

## 2018-01-01 RX ADMIN — DEXTROSE MONOHYDRATE 12.5 G: 25 INJECTION, SOLUTION INTRAVENOUS at 06:44

## 2018-01-01 RX ADMIN — SODIUM CHLORIDE 3 G: 900 INJECTION, SOLUTION INTRAVENOUS at 11:14

## 2018-01-01 RX ADMIN — NYSTATIN: 100000 POWDER TOPICAL at 21:09

## 2018-01-01 RX ADMIN — Medication 10 ML: at 05:28

## 2018-01-01 RX ADMIN — NYSTATIN: 100000 POWDER TOPICAL at 17:31

## 2018-01-01 RX ADMIN — LORAZEPAM 0.5 MG: 2 INJECTION, SOLUTION INTRAMUSCULAR; INTRAVENOUS at 16:35

## 2018-01-01 RX ADMIN — HYDROMORPHONE HYDROCHLORIDE 0.4 MG: 2 INJECTION, SOLUTION INTRAMUSCULAR; INTRAVENOUS; SUBCUTANEOUS at 00:01

## 2018-01-01 RX ADMIN — THERA TABS 1 TABLET: TAB at 08:39

## 2018-01-01 RX ADMIN — Medication 10 ML: at 21:56

## 2018-01-01 RX ADMIN — PIPERACILLIN SODIUM,TAZOBACTAM SODIUM 3.38 G: 3; .375 INJECTION, POWDER, FOR SOLUTION INTRAVENOUS at 17:34

## 2018-01-01 RX ADMIN — AMPICILLIN SODIUM AND SULBACTAM SODIUM 3 G: 2; 1 INJECTION, POWDER, FOR SOLUTION INTRAMUSCULAR; INTRAVENOUS at 18:04

## 2018-01-01 RX ADMIN — AZITHROMYCIN MONOHYDRATE 500 MG: 500 INJECTION, POWDER, LYOPHILIZED, FOR SOLUTION INTRAVENOUS at 17:10

## 2018-01-01 RX ADMIN — PIPERACILLIN SODIUM,TAZOBACTAM SODIUM 4.5 G: 4; .5 INJECTION, POWDER, FOR SOLUTION INTRAVENOUS at 13:00

## 2018-01-01 RX ADMIN — POTASSIUM CHLORIDE 40 MEQ: 1.5 POWDER, FOR SOLUTION ORAL at 08:39

## 2018-01-01 RX ADMIN — PIPERACILLIN SODIUM,TAZOBACTAM SODIUM 4.5 G: 4; .5 INJECTION, POWDER, FOR SOLUTION INTRAVENOUS at 17:45

## 2018-01-01 RX ADMIN — CASTOR OIL AND BALSAM, PERU: 788; 87 OINTMENT TOPICAL at 08:18

## 2018-01-01 RX ADMIN — VANCOMYCIN HYDROCHLORIDE 1000 MG: 10 INJECTION, POWDER, LYOPHILIZED, FOR SOLUTION INTRAVENOUS at 23:23

## 2018-01-01 RX ADMIN — LEVOTHYROXINE SODIUM 100 MCG: 25 TABLET ORAL at 06:02

## 2018-01-01 RX ADMIN — SODIUM CHLORIDE 75 ML/HR: 900 INJECTION, SOLUTION INTRAVENOUS at 06:33

## 2018-01-01 RX ADMIN — ENOXAPARIN SODIUM 40 MG: 40 INJECTION SUBCUTANEOUS at 09:19

## 2018-01-01 RX ADMIN — VANCOMYCIN HYDROCHLORIDE 1500 MG: 10 INJECTION, POWDER, LYOPHILIZED, FOR SOLUTION INTRAVENOUS at 00:42

## 2018-01-01 RX ADMIN — MICONAZOLE NITRATE: 20 CREAM TOPICAL at 18:00

## 2018-01-01 RX ADMIN — ENOXAPARIN SODIUM 40 MG: 40 INJECTION SUBCUTANEOUS at 21:37

## 2018-01-01 RX ADMIN — ENOXAPARIN SODIUM 40 MG: 40 INJECTION SUBCUTANEOUS at 17:52

## 2018-01-01 RX ADMIN — NYSTATIN: 100000 POWDER TOPICAL at 18:00

## 2018-01-01 RX ADMIN — ENOXAPARIN SODIUM 40 MG: 40 INJECTION SUBCUTANEOUS at 21:26

## 2018-01-01 RX ADMIN — HYDROCORTISONE SODIUM SUCCINATE 50 MG: 100 INJECTION, POWDER, FOR SOLUTION INTRAMUSCULAR; INTRAVENOUS at 21:08

## 2018-01-01 RX ADMIN — AZITHROMYCIN MONOHYDRATE 500 MG: 500 INJECTION, POWDER, LYOPHILIZED, FOR SOLUTION INTRAVENOUS at 18:19

## 2018-01-01 RX ADMIN — CASTOR OIL AND BALSAM, PERU: 788; 87 OINTMENT TOPICAL at 08:41

## 2018-01-01 RX ADMIN — SODIUM CHLORIDE: 234 INJECTION, SOLUTION, CONCENTRATE INTRAVENOUS; SUBCUTANEOUS at 07:28

## 2018-01-01 RX ADMIN — HYDROMORPHONE HYDROCHLORIDE 0.2 MG: 2 INJECTION, SOLUTION INTRAMUSCULAR; INTRAVENOUS; SUBCUTANEOUS at 04:05

## 2018-01-01 RX ADMIN — CASTOR OIL AND BALSAM, PERU: 788; 87 OINTMENT TOPICAL at 08:22

## 2018-01-01 RX ADMIN — DEXTROSE MONOHYDRATE 75 ML/HR: 10 INJECTION, SOLUTION INTRAVENOUS at 04:24

## 2018-01-01 RX ADMIN — CASTOR OIL AND BALSAM, PERU: 788; 87 OINTMENT TOPICAL at 17:51

## 2018-01-01 RX ADMIN — ENOXAPARIN SODIUM 40 MG: 40 INJECTION SUBCUTANEOUS at 17:57

## 2018-01-01 RX ADMIN — PIPERACILLIN SODIUM,TAZOBACTAM SODIUM 3.38 G: 3; .375 INJECTION, POWDER, FOR SOLUTION INTRAVENOUS at 21:55

## 2018-01-01 RX ADMIN — Medication 10 ML: at 00:46

## 2018-01-01 RX ADMIN — MUPIROCIN: 20 OINTMENT TOPICAL at 08:28

## 2018-01-01 RX ADMIN — Medication 10 ML: at 06:00

## 2018-01-01 RX ADMIN — HYDROMORPHONE HYDROCHLORIDE 0.2 MG: 2 INJECTION, SOLUTION INTRAMUSCULAR; INTRAVENOUS; SUBCUTANEOUS at 12:31

## 2018-01-01 RX ADMIN — ENOXAPARIN SODIUM 40 MG: 40 INJECTION SUBCUTANEOUS at 06:34

## 2018-01-01 RX ADMIN — SODIUM CHLORIDE 500 ML: 900 INJECTION, SOLUTION INTRAVENOUS at 12:06

## 2018-01-01 RX ADMIN — FOLIC ACID 1 MG: 1 TABLET ORAL at 08:55

## 2018-01-01 RX ADMIN — LEVOTHYROXINE SODIUM 100 MCG: 100 TABLET ORAL at 07:08

## 2018-01-01 RX ADMIN — FOLIC ACID 1 MG: 1 TABLET ORAL at 08:43

## 2018-01-01 RX ADMIN — Medication 10 ML: at 21:14

## 2018-01-01 RX ADMIN — ENOXAPARIN SODIUM 40 MG: 40 INJECTION SUBCUTANEOUS at 08:40

## 2018-01-01 RX ADMIN — ENOXAPARIN SODIUM 40 MG: 40 INJECTION SUBCUTANEOUS at 06:10

## 2018-01-01 RX ADMIN — MICONAZOLE NITRATE: 20 CREAM TOPICAL at 18:03

## 2018-01-01 RX ADMIN — HEPARIN SODIUM 5000 UNITS: 5000 INJECTION, SOLUTION INTRAVENOUS; SUBCUTANEOUS at 05:38

## 2018-01-01 RX ADMIN — PIPERACILLIN SODIUM,TAZOBACTAM SODIUM 3.38 G: 3; .375 INJECTION, POWDER, FOR SOLUTION INTRAVENOUS at 04:30

## 2018-01-01 RX ADMIN — VANCOMYCIN HYDROCHLORIDE 1000 MG: 10 INJECTION, POWDER, LYOPHILIZED, FOR SOLUTION INTRAVENOUS at 11:21

## 2018-01-01 RX ADMIN — ASPIRIN 325 MG: 325 TABLET ORAL at 18:20

## 2018-01-01 RX ADMIN — HYDROCORTISONE SODIUM SUCCINATE 50 MG: 100 INJECTION, POWDER, FOR SOLUTION INTRAMUSCULAR; INTRAVENOUS at 13:59

## 2018-01-01 RX ADMIN — PIPERACILLIN SODIUM,TAZOBACTAM SODIUM 3.38 G: 3; .375 INJECTION, POWDER, FOR SOLUTION INTRAVENOUS at 09:43

## 2018-01-01 RX ADMIN — PIPERACILLIN SODIUM,TAZOBACTAM SODIUM 4.5 G: 4; .5 INJECTION, POWDER, FOR SOLUTION INTRAVENOUS at 12:27

## 2018-01-01 RX ADMIN — NYSTATIN: 100000 POWDER TOPICAL at 21:22

## 2018-01-01 RX ADMIN — HEPARIN SODIUM 5000 UNITS: 5000 INJECTION, SOLUTION INTRAVENOUS; SUBCUTANEOUS at 05:12

## 2018-01-01 RX ADMIN — FUROSEMIDE 80 MG: 10 INJECTION, SOLUTION INTRAMUSCULAR; INTRAVENOUS at 14:05

## 2018-01-01 RX ADMIN — HYDROMORPHONE HYDROCHLORIDE 0.2 MG: 2 INJECTION, SOLUTION INTRAMUSCULAR; INTRAVENOUS; SUBCUTANEOUS at 13:54

## 2018-01-01 RX ADMIN — MUPIROCIN: 20 OINTMENT TOPICAL at 08:57

## 2018-01-01 RX ADMIN — LORAZEPAM 0.5 MG: 2 INJECTION, SOLUTION INTRAMUSCULAR; INTRAVENOUS at 00:01

## 2018-01-01 RX ADMIN — Medication 10 ML: at 13:09

## 2018-01-01 RX ADMIN — PIPERACILLIN SODIUM,TAZOBACTAM SODIUM 3.38 G: 3; .375 INJECTION, POWDER, FOR SOLUTION INTRAVENOUS at 02:29

## 2018-01-01 RX ADMIN — ENOXAPARIN SODIUM 40 MG: 40 INJECTION SUBCUTANEOUS at 18:16

## 2018-01-01 RX ADMIN — MUPIROCIN: 20 OINTMENT TOPICAL at 17:57

## 2018-01-01 RX ADMIN — HEPARIN SODIUM 5000 UNITS: 5000 INJECTION, SOLUTION INTRAVENOUS; SUBCUTANEOUS at 14:16

## 2018-01-01 RX ADMIN — HEPARIN SODIUM 5000 UNITS: 5000 INJECTION, SOLUTION INTRAVENOUS; SUBCUTANEOUS at 13:46

## 2018-01-01 RX ADMIN — AMPICILLIN SODIUM AND SULBACTAM SODIUM 3 G: 2; 1 INJECTION, POWDER, FOR SOLUTION INTRAMUSCULAR; INTRAVENOUS at 17:55

## 2018-01-01 RX ADMIN — Medication 10 ML: at 21:08

## 2018-01-01 RX ADMIN — VANCOMYCIN HYDROCHLORIDE 1500 MG: 10 INJECTION, POWDER, LYOPHILIZED, FOR SOLUTION INTRAVENOUS at 23:22

## 2018-01-01 RX ADMIN — MICONAZOLE NITRATE: 20 CREAM TOPICAL at 08:40

## 2018-01-01 RX ADMIN — Medication 10 ML: at 02:04

## 2018-01-01 RX ADMIN — HEPARIN SODIUM 5000 UNITS: 5000 INJECTION, SOLUTION INTRAVENOUS; SUBCUTANEOUS at 14:55

## 2018-01-01 RX ADMIN — MUPIROCIN: 20 OINTMENT TOPICAL at 10:29

## 2018-01-01 RX ADMIN — CASTOR OIL AND BALSAM, PERU: 788; 87 OINTMENT TOPICAL at 17:38

## 2018-01-01 RX ADMIN — HYDROCORTISONE SODIUM SUCCINATE 50 MG: 100 INJECTION, POWDER, FOR SOLUTION INTRAMUSCULAR; INTRAVENOUS at 06:11

## 2018-01-01 RX ADMIN — CASTOR OIL AND BALSAM, PERU: 788; 87 OINTMENT TOPICAL at 17:31

## 2018-01-01 RX ADMIN — ENOXAPARIN SODIUM 40 MG: 40 INJECTION SUBCUTANEOUS at 09:13

## 2018-01-01 RX ADMIN — Medication 10 ML: at 06:01

## 2018-01-01 RX ADMIN — LORAZEPAM 0.5 MG: 2 INJECTION, SOLUTION INTRAMUSCULAR; INTRAVENOUS at 20:07

## 2018-01-01 RX ADMIN — CASTOR OIL AND BALSAM, PERU: 788; 87 OINTMENT TOPICAL at 18:34

## 2018-01-01 RX ADMIN — MICONAZOLE NITRATE: 20 CREAM TOPICAL at 21:16

## 2018-01-01 RX ADMIN — MICONAZOLE NITRATE: 20 CREAM TOPICAL at 09:23

## 2018-01-01 RX ADMIN — MICONAZOLE NITRATE: 20 CREAM TOPICAL at 08:44

## 2018-01-01 RX ADMIN — ASPIRIN 325 MG: 325 TABLET ORAL at 17:55

## 2018-01-01 RX ADMIN — ENOXAPARIN SODIUM 40 MG: 40 INJECTION SUBCUTANEOUS at 06:14

## 2018-01-01 RX ADMIN — NYSTATIN: 100000 POWDER TOPICAL at 17:12

## 2018-01-01 RX ADMIN — SODIUM CHLORIDE 4764 ML: 900 INJECTION, SOLUTION INTRAVENOUS at 11:23

## 2018-01-01 RX ADMIN — THERA TABS 1 TABLET: TAB at 09:13

## 2018-01-01 RX ADMIN — Medication 10 ML: at 21:40

## 2018-01-01 RX ADMIN — HYDROCORTISONE SODIUM SUCCINATE 50 MG: 100 INJECTION, POWDER, FOR SOLUTION INTRAMUSCULAR; INTRAVENOUS at 13:55

## 2018-01-01 RX ADMIN — NYSTATIN: 100000 POWDER TOPICAL at 08:55

## 2018-01-01 RX ADMIN — PIPERACILLIN SODIUM,TAZOBACTAM SODIUM 4.5 G: 4; .5 INJECTION, POWDER, FOR SOLUTION INTRAVENOUS at 06:12

## 2018-01-01 RX ADMIN — THERA TABS 1 TABLET: TAB at 09:28

## 2018-01-01 RX ADMIN — HYDROCORTISONE SODIUM SUCCINATE 50 MG: 100 INJECTION, POWDER, FOR SOLUTION INTRAMUSCULAR; INTRAVENOUS at 14:55

## 2018-01-01 RX ADMIN — LEVOTHYROXINE SODIUM 100 MCG: 25 TABLET ORAL at 05:32

## 2018-01-01 RX ADMIN — MUPIROCIN: 20 OINTMENT TOPICAL at 17:32

## 2018-01-01 RX ADMIN — AZITHROMYCIN MONOHYDRATE 500 MG: 500 INJECTION, POWDER, LYOPHILIZED, FOR SOLUTION INTRAVENOUS at 17:51

## 2018-01-01 RX ADMIN — CASTOR OIL AND BALSAM, PERU: 788; 87 OINTMENT TOPICAL at 17:11

## 2018-01-01 RX ADMIN — CASTOR OIL AND BALSAM, PERU: 788; 87 OINTMENT TOPICAL at 17:39

## 2018-01-01 RX ADMIN — NYSTATIN: 100000 POWDER TOPICAL at 08:40

## 2018-01-01 RX ADMIN — Medication 10 ML: at 13:57

## 2018-01-01 RX ADMIN — MUPIROCIN: 20 OINTMENT TOPICAL at 18:01

## 2018-01-01 RX ADMIN — AMPICILLIN SODIUM AND SULBACTAM SODIUM 3 G: 2; 1 INJECTION, POWDER, FOR SOLUTION INTRAMUSCULAR; INTRAVENOUS at 10:53

## 2018-01-01 RX ADMIN — HYDROCORTISONE SODIUM SUCCINATE 50 MG: 100 INJECTION, POWDER, FOR SOLUTION INTRAMUSCULAR; INTRAVENOUS at 05:38

## 2018-01-01 RX ADMIN — HYDROMORPHONE HYDROCHLORIDE 0.4 MG: 2 INJECTION, SOLUTION INTRAMUSCULAR; INTRAVENOUS; SUBCUTANEOUS at 16:35

## 2018-01-01 RX ADMIN — Medication 10 ML: at 06:14

## 2018-01-01 RX ADMIN — HYDROCORTISONE SODIUM SUCCINATE 50 MG: 100 INJECTION, POWDER, FOR SOLUTION INTRAMUSCULAR; INTRAVENOUS at 21:16

## 2018-01-01 RX ADMIN — VANCOMYCIN HYDROCHLORIDE 1500 MG: 10 INJECTION, POWDER, LYOPHILIZED, FOR SOLUTION INTRAVENOUS at 00:12

## 2018-01-01 RX ADMIN — HYDROCORTISONE SODIUM SUCCINATE 50 MG: 100 INJECTION, POWDER, FOR SOLUTION INTRAMUSCULAR; INTRAVENOUS at 21:24

## 2018-01-01 RX ADMIN — ENOXAPARIN SODIUM 40 MG: 40 INJECTION SUBCUTANEOUS at 06:48

## 2018-01-01 RX ADMIN — Medication 10 ML: at 21:18

## 2018-01-01 RX ADMIN — IOPAMIDOL 100 ML: 755 INJECTION, SOLUTION INTRAVENOUS at 10:00

## 2018-01-01 RX ADMIN — LEVOTHYROXINE SODIUM 100 MCG: 25 TABLET ORAL at 05:38

## 2018-01-01 RX ADMIN — OXYCODONE HYDROCHLORIDE AND ACETAMINOPHEN 500 MG: 500 TABLET ORAL at 18:07

## 2018-01-01 RX ADMIN — CASTOR OIL AND BALSAM, PERU: 788; 87 OINTMENT TOPICAL at 09:56

## 2018-01-01 RX ADMIN — PIPERACILLIN SODIUM,TAZOBACTAM SODIUM 3.38 G: 3; .375 INJECTION, POWDER, FOR SOLUTION INTRAVENOUS at 20:21

## 2018-01-01 RX ADMIN — HEPARIN SODIUM 5000 UNITS: 5000 INJECTION, SOLUTION INTRAVENOUS; SUBCUTANEOUS at 06:14

## 2018-01-01 RX ADMIN — ENOXAPARIN SODIUM 40 MG: 40 INJECTION SUBCUTANEOUS at 06:19

## 2018-01-01 RX ADMIN — MICONAZOLE NITRATE: 20 CREAM TOPICAL at 08:15

## 2018-01-01 RX ADMIN — SODIUM CHLORIDE 50 ML/HR: 900 INJECTION, SOLUTION INTRAVENOUS at 10:00

## 2018-01-01 RX ADMIN — PIPERACILLIN SODIUM,TAZOBACTAM SODIUM 4.5 G: 4; .5 INJECTION, POWDER, FOR SOLUTION INTRAVENOUS at 06:02

## 2018-01-01 RX ADMIN — LEVOTHYROXINE SODIUM 100 MCG: 25 TABLET ORAL at 06:09

## 2018-01-01 RX ADMIN — VITAMIN D, TAB 1000IU (100/BT) 1000 UNITS: 25 TAB at 09:13

## 2018-01-01 RX ADMIN — Medication 10 ML: at 13:59

## 2018-01-01 RX ADMIN — HYDROCORTISONE SODIUM SUCCINATE 50 MG: 100 INJECTION, POWDER, FOR SOLUTION INTRAMUSCULAR; INTRAVENOUS at 06:14

## 2018-01-01 RX ADMIN — FUROSEMIDE 40 MG: 10 INJECTION, SOLUTION INTRAMUSCULAR; INTRAVENOUS at 21:27

## 2018-01-01 RX ADMIN — THERA TABS 1 TABLET: TAB at 09:19

## 2018-01-01 RX ADMIN — NYSTATIN: 100000 POWDER TOPICAL at 04:34

## 2018-01-01 RX ADMIN — NYSTATIN: 100000 POWDER TOPICAL at 09:00

## 2018-01-01 RX ADMIN — SODIUM CHLORIDE: 234 INJECTION, SOLUTION, CONCENTRATE INTRAVENOUS; SUBCUTANEOUS at 11:14

## 2018-01-01 RX ADMIN — PIPERACILLIN SODIUM,TAZOBACTAM SODIUM 4.5 G: 4; .5 INJECTION, POWDER, FOR SOLUTION INTRAVENOUS at 21:09

## 2018-01-01 RX ADMIN — HEPARIN SODIUM 5000 UNITS: 5000 INJECTION, SOLUTION INTRAVENOUS; SUBCUTANEOUS at 14:23

## 2018-01-01 RX ADMIN — NYSTATIN: 100000 POWDER TOPICAL at 17:15

## 2018-01-01 RX ADMIN — VITAMIN D, TAB 1000IU (100/BT) 1000 UNITS: 25 TAB at 09:28

## 2018-01-01 RX ADMIN — HEPARIN SODIUM 5000 UNITS: 5000 INJECTION, SOLUTION INTRAVENOUS; SUBCUTANEOUS at 21:23

## 2018-01-01 RX ADMIN — CASTOR OIL AND BALSAM, PERU: 788; 87 OINTMENT TOPICAL at 08:44

## 2018-01-01 RX ADMIN — Medication 10 ML: at 14:55

## 2018-01-01 RX ADMIN — OXYCODONE HYDROCHLORIDE AND ACETAMINOPHEN 500 MG: 500 TABLET ORAL at 17:55

## 2018-01-01 RX ADMIN — NYSTATIN: 100000 POWDER TOPICAL at 08:28

## 2018-01-01 RX ADMIN — MICONAZOLE NITRATE: 20 CREAM TOPICAL at 08:22

## 2018-01-01 RX ADMIN — Medication 10 ML: at 21:22

## 2018-01-01 RX ADMIN — ENOXAPARIN SODIUM 40 MG: 40 INJECTION SUBCUTANEOUS at 17:53

## 2018-01-01 RX ADMIN — AZITHROMYCIN MONOHYDRATE 500 MG: 500 INJECTION, POWDER, LYOPHILIZED, FOR SOLUTION INTRAVENOUS at 17:59

## 2018-01-01 RX ADMIN — LEVOTHYROXINE SODIUM 100 MCG: 100 TABLET ORAL at 07:43

## 2018-01-01 RX ADMIN — PIPERACILLIN SODIUM,TAZOBACTAM SODIUM 3.38 G: 3; .375 INJECTION, POWDER, FOR SOLUTION INTRAVENOUS at 04:14

## 2018-01-01 RX ADMIN — MICONAZOLE NITRATE: 20 CREAM TOPICAL at 18:23

## 2018-01-01 RX ADMIN — HEPARIN SODIUM 5000 UNITS: 5000 INJECTION, SOLUTION INTRAVENOUS; SUBCUTANEOUS at 21:16

## 2018-01-01 RX ADMIN — DEXTROSE MONOHYDRATE AND SODIUM CHLORIDE 75 ML/HR: 5; .9 INJECTION, SOLUTION INTRAVENOUS at 17:11

## 2018-01-01 RX ADMIN — Medication 10 ML: at 13:08

## 2018-01-01 RX ADMIN — CASTOR OIL AND BALSAM, PERU: 788; 87 OINTMENT TOPICAL at 12:25

## 2018-01-01 RX ADMIN — HYDROCORTISONE SODIUM SUCCINATE 50 MG: 100 INJECTION, POWDER, FOR SOLUTION INTRAMUSCULAR; INTRAVENOUS at 05:29

## 2018-01-01 RX ADMIN — MICONAZOLE NITRATE: 20 CREAM TOPICAL at 08:18

## 2018-01-01 RX ADMIN — MUPIROCIN: 20 OINTMENT TOPICAL at 08:46

## 2018-01-01 RX ADMIN — Medication 10 ML: at 21:30

## 2018-01-01 RX ADMIN — HEPARIN SODIUM 5000 UNITS: 5000 INJECTION, SOLUTION INTRAVENOUS; SUBCUTANEOUS at 21:08

## 2018-01-01 RX ADMIN — MICONAZOLE NITRATE: 20 CREAM TOPICAL at 18:21

## 2018-01-01 RX ADMIN — DEXTROSE MONOHYDRATE 25 G: 25 INJECTION, SOLUTION INTRAVENOUS at 17:54

## 2018-01-01 RX ADMIN — MICONAZOLE NITRATE: 20 CREAM TOPICAL at 08:55

## 2018-01-01 RX ADMIN — ALBUMIN (HUMAN) 25 G: 0.25 INJECTION, SOLUTION INTRAVENOUS at 15:39

## 2018-01-01 RX ADMIN — VANCOMYCIN HYDROCHLORIDE 2000 MG: 10 INJECTION, POWDER, LYOPHILIZED, FOR SOLUTION INTRAVENOUS at 18:26

## 2018-01-01 RX ADMIN — MICONAZOLE NITRATE: 20 CREAM TOPICAL at 08:43

## 2018-01-01 RX ADMIN — VANCOMYCIN HYDROCHLORIDE 1500 MG: 10 INJECTION, POWDER, LYOPHILIZED, FOR SOLUTION INTRAVENOUS at 12:30

## 2018-01-01 RX ADMIN — VANCOMYCIN HYDROCHLORIDE 1500 MG: 10 INJECTION, POWDER, LYOPHILIZED, FOR SOLUTION INTRAVENOUS at 12:49

## 2018-01-01 RX ADMIN — MICONAZOLE NITRATE: 20 CREAM TOPICAL at 20:21

## 2018-01-01 RX ADMIN — CASTOR OIL AND BALSAM, PERU: 788; 87 OINTMENT TOPICAL at 18:08

## 2018-01-01 RX ADMIN — HYDROCORTISONE SODIUM SUCCINATE 50 MG: 100 INJECTION, POWDER, FOR SOLUTION INTRAMUSCULAR; INTRAVENOUS at 06:01

## 2018-01-01 RX ADMIN — ENOXAPARIN SODIUM 40 MG: 40 INJECTION SUBCUTANEOUS at 21:55

## 2018-01-01 RX ADMIN — SODIUM CHLORIDE: 234 INJECTION, SOLUTION, CONCENTRATE INTRAVENOUS; SUBCUTANEOUS at 18:26

## 2018-01-01 RX ADMIN — NYSTATIN: 100000 POWDER TOPICAL at 21:18

## 2018-01-01 RX ADMIN — HYDROMORPHONE HYDROCHLORIDE 0.4 MG: 2 INJECTION, SOLUTION INTRAMUSCULAR; INTRAVENOUS; SUBCUTANEOUS at 20:07

## 2018-01-01 RX ADMIN — CASTOR OIL AND BALSAM, PERU: 788; 87 OINTMENT TOPICAL at 18:26

## 2018-01-01 RX ADMIN — HYDROCORTISONE SODIUM SUCCINATE 50 MG: 100 INJECTION, POWDER, FOR SOLUTION INTRAMUSCULAR; INTRAVENOUS at 14:23

## 2018-01-01 RX ADMIN — LORAZEPAM 0.5 MG: 2 INJECTION INTRAMUSCULAR; INTRAVENOUS at 22:23

## 2018-01-01 RX ADMIN — Medication 10 ML: at 06:15

## 2018-01-01 RX ADMIN — DEXTROSE MONOHYDRATE 25 G: 25 INJECTION, SOLUTION INTRAVENOUS at 16:33

## 2018-01-01 RX ADMIN — Medication 10 ML: at 05:13

## 2018-01-01 RX ADMIN — Medication 10 ML: at 18:01

## 2018-01-01 RX ADMIN — Medication 10 ML: at 15:29

## 2018-01-01 RX ADMIN — SODIUM CHLORIDE: 234 INJECTION, SOLUTION, CONCENTRATE INTRAVENOUS; SUBCUTANEOUS at 03:39

## 2018-01-01 RX ADMIN — SODIUM CHLORIDE 500 ML: 900 INJECTION, SOLUTION INTRAVENOUS at 01:30

## 2018-01-01 RX ADMIN — VANCOMYCIN HYDROCHLORIDE 1500 MG: 10 INJECTION, POWDER, LYOPHILIZED, FOR SOLUTION INTRAVENOUS at 12:42

## 2018-01-01 RX ADMIN — FOLIC ACID 1 MG: 1 TABLET ORAL at 08:23

## 2018-01-01 RX ADMIN — NYSTATIN: 100000 POWDER TOPICAL at 18:21

## 2018-01-01 RX ADMIN — Medication 10 ML: at 21:39

## 2018-01-01 RX ADMIN — DEXTROSE MONOHYDRATE 12.5 G: 25 INJECTION, SOLUTION INTRAVENOUS at 23:40

## 2018-01-01 RX ADMIN — HYDROCORTISONE SODIUM SUCCINATE 50 MG: 100 INJECTION, POWDER, FOR SOLUTION INTRAMUSCULAR; INTRAVENOUS at 12:58

## 2018-01-01 RX ADMIN — HYDROCORTISONE SODIUM SUCCINATE 50 MG: 100 INJECTION, POWDER, FOR SOLUTION INTRAMUSCULAR; INTRAVENOUS at 22:03

## 2018-01-01 RX ADMIN — DEXTROSE MONOHYDRATE 75 ML/HR: 10 INJECTION, SOLUTION INTRAVENOUS at 17:51

## 2018-03-11 PROBLEM — F79 MENTAL RETARDATION: Status: ACTIVE | Noted: 2018-01-01

## 2018-03-11 PROBLEM — J18.9 CAP (COMMUNITY ACQUIRED PNEUMONIA): Status: RESOLVED | Noted: 2017-01-01 | Resolved: 2018-01-01

## 2018-03-11 NOTE — ROUTINE PROCESS
TRANSFER - OUT REPORT:    Verbal report given to Carmela(name) on Deonte Manriquez  being transferred to [address](unit) for routine progression of care       Report consisted of patients Situation, Background, Assessment and   Recommendations(SBAR). Information from the following report(s) SBAR, Kardex, ED Summary, Procedure Summary, Intake/Output, MAR, Accordion, Recent Results, Med Rec Status and Cardiac Rhythm Paced SR was reviewed with the receiving nurse. Lines:   Peripheral IV 03/11/18 Right; Lower Arm (Active)   Site Assessment Clean, dry, & intact 3/11/2018 10:19 AM   Phlebitis Assessment 0 3/11/2018 10:19 AM   Infiltration Assessment 0 3/11/2018 10:19 AM   Dressing Status Clean, dry, & intact 3/11/2018 10:19 AM   Dressing Type Transparent 3/11/2018 10:19 AM        Opportunity for questions and clarification was provided.       Patient transported with:

## 2018-03-11 NOTE — PROGRESS NOTES
General Surgery End of Shift Nursing Note    Bedside shift change report given to Elza Baker (oncoming nurse) by Charly Beverly (offgoing nurse). Report included the following information SBAR, Kardex, Intake/Output, MAR and Recent Results. Shift worked:   D   Summary of shift:    0   Issues for physician to address:   0     Number times ambulated in hallway past shift: 0    Number of times OOB to chair past shift: 0    Pain Management:  Current medication: 0  Patient states pain is manageable on current pain medication: YES    GI:    Current diet:  DIET REGULAR    Tolerating current diet: YES  Passing flatus: YES  Last Bowel Movement: yesterday   Appearance: 0    Respiratory:    Incentive Spirometer at bedside: NO  Patient instructed on use: NO    Patient Safety:    Falls Score: 1  Bed Alarm On? No  Sitter?  No    Steffany Mazariegos RN

## 2018-03-11 NOTE — IP AVS SNAPSHOT
[address] [facility] 
[phone] Patient: Fabiola Cano MRN: [id number] :1965 About your hospitalization You were admitted on:  2018 You last received care in the:  [facility] 2 GENERAL SURGERY You were discharged on:  March 15, 2018 Why you were hospitalized Your primary diagnosis was:  Sepsis (Hcc) Your diagnoses also included:  S/P Cardiac Pacemaker Procedure, Prader-Willi Syndrome, Obesity, Morbid (More Than 100 Lbs Over Ideal Weight Or Bmi > 40) (Hcc), Irregular Heart Beat, Intertrigo, Cellulitis Of Right Lower Leg, Mental Retardation Follow-up Information Follow up With Details Comments Contact Info [facility] [place] On 3/13/2018 THIS IS YOUR HOME HEALTH AGENCY. IF YOU DO NOT HEAR FROM THEM WITHIN 24-48HRS, PLEASE CONTACT THEM DIRECTLY [address] [place] [address] Rd. 
1st Floor [facility] [address] 
[phone] Gilson Seay MD   [address] [place] [address] 
[phone] Discharge Orders None A check rosette indicates which time of day the medication should be taken. My Medications START taking these medications Instructions Each Dose to Equal  
 Morning Noon Evening Bedtime  
 levoFLOXacin 750 mg tablet Commonly known as:  Radha Gupta Your last dose was: Your next dose is: Take 1 Tab by mouth every twenty-four (24) hours for 3 days. 750 mg  
    
   
   
   
  
 linezolid 600 mg tablet Commonly known as:  Shana Ch Your last dose was: Your next dose is: Take 1 Tab by mouth two (2) times a day. 600 mg CONTINUE taking these medications Instructions Each Dose to Equal  
 Morning Noon Evening Bedtime  
 ascorbic acid (vitamin C) 500 mg tablet Commonly known as:  VITAMIN C Your last dose was: Your next dose is: Take 500 mg by mouth every evening. 500 mg  
    
   
   
   
  
 aspirin 325 mg tablet Commonly known as:  ASPIRIN Your last dose was: Your next dose is: Take 325 mg by mouth every evening. 325 mg  
    
   
   
   
  
 Cranberry 450 mg Tab tablet Generic drug:  cranberry extract Your last dose was: Your next dose is: Take 1 Tab by mouth daily. 1 Tab Iron 325 mg (65 mg iron) tablet Generic drug:  ferrous sulfate Your last dose was: Your next dose is: Take 325 mg by mouth daily (after dinner). 325 mg  
    
   
   
   
  
 multivitamin tablet Commonly known as:  ONE A DAY Your last dose was: Your next dose is: Take 1 tablet by mouth every evening. 1 Tab  
    
   
   
   
  
 nystatin powder Commonly known as:  MYCOSTATIN Your last dose was: Your next dose is:    
   
   
 Apply 100,000 Units to affected area two (2) times a day. 169869 Units OXYGEN-AIR DELIVERY SYSTEMS Your last dose was: Your next dose is:    
   
   
 2 L by Nasal route nightly. whenever lying down in bed 2 L  
    
   
   
   
  
 SYNTHROID 100 mcg tablet Generic drug:  levothyroxine Your last dose was: Your next dose is: Take 100 mcg by mouth Daily (before breakfast). 100 mcg VITAMIN D3 1,000 unit Cap Generic drug:  cholecalciferol Your last dose was: Your next dose is: Take 1,000 Units by mouth daily. 1000 Units Where to Get Your Medications These medications were sent to [facility] [address] [place], [address] [address] Phone:  [phone]  
  levoFLOXacin 750 mg tablet  
 linezolid 600 mg tablet Discharge Instructions HOSPITALIST DISCHARGE INSTRUCTIONS 
 
NAME: Marie Vences :  1965 MRN:  [id number] Date/Time:  3/15/2018 7:40 AM 
 
ADMIT DATE: 3/11/2018 DISCHARGE DATE: 3/15/2018 DISCHARGE DIAGNOSIS: 
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   
Chronic candida intertrigo S/p Permanent pacemaker placement due to hx of Afib with bradycardia Hypokalemia -improved after repletion Mild Hypernatremia 
 
  
  
 
MEDICATIONS: 
As per medication reconciliation  list 
· It is important that you take the medication exactly as they are prescribed. · Keep your medication in the bottles provided by the pharmacist and keep a list of the medication names, dosages, and times to be taken in your wallet. · Do not take other medications without consulting your doctor. Use Miralax as an over the counter laxative. Pain Management: use tylenol (acetaminophen for pain) What to do at [facility] Recommended diet:  Cardiac Diet Recommended activity: PT per Home Health If you experience any of the following symptoms then please call your primary care physician or return to the emergency room if you cannot get hold of your doctor: 
Fever, chills, nausea, vomiting, diarrhea, change in mentation, falling, bleeding, shortness of breath or any other concerning symptoms. Follow Up: With you PCP, Dr. Dee Iraheta MD, within 1 week. If needed Dr. Nixon Mclean office number is [phone] Information obtained by : 
I understand that if any problems occur once I am at home I am to contact my physician. I understand and acknowledge receipt of the instructions indicated above.   
 
                                                                                                                                     
Physician's or R.N.'s Signature Date/Time Patient or Representative Signature                                                          Date/Time Hole 19 Announcement We are excited to announce that we are making your provider's discharge notes available to you in Hole 19. You will see these notes when they are completed and signed by the physician that discharged you from your recent hospital stay. If you have any questions or concerns about any information you see in Hole 19, please call the Health Information Department where you were seen or reach out to your Primary Care Provider for more information about your plan of care. Introducing [facility]! Elinor Sandhu introduces Hole 19 patient portal. Now you can access parts of your medical record, email your doctor's office, and request medication refills online. 1. In your internet browser, go to [email or web address] 2. Click on the First Time User? Click Here link in the Sign In box. You will see the New Member Sign Up page. 3. Enter your Hole 19 Access Code exactly as it appears below. You will not need to use this code after youve completed the sign-up process. If you do not sign up before the expiration date, you must request a new code. · Hole 19 Access Code: [email or web address] Expires: 6/10/2018  4:58 PM 
 
4. Enter the last four digits of your Social Security Number (xxxx) and Date of Birth (mm/dd/yyyy) as indicated and click Submit. You will be taken to the next sign-up page. 5. Create a Hole 19 ID. This will be your Hole 19 login ID and cannot be changed, so think of one that is secure and easy to remember. 6. Create a Hole 19 password. You can change your password at any time. 7. Enter your Password Reset Question and Answer.  This can be used at a later time if you forget your password. 8. Enter your e-mail address. You will receive e-mail notification when new information is available in [address] 9. Click Sign Up. You can now view and download portions of your medical record. 10. Click the Download Summary menu link to download a portable copy of your medical information. If you have questions, please visit the Frequently Asked Questions section of the TalkLifet website. Remember, Sagge is NOT to be used for urgent needs. For medical emergencies, dial 911. Now available from your iPhone and Android! Unresulted Labs-Please follow up with your PCP about these lab tests Order Current Status HEPATITIS C AB In process HIV 1/2 AG/AB, 4TH GENERATION,W RFLX CONFIRM In process CULTURE, BLOOD Preliminary result CULTURE, BLOOD Preliminary result Providers Seen During Your Hospitalization Provider Specialty Primary office phone Omar Galvez MD Emergency Medicine [phone] Kassi Rodriguez MD Internal Medicine [phone] Justin Frank MD Internal Medicine [phone] Your Primary Care Physician (PCP) Primary Care Physician Office Phone Office Fax Gloria Hill [phone] You are allergic to the following No active allergies Recent Documentation Height Weight BMI Smoking Status 1.549 m 150.8 kg 62.82 kg/m2 Never Smoker Emergency Contacts Name Discharge Info Relation Home Work Mobile Timi Gant  Mother [14] [phone] [facility] HOSPITAL DISCHARGE CAREGIVER [3] Parent [1] [phone] HenokFozia welsh  Parent [1] [phone]    
 Fozia DOYLE  Parent [1] [phone] Patient Belongings The following personal items are in your possession at time of discharge: 
  Dental Appliances: None         Home Medications: None   Jewelry: None  Clothing: None    Other Valuables: None Please provide this summary of care documentation to your next provider. Signatures-by signing, you are acknowledging that this After Visit Summary has been reviewed with you and you have received a copy. Patient Signature:  ____________________________________________________________ Date:  ____________________________________________________________  
  
Jon Moritz Provider Signature:  ____________________________________________________________ Date:  ____________________________________________________________

## 2018-03-11 NOTE — H&P
History and Physical          Pt Name  Lary Chopra   Date of Birth 1965   Medical Record Number  [id number]      Age  46 y.o. PCP Walt Soler MD   Admit date:  3/11/2018    Room Number  ER25/25  @ [facility]   Date of Service  3/11/2018     Admission Diagnoses:  Sepsis [facility])     Certification: We are admitting Lary Chopra 46 y.o. male with a principle diagnosis of Sepsis [facility])  This patient also suffers from other comorbidities listed below. I have a high level of concern for rapid progression of cellulitis  leading to life threatening complications      Assessment and plan:    Sepsis due to   Right lower extremity rapidly progressive cellulitis without obvious signs of faciitis/crepitation   -Admit to remote tele bed   -IVF started per sepsis protocol by the ER doctor   -IV Unasyn and Vancomycin (per pharmacy protocol)   -Pan culture   -Infectious disease consultation.   -Hibiclens bath daily x 3 days to help reduce bacterial load     Hypothyroidism   -continue synthroid as was PTA   -Check TSH, Free T4     Prader Willi syndrome with mild mental retardation   -supportive care. Morbid Obesity -Body mass index is 66.13 kg/(m^2). 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 S/p Permanent pacemaker placement due to hx of Afib with bradycardia   -clinically now stable        CODE STATUS  Full     Functional Status  Pt lives with his mother in [facility]   He is nearly completely dependent on his mother for support. Surrogate decision maker: Pt's mother    Prophylaxis  Lovenox   Discharge Plan:  PT, OT, RN,     There are currently no Active Isolations Payor: [place] MEDICARE / Plan: [place] MEDICARE PART A & B / Product Type: Medicare /    Social issues  Date Comment    12:40 PM  I met with pt's mother at bedside.  I explained above issues in simple language and answered all questions. Prognosis  Fair      Subjective Data         History of Present Illness : The pt has had previous episodes of cellulitis. The pt is unable to provide any meaningful information. His mother reports that he was fine until last evening when he missed his meal - that was unusual.   This morning she noticed that the pt has redness starting from the RIGHT upper medial thigh that is spreading rapidly. She brought him to the ER and workup here is suggestive of sepsis with leukocytosis, obvious source of infection, lactic acidosis etc. Code S initiated. Review of Systems   Constitutional: Negative for chills, fever and weight loss. HENT: Negative for hearing loss and tinnitus. Eyes: Negative for blurred vision and double vision. Respiratory: Negative for cough and wheezing. Cardiovascular: Negative for chest pain and palpitations. Gastrointestinal: Negative for heartburn and nausea. Genitourinary: Negative for dysuria and urgency. Musculoskeletal: Negative for myalgias and neck pain. Skin: Positive for rash. Negative for itching. Neurological: Negative for dizziness and headaches. Endo/Heme/Allergies: Negative for environmental allergies. Does not bruise/bleed easily. Psychiatric/Behavioral: Negative for depression and hallucinations.           Past Medical History:   Diagnosis Date    [facility]     Hypothyroid     Hypothyroidism     Mental retardation     Other ill-defined conditions(799.89)     obesity    Other ill-defined conditions(799.89)     mentally disabled    Prader-Willi syndrome     S/P cardiac pacemaker procedure 2/15/2013    heart block         Past Surgical History:   Procedure Laterality Date    HX HEENT      eye surgery as a baby    HX PACEMAKER           Social History   Substance Use Topics    Smoking status: Never Smoker    Smokeless tobacco: Never Used    Alcohol use No         Family History   Problem Relation Age of Onset    Hypertension Mother     Heart Disease Father            Objective data     Comment:  morbidly obese male patient lying in ER kenyetta in no distress   His mother is in the room      Patient Vitals for the past 24 hrs:   Temp   03/11/18 0858 98.1 °F (36.7 °C)    Patient Vitals for the past 24 hrs:   Pulse   03/11/18 1224 75   03/11/18 1126 75   03/11/18 0858 76    Patient Vitals for the past 24 hrs:   Resp   03/11/18 1126 18   03/11/18 0858 18    Patient Vitals for the past 24 hrs:   BP   03/11/18 1224 93/62   03/11/18 1128 100/52   03/11/18 1126 95/53   03/11/18 0858 108/65        SpO2 Readings from Last 6 Encounters:   03/11/18 92%   12/05/17 95%   12/04/17 95%   11/30/17 95%   11/30/17 95%   11/29/17 95%         O2 Device: Room air   Body mass index is 66.13 kg/(m^2). - Wt Readings from Last 10 Encounters:   03/11/18 158.8 kg (350 lb)   11/23/17 143 kg (315 lb 4.1 oz)   06/29/17 148.5 kg (327 lb 6.1 oz)   06/19/17 148.5 kg (327 lb 6.1 oz)   09/28/16 158.8 kg (350 lb)   09/25/16 146.7 kg (323 lb 6.6 oz)   10/16/15 149 kg (328 lb 7.8 oz)   12/04/14 157.9 kg (348 lb)   11/05/14 143.3 kg (316 lb)   10/14/14 143.3 kg (316 lb)          Physical Exam:             General:  Alert, cooperative,   Morbidly obese mal  noursished,   well developed,   appears stated age    Ears/Eyes:  Hearing seems intact  Sclera anicteric.       Mouth/Throat:  Mucous membranes normal pink and moist  Oral pharynx not examined as pt didn't open mouth    Neck:     Lungs:  Trachea midline  Chest excursion symmetrical   Auscultation B/L Symmetrical with   Vesicular breath sounds     No Crepitations noted   Percussion note resonant on mid Clavicular line; no sign of pneumothorax        CVS:   Pacemaker site marked no the left pectoral area   Regular rate and rhythm   no  murmur,   No click, rub or gallop  S1 normal   S2 normal   Pedal pulses  b/l symmetrical   Abdomen:  Obese  Soft, non-tender  Bowel sounds normal  No distension   Percussion note tympanitic   Extremities:  See picture below   No cyanosis, jaundice  No edema noted   No sign of DVT/cord like lesion on palpation  No sign of acute trauma   Age appropriate OA changes noted. Skin:       Lymph nodes:     Musculoskeletal Muscle bulk B/L symmetrical   Neuro Cranial nerves are intact,   motor movement b/l symmetrical,   Sensory evaluation b/l symmetrical    Psych:  Alert and   Unable to discern orientation   normal mood & affect given the history and mother's report               Medications reviewed     Current Facility-Administered Medications   Medication Dose Route Frequency    sodium chloride (NS) flush 5-10 mL  5-10 mL IntraVENous PRN    vancomycin (VANCOCIN) 3,000 mg in 0.9% sodium chloride 500 mL IVPB  3,000 mg IntraVENous ONCE     Current Outpatient Prescriptions   Medication Sig    OXYGEN-AIR DELIVERY SYSTEMS 2 L by Nasal route nightly. whenever lying down in bed    cholecalciferol (VITAMIN D3) 1,000 unit cap Take 1,000 Units by mouth daily.  cranberry extract (CRANBERRY) 450 mg tab Take 1 Tab by mouth daily.  aspirin (ASPIRIN) 325 mg tablet Take 325 mg by mouth every evening.  ascorbic acid (VITAMIN C) 500 mg tablet Take 500 mg by mouth every evening.  ferrous sulfate (IRON) 325 mg (65 mg iron) tablet Take 325 mg by mouth daily (after dinner).  multivitamin (ONE A DAY) tablet Take 1 tablet by mouth every evening.  nystatin (MYCOSTATIN) powder Apply 100,000 Units to affected area two (2) times a day.  levothyroxine (SYNTHROID) 100 mcg tablet Take 100 mcg by mouth Daily (before breakfast). Relevant other informations: Other medical conditions listed in this following active hospital problem list section; all of these and other pertinent data were taken into consideration when treatment plan is developed and customized to this patient's unique overall circumstances and needs.  We have reviewed available old medical records within the constraints of this admission process. Present on Admission:   Sepsis ([address]   S/P cardiac pacemaker procedure   Prader-Willi syndrome   Obesity, morbid (more than 100 lbs over ideal weight or BMI > 40) (HCC)   Irregular heart beat   Intertrigo   Cellulitis of right lower leg   Mental retardation       Data Review:   Recent Days:  All Micro Results     Procedure Component Value Units Date/Time    CULTURE, BLOOD [[id number]] Collected:  03/11/18 1030    Order Status:  Completed Specimen:  Blood from Blood Updated:  03/11/18 1123    CULTURE, BLOOD [[id number]] Collected:  03/11/18 1020    Order Status:  Completed Specimen:  Blood from Blood Updated:  03/11/18 1123          Recent Labs      03/11/18   1024   WBC  12.7*   HGB  11.8*   HCT  36.9   PLT  98*     Recent Labs      03/11/18   1024   NA  142   K  4.3   CL  103   CO2  34*   GLU  84   BUN  22*   CREA  0.88   CA  8.7   ALB  2.3*   TBILI  0.5   SGOT  42*   ALT  30      Lab Results   Component Value Date/Time    TSH 2.12 11/20/2017 07:07 PM         Care Plan discussed with: Patient/Family and Nurse   Other medical conditions are listed in the active hospital problem list section; these and other pertinent data were taken into consideration when the treatment plan was developed and customized to this patient's unique overall circumstances and needs. High complexity decision making was performed for this patient who is at high risk for decompensation with multiple organ involvement. Today total floor/unit time was 65   minutes while caring for this patient and greater than 50% of that time was spent with patient (and/or family) coordinating patients clinical issues.      Rebekah Khalil MD MPH  FACP                               3/11/2018       __________________________________________________________   FOR SOUND PHYSICIAN ADMINISTRATIVE USE ONLY   MDM  Number of Diagnoses or Management Options  Cellulitis of buttock, right: new, needed workup  Cellulitis of right leg: new, needed workup     Amount and/or Complexity of Data Reviewed  Clinical lab tests: ordered and reviewed  Obtain history from someone other than the patient: yes (Pt's mother )  Review and summarize past medical records: yes  Discuss the patient with other providers: yes (ER doctor )    Risk of Complications, Morbidity, and/or Mortality  Presenting problems: high  Diagnostic procedures: high  Management options: high           INPT 223      Cloyce Delay MD MPH FACP   12:27 PM  3/11/2018

## 2018-03-11 NOTE — ED PROVIDER NOTES
EMERGENCY DEPARTMENT HISTORY AND PHYSICAL EXAM      Date: 3/11/2018  Patient Name: Isamar Garsia    History of Presenting Illness     No chief complaint on file. History Provided By: Patient and Patient's Mother    HPI: Isamar Garsia, 46 y.o. male with PMHx significant for MR, hypothyroidism, obesity, Prader-Willi syndrome, and afib, presents via EMS with mother to the ED for evaluation of progressively worsening redness of the right lower extremity since onset yesterday. Per mother, pt has a hx of cellulitis for which current symptoms are similar. She states that symptoms began yesterday as a small area to the right upper leg that has since progressively worsened, spreading down the right leg and up past the right groin and buttock. She notes he has a hx of lymphedema at baseline but notes that the swelling of his RLE at the site of the redness is worse than baseline. She denies any hx of DM or DVT in the pt. She notes pt is otherwise acting at his baseline mentation. She denies any known allergies in the pt. On exam, pt is without complaints; no fever, CP, SOB, fever, nausea, or further complaints. PCP: Demian Arizmendi MD    History of present illness otherwise limited secondary to pt's baseline mental status/ hx of MR. Current Facility-Administered Medications   Medication Dose Route Frequency Provider Last Rate Last Dose    sodium chloride (NS) flush 5-10 mL  5-10 mL IntraVENous PRN Lili Martinez MD        vancomycin (VANCOCIN) 3,000 mg in 0.9% sodium chloride 500 mL IVPB  3,000 mg IntraVENous ONCE Lili Martinez MD   3,000 mg at 03/11/18 1220    ascorbic acid (vitamin C) (VITAMIN C) tablet 500 mg  500 mg Oral QPM Carmelina Boswell MD        aspirin (ASPIRIN) tablet 325 mg  325 mg Oral QPM Carmelina Boswell MD        [START ON 3/12/2018] cholecalciferol (VITAMIN D3) tablet 1,000 Units  1,000 Units Oral DAILY Carmelina Boswell MD       [facility] . PHARMACY TO SUBSTITUTE PER PROTOCOL    Per Protocol Stacy Duncan MD        Cleven Aschoff ON 3/12/2018] levothyroxine (SYNTHROID) tablet 100 mcg  100 mcg Oral ACB Stacy Duncan MD        [START ON 3/12/2018] therapeutic multivitamin [facility]) tablet 1 Tab  1 Tab Oral DAILY Stacy Duncan MD        nystatin (MYCOSTATIN) 100,000 unit/gram powder 100,000 Units  100,000 Units Topical BID Stacy Duncan MD        sodium chloride (NS) flush 5-10 mL  5-10 mL IntraVENous Shady Soto MD        sodium chloride (NS) flush 5-10 mL  5-10 mL IntraVENous PRN Stacy Duncan MD        acetaminophen (TYLENOL) tablet 500 mg  500 mg Oral Q4H PRN Stacy Duncan MD        HYDROcodone-acetaminophen [facility]) 5-325 mg per tablet 1 Tab  1 Tab Oral Q6H PRN Stacy Duncan MD        [facility]) injection 0.4 mg  0.4 mg IntraVENous PRN Stacy Duncan MD        ondansetron [facility]) injection 2 mg  2 mg IntraVENous Q6H PRN Stacy Duncan MD        bisacodyl (DULCOLAX) tablet 5 mg  5 mg Oral DAILY PRN Stacy Duncan MD        0.9% sodium chloride infusion  100 mL/hr IntraVENous CONTINUOUS Stacy Duncan MD        enoxaparin (LOVENOX) injection 40 mg  40 mg SubCUTAneous Q12H Stacy Duncan MD        ampicillin-sulbactam (UNASYN) 3 g in 0.9% sodium chloride (MBP/ADV) 100 mL  3 g IntraVENous Shady Soto MD           Past History     Past Medical History:  Past Medical History:   Diagnosis Date    A-fib ([facility] [address]     Hypothyroid     Hypothyroidism     Mental retardation     Other ill-defined conditions(799.89)     obesity    Other ill-defined conditions(799.89)     mentally disabled    Prader-Willi syndrome     S/P cardiac pacemaker procedure 2/15/2013    heart block       Past Surgical History:  Past Surgical History:   Procedure Laterality Date    HX HEENT      eye surgery as a baby    HX PACEMAKER         Family History:  Family History   Problem Relation Age of Onset    Hypertension Mother     Heart Disease Father        Social History:  Social History   Substance Use Topics  Smoking status: Never Smoker    Smokeless tobacco: Never Used    Alcohol use No       Allergies:  No Known Allergies      Review of Systems   Review of Systems   Unable to perform ROS: Patient nonverbal (Hx mental retardation)   Constitutional: Negative for appetite change, chills and fever. Physical Exam   Physical Exam    Vital signs and nursing notes reviewed    CONSTITUTIONAL: Alert, in no apparent distress; well-developed; well-nourished. Morbidly obese. HEAD:  Normocephalic, atraumatic  EYES: PERRL; EOM's intact. ENTM: Nose: no rhinorrhea; Throat: no erythema or exudate, mucous membranes moist  Neck:  Supple. trachea is midline. RESP: Chest clear, equal breath sounds. - W/R/R  CV: S1 and S2 WNL; No murmurs, gallops or rubs. 2+ radial and DP pulses bilaterally. GI: non-distended, normal bowel sounds, abdomen soft and non-tender. No masses or organomegaly. : No costo-vertebral angle tenderness. BACK:  Non-tender, normal appearance  UPPER EXT:  Normal inspection. no joint or soft tissue swelling  LOWER EXT: 2+ peripheral edema, no calf tenderness. NEURO: Alert and oriented, answers questions appropriately, no facial droop or slurred speech, at baseline mentation per mother, 5/5 strength and light touch sensation intact in bilateral upper and lower extremities. SKIN: No rashes; Warm and dry; Right foot is warm and well perfused, extensive erythema and warmth extending from the lower leg up into the right groin and buttock area. Reddened, dried skin under the pannus. Dry, reddened skin under the bilateral (R>L) chest wall tissue. Fungal nail changes of R foot.   PSYCH: Normal mood, normal affect    Diagnostic Study Results     Labs -  Recent Results (from the past 12 hour(s))   EKG, 12 LEAD, INITIAL    Collection Time: 03/11/18 10:07 AM   Result Value Ref Range    Ventricular Rate 75 BPM    Atrial Rate 76 BPM    QRS Duration 164 ms    Q-T Interval 404 ms    QTC Calculation (Bezet) 451 ms Calculated R Axis 43 degrees    Calculated T Axis 55 degrees    Diagnosis       Ventricular-paced rhythm  When compared with ECG of 20-NOV-2017 11:09,  No significant change was found     LACTIC ACID    Collection Time: 03/11/18 10:24 AM   Result Value Ref Range    Lactic acid 2.9 (HH) 0.4 - 2.0 MMOL/L   METABOLIC PANEL, COMPREHENSIVE    Collection Time: 03/11/18 10:24 AM   Result Value Ref Range    Sodium 142 136 - 145 mmol/L    Potassium 4.3 3.5 - 5.1 mmol/L    Chloride 103 97 - 108 mmol/L    CO2 34 (H) 21 - 32 mmol/L    Anion gap 5 5 - 15 mmol/L    Glucose 84 65 - 100 mg/dL    BUN 22 (H) 6 - 20 MG/DL    Creatinine 0.88 0.70 - 1.30 MG/DL    BUN/Creatinine ratio 25 (H) 12 - 20      GFR est AA >60 >60 ml/min/1.73m2    GFR est non-AA >60 >60 ml/min/1.73m2    Calcium 8.7 8.5 - 10.1 MG/DL    Bilirubin, total 0.5 0.2 - 1.0 MG/DL    ALT (SGPT) 30 12 - 78 U/L    AST (SGOT) 42 (H) 15 - 37 U/L    Alk. phosphatase 70 45 - 117 U/L    Protein, total 7.7 6.4 - 8.2 g/dL    Albumin 2.3 (L) 3.5 - 5.0 g/dL    Globulin 5.4 (H) 2.0 - 4.0 g/dL    A-G Ratio 0.4 (L) 1.1 - 2.2     CBC WITH AUTOMATED DIFF    Collection Time: 03/11/18 10:24 AM   Result Value Ref Range    WBC 12.7 (H) 4.1 - 11.1 K/uL    RBC 3.83 (L) 4.10 - 5.70 M/uL    HGB 11.8 (L) 12.1 - 17.0 g/dL    HCT 36.9 36.6 - 50.3 %    MCV 96.3 80.0 - 99.0 FL    MCH 30.8 26.0 - 34.0 PG    MCHC 32.0 30.0 - 36.5 g/dL    RDW 15.6 (H) 11.5 - 14.5 %    PLATELET 98 (L) 657 - 400 K/uL    MPV 13.0 (H) 8.9 - 12.9 FL    NRBC 0.0 0  WBC    ABSOLUTE NRBC 0.00 0.00 - 0.01 K/uL    NEUTROPHILS 75 32 - 75 %    BAND NEUTROPHILS 17 %    LYMPHOCYTES 5 (L) 12 - 49 %    MONOCYTES 2 (L) 5 - 13 %    EOSINOPHILS 0 0 - 7 %    BASOPHILS 1 0 - 1 %    IMMATURE GRANULOCYTES 0 0.0 - 0.5 %    ABS. NEUTROPHILS 11.7 (H) 1.8 - 8.0 K/UL    ABS. LYMPHOCYTES 0.6 (L) 0.8 - 3.5 K/UL    ABS. MONOCYTES 0.3 0.0 - 1.0 K/UL    ABS. EOSINOPHILS 0.0 0.0 - 0.4 K/UL    ABS. BASOPHILS 0.1 0.0 - 0.1 K/UL    ABS. IMM. GRANS. 0.0 0.00 - 0.04 K/UL    DF MANUAL      RBC COMMENTS ANISOCYTOSIS  1+       LACTIC ACID    Collection Time: 03/11/18  1:39 PM   Result Value Ref Range    Lactic acid 1.5 0.4 - 2.0 MMOL/L   POC LACTIC ACID    Collection Time: 03/11/18  1:41 PM   Result Value Ref Range    Lactic Acid (POC) 1.5 0.4 - 2.0 mmol/L       Radiologic Studies -   DUPLEX LOWER EXT VENOUS RIGHT   Final Result   Clinical history: Sepsis     INDICATION: Sepsis     COMPARISON: 11/21/2017     FINDINGS:   AP semiupright view of the chest is obtained . The cardiopericardial silhouette is stable. There is no pleural effusion,  pneumothorax or focal consolidation present. No acute osseous finding. Mild  cardiomegaly is unchanged. Cardiac pacer lead unchanged. . Minimal atelectasis. Minimal interstitial prominence.     IMPRESSION  IMPRESSION:  No significant interval change. CXR Results  (Last 48 hours)               03/11/18 1007  XR CHEST PORT Final result    Impression:  IMPRESSION:   No significant interval change. Narrative:  Clinical history: Sepsis       INDICATION: Sepsis       COMPARISON: 11/21/2017       FINDINGS:    AP semiupright view of the chest is obtained . The cardiopericardial silhouette is stable. There is no pleural effusion,   pneumothorax or focal consolidation present. No acute osseous finding. Mild   cardiomegaly is unchanged. Cardiac pacer lead unchanged. . Minimal atelectasis. Minimal interstitial prominence. Medical Decision Making   I am the first provider for this patient. I reviewed the vital signs, available nursing notes, past medical history, past surgical history, family history and social history. Vital Signs-Reviewed the patient's vital signs.   Patient Vitals for the past 12 hrs:   Temp Pulse Resp BP SpO2   03/11/18 1404 97.8 °F (36.6 °C) 75 16 136/73 91 %   03/11/18 1224 - 75 - 93/62 92 %   03/11/18 1128 - - - 100/52 -   03/11/18 1126 - 75 18 95/53 91 %   03/11/18 0858 98.1 °F (36.7 °C) 76 18 108/65 91 %       EKG interpretation: (Preliminary) 1007  Ventricular paced rhythm at 75 bpm, normal axis, no visible acute ischemic changes  Written by Niurka Stark ED Scribe, as dictated by La Nena Avilez MD.    Records Reviewed: Nursing Notes and Old Medical Records    Provider Notes (Medical Decision Making):   46year old male with extensive skin changes today of the right lower extremity and buttock area consistent with cellulitis. No palpable subcutaneous air. Will rule out DVT. Pt is somewhat sedentary at home. Lab work UTD, will include lab work for sepsis though initial vital signs do not suggest SIRS. Given extensive area of cellulitis, will plan for admission. ED Course:   Initial assessment performed. The patients presenting problems have been discussed, and they are in agreement with the care plan formulated and outlined with them. I have encouraged them to ask questions as they arise throughout their visit. 9:28 AM - I suspect that this patient has an active infection. 11:13 AM - The patient met criteria for severe sepsis at this time. PROVIDER SEPSIS PHYSICAL EXAM EVAL  Vital signs reviewed (see nursing documentation for further details):  Vitals:    03/11/18 1126 03/11/18 1128 03/11/18 1224 03/11/18 1404   BP: 95/53 100/52 93/62 136/73   Pulse: 75  75 75   Resp: 18   16   Temp:    97.8 °F (36.6 °C)   SpO2: 91%  92% 91%     Cardiac exam:Regular Rate  Pulmonary exam:Normal  Peripheral pulses:Normal  Capillary refill:Normal  Skin exam:pink  Exam performed by La Nena Avilez MD.    CONSULT NOTE:   11:10 AM  La Nena Avilez MD spoke with Dr. Britni Crane,   Specialty: Hospitalist  Discussed pt's hx, disposition, and available diagnostic and imaging results. Reviewed care plans. Consultant will evaluate pt for admission.     CRITICAL CARE NOTE:  IMPENDING DETERIORATION -Cardiovascular and metabolic   ASSOCIATED RISK FACTORS - Hypotension and Metabolic changes  MANAGEMENT- Bedside Assessment and Supervision of Care  INTERPRETATION -  Blood Pressure and Cardiac Output Measures   INTERVENTIONS - hemodynamic mngmt and IV abx management   CASE REVIEW - Hospitalist, Nursing and Family  TREATMENT RESPONSE -Stable  PERFORMED BY - Self  NOTES:  I have spent 52 minutes of critical care time involved in lab review, consultations with specialist, family decision- making, bedside attention and documentation. During this entire length of time I was immediately available to the patient. Brannon Beverly MD.    Disposition:  11:10 AM  Patient is being admitted to the hospital.  The results of their tests and reasons for their admission have been discussed with them and/or available family. They convey agreement and understanding for the need to be admitted and for their admission diagnosis. Consultation has been made with the inpatient physician specialist for hospitalization. PLAN:  1. Admit to Hospitalist     Diagnosis     Clinical Impression:   1. Cellulitis of right leg    2. Cellulitis of buttock, right        Attestations: This note is prepared by Elly Heimlich, acting as Scribe for Brannon Beverly MD.    Brannon Beverly MD: The scribe's documentation has been prepared under my direction and personally reviewed by me in its entirety. I confirm that the note above accurately reflects all work, treatment, procedures, and medical decision making performed by me.

## 2018-03-11 NOTE — PROGRESS NOTES
General Surgery End of Shift Nursing Note    Bedside shift change report given to damien tom (oncoming nurse) by Jez Mcgregor (offgoing nurse). Report included the following information SBAR and Kardex. Shift worked:   3p-7p   Summary of shift:    Finished boluses and given antibiotics, VSS   Issues for physician to address:        Number times ambulated in hallway past shift: 0    Number of times OOB to chair past shift: 0    Pain Management:  Current medication: n/a  Patient states pain is manageable on current pain medication: NO    GI:    Current diet:  DIET REGULAR    Tolerating current diet: YES  Passing flatus: YES  Last Bowel Movement: several days ago   Appearance:     Respiratory:    Incentive Spirometer at bedside: NO  Patient instructed on use: NO    Patient Safety:    Falls Score: 2  Bed Alarm On? Not applicable  Sitter?  Not applicable    Jc Vasquez RN

## 2018-03-11 NOTE — PROGRESS NOTES
Patient has multiple areas of moisture incontinence, rash, \"old healed spots\" on BLE (RLE posterior, behind knees, groin). Angry red areas (\"yeasty\") in skin folds. Some small \"open\" (scabs) scattered on extremities.

## 2018-03-11 NOTE — PROGRESS NOTES
Pt is a 45 yo  male admitted on 3/11/18 for cellulitis. Pt lives in a two-story house (basement with porch lift) with his mother Helen Hilario, who provides care/support for pt. PTA, pt is independent in ADLs with some limited mobility. Mother assists with IADLs and transportation. Pt has used [facility] services in the past for PT and wound care. Pt has no reported history of SNF, acute inpatient rehab. Pt has a RW, porch lift, and ADA compliant vehicle to be transported in at home. Pt to dc home by private vehicle with mother. Pt's mother to provide transportation to follow-up care appointments. Pt's preferred Rx is [facility]). CM contacted pt to verify demographic info and complete initial assessment, dc planning. Pt diagnosed with mental retardation, pt's mother provided information needed for assessment. Pt sees Dr. Margaret Corral (PCP) and a Cardiologist (cannot recall name) outpatient. CM will continue to follow-up to ensure additional CM needs are met. Care Management Interventions  PCP Verified by CM: Yes  Palliative Care Criteria Met (RRAT>21 & CHF Dx)?: No  Mode of Transport at Discharge:  Other (see comment) (With family in ADA compliant vehicle)  Transition of Care Consult (CM Consult): Discharge Planning  Discharge Durable Medical Equipment: No (Porch lift, RW at home)  Health Maintenance Reviewed: Yes  Physical Therapy Consult: Yes  Occupational Therapy Consult: Yes  Speech Therapy Consult: No  Current Support Network: Lives with Caregiver, Relative's Home, Family Lives [place] ([address] with basement (porch lift, pt cannot use stairs); bed/bath on main level; lives with mother/caregiver)  Confirm Follow Up Transport: Family  Plan discussed with Pt/Family/Caregiver: Yes  Discharge Location  Discharge Placement:  (TBD)    [place] [employer], [address] [place] [address]  [phone]

## 2018-03-11 NOTE — PROGRESS NOTES
Problem: Impaired Skin Integrity/Pressure Injury Treatment  Goal: *Absence of skin breakdown  Outcome: Progressing Towards Goal  Turning Q2h, wound care consult. Applying cream to affected areas (nystatin per family).  Assess skin Q4h & prn.

## 2018-03-11 NOTE — PROGRESS NOTES
Pharmacy Dose Adjustment    Enoxaparin 40 mg SC Q24H for DVT prophylaxis  Other anticoag and/or antiplatelets: aspirin 435DY PO daily  Recent Labs      03/11/18   1024   HGB  11.8*   PLT  98*   CREA  0.88     Crcl 72 ml/min (IBW)  Body mass index is 66.13 kg/(m^2). Plan: Changed to enoxparin 40mg SQ Q12H for BMI greater than 40 kg/m2 and Crcl > 30 ml/min.      Ana Laura Dewitt, CARSOND

## 2018-03-11 NOTE — PROGRESS NOTES
Pharmacy Automatic Renal Dosing Protocol - Antimicrobials    Indication for Antimicrobials: Sepsis due to RLE cellulitis      Current Regimen of Each Antimicrobial:  Ampicillin/sulbactam 3g IV Q8H (Start Date 3/11/18; Day # 1)  Vanc 3000mg IV x1 followed by     Previous Antimicrobial Therapy: None    Goal Level: VANCOMYCIN TROUGH GOAL RANGE: Vancomycin Trough: 10 - 15 mcg/mL    Measured / Extrapolated Vancomycin Level:     Significant Cultures:   3/11/18 Blood: pending  3/11/18 Blood: pending    Paralysis, amputations, malnutrition:  Prader-willi syndrome    Labs:  Recent Labs      18   1024   CREA  0.88   BUN  22*   WBC  12.7*     Temp (24hrs), Av.8 °F (36.6 °C), Min:97.4 °F (36.3 °C), Max:98.1 °F (36.7 °C)      Creatinine Clearance (mL/min) or Dialysis: ~72 (IBW)  No results found for: PCT    Impression/Plan:   · Vancomycin 3,000 mg loading dose followed by 1500 mg Q12 hours (~10 mg/kg) for anticipated trough level of ~14.6 mcg/ml  · Ampicillin/sulbactam dosed appropriately for renal function and indication  · CBC and BMP ordered daily  · Antimicrobial stop date TBD     Pharmacy will follow daily and adjust medications as appropriate for renal function and/or serum levels. Thank you,  Mika James, [address]          Recommended duration of therapy  [email or web address]/[facility]/Pharmacy/Clinical%20Companion/[email or web address]. [email or web address]    Renal Dosing  [email or web address]/[facility]/Pharmacy/Clinical%20Companion/Renal%[email or web address]. pdf

## 2018-03-12 NOTE — CONSULTS
Infectious Disease Consult Note    Reason for Consult: Right lower extremity cellulitis   Date of Consultation: March 12, 2018  Date of Admission: 3/11/2018  Referring Physician: Michel Ann       HPI:  Mr Gallito Driscoll is a 46year old gentleman wt Prader-Willi syndrome, cognitive impairment, obesity, atrial fibrillation S/P cardiac pacemaker, hx of RLE cellulitis admitted for erythema of RLE. Chart reviewed and history obtained from chart review and from discussion with Mr [facility] mother, who is his care giver. His mother reports that he was at his baseline state of health till about 3/10 am when she noticed his RLE to be \"very red\" and warm to touch. She says that he was complaining of his leg hurting as well. She recalls he has had RLE cellulitis in past but is not very frequent. Last admission to hospital was here at [facility] 11/2017 with pneumonia per her and chart review. She says that he has an \"open\" area in his R buttock area. She also says that she puts \"Nystatin\" powder in his LE creases as much as able. No recent falls. She reports that he did mention that he was \"cold\" a few days ago but says that he gets hypothermic at times due to his chronic conditions and did not think he was getting \"cold\" when she checked. Apart from above, no other symptoms such as URI, diarrhea, dysuria, vomiting, fever, chills , etc.    This admission found to have leukocytosis up to 12, thrombocytopenia and elevated ESR (73) and lactate of 2.9. Blood cx sent 3/11 and pending. US done 3/11 and no evidence of DVT of RLE. Of note MRSA screening negative 2017. A1C 4.6 8/2014. He has been started on Vancomycin and Unasyn. His mother reports that his RLE erythema is much better today than yesterday.       Past Medical History:  Past Medical History:   Diagnosis Date    A-fib ([facility] [address]     Hypothyroid     Hypothyroidism     Mental retardation     Other ill-defined conditions(799.89)     obesity    Other ill-defined conditions(799.89)     mentally disabled    Prader-Willi syndrome     S/P cardiac pacemaker procedure 2/15/2013    heart block         Surgical History:  Past Surgical History:   Procedure Laterality Date    HX HEENT      eye surgery as a baby    HX PACEMAKER           Family History:   Family History   Problem Relation Age of Onset    Hypertension Mother     Heart Disease Father          Social History:     Unable to fully obtain. Lives with mother     Allergies:  No Known Allergies      Review of Systems:    Per mother as in HPI, otherwise, unable to obtain from patient     Medications:  No current facility-administered medications on file prior to encounter. Current Outpatient Prescriptions on File Prior to Encounter   Medication Sig Dispense Refill    OXYGEN-AIR DELIVERY SYSTEMS 2 L by Nasal route nightly. whenever lying down in bed      cholecalciferol (VITAMIN D3) 1,000 unit cap Take 1,000 Units by mouth daily.  cranberry extract (CRANBERRY) 450 mg tab Take 1 Tab by mouth daily.  aspirin (ASPIRIN) 325 mg tablet Take 325 mg by mouth every evening.  ascorbic acid (VITAMIN C) 500 mg tablet Take 500 mg by mouth every evening.  ferrous sulfate (IRON) 325 mg (65 mg iron) tablet Take 325 mg by mouth daily (after dinner).  multivitamin (ONE A DAY) tablet Take 1 tablet by mouth every evening.  nystatin (MYCOSTATIN) powder Apply 100,000 Units to affected area two (2) times a day. 1 Bottle 1    levothyroxine (SYNTHROID) 100 mcg tablet Take 100 mcg by mouth Daily (before breakfast).              Physical Exam:    Vitals: Patient Vitals for the past 24 hrs:   Temp Pulse Resp BP SpO2   03/12/18 0909 97.2 °F (36.2 °C) 74 18 136/63 94 %   03/12/18 0244 97.5 °F (36.4 °C) 75 16 98/59 93 %   03/11/18 2252 97.3 °F (36.3 °C) 75 16 94/66 90 %   03/11/18 1901 97.4 °F (36.3 °C) 75 18 90/50 91 %   03/11/18 1803 97.5 °F (36.4 °C) 74 18 108/63 91 %   03/11/18 1519 97.4 °F (36.3 °C) 75 18 101/67 92 %   03/11/18 1404 97.8 °F (36.6 °C) 75 16 136/73 91 %   03/11/18 1224 - 75 - 93/62 92 %   03/11/18 1128 - - - 100/52 -   03/11/18 1126 - 75 18 95/53 91 %   ·   · GEN: NAD  · HEENT:no scleral icterus,   no thrush  · CV: S1, S2 heard regularly  · Lungs: Clear to auscultation bilaterally anteriorly  · Abdomen: soft,obese, non tender,   · Extremities: + RLE markedly warm to touch, erythema noted that is blanching , no vesicles or open wounds on examined areas.  Exam is limited given body habitus   · Neuro: Alert to self, responds to some questions with nods   · Skin: no rash except erythema RLE   · Psych: smiles, nods yes/no   · MSK: unable to evaluate back/sacral area at this time          Labs:   Recent Results (from the past 24 hour(s))   LACTIC ACID    Collection Time: 03/11/18  1:39 PM   Result Value Ref Range    Lactic acid 1.5 0.4 - 2.0 MMOL/L   POC LACTIC ACID    Collection Time: 03/11/18  1:41 PM   Result Value Ref Range    Lactic Acid (POC) 1.5 0.4 - 2.0 mmol/L   URINALYSIS W/ RFLX MICROSCOPIC    Collection Time: 03/11/18  3:50 PM   Result Value Ref Range    Color DARK YELLOW      Appearance CLEAR CLEAR      Specific gravity 1.029 1.003 - 1.030      pH (UA) 5.5 5.0 - 8.0      Protein TRACE (A) NEG mg/dL    Glucose NEGATIVE  NEG mg/dL    Ketone TRACE (A) NEG mg/dL    Blood NEGATIVE  NEG      Urobilinogen 1.0 0.2 - 1.0 EU/dL    Nitrites NEGATIVE  NEG      Leukocyte Esterase SMALL (A) NEG      WBC 0-4 0 - 4 /hpf    RBC 0-5 0 - 5 /hpf    Epithelial cells MODERATE (A) FEW /lpf    Bacteria NEGATIVE  NEG /hpf    Mucus 1+ (A) NEG /lpf   BILIRUBIN, CONFIRM    Collection Time: 03/11/18  3:50 PM   Result Value Ref Range    Bilirubin UA, confirm NEGATIVE  NEG     SED RATE (ESR)    Collection Time: 03/11/18  4:16 PM   Result Value Ref Range    Sed rate, automated 73 (H) 0 - 20 mm/hr   METABOLIC PANEL, BASIC    Collection Time: 03/12/18  1:51 AM   Result Value Ref Range    Sodium 143 136 - 145 mmol/L    Potassium 4.1 3.5 - 5.1 mmol/L    Chloride 108 97 - 108 mmol/L    CO2 30 21 - 32 mmol/L    Anion gap 5 5 - 15 mmol/L    Glucose 90 65 - 100 mg/dL    BUN 23 (H) 6 - 20 MG/DL    Creatinine 0.62 (L) 0.70 - 1.30 MG/DL    BUN/Creatinine ratio 37 (H) 12 - 20      GFR est AA >60 >60 ml/min/1.73m2    GFR est non-AA >60 >60 ml/min/1.73m2    Calcium 8.2 (L) 8.5 - 10.1 MG/DL   CBC WITH AUTOMATED DIFF    Collection Time: 03/12/18  1:51 AM   Result Value Ref Range    WBC 11.0 4.1 - 11.1 K/uL    RBC 3.51 (L) 4.10 - 5.70 M/uL    HGB 10.8 (L) 12.1 - 17.0 g/dL    HCT 34.3 (L) 36.6 - 50.3 %    MCV 97.7 80.0 - 99.0 FL    MCH 30.8 26.0 - 34.0 PG    MCHC 31.5 30.0 - 36.5 g/dL    RDW 15.7 (H) 11.5 - 14.5 %    PLATELET 013 (L) 110 - 400 K/uL    MPV 12.0 8.9 - 12.9 FL    NRBC 0.0 0  WBC    ABSOLUTE NRBC 0.00 0.00 - 0.01 K/uL    NEUTROPHILS 90 (H) 32 - 75 %    LYMPHOCYTES 5 (L) 12 - 49 %    MONOCYTES 5 5 - 13 %    EOSINOPHILS 0 0 - 7 %    BASOPHILS 0 0 - 1 %    IMMATURE GRANULOCYTES 0 0.0 - 0.5 %    ABS. NEUTROPHILS 9.8 (H) 1.8 - 8.0 K/UL    ABS. LYMPHOCYTES 0.6 (L) 0.8 - 3.5 K/UL    ABS. MONOCYTES 0.6 0.0 - 1.0 K/UL    ABS. EOSINOPHILS 0.0 0.0 - 0.4 K/UL    ABS. BASOPHILS 0.0 0.0 - 0.1 K/UL    ABS. IMM. GRANS. 0.0 0.00 - 0.04 K/UL    DF AUTOMATED      RBC COMMENTS NORMOCYTIC, NORMOCHROMIC         Microbiology Data:       Blood: pending 3/11/18          Imaging:   RLE US  INDICATION:  Leg swelling, pain, DVT suspected; R leg swelling and cellulitis  changes; eval for DVT     COMPARISON: None.     FINDINGS: Duplex Doppler sonography of the right lower extremity was performed  from the groin to the calf. The right common femoral, femoral and popliteal  veins are compressible with normal color-flow and wave forms and response to  physiologic maneuvers including Valsalva and augmentation.     IMPRESSION  IMPRESSION:  No deep venous thrombosis identified.         Assessment / Plan:     Mr Maico Self is a 46year old gentleman wt Prader-Willi syndrome, cognitive impairment, obesity, atrial fibrillation S/P cardiac pacemaker, hx of RLE cellulitis admitted for erythema of RLE/cellulitis. Chart reviewed and history obtained from chart review and from discussion with Mr [facility] mother, who is his care giver who reports that erythema is improved today compared to yesterday. This admission found to have leukocytosis up to 12, thrombocytopenia and elevated ESR (73) and lactate of 2.9. Blood cx sent 3/11 and pending. US done 3/11 and no evidence of DVT of RLE. Of note MRSA screening negative 2017. A1C 4.6 8/2014. He has been started on Vancomycin and Unasyn. 1) RLE cellulitis: possibly sources are skin entry given his mother says that he has a small open area on his R buttock area. Unable to examine this area at this time but will attempt again later    Recommendations:  Continue current regimen Vancomycin IV wt renal dosing and pharmacy monitoring   Also noted on Unasyn for now   Will taper antibiotics based on response   Check imaging (CT) to ensure no deep localizing abscess/fluid collection that will need drainage   Await blood cultures   Has thrombocytopenia  That seems chronic ( which antibiotics can worsen)       2) Chronic thrombocytopenia:   Workup per primary team   Check Hep C/HIV if not done in past     3) Prader Willi syndrome   Cognitive impairment     4) Hx of Afib   S/P Pacer   If bacteremia, needs FATMATA     5) DVT Px per primary team     6) Obesity: complicates care     D/W his mother today             Thank for the opportunity to participate in the care of this patient. Please contact with questions or concerns.        Kim Squires,   11:29 AM

## 2018-03-12 NOTE — PROGRESS NOTES
1130 Patient notice with small amounts of blood coming from his nose this morning, possibly from the oxygen via nasal cannula he is using. Patient placed on humidified air to help with the dryness from the O2, but unsuccessful. Patient nose continued to bleed. Notified Dr. Leonardo Steiner with regarding the nose bleeds. MD stated if patient is able to tolerate being without O2, that he can be taken off O2 until nose stops bleeding. Patient O2 saturation between 89-90 and patient appears asymptomatic. No SOB or respiratory distress observed. Will continue to monitor for any further changes. 1230 Patient off O2 and remains without nose bleeds at this time. No further complaints voiced.

## 2018-03-12 NOTE — WOUND CARE
Wound care Nurse Consult from Dr Omayra Navarrete for \" Please evaluate RIGHT post thigh and gluteal area non healing skin ulcers\". Patient is well known to [facility] nurses for his Willi-Prader syndrome, he is a 47 y/o CM, morbidly obese, has MR, his mother is his main caregiver and always at his bedside. Patient has:  Rt. Leg cellulitis from ankle to upper thigh. No open wounds. Abdominal skin folds: intertriginous MASD with fungal type rash. Right gluteal fold and buttocks: MASD, partial thickness wounds. Recommend:    Right buttocks, gluteal cleft and left abdominal skin fold: gently cleanse with soap and water, blot complety dry. Apply a smear of Secura antifungal zinc cream to affected areas. Blue and white chuxs only under patient with flat sheet to bariatric bed to allow repositioning without pulling on skin.     Virgilio Tanner RN, CWON, zone ph# [phone]

## 2018-03-12 NOTE — PROGRESS NOTES
Infectious Disease consult called to Dr. Aubrey Iglesias for right lower leg cellulitis; MD and RN reviewed chart together over the phone and MD states patient will be seen tomorrow as this was not a STAT consult; consult was called late by nursing (missed by day shift RN; was ordered at 1430 and stated to call today; consult called at shift change).  Patient and his mom updated    Michel Perry RN  03/11/18  8:04 PM Maryagnes Lennox   MRN: [id number]    Department:  [facility] Emergency Department   Date of Visit:  12/9/2018           Disclosure     Insurance plans vary and the physician(s) referred by the ER may not be covered by your plan.  Please contact y CARE PHYSICIAN AT ONCE OR RETURN IMMEDIATELY TO THE EMERGENCY DEPARTMENT. If you have been prescribed any medication(s), please fill your prescription right away and begin taking the medication(s) as directed.   If you believe that any of the medications

## 2018-03-12 NOTE — PROGRESS NOTES
Specialty bed ordered -  [facility] Care Bariatric plus with Air, (with trapeze) per policy algorithm, [address] 17, wt 350lbs. Spoke with Cleo Garcia Ref # [id number].

## 2018-03-12 NOTE — PROGRESS NOTES
Occupational Therapy EVALUATION/discharge  Patient: Deonte Manriquez (34 y.o. male)  Date: 3/12/2018  Primary Diagnosis: Cellulitis        Precautions: falls       ASSESSMENT:   Based on the objective data described below, the patient presents with baseline cognitive impairments, obesity, GW, RLE pain and decreased activity tolerance, which is impacting his baseline functional mobility. In regards to ADLs he is at his baseline of total A with the exception of performing self feeding with supervision/setup. Patient could be slightly more independent, but patient's mother reports she is happy with their ADL routine and they are not interested in OT services at this time. Further skilled acute occupational therapy is not indicated at this time. Will complete OT order. Discharge Recommendations: John Todd for PT services only  Further Equipment Recommendations for Discharge: TBD        OBJECTIVE DATA SUMMARY:   HISTORY:   Past Medical History:   Diagnosis Date    A-fib ([facility] [address]     Hypothyroid     Hypothyroidism     Mental retardation     Other ill-defined conditions(799.89)     obesity    Other ill-defined conditions(799.89)     mentally disabled    Prader-Willi syndrome     S/P cardiac pacemaker procedure 2/15/2013    heart block     Past Surgical History:   Procedure Laterality Date    HX HEENT      eye surgery as a baby    HX PACEMAKER         Prior Level of Function/Environment/Context: Patient's mother reports they are happy with there present ADL routine which includes patient performing self feeding with supervision/setup and the patient's mother providing total A for all other ADLs. Patient has has OT services in the past and the patient's mother is very familiar with the role of OT. The patient is able to ambulate with a RW.    Expanded or extensive additional review of patient history:   Home Situation  Home Environment: Private residence  # Steps to Enter: 0  One/Two Story Residence: One story  Living Alone: No  Support Systems: Family member(s), Parent  Patient Expects to be Discharged to[de-identified] Private residence  Current DME Used/Available at Home: [employer], rolling  [x]  Right hand dominant   []  Left hand dominant    EXAMINATION OF PERFORMANCE DEFICITS:  Cognitive/Behavioral Status:  Neurologic State: Alert  Orientation Level: Oriented X4  Cognition: Decreased attention/concentration;Decreased command following; Impaired decision making        Safety/Judgement: Decreased awareness of need for safety     Hearing: Auditory  Auditory Impairment: None    Vision/Perceptual:                           Acuity: Within Defined Limits         Range of Motion:  AROM: Generally decreased, functional                       Strength:  Strength: Generally decreased, functional              Coordination:  Coordination: Within functional limits          Tone & Sensation:  Tone: Normal                 Balance:  Sitting: Intact (with support of RW)  Standing: Impaired  Standing - Static: Good  Standing - Dynamic : Fair    Functional Mobility and Transfers for ADLs:  Bed Mobility:  Rolling: Maximum assistance;Assist x2  Supine to Sit: Maximum assistance;Assist x2  Scooting: Maximum assistance;Assist x2    Transfers:  Sit to Stand: Minimum assistance;Assist x2  Stand to Sit: Minimum assistance;Assist x2  Bed to Chair: Assist x2;Minimum assistance (ambualting with RW)    ADL Assessment:  Feeding: Supervision;Setup    Oral Facial Hygiene/Grooming: Moderate assistance (but prefers assist of his mother.)    Bathing: Total assistance    Upper Body Dressing: Total assistance    Lower Body Dressing: Total assistance    Toileting:  Total assistance            Functional Measure:  Barthel Index:    Bathin  Bladder: 0  Bowels: 0  Groomin  Dressin  Feedin  Mobility: 0  Stairs: 0  Toilet Use: 0  Transfer (Bed to Chair and Back): 5  Total: 10       Barthel and G-code impairment scale:  Percentage of impairment CH  0% CI  1-19% CJ  20-39% CK  40-59% CL  60-79% CM  80-99% CN  100%   Barthel Score 0-100 100 99-80 79-60 59-40 20-39 1-19   0   Barthel Score 0-20 20 17-19 13-16 9-12 5-8 1-4 0      The Barthel ADL Index: Guidelines  1. The index should be used as a record of what a patient does, not as a record of what a patient could do. 2. The main aim is to establish degree of independence from any help, physical or verbal, however minor and for whatever reason. 3. The need for supervision renders the patient not independent. 4. A patient's performance should be established using the best available evidence. Asking the patient, friends/relatives and nurses are the usual sources, but direct observation and common sense are also important. However direct testing is not needed. 5. Usually the patient's performance over the preceding 24-48 hours is important, but occasionally longer periods will be relevant. 6. Middle categories imply that the patient supplies over 50 per cent of the effort. 7. Use of aids to be independent is allowed. Luis Alberto Lerner., Barthel, D.W. ([id number]). Functional evaluation: the Barthel Index. [address] [facility] (14)2. TERRY Chandra, Laci Thompson., [place] [place], [address] [place] [address] (1999). Measuring the change indisability after inpatient rehabilitation; comparison of the responsiveness of the Barthel Index and Functional [place] Measure. Journal of Neurology, Neurosurgery, and Psychiatry, 66(4), 091-632. Orin Harvey, N.TIKI.SHARIFA, PASCUAL Perkins, & Mily Gastelum MMARGARITA. (2004.) Assessment of post-stroke quality of life in cost-effectiveness studies: The usefulness of the Barthel Index and the EuroQoL-5D. Quality of Life Research, 13, 116-21       G codes: In compliance with CMSs Claims Based Outcome Reporting, the following G-code set was chosen for this patient based on their primary functional limitation being treated:     The outcome measure chosen to determine the severity of the functional limitation was the Barthel index with a score of 10/100 which was correlated with the impairment scale. ? Self Care:     - CURRENT STATUS: CM - 80%-99% impaired, limited or restricted    - GOAL STATUS: CM - 80%-99% impaired, limited or restricted    - D/C STATUS:  CM - 80%-99% impaired, limited or restricted   Pain:  Pain Scale 1: Numeric (0 - 10)  Pain Intensity 1: 0              Activity Tolerance:   VSS    After treatment:   [x]  Patient left in no apparent distress sitting up in chair, with bradford lift pad under patient  []  Patient left in no apparent distress in bed  [x]  Call bell left within reach  [x]  Nursing notified  []  Caregiver present  []  Bed alarm activated    COMMUNICATION/EDUCATION:   Communication/Collaboration:  []      Home safety education was provided and the patient/caregiver indicated understanding. [x]      Patient/family have participated as able and agree with findings and recommendations. []      Patient is unable to participate in plan of care at this time.   Findings and recommendations were discussed with: Physical Therapist    FADUMO Langley/L  Time Calculation: 21 mins

## 2018-03-12 NOTE — PROGRESS NOTES
General Surgery End of Shift Nursing Note    Bedside shift change report given to GAURANG DARDEN (oncoming nurse) by Gerry Spence RN (offgoing nurse). Report included the following information SBAR, Kardex, Intake/Output, MAR, Accordion and Recent Results. Shift worked:   7 pm to 7 am   Summary of shift:  Patient stable overnight with no complaints; Denies pain; wound care consult and needs specialty bed; ID consult called and will see today; wears 2L O2 at HS; up with 2 assist and walker (has home walker in room); has NOT gotten up since arriving to unit; on turn team; takes meds crushed in applesauce; LBM 3/9   Issues for physician to address:   Patient has had PICC in past and is on vanc and unasyn, has two lines currently, but may require one before admission is over     Number times ambulated in hallway past shift: 0    Number of times OOB to chair past shift: 0    Pain Management:  Current medication:   Patient states pain is manageable on current pain medication: YES    GI:    Current diet:  DIET REGULAR    Tolerating current diet: YES  Passing flatus: YES  Last Bowel Movement: 3/9   Appearance:     Respiratory:    Incentive Spirometer at bedside: NO  Patient instructed on use: NO    Patient Safety:    Falls Score: 2  Bed Alarm On? No  Sitter?  No    Gerry Spence RN

## 2018-03-12 NOTE — PROGRESS NOTES
Hospitalist Progress Note    NAME: Jillian Sorensen   :  1965   MRN:  [id number]       Assessment / Plan:  Sepsis due to   Right lower extremity rapidly progressive cellulitis without obvious signs of faciitis/crepitation, improving  -Continue remote tele monitoring  -IVF started per sepsis protocol by the ER doctor   -Continue IV Unasyn and Vancomycin - Day 2  -Pan culture - results still pending  -Afebrile since admission, WBC decreasing from 12.7 to 11  -ID consult placed while pt in ED - eval pending  -Hibiclens bath daily x 3 days to help reduce bacterial load      Hypothyroidism   -Continue synthroid as was PTA   -TSH and T4 wnl     Prader Willi syndrome with mild mental retardation   -supportive care.      Morbid Obesity -Body mass index is 66.13 kg/(m^2). 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      S/p Permanent pacemaker placement due to hx of Afib with bradycardia   -clinically now stable     Body mass index is 66.13 kg/(m^2). Code status: Full  Prophylaxis: Lovenox  Recommended Disposition: Home w/Family - lives with his mother who states that she is able to provide all care for him and that he is mostly independent ADLs     Subjective:     Chief Complaint / Reason for Physician Visit  \"Okay\". Discussed with RN events overnight. Review of Systems:  Symptom Y/N Comments  Symptom Y/N Comments   Fever/Chills    Chest Pain     Poor Appetite    Edema     Cough    Abdominal Pain     Sputum    Joint Pain     SOB/CLEMENTE    Pruritis/Rash n    Nausea/vomit    Tolerating PT/OT     Diarrhea    Tolerating Diet     Constipation    Other       Could NOT obtain due to:      Objective:     VITALS:   Last 24hrs VS reviewed since prior progress note.  Most recent are:  Patient Vitals for the past 24 hrs:   Temp Pulse Resp BP SpO2   18 0909 97.2 °F (36.2 °C) 74 18 136/63 94 %   18 0244 97.5 °F (36.4 °C) 75 16 98/59 93 %   03/11/18 2252 97.3 °F (36.3 °C) 75 16 94/66 90 %   03/11/18 1901 97.4 °F (36.3 °C) 75 18 90/50 91 %   03/11/18 1803 97.5 °F (36.4 °C) 74 18 108/63 91 %   03/11/18 1519 97.4 °F (36.3 °C) 75 18 101/67 92 %   03/11/18 1404 97.8 °F (36.6 °C) 75 16 136/73 91 %   03/11/18 1224 - 75 - 93/62 92 %   03/11/18 1128 - - - 100/52 -   03/11/18 1126 - 75 18 95/53 91 %       Intake/Output Summary (Last 24 hours) at 03/12/18 1008  Last data filed at 03/12/18 0244   Gross per 24 hour   Intake             2675 ml   Output              375 ml   Net             2300 ml        PHYSICAL EXAM:  General: Alert, cooperative, no acute distress, morbidly obese  EENT:  EOMI. Anicteric sclerae. MMM  Resp:  CTA bilaterally, no wheezing or rales. No accessory muscle use  CV:  Regular  rhythm,  No edema  GI:  Soft, Non distended, Non tender.  +Bowel sounds  Neurologic:  Alert and oriented to baseline, pleasant  Psych:   Poor insight. Not anxious nor agitated  Skin:  Entire RLE with erythema, improved from yesterday, no tenderness, no specific pockets of fluctuation, distal pulses palpable    Reviewed most current lab test results and cultures  YES  Reviewed most current radiology test results   YES  Review and summation of old records today    NO  Reviewed patient's current orders and MAR    YES  PMH/ reviewed - no change compared to H&P  ________________________________________________________________________  Care Plan discussed with:    Comments   Patient x    Family  x    RN     Care Manager     Consultant                        Multidiciplinary team rounds were held today with , nursing, pharmacist and clinical coordinator. Patient's plan of care was discussed; medications were reviewed and discharge planning was addressed.      ________________________________________________________________________  Total NON critical care TIME:  25   Minutes    Total CRITICAL CARE TIME Spent: Minutes non procedure based      Comments   >50% of visit spent in counseling and coordination of care     ________________________________________________________________________  Nicholas Vyas MD     Procedures: see electronic medical records for all procedures/Xrays and details which were not copied into this note but were reviewed prior to creation of Plan. LABS:  I reviewed today's most current labs and imaging studies.   Pertinent labs include:  Recent Labs      03/12/18   0151  03/11/18   1024   WBC  11.0  12.7*   HGB  10.8*  11.8*   HCT  34.3*  36.9   PLT  116*  98*     Recent Labs      03/12/18   0151  03/11/18   1024   NA  143  142   K  4.1  4.3   CL  108  103   CO2  30  34*   GLU  90  84   BUN  23*  22*   CREA  0.62*  0.88   CA  8.2*  8.7   ALB   --   2.3*   TBILI   --   0.5   SGOT   --   42*   ALT   --   30       Signed: Nicholas Vyas MD

## 2018-03-12 NOTE — PROGRESS NOTES
Problem: Mobility Impaired (Adult and Pediatric)  Goal: *Acute Goals and Plan of Care (Insert Text)  Physical Therapy Goals  Initiated 3/12/2018  1. Patient will move from supine to sit and sit to supine  in bed with supervision/set-up within 7 day(s). 2.  Patient will transfer from bed to chair and chair to bed with supervision/set-up using the least restrictive device within 7 day(s). 3.  Patient will perform sit to stand with supervision/set-up within 7 day(s). 4.  Patient will ambulate with supervision/set-up for 50 feet with the least restrictive device within 7 day(s). physical Therapy EVALUATION  Patient: Zulma Osborne (79 y.o. male)  Date: 3/12/2018  Primary Diagnosis: Cellulitis        Precautions:   Fall    ASSESSMENT :  Based on the objective data described below, the patient presents with baseline MR, anxiety, impaired balance, decreased strength, and decreased activity tolerance limiting pt's safe functional mobility. At baseline, pt lives with his mother who is his primary caregiver. Pt ambulates with RW and needs assist for all ADLs. Pt required max assist x 2 for supine to sit and to scoot towards EOB with additional time. Pt moaning during transfer due to c/o right LE pain. Pt then stood with min assist x 2 and ambulated 3 ft using RW with min assist. Pt ambulates with flexed posture, widened BHAVANA, and walker too far in front of him (baseline per mother). Pt left up in chair at end of session with bradford pad under pt, mother present. Pt's mother concerned about pt being left up in chair too long. Discussed goal of 45-60 min up in chair and then calling for RN or PMT to help pt back to bed, mother in agreement. Pt may need SNF rehab at discharge due to significantly impaired mobility. Will continue to follow to progress mobility while pt remains in hospital.    Patient will benefit from skilled intervention to address the above impairments.   Patients rehabilitation potential is considered to be Good  Factors which may influence rehabilitation potential include:   []         None noted  []         Mental ability/status  [x]         Medical condition  [x]         Home/family situation and support systems  []         Safety awareness  []         Pain tolerance/management  []         Other:      PLAN :  Recommendations and Planned Interventions:  [x]           Bed Mobility Training             []    Neuromuscular Re-Education  [x]           Transfer Training                   []    Orthotic/Prosthetic Training  [x]           Gait Training                         []    Modalities  [x]           Therapeutic Exercises           []    Edema Management/Control  [x]           Therapeutic Activities            [x]    Patient and Family Training/Education  []           Other (comment):    Frequency/Duration: Patient will be followed by physical therapy  4 times a week to address goals.   Discharge Recommendations: Skilled Nursing Facility  Further Equipment Recommendations for Discharge: none      SUBJECTIVE:   Patient agreeable to PT treatment though minimally conversant due to baseline MR.     OBJECTIVE DATA SUMMARY:   HISTORY:    Past Medical History:   Diagnosis Date    A-fib ([facility] [address]     Hypothyroid     Hypothyroidism     Mental retardation     Other ill-defined conditions(799.89)     obesity    Other ill-defined conditions(799.89)     mentally disabled    Prader-Willi syndrome     S/P cardiac pacemaker procedure 2/15/2013    heart block     Past Surgical History:   Procedure Laterality Date    HX HEENT      eye surgery as a baby    HX PACEMAKER       Prior Level of Function/Home Situation: lives with mother, ambulates with RW, needs assist for all ADLs  Personal factors and/or comorbidities impacting plan of care:     Home Situation  Home Environment: Private residence  # Steps to Enter: 0 (lift up to porch)  One/Two Story Residence: One story  Living Alone: No  Support Systems: Family member(s)  Patient Expects to be Discharged to[de-identified] Private residence  Current DME Used/Available at Home: [employer]    EXAMINATION/PRESENTATION/DECISION MAKING:   Critical Behavior:  Neurologic State: Alert  Orientation Level: Oriented X4  Cognition: Decreased attention/concentration, Decreased command following, Impaired decision making  Safety/Judgement: Decreased awareness of need for safety  Hearing:   Auditory  Auditory Impairment: None  Skin:  Redness noted R lower leg  Edema: B LEs (R>L)  Range Of Motion:  AROM: Generally decreased, functional                       Strength:    Strength: Generally decreased, functional                    Tone & Sensation:   Tone: Normal                              Coordination:  Coordination: Within functional limits  Vision:   Acuity: Within Defined Limits  Functional Mobility:  Bed Mobility:  Rolling: Maximum assistance;Assist x2  Supine to Sit: Maximum assistance;Assist x2     Scooting: Maximum assistance;Assist x2  Transfers:  Sit to Stand: Minimum assistance;Assist x2  Stand to Sit: Minimum assistance;Assist x2        Bed to Chair: Assist x2;Minimum assistance (ambualting with RW)              Balance:   Sitting: Intact (with support of RW)  Standing: Impaired  Standing - Static: Good  Standing - Dynamic : Fair  Ambulation/Gait Training:  Distance (ft): 3 Feet (ft) (took a few steps to the chair)  Assistive Device: Gait belt;Walker, rolling  Ambulation - Level of Assistance: Minimal assistance     Gait Description (WDL): Exceptions to WDL  Gait Abnormalities: Decreased step clearance (flexed posture)        Base of Support: Widened     Speed/Sabiha: Shuffled            Functional Measure:  Barthel Index:    Bathin  Bladder: 0  Bowels: 0  Groomin  Dressin  Feedin  Mobility: 0  Stairs: 0  Toilet Use: 0  Transfer (Bed to Chair and Back): 5  Total: 10       Barthel and G-code impairment scale:  Percentage of impairment CH  0% CI  1-19% CJ  20-39% CK  40-59% CL  60-79% CM  80-99% CN  100%   Barthel Score 0-100 100 99-80 79-60 59-40 20-39 1-19   0   Barthel Score 0-20 20 17-19 13-16 9-12 5-8 1-4 0      The Barthel ADL Index: Guidelines  1. The index should be used as a record of what a patient does, not as a record of what a patient could do. 2. The main aim is to establish degree of independence from any help, physical or verbal, however minor and for whatever reason. 3. The need for supervision renders the patient not independent. 4. A patient's performance should be established using the best available evidence. Asking the patient, friends/relatives and nurses are the usual sources, but direct observation and common sense are also important. However direct testing is not needed. 5. Usually the patient's performance over the preceding 24-48 hours is important, but occasionally longer periods will be relevant. 6. Middle categories imply that the patient supplies over 50 per cent of the effort. 7. Use of aids to be independent is allowed. Priscila Urban., Barthel, D.W. (8111). Functional evaluation: the Barthel Index. [facility] (14)2. Ruben Garcia, TERRY, Clement Gonsales., Radha Joy., Johan, [address] [place] [address] (1999). Measuring the change indisability after inpatient rehabilitation; comparison of the responsiveness of the Barthel Index and Functional Morrison Measure. Journal of Neurology, Neurosurgery, and Psychiatry, 66(4), 743-364. JUAN Chowdhury.[email or web address], PASCUAL Perkins, & Brett Medina M.A. (2004.) Assessment of post-stroke quality of life in cost-effectiveness studies: The usefulness of the Barthel Index and the EuroQoL-5D. Quality of Life Research, 13, 491-48       G codes: In compliance with CMSs Claims Based Outcome Reporting, the following G-code set was chosen for this patient based on their primary functional limitation being treated:     The outcome measure chosen to determine the severity of the functional limitation was the Barthel Index with a score of 10/100 which was correlated with the impairment scale. ? Mobility - Walking and Moving Around:     - CURRENT STATUS: CM - 80%-99% impaired, limited or restricted    - GOAL STATUS: CL - 60%-79% impaired, limited or restricted    - D/C STATUS:  ---------------To be determined---------------       Pain:  Pain Scale 1: Numeric (0 - 10)  Pain Intensity 1: 0              Activity Tolerance:   VSS  Please refer to the flowsheet for vital signs taken during this treatment. After treatment:   [x]         Patient left in no apparent distress sitting up in chair  []         Patient left in no apparent distress in bed  [x]         Call bell left within reach  [x]         Nursing notified  [x]         Caregiver present  []         Bed alarm activated    COMMUNICATION/EDUCATION:   The patients plan of care was discussed with: Occupational Therapist and Registered Nurse. [x]         Fall prevention education was provided and the patient/caregiver indicated understanding. [x]         Patient/family have participated as able in goal setting and plan of care. [x]         Patient/family agree to work toward stated goals and plan of care. []         Patient understands intent and goals of therapy, but is neutral about his/her participation. []         Patient is unable to participate in goal setting and plan of care.     Thank you for this referral.  Prakash Casanova, PT   Time Calculation: 30 mins

## 2018-03-13 NOTE — PROGRESS NOTES
1831 Patient unable to swallow potassium pills. Patient has to have pills crushed and put in applesauce, however potassium pills cannot be crushed. Paged Dr. Adela Carvajal. [phone] Dr. Adela Carvajal returned page. Updated on patient situation and condition. Order obtained to change potassium pills to potassium chloride packet 40 mEq PO NOW.

## 2018-03-13 NOTE — PROGRESS NOTES
Bedside shift change report given to Zach Angelo (oncoming nurse) by Sunny Rivera RN (offgoing nurse). Report included the following information SBAR, Kardex, Intake/Output, MAR, Recent Results and Cardiac Rhythm Paced.

## 2018-03-13 NOTE — PROGRESS NOTES
*CM acknowledged consult*. CM completed room visit with pt and mother. CM informed both parties that home health is being recommended at d/c.  Pt's mother was informed that pt has used [facility] in the past.  CM will send a referral to [facility], via [facility]. Pt aware of FOC document (signed) placed in chart. Pt informed CM that pt maybe in need of transport at d/c, CM will scheduled arrangements upon d/c. CM will continue to follow up with pt and make referrals as deemed necessary. UPDATE: 1:45PM    CM was informed that pt received auth to [facility]. CM will inform pt.     INDIA Shin CM  [phone]

## 2018-03-13 NOTE — PROGRESS NOTES
Infectious Disease Progress Note       Subjective:   Mr Agus Hurst seen. His mother was with him as well. She says that he slept well last night. No other new issues today, denied diarrhea. He denied pain in lower extremities       ROS: 10 point ROS obtained and pertinent positives include above. All others negative. Objective:    Vitals:   Patient Vitals for the past 24 hrs:   Temp Pulse Resp BP SpO2   03/13/18 1111 97.4 °F (36.3 °C) 76 20 104/64 96 %   03/13/18 0738 97.4 °F (36.3 °C) 70 20 116/70 96 %   03/13/18 0333 97.5 °F (36.4 °C) 74 20 103/58 96 %   03/12/18 2333 98.9 °F (37.2 °C) 75 20 109/62 90 %   03/12/18 2048 97.2 °F (36.2 °C) 74 16 122/66 90 %   03/12/18 1713 97.8 °F (36.6 °C) 75 17 110/66 91 %       Physical Exam:  ¨ GEN: NAD  ¨ HEENT:no scleral icterus,   no thrush  ¨ CV: S1, S2 heard regularly  ¨ Lungs: Clear to auscultation bilaterally anteriorly  ¨ Abdomen: soft,obese, non tender,   ¨ Extremities: + RLE  warm to touch, slight improvement in erythema ,  no vesicles or open wounds on examined areas.  Exam is limited given body habitus   ¨ Neuro: Alert to self, responds to some questions with nods   ¨ Skin: no rash except erythema RLE   ¨ Psych: smiles, nods yes/no           Medications:    Current Facility-Administered Medications:     PLEASE DRAW VANCOMYCIN TROUGH AT 2330, 1/2 HOUR BEFORE MIDNIGHT DOSE DUE, 1 Each, Other, ONCE, Aida Li MD    miconazole (SECURA) 2 % extra thick cream, , Topical, BID, Horacio Cao MD    sodium chloride (NS) flush 5-10 mL, 5-10 mL, IntraVENous, PRN, Nakul Kay MD    ascorbic acid (vitamin C) (VITAMIN C) tablet 500 mg, 500 mg, Oral, QPM, Carmen Fontanez MD, 500 mg at 03/12/18 1820    aspirin (ASPIRIN) tablet 325 mg, 325 mg, Oral, QPM, Carmen Fontanez MD, 325 mg at 03/12/18 1820    cholecalciferol (VITAMIN D3) tablet 1,000 Units, 1,000 Units, Oral, DAILY, Carmen Fontanez MD, 1,000 Units at 03/13/18 0839    levothyroxine (SYNTHROID) tablet 100 mcg, 100 mcg, Oral, ACB, Luci Rodgers MD, 100 mcg at 03/13/18 0708    therapeutic multivitamin [facility]) tablet 1 Tab, 1 Tab, Oral, DAILY, Luci Rodgers MD, 1 Tab at 03/13/18 0839    sodium chloride (NS) flush 5-10 mL, 5-10 mL, IntraVENous, Q8H, Luci Rodgers MD, 10 mL at 03/13/18 0046    sodium chloride (NS) flush 5-10 mL, 5-10 mL, IntraVENous, PRN, Luci Rodgers MD    acetaminophen (TYLENOL) tablet 500 mg, 500 mg, Oral, Q4H PRN, Luci Rodgers MD    HYDROcodone-acetaminophen [facility]) 5-325 mg per tablet 1 Tab, 1 Tab, Oral, Q6H PRN, Luci Rodgers MD    [facility]) injection 0.4 mg, 0.4 mg, IntraVENous, PRN, Luci Rodgers MD    ondansetron [facility]) injection 2 mg, 2 mg, IntraVENous, Q6H PRN, Luci Rodgers MD    bisacodyl (DULCOLAX) tablet 5 mg, 5 mg, Oral, DAILY PRN, Luci Rodgers MD    enoxaparin (LOVENOX) injection 40 mg, 40 mg, SubCUTAneous, Q12H, Luci Rodgers MD, 40 mg at 03/13/18 0840    ampicillin-sulbactam (UNASYN) 3 g in 0.9% sodium chloride (MBP/ADV) 100 mL, 3 g, IntraVENous, Q8H, Luci Rodgers MD, Last Rate: 100 mL/hr at 03/13/18 1053, 3 g at 03/13/18 1053    vancomycin (VANCOCIN) 1500 mg in  ml infusion, 1,500 mg, IntraVENous, Q12H, Lydia Ngo MD, Last Rate: 166.7 mL/hr at 03/13/18 1249, 1,500 mg at 03/13/18 1249      Labs:  Recent Results (from the past 24 hour(s))   METABOLIC PANEL, BASIC    Collection Time: 03/13/18  3:39 AM   Result Value Ref Range    Sodium 144 136 - 145 mmol/L    Potassium 3.4 (L) 3.5 - 5.1 mmol/L    Chloride 109 (H) 97 - 108 mmol/L    CO2 31 21 - 32 mmol/L    Anion gap 4 (L) 5 - 15 mmol/L    Glucose 74 65 - 100 mg/dL    BUN 20 6 - 20 MG/DL    Creatinine 0.45 (L) 0.70 - 1.30 MG/DL    BUN/Creatinine ratio 44 (H) 12 - 20      GFR est AA >60 >60 ml/min/1.73m2    GFR est non-AA >60 >60 ml/min/1.73m2    Calcium 8.2 (L) 8.5 - 10.1 MG/DL   CBC WITH AUTOMATED DIFF    Collection Time: 03/13/18  3:39 AM   Result Value Ref Range    WBC 8.7 4.1 - 11.1 K/uL    RBC 3.34 (L) 4.10 - 5.70 M/uL    HGB 10.3 (L) 12.1 - 17.0 g/dL    HCT 33.1 (L) 36.6 - 50.3 %    MCV 99.1 (H) 80.0 - 99.0 FL    MCH 30.8 26.0 - 34.0 PG    MCHC 31.1 30.0 - 36.5 g/dL    RDW 15.7 (H) 11.5 - 14.5 %    PLATELET 95 (L) 463 - 400 K/uL    MPV 12.6 8.9 - 12.9 FL    NRBC 0.0 0  WBC    ABSOLUTE NRBC 0.00 0.00 - 0.01 K/uL    NEUTROPHILS 87 (H) 32 - 75 %    LYMPHOCYTES 7 (L) 12 - 49 %    MONOCYTES 5 5 - 13 %    EOSINOPHILS 2 0 - 7 %    BASOPHILS 0 0 - 1 %    IMMATURE GRANULOCYTES 0 0.0 - 0.5 %    ABS. NEUTROPHILS 7.6 1.8 - 8.0 K/UL    ABS. LYMPHOCYTES 0.6 (L) 0.8 - 3.5 K/UL    ABS. MONOCYTES 0.4 0.0 - 1.0 K/UL    ABS. EOSINOPHILS 0.1 0.0 - 0.4 K/UL    ABS. BASOPHILS 0.0 0.0 - 0.1 K/UL    ABS. IMM. GRANS. 0.0 0.00 - 0.04 K/UL    DF AUTOMATED             Micro:     Blood:        3/13/2018  7:12 AM - [place], [facility]       Component Results      Component Value Ref Range & Units Status     Special Requests: NO SPECIAL REQUESTS   Preliminary     Culture result: NO GROWTH 2 DAYS   Preliminary       Result History        Imaging:   RLE US  INDICATION:  Leg swelling, pain, DVT suspected; R leg swelling and cellulitis  changes; eval for DVT      COMPARISON: None.      FINDINGS: Duplex Doppler sonography of the right lower extremity was performed  from the groin to the calf. The right common femoral, femoral and popliteal  veins are compressible with normal color-flow and wave forms and response to  physiologic maneuvers including Valsalva and augmentation.      IMPRESSION  IMPRESSION:  No deep venous thrombosis identified.      CT RLE   FINDINGS: There is mild edema along the medial posterior aspect of the right  side. There is no drainable abscess. Note that the skin of the lateral right  thigh could not be imaged due to patient's large body habitus. Vessels enhance  normally. No evidence of deep venous thrombosis. There are prominent nodes in  the right inguinal region. The largest measures 20 x 14 mm.  There is no gas  within the soft tissues. No evidence of bone destruction or fracture.     IMPRESSION  IMPRESSION:  1. Edema in the subcutaneous tissues. No evidence of drainable fluid collection  2. Enlarged right inguinal nodes. Given patient's history these are likely  reactive     Assessment / Plan:      Mr Ean Munoz is a 46year old gentleman wt Prader-Willi syndrome, cognitive impairment, obesity, atrial fibrillation S/P cardiac pacemaker, hx of RLE cellulitis admitted for erythema of RLE/cellulitis. Chart reviewed and history obtained from chart review and from discussion with Mr [facility] mother, who is his care giver who reports that erythema is improved today compared to yesterday.       This admission found to have leukocytosis up to 12, thrombocytopenia and elevated ESR (73) and lactate of 2.9. Blood cx sent 3/11 and pending. US done 3/11 and no evidence of DVT of RLE. Of note MRSA screening negative 2017. A1C 4.6 8/2014. He has been started on Vancomycin and Unasyn.    1) RLE cellulitis:     Recommendations:   Continue current regimen Vancomycin IV wt renal dosing and pharmacy monitoring   Also noted on Unasyn for now   Will taper antibiotics based on response   CT with no localized fluid collections  Blood cx negative so far   Wound care notes noted   Has thrombocytopenia  That seems chronic ( which antibiotics can worsen)         2) Chronic thrombocytopenia:   Workup per primary team   Check Hep C/HIV if not done in past      3) Prader Willi syndrome   Cognitive impairment      4) Hx of Afib   S/P Pacer   If bacteremia, needs FATMATA      5) DVT Px per primary team      6) Obesity: complicates care      D/W his mother today           Thank for the opportunity to participate in the care of this patient. Please contact with questions or concerns.         Kim Squires DO  1:35 PM                           Thank you for the opportunity to participate in the care of this patient.    Please contact with questions or concerns.       Josesito Lal, DO

## 2018-03-13 NOTE — ROUTINE PROCESS
Bedside and Verbal shift change report given to Mia Morejon (oncoming nurse) by Carlos Sams RN (offgoing nurse). Report included the following information SBAR, Kardex, Intake/Output, MAR, Accordion, Recent Results, Med Rec Status and Cardiac Rhythm Paced.

## 2018-03-13 NOTE — PROGRESS NOTES
Bedside and Verbal shift change report given to Esvin Bedoya (oncoming nurse) by Odilia Hallman (offgoing nurse). Report included the following information SBAR, Kardex, Intake/Output, MAR, Recent Results, Med Rec Status and Cardiac Rhythm Paced.

## 2018-03-13 NOTE — PROGRESS NOTES
Hospitalist Progress Note    NAME: Jewell Valenzuela   :  1965   MRN:  [id number]       Assessment / Plan:  Sepsis due to   Right lower extremity rapidly progressive cellulitis   -Continue remote tele monitoring  -Continue IV Unasyn and Vancomycin - Day 3; appreciate Dr. Dung Bernstein recommendations, 3/12/18 CT RLE without underlying abscess  -Pan culture - results still pending (UCx suggests colonization with 15k colonies of GNRs)  -Afebrile since admission, Leukocytosis/Bandemia normalized  -Hibiclens bath daily x 3 days     Hypothyroidism   -Continue synthroid as was PTA   -TSH and T4 wnl     Prader Willi syndrome with Intellectual Disability   -supportive care.      Morbid Obesity -Body mass index is 66.13 kg/(m^2). -supportive care      Chronic candida intertrigo   -topical antifungal Rx.      S/p Permanent pacemaker placement due to hx of Afib with bradycardia   -clinically now stable     Hypokalemia:  -replete and monitor    Body mass index is 66.07 kg/(m^2). Code status: Full  Prophylaxis: Lovenox  Recommended Disposition: Home w/Family and HH PT per my discussion with mother; SNF (as recommended per PT/OT eval not desired)     Subjective:     Chief Complaint / Reason for Physician Visit  Patient denies pain, no event's overnight  Case discussed with mother, Only wants HH PT when discharged  Case discussed with RN    Review of Systems:  Symptom Y/N Comments  Symptom Y/N Comments   Fever/Chills    Chest Pain     Poor Appetite    Edema     Cough    Abdominal Pain     Sputum    Joint Pain     SOB/CLEMENTE    Pruritis/Rash     Nausea/vomit    Tolerating PT/OT     Diarrhea    Tolerating Diet     Constipation    Other       Could NOT obtain due to: Intellectual Disability,, not a reliable historian     Objective:     VITALS:   Last 24hrs VS reviewed since prior progress note.  Most recent are:  Patient Vitals for the past 24 hrs:   Temp Pulse Resp BP SpO2   18 0738 97.4 °F (36.3 °C) 70 20 116/70 96 % 03/13/18 0333 97.5 °F (36.4 °C) 74 20 103/58 96 %   03/12/18 2333 98.9 °F (37.2 °C) 75 20 109/62 90 %   03/12/18 2048 97.2 °F (36.2 °C) 74 16 122/66 90 %   03/12/18 1713 97.8 °F (36.6 °C) 75 17 110/66 91 %   03/12/18 1138 97.4 °F (36.3 °C) 74 18 113/59 93 %   03/12/18 0909 97.2 °F (36.2 °C) 74 18 136/63 94 %       Intake/Output Summary (Last 24 hours) at 03/13/18 0818  Last data filed at 03/13/18 0343   Gross per 24 hour   Intake          2441.67 ml   Output              250 ml   Net          2191.67 ml        PHYSICAL EXAM:  General: Alert, cooperative, no acute distress, morbidly obese  EENT:  EOMI. Anicteric sclerae. MMM  Resp:  CTA bilaterally on anterior and lateral assessment, no wheezing or rales. No accessory muscle use  CV:  Regular  rhythm,  No edema  GI:  Soft, Non distended but increased girth secondary to body habitus, Non tender.  +Bowel sounds  Neurologic:  Alert and oriented to baseline, pleasant  Psych:   Poor insight. Not anxious nor agitated  Skin:  Entire RLE with erythema (see below), no tenderness noted, distal pulses palpable        Reviewed most current lab test results and cultures  YES  Reviewed most current radiology test results   YES  Review and summation of old records today    NO  Reviewed patient's current orders and MAR    YES  PMH/ reviewed - no change compared to H&P  ________________________________________________________________________  Care Plan discussed with:    Comments   Patient x    Family  x mother   RN x    Care Manager x    Consultant                        Multidiciplinary team rounds were held today with , nursing, pharmacist and clinical coordinator. Patient's plan of care was discussed; medications were reviewed and discharge planning was addressed.      ________________________________________________________________________  Total NON critical care TIME:  25   Minutes    Total CRITICAL CARE TIME Spent:   Minutes non procedure based      Comments >50% of visit spent in counseling and coordination of care     ________________________________________________________________________  Alexandro Parry MD     Procedures: see electronic medical records for all procedures/Xrays and details which were not copied into this note but were reviewed prior to creation of Plan. LABS:  I reviewed today's most current labs and imaging studies.   Pertinent labs include:  Recent Labs      03/13/18 0339 03/12/18   0151 03/11/18   1024   WBC  8.7  11.0  12.7*   HGB  10.3*  10.8*  11.8*   HCT  33.1*  34.3*  36.9   PLT  95*  116*  98*     Recent Labs      03/13/18 0339 03/12/18   0151 03/11/18   1024   NA  144  143  142   K  3.4*  4.1  4.3   CL  109*  108  103   CO2  31  30  34*   GLU  74  90  84   BUN  20  23*  22*   CREA  0.45*  0.62*  0.88   CA  8.2*  8.2*  8.7   ALB   --    --   2.3*   TBILI   --    --   0.5   SGOT   --    --   42*   ALT   --    --   30       Signed: Aelxandro Parry MD

## 2018-03-13 NOTE — PROGRESS NOTES
Problem: Mobility Impaired (Adult and Pediatric)  Goal: *Acute Goals and Plan of Care (Insert Text)  Physical Therapy Goals  Initiated 3/12/2018  1. Patient will move from supine to sit and sit to supine  in bed with supervision/set-up within 7 day(s). 2.  Patient will transfer from bed to chair and chair to bed with supervision/set-up using the least restrictive device within 7 day(s). 3.  Patient will perform sit to stand with supervision/set-up within 7 day(s). 4.  Patient will ambulate with supervision/set-up for 50 feet with the least restrictive device within 7 day(s). physical Therapy TREATMENT  Patient: Agus Riojas (40 y.o. male)  Date: 3/13/2018  Diagnosis: Cellulitis Sepsis ([facility] [address]       Precautions: Fall  Chart, physical therapy assessment, plan of care and goals were reviewed. ASSESSMENT:  Patient progressing with gait tolerance but remains challenged with bed mobility and transfers. He required maximum assist x2 for supine to sit transfers and scooting to EOB. He presented with difficulty completing a lateral weight shift to scoot to bedside. Sit to stand better from an elevated bed height than from a bedside recliner. Some variation may be due to the bed type and current environment but patient may benefit from a SNF disposition if transfers are significantly different from baseline. HHPT recommended to improve strength and endurance if he can manage with his mother at home. The patient's mother is his caregiver and was present and highly involved throughout the session   Progression toward goals:  [x]    Improving appropriately and progressing toward goals  []    Improving slowly and progressing toward goals  []    Not making progress toward goals and plan of care will be adjusted     PLAN:  Patient continues to benefit from skilled intervention to address the above impairments. Continue treatment per established plan of care.   Discharge Recommendations:  Rehab and Home Health  Further Equipment Recommendations for Discharge:  None     SUBJECTIVE:   Patient nodded, \"yes.  when asked on if he was \"okay\" at bedside    OBJECTIVE DATA SUMMARY:   Critical Behavior:  Neurologic State: Alert, Eyes open spontaneously  Orientation Level: Oriented to person, Oriented to place, Disoriented to situation, Disoriented to time  Cognition: Decreased attention/concentration, Follows commands, Impaired decision making  Safety/Judgement: Decreased awareness of need for safety  Functional Mobility Training:  Bed Mobility:  Rolling: Maximum assistance;Assist x2  Supine to Sit: Maximum assistance;Assist x2; Additional time     Scooting: Maximum assistance; Additional time;Assist x2        Transfers:  Sit to Stand: Minimum assistance;Maximum assistance (min from edge of bed, max from recliner)                                Balance:  Sitting: Intact  Standing: Impaired  Standing - Static: Good  Standing - Dynamic : Fair  Ambulation/Gait Training:  Distance (ft): 45 Feet (ft) (15' with seated rest, 30' return to chair)  Assistive Device: Gait belt;Walker, rolling  Ambulation - Level of Assistance: Minimal assistance        Gait Abnormalities: Decreased step clearance;Trunk sway increased (flexed posture, unable to place both feet in RW)        Base of Support: Widened     Speed/Sabiha: Shuffled  Pain:  Pain Scale 1: Numeric (0 - 10)  Pain Intensity 1: 0                After treatment:   [x]    Patient left in no apparent distress sitting up in chair  []    Patient left in no apparent distress in bed  [x]    Call bell left within reach  [x]    Nursing notified  [x]    Caregiver present  []    Bed alarm activated    COMMUNICATION/COLLABORATION:   The patients plan of care was discussed with: Registered Nurse    Sirena Julien, PT, DPT   Time Calculation: 27 mins

## 2018-03-14 NOTE — PROGRESS NOTES
Infectious Disease Progress Note       Subjective:   Mr Raymundo Bates seen and discussed with his mother today as well. She seems to think erythema is better. His mother say that he was incontinent of urine this am, which is new for him. ROS: 10 point ROS obtained and pertinent positives include above. All others negative.          Objective:    Vitals:   Patient Vitals for the past 24 hrs:   Temp Pulse Resp BP SpO2   03/14/18 1100 97.4 °F (36.3 °C) 75 16 124/73 95 %   03/14/18 0744 97.5 °F (36.4 °C) 75 16 103/62 93 %   03/14/18 0320 97.4 °F (36.3 °C) 81 16 112/65 96 %   03/14/18 0056 98 °F (36.7 °C) 75 14 121/69 97 %   03/13/18 2005 97.7 °F (36.5 °C) 74 20 111/63 92 %       Physical Exam:  ¨ GEN: NAD  ¨ HEENT:no scleral icterus,   no thrush  ¨ CV: S1, S2 heard regularly  ¨ Lungs: Clear to auscultation bilaterally anteriorly  ¨ Abdomen: soft,obese, non tender,   ¨ Extremities: + right lower extremity erythema and edema better, still has slight warmth but improved   ¨ Neuro: Alert to self, responds to some questions with nods   ¨ Skin: no rash except erythema RLE   ¨ Psych: smiles, nods yes/no           Medications:    Current Facility-Administered Medications:     Vancomycin trough at 11:30, 1/2 hour before noon dose, 1 Each, Other, ONCE, Asher Godinez MD    miconazole (SECURA) 2 % extra thick cream, , Topical, BID, Ayden Conrad MD    sodium chloride (NS) flush 5-10 mL, 5-10 mL, IntraVENous, PRN, Herb Anthony MD    ascorbic acid (vitamin C) (VITAMIN C) tablet 500 mg, 500 mg, Oral, QPM, Amanda Lerner MD, 500 mg at 03/13/18 1755    aspirin (ASPIRIN) tablet 325 mg, 325 mg, Oral, QPM, Amanda Lerner MD, 325 mg at 03/13/18 1755    cholecalciferol (VITAMIN D3) tablet 1,000 Units, 1,000 Units, Oral, DAILY, Amanda Lerner MD, 1,000 Units at 03/14/18 0919    levothyroxine (SYNTHROID) tablet 100 mcg, 100 mcg, Oral, ACAmanda HE MD, 100 mcg at 03/14/18 0646    therapeutic multivitamin [facility]) tablet 1 Tab, 1 Tab, Oral, DAILY, Joy Swift MD, 1 Tab at 03/14/18 0919    sodium chloride (NS) flush 5-10 mL, 5-10 mL, IntraVENous, Q8H, Joy Swift MD, 10 mL at 03/14/18 0204    sodium chloride (NS) flush 5-10 mL, 5-10 mL, IntraVENous, PRN, Joy Swift MD    acetaminophen (TYLENOL) tablet 500 mg, 500 mg, Oral, Q4H PRN, Joy Swift MD    HYDROcodone-acetaminophen [facility]) 5-325 mg per tablet 1 Tab, 1 Tab, Oral, Q6H PRN, Joy Swift MD    [facility]) injection 0.4 mg, 0.4 mg, IntraVENous, PRN, Joy Swift MD    ondansetron [facility]) injection 2 mg, 2 mg, IntraVENous, Q6H PRN, Joy Swift MD    bisacodyl (DULCOLAX) tablet 5 mg, 5 mg, Oral, DAILY PRN, Joy Swift MD    enoxaparin (LOVENOX) injection 40 mg, 40 mg, SubCUTAneous, Q12H, Joy Swift MD, 40 mg at 03/14/18 0919    vancomycin (VANCOCIN) 1500 mg in  ml infusion, 1,500 mg, IntraVENous, Q12H, Jeny Pink MD, Last Rate: 166.7 mL/hr at 03/14/18 0012, 1,500 mg at 03/14/18 0012      Labs:  Recent Results (from the past 24 hour(s))   METABOLIC PANEL, BASIC    Collection Time: 03/14/18  2:08 AM   Result Value Ref Range    Sodium 147 (H) 136 - 145 mmol/L    Potassium 3.5 3.5 - 5.1 mmol/L    Chloride 110 (H) 97 - 108 mmol/L    CO2 32 21 - 32 mmol/L    Anion gap 5 5 - 15 mmol/L    Glucose 89 65 - 100 mg/dL    BUN 22 (H) 6 - 20 MG/DL    Creatinine 0.48 (L) 0.70 - 1.30 MG/DL    BUN/Creatinine ratio 46 (H) 12 - 20      GFR est AA >60 >60 ml/min/1.73m2    GFR est non-AA >60 >60 ml/min/1.73m2    Calcium 8.0 (L) 8.5 - 10.1 MG/DL   CBC WITH AUTOMATED DIFF    Collection Time: 03/14/18  2:08 AM   Result Value Ref Range    WBC 6.4 4.1 - 11.1 K/uL    RBC 3.39 (L) 4.10 - 5.70 M/uL    HGB 10.5 (L) 12.1 - 17.0 g/dL    HCT 34.4 (L) 36.6 - 50.3 %    .5 (H) 80.0 - 99.0 FL    MCH 31.0 26.0 - 34.0 PG    MCHC 30.5 30.0 - 36.5 g/dL    RDW 15.5 (H) 11.5 - 14.5 %    PLATELET 95 (L) 940 - 400 K/uL    MPV 11.9 8.9 - 12.9 FL    NRBC 0.0 0  WBC ABSOLUTE NRBC 0.00 0.00 - 0.01 K/uL    NEUTROPHILS 81 (H) 32 - 75 %    LYMPHOCYTES 8 (L) 12 - 49 %    MONOCYTES 8 5 - 13 %    EOSINOPHILS 3 0 - 7 %    BASOPHILS 0 0 - 1 %    IMMATURE GRANULOCYTES 1 (H) 0.0 - 0.5 %    ABS. NEUTROPHILS 5.2 1.8 - 8.0 K/UL    ABS. LYMPHOCYTES 0.5 (L) 0.8 - 3.5 K/UL    ABS. MONOCYTES 0.5 0.0 - 1.0 K/UL    ABS. EOSINOPHILS 0.2 0.0 - 0.4 K/UL    ABS. BASOPHILS 0.0 0.0 - 0.1 K/UL    ABS. IMM.  GRANS. 0.1 (H) 0.00 - 0.04 K/UL    DF AUTOMATED             Micro:     Blood:        3/13/2018  7:12 AM - [place], [facility]       Component Results      Component Value Ref Range & Units Status     Special Requests: NO SPECIAL REQUESTS   Preliminary     Culture result: NO GROWTH 2 DAYS   Preliminary       Result History          Urine   Component Value Ref Range & Units Status      Special Requests: NO SPECIAL REQUESTS   Preliminary     Olla Count 70413   COLONIES/mL      Preliminary     Culture result: PSEUDOMONAS AERUGINOSA (A)   Preliminary     Culture result:    Preliminary     POSSIBLE STREPTOCOCCUS SPECIES (A)       Culture & Susceptibility        Antibiotic   Organism Organism Organism       Pseudomonas aeruginosa       AMIKACIN ($)   <=16   ug/mL   S Preliminary         AZTREONAM ($$$$)   16   ug/mL   R Preliminary         CEFEPIME ($$)   <=4   ug/mL   S Preliminary         CEFTAZIDIME ($)   4   ug/mL   S Preliminary         CIPROFLOXACIN ($)   <=1   ug/mL   S Preliminary         GENTAMICIN ($)   <=4   ug/mL   S Preliminary         IMIPENEM   <=1   ug/mL   S Preliminary         LEVOFLOXACIN ($)   <=2   ug/mL   S Preliminary         MEROPENEM ($$)   <=1   ug/mL   S Preliminary         PIPERACILLIN/TAZOBAC ($)   <=16   ug/mL   S Preliminary         TOBRAMYCIN ($)   <=4   ug/mL   S Preliminary                          Imaging:   RLE US  INDICATION:  Leg swelling, pain, DVT suspected; R leg swelling and cellulitis  changes; eval for DVT      COMPARISON: None.      FINDINGS: Duplex Doppler sonography of the right lower extremity was performed  from the groin to the calf. The right common femoral, femoral and popliteal  veins are compressible with normal color-flow and wave forms and response to  physiologic maneuvers including Valsalva and augmentation.      IMPRESSION  IMPRESSION:  No deep venous thrombosis identified.      CT RLE   FINDINGS: There is mild edema along the medial posterior aspect of the right  side. There is no drainable abscess. Note that the skin of the lateral right  thigh could not be imaged due to patient's large body habitus. Vessels enhance  normally. No evidence of deep venous thrombosis. There are prominent nodes in  the right inguinal region. The largest measures 20 x 14 mm. There is no gas  within the soft tissues. No evidence of bone destruction or fracture.     IMPRESSION  IMPRESSION:  1. Edema in the subcutaneous tissues. No evidence of drainable fluid collection  2. Enlarged right inguinal nodes. Given patient's history these are likely  reactive     Assessment / Plan:      Mr Henry Barraza is a 46year old gentleman wt Prader-Willi syndrome, cognitive impairment, obesity, atrial fibrillation S/P cardiac pacemaker, hx of RLE cellulitis admitted for erythema of RLE/cellulitis. Chart reviewed and history obtained from chart review and from discussion with  [facility] mother, who is his care giver who reports that erythema is improved today compared to yesterday.       This admission found to have leukocytosis up to 12, thrombocytopenia and elevated ESR (73) and lactate of 2.9. Blood cx sent 3/11 and pending. US done 3/11 and no evidence of DVT of RLE. Of note MRSA screening negative 2017. A1C 4.6 8/2014. He has been started on Vancomycin and Unasyn.       1) RLE cellulitis:     Recommendations:   Clinically improving  Would stop Unasyn and monitor with continuing IV Vancomycin alone today  If continues to improve, then can possibly transition to Zyvox PO for additional 5 days  If Zyvox cannot be obtained, then would recommend Augmentin + Bactrim   CT with no localized fluid collections  Blood cx negative so far   Wound care notes noted   Has thrombocytopenia  That seems chronic ( which antibiotics can worsen) and being monitored     2) Urine culture wt Pseudomonas   UA not very consistent wt UTI and culture with low colony count   He is however someone who may not be able to express symptoms and had incontinence once this am that is new. Given the virulence of Pseudomonas and if there is urinary incontincence/stasis, risk for recurrent UTI is high and therefore, even though low colony count, will treat wt Levaquin   If any worsening, may need to evaluate further   D/W this with his mother today       2) Chronic thrombocytopenia:   Workup per primary team   Check Hep C/HIV if not done in past. D/W his mother who permits to check. Results will likely not be available before discharge and needs to be followed upon      3) Prader Willi syndrome   Cognitive impairment      4) Hx of Afib   S/P Pacer   If bacteremia, needs FATMATA      5) DVT Px per primary team      6) Obesity: complicates care      D/W his mother and Dr Leigha Arndt today           Thank for the opportunity to participate in the care of this patient.  Please contact with questions or concerns.         Lillian Cortes,   12:37 PM

## 2018-03-14 NOTE — PROGRESS NOTES
Bedside shift change report given to Edith Hou 694 (oncoming nurse) by Andrews Guidry RN (offgoing nurse). Report included the following information SBAR, Kardex, MAR, Recent Results and Cardiac Rhythm paced.

## 2018-03-14 NOTE — PROGRESS NOTES
Problem: Mobility Impaired (Adult and Pediatric)  Goal: *Acute Goals and Plan of Care (Insert Text)  Physical Therapy Goals  Initiated 3/12/2018  1. Patient will move from supine to sit and sit to supine  in bed with supervision/set-up within 7 day(s). 2.  Patient will transfer from bed to chair and chair to bed with supervision/set-up using the least restrictive device within 7 day(s). 3.  Patient will perform sit to stand with supervision/set-up within 7 day(s). 4.  Patient will ambulate with supervision/set-up for 50 feet with the least restrictive device within 7 day(s). physical Therapy TREATMENT  Patient: Tk Vang (77 y.o. male)  Date: 3/14/2018  Diagnosis: Cellulitis Sepsis ([facility] [address]                   Precautions: Fall  Chart, physical therapy assessment, plan of care and goals were reviewed. ASSESSMENT: pt with vast improvement today, needed much less assistance for all mobility,did fair with bed mob and transfers, did very well with gait, fatigues quickly, vc's for safety and proper RW use. Progression toward goals:  []    Improving appropriately and progressing toward goals  [x]    Improving slowly and progressing toward goals  []    Not making progress toward goals and plan of care will be adjusted     PLAN:  Patient continues to benefit from skilled intervention to address the above impairments. Continue treatment per established plan of care. Discharge Recommendations:  [facility]  Further Equipment Recommendations for Discharge:  Has all DME needed     OBJECTIVE DATA SUMMARY:     Critical Behavior:  Neurologic State: Alert  Orientation Level: Oriented to person, Unable to verbalize (verbal responses are unclear; pt nods head to questions)  Cognition: Follows commands  Safety/Judgement: Decreased awareness of need for safety     Functional Mobility Training:  Bed Mobility:  Supine to Sit: Minimum assistance;Assist x1  Sit to Supine:  Moderate assistance;Assist x1  Scooting: Minimum assistance;Assist x1  Level of Assistance: Moderate assistance  Interventions: Tactile cues; Verbal cues     Transfers:  Sit to Stand: Minimum assistance;Assist x1;Additional time  Stand to Sit: Contact guard assistance  Interventions: Tactile cues; Verbal cues  Level of Assistance: Minimum assistance     Balance:  Sitting: Intact; Without support  Standing: Intact; With support  Standing - Static: Good;Constant support  Standing - Dynamic : Good     Ambulation/Gait Training:  Distance (ft): 25 Feet (ft)  Assistive Device: Gait belt;Walker, rolling  Ambulation - Level of Assistance: Minimal assistance;Contact guard assistance  Gait Abnormalities: Decreased step clearance;Trunk sway increased  Right Side Weight Bearing: Full  Left Side Weight Bearing: Full  Base of Support: Widened  Speed/Sabiha: Slow;Shuffled  Step Length: Left shortened;Right shortened    Pain:  Pain Scale 1: Numeric (0 - 10)  Pain Intensity 1: 0    Activity Tolerance: poor    After treatment:   []    Patient left in no apparent distress sitting up in chair  [x]    Patient left in no apparent distress in bed  [x]    Call bell left within reach  [x]    Nursing notified  [x]    Caregiver present  []    Bed alarm activated    COMMUNICATION/COLLABORATION:   The patients plan of care was discussed with: Registered Nurse    Lucas Chau PTA   Time Calculation: 25 mins

## 2018-03-14 NOTE — PROGRESS NOTES
General Surgery End of Shift Nursing Note    Bedside shift change report given to Dolly Gruber (oncoming nurse) by Mel Whelan (offgoing nurse). Report included the following information SBAR, Kardex, Procedure Summary and Recent Results. Shift worked:      Summary of shift:       Issues for physician to address:        Number times ambulated in hallway past shift: none    Number of times OOB to chair past shift: 1    Pain Management:  Current medication: none  Patient states pain is manageable on current pain medication: NO    GI:    Current diet:  DIET REGULAR    Tolerating current diet: YES  Passing flatus: NO  Last Bowel Movement: several days ago   Appearance:     Respiratory:    Incentive Spirometer at bedside: NO  Patient instructed on use: NO    Patient Safety:    Falls Score: 4  Bed Alarm On? No  Sitter?  No    Boston Mendez RN

## 2018-03-14 NOTE — PROGRESS NOTES
Hospitalist Progress Note    NAME: Erwin Montanez   :  1965   MRN:  [id number]       Assessment / Plan:  Sepsis due to   Right lower extremity rapidly progressive cellulitis, improving-3/12/18 CT RLE without underlying abscess  -Continue remote tele monitoring  -Continue IV Vancomycin - Day 4 (Unasyn discontinued after 3 days); appreciate Dr. Jose M Cornejo recommendations, case discussed with her today and electronic Rx for Zyvox sent to patient's pharmacy and Ms Earnest Estrada (mother) instructed to call to determine price  -Cx's unremarkable to date  -Afebrile since admission, Leukocytosis/Bandemia normalized  -Hibiclens bath daily x 3 days completed     Hypothyroidism   -Continue synthroid as was PTA   -TSH and T4 wnl     Prader Willi syndrome with Intellectual Disability   -supportive care.      Morbid Obesity -Body mass index is 66.13 kg/(m^2). -supportive care      Chronic candida intertrigo   -topical antifungal Rx.      S/p Permanent pacemaker placement due to hx of Afib with bradycardia   -clinically now stable     Hypokalemia:  -improved after repletion    Mild Hypernatremia:  -monitor    Body mass index is 65.57 kg/(m^2). Code status: Full  Prophylaxis: Lovenox  Recommended Disposition: Home w/Family and HH PT per my discussion with mother; SNF (as recommended per PT/OT eval not desired)     Subjective:     Chief Complaint / Reason for Physician Visit  Leg with subtle improvement  Mother at bedside  Patient denies complaints    Review of Systems:  Symptom Y/N Comments  Symptom Y/N Comments   Fever/Chills    Chest Pain     Poor Appetite    Edema     Cough    Abdominal Pain     Sputum    Joint Pain     SOB/CLEMENTE    Pruritis/Rash     Nausea/vomit    Tolerating PT/OT     Diarrhea    Tolerating Diet     Constipation    Other       Could NOT obtain due to: Intellectual Disability, not a reliable historian     Objective:     VITALS:   Last 24hrs VS reviewed since prior progress note.  Most recent are:  Patient Vitals for the past 24 hrs:   Temp Pulse Resp BP SpO2   03/14/18 1100 97.4 °F (36.3 °C) 75 16 124/73 95 %   03/14/18 0744 97.5 °F (36.4 °C) 75 16 103/62 93 %   03/14/18 0320 97.4 °F (36.3 °C) 81 16 112/65 96 %   03/14/18 0056 98 °F (36.7 °C) 75 14 121/69 97 %   03/13/18 2005 97.7 °F (36.5 °C) 74 20 111/63 92 %     No intake or output data in the 24 hours ending 03/14/18 1609     PHYSICAL EXAM:  General: Alert, cooperative, no acute distress, morbidly obese  EENT:  EOMI. Anicteric sclerae. MMM  Resp:  CTA bilaterally on anterior and lateral assessment, no wheezing or rales. No accessory muscle use  CV:  Regular  rhythm,  No edema  GI:  Soft, Non distended but increased girth secondary to body habitus, Non tender.  +Bowel sounds  Neurologic:  Alert and oriented to baseline, pleasant  Psych:   Poor insight. Not anxious nor agitated  Skin:  Entire RLE with erythema (see below), no tenderness noted, distal pulses palpable        Reviewed most current lab test results and cultures  YES  Reviewed most current radiology test results   YES  Review and summation of old records today    NO  Reviewed patient's current orders and MAR    YES  PMH/SH reviewed - no change compared to H&P  ________________________________________________________________________  Care Plan discussed with:    Comments   Patient x    Family  x mother   RN x    Care Manager     Consultant  x Dr. Patsy Duong team rounds were held today with , nursing, pharmacist and clinical coordinator. Patient's plan of care was discussed; medications were reviewed and discharge planning was addressed.      ________________________________________________________________________  Total NON critical care TIME:  25   Minutes    Total CRITICAL CARE TIME Spent:   Minutes non procedure based      Comments   >50% of visit spent in counseling and coordination of care ________________________________________________________________________  Zoltan Naylor MD     Procedures: see electronic medical records for all procedures/Xrays and details which were not copied into this note but were reviewed prior to creation of Plan. LABS:  I reviewed today's most current labs and imaging studies.   Pertinent labs include:  Recent Labs      03/14/18 0208 03/13/18 0339 03/12/18   0151   WBC  6.4  8.7  11.0   HGB  10.5*  10.3*  10.8*   HCT  34.4*  33.1*  34.3*   PLT  95*  95*  116*     Recent Labs      03/14/18 0208 03/13/18 0339 03/12/18   0151   NA  147*  144  143   K  3.5  3.4*  4.1   CL  110*  109*  108   CO2  32  31  30   GLU  89  74  90   BUN  22*  20  23*   CREA  0.48*  0.45*  0.62*   CA  8.0*  8.2*  8.2*       Signed: Zoltan Naylor MD

## 2018-03-14 NOTE — PROGRESS NOTES
Spiritual Care Partner Volunteer visited patient in [place] on 3/14/18. Documented by:  CATINA Talamantes

## 2018-03-14 NOTE — PROGRESS NOTES
Pharmacy Automatic Renal Dosing Protocol - Antimicrobials    Indication for Antimicrobials: Sepsis due to RLE cellulitis      Current Regimen of Each Antimicrobial:  Vanc 3000mg IV x1 followed by 1500 mg IV q 12 hr (start date: 3/11/18, day #3    Previous Antimicrobial Therapy:   Ampicillin/sulbactam 3g IV Q8H (Start Date 3/11/18; Day # 3)    Goal Level: VANCOMYCIN TROUGH GOAL RANGE: Vancomycin Trough: 10 - 15 mcg/mL    Measured / Extrapolated Vancomycin Level:   3/14 1500 mg Q12H 22.2/22.2    Significant Cultures:   3/11/18 Blood: pending-ng x 2 days  3/11/18 Blood: pendingng x 2 days  3/11-urine-15,000 CFU/ml-probable pseudomonas, possible streptococcus    Paralysis, amputations, malnutrition:  Prader-willi syndrome    Labs:  Recent Labs      18   0208  18   0339  18   0151   CREA  0.48*  0.45*  0.62*   BUN  22*  20  23*   WBC  6.4  8.7  11.0     Temp (24hrs), Av.6 °F (36.4 °C), Min:97.4 °F (36.3 °C), Max:98 °F (36.7 °C)      Creatinine Clearance (mL/min) or Dialysis: 63      Impression/Plan:   · Vancomycin trough on 1500 mg Q12H 22.2 mcg/ml. Dose reduced to 1000 mg Q12H with anticipated trough of 15 mcg/ml. · Dr. Soo Ferrera is seeing patient and wants to continue Vancomycin  · Dr. Chelsey Steiner thinks that organisms in urine are colonization due to low colony count  · WBC count and creatinine both decreasing  · CBC and BMP ordered daily  · Antimicrobial stop date TBD     Pharmacy will follow daily and adjust medications as appropriate for renal function and/or serum levels. Thank you,  Aleks [place], [facility]          Recommended duration of therapy  [email or web address]/Pharmacy/Clinical%20Companion/[email or web address]. docx    Renal Dosing  [email or web address]/Pharmacy/Clinical%20Companion/Renal%[email or web address]. pdf

## 2018-03-15 NOTE — DISCHARGE INSTRUCTIONS
HOSPITALIST DISCHARGE INSTRUCTIONS    NAME: Luigi Alvarado   :  1965   MRN:  [id number]     Date/Time:  3/15/2018 7:40 AM    ADMIT DATE: 3/11/2018     DISCHARGE DATE: 3/15/2018     DISCHARGE DIAGNOSIS:  Sepsis (resolved)  Right lower extremity cellulitis, improving  Hypothyroidism   Prader Verba Slate syndrome with Intellectual Disability   Morbid Obesity    Chronic candida intertrigo   S/p Permanent pacemaker placement due to hx of Afib with bradycardia   Hypokalemia -improved after repletion  Mild Hypernatremia            MEDICATIONS:  As per medication reconciliation  list  · It is important that you take the medication exactly as they are prescribed. · Keep your medication in the bottles provided by the pharmacist and keep a list of the medication names, dosages, and times to be taken in your wallet. · Do not take other medications without consulting your doctor. Use Miralax as an over the counter laxative. Pain Management: use tylenol (acetaminophen for pain)    What to do at Home    Recommended diet:  Cardiac Diet    Recommended activity: PT per Home Health    If you experience any of the following symptoms then please call your primary care physician or return to the emergency room if you cannot get hold of your doctor:  Fever, chills, nausea, vomiting, diarrhea, change in mentation, falling, bleeding, shortness of breath or any other concerning symptoms. Follow Up: With you PCP, Dr. Arturo Grayson MD, within 1 week. If needed Dr. Roseann Parham office number is [phone]    Information obtained by :  I understand that if any problems occur once I am at home I am to contact my physician. I understand and acknowledge receipt of the instructions indicated above.                                                                                                                                            Physician's or R.N.'s Signature Date/Time                                                                                                                                              Patient or Representative Signature                                                          Date/Time

## 2018-03-15 NOTE — PROGRESS NOTES
Home Health Care Discharge Planning: [facility]  Face to Face Encounter      NAME: Devon Rao   :  1965   MRN:  [id number]     Primary Diagnosis: Cellulitis, Deconditioning, Prader-Willi Syndrome, Morbid Obesity    Date of Face to Face:  3/15/2018 7:54 AM                                  Face to Face Encounter findings are related to primary reason for home care:   YES    1. I certify that the patient needs intermittent skilled nursing care, physical therapy and/or speech therapy. I will not be following this patient in the Community and Dr. Deon Godwin MD will be responsible for signing the [address] 2. Initial Orders for Care: Physical Therapy    3. I certify that this patient is homebound because of illness or injury, need the aid of supportive devices such as crutches, canes, wheelchairs, and walkers; the use of special transportation; or the assistance of another person in order to leave their place of residence. There exists a normal inability to leave home and leaving home requires a considerable and taxing effort. 4. I certify that this patient is under my care and that I had a Face-to-Face Encounter that meets the physician Face-to-Face Encounter requirements. Document the physical findings from the [address] [place] [address] Encounter that support the need for skilled services: Has new finding of weakness and altered mobility that requires skilled physical therapy services for evaluation and interventions.      Devang Pennington MD  Discharging Physician  Office: [phone]  Fax:   [phone]

## 2018-03-15 NOTE — PROGRESS NOTES
*CM acknowledged consult*  Pt will be using [employer] at d/c. Pt will need transportation scheduled for the ride home. Care Management Interventions  PCP Verified by CM:  Yes  Palliative Care Criteria Met (RRAT>21 & CHF Dx)?: No  Mode of Transport at Discharge: [address] (tender care )  Transition of Care Consult (CM Consult): [address]: Yes  Discharge Durable Medical Equipment: No  Health Maintenance Reviewed: Yes  Physical Therapy Consult: Yes  Occupational Therapy Consult: Yes  Speech Therapy Consult: No  Current Support Network: Lives with Caregiver, Relative's Home, Family Lives Nearby ([address] with basement (porch lift, pt cannot use stairs); bed/bath on main level; lives with mother/caregiver)  Confirm Follow Up Transport: Family  Plan discussed with Pt/Family/Caregiver: Yes  Freedom of Choice Offered: Yes  Discharge Location  Discharge Placement: Home with home health      INDIA Banegas   [phone]

## 2018-03-15 NOTE — ROUTINE PROCESS
DIPAKI: a plastic grocery bag has been found tucked under the patient's rectal area several times during this admission. Each time it has been removed. Today, when preparing patient for discharge, another plastic bag has again been found under the patient, tucked in between his buttocks. Nurse asked why the bags are being placed there and the mother, caretaker of the patient, stated to \"catch the poop and make cleanup easier. \"

## 2018-03-15 NOTE — PROGRESS NOTES
Infectious Disease Progress Note       Subjective:   Mr Maico Self seen . His mother had stepped out of the room this am when I went to see the patient. D/W RN in the room. No acute events. ROS: 10 point ROS obtained and pertinent positives include above. All others negative.          Objective:    Vitals:   Patient Vitals for the past 24 hrs:   Temp Pulse Resp BP SpO2   03/15/18 0933 98 °F (36.7 °C) 73 15 141/67 96 %   03/15/18 0450 97.5 °F (36.4 °C) 75 16 106/59 93 %   03/14/18 2346 98.5 °F (36.9 °C) 75 16 108/62 90 %   03/14/18 1914 97.8 °F (36.6 °C) 76 15 105/90 90 %       Physical Exam:  ¨ GEN: NAD  ¨ HEENT:no scleral icterus,   no thrush  ¨ CV: S1, S2 heard regularly  ¨ Lungs: Clear to auscultation bilaterally anteriorly  ¨ Abdomen: soft,obese, non tender,   ¨ Extremities: + right lower extremity erythema and warmth much improved overall  ¨ Neuro: Alert to self, responds to some questions with nods   ¨ Skin: no other rash on visualized areas   ¨ Psych: smiles, nods yes/no           Medications:    Current Facility-Administered Medications:     vancomycin (VANCOCIN) 1000 mg in  ml infusion, 1,000 mg, IntraVENous, Q12H, Lawrence Hood MD, Last Rate: 125 mL/hr at 03/14/18 2323, 1,000 mg at 03/14/18 2323    levoFLOXacin (LEVAQUIN) tablet 750 mg, 750 mg, Oral, Q24H, Kim Squires DO    miconazole (SECURA) 2 % extra thick cream, , Topical, BID, Segun Macias MD    sodium chloride (NS) flush 5-10 mL, 5-10 mL, IntraVENous, PRN, Larissa Gilman MD    ascorbic acid (vitamin C) (VITAMIN C) tablet 500 mg, 500 mg, Oral, QPM, Kate Mosquera MD, 500 mg at 03/14/18 5945    aspirin (ASPIRIN) tablet 325 mg, 325 mg, Oral, QPM, Kate Mosquera MD, 325 mg at 03/14/18 1822    cholecalciferol (VITAMIN D3) tablet 1,000 Units, 1,000 Units, Oral, DAILY, Kate Mosquera MD, 1,000 Units at 03/15/18 0974    levothyroxine (SYNTHROID) tablet 100 mcg, 100 mcg, Oral, ACB, Kate Mosquera MD, 100 mcg at 03/15/18 0785   therapeutic multivitamin (THERAGRAN) tablet 1 Tab, 1 Tab, Oral, DAILY, Ricco Ni MD, 1 Tab at 03/15/18 0913    sodium chloride (NS) flush 5-10 mL, 5-10 mL, IntraVENous, Q8H, Ricco Ni MD, 10 mL at 03/14/18 2155    sodium chloride (NS) flush 5-10 mL, 5-10 mL, IntraVENous, PRN, Ricco Ni MD    acetaminophen (TYLENOL) tablet 500 mg, 500 mg, Oral, Q4H PRN, Ricco Ni MD    HYDROcodone-acetaminophen [facility]) 5-325 mg per tablet 1 Tab, 1 Tab, Oral, Q6H PRN, Ricco Ni MD    [facility]) injection 0.4 mg, 0.4 mg, IntraVENous, PRN, Ricco Ni MD    ondansetron [facility]) injection 2 mg, 2 mg, IntraVENous, Q6H PRN, Ricco Ni MD    bisacodyl (DULCOLAX) tablet 5 mg, 5 mg, Oral, DAILY PRN, Ricco Ni MD    enoxaparin (LOVENOX) injection 40 mg, 40 mg, SubCUTAneous, Q12H, Ricco Ni MD, 40 mg at 03/15/18 0913      Labs:  Recent Results (from the past 24 hour(s))   VANCOMYCIN, TROUGH    Collection Time: 03/14/18 12:11 PM   Result Value Ref Range    Vancomycin,trough 22.2 (HH) 5.0 - 10.0 ug/mL    Reported dose date: [id number]      Reported dose time: 1245      Reported dose: 1500 MG UNITS   CBC WITH AUTOMATED DIFF    Collection Time: 03/15/18  4:38 AM   Result Value Ref Range    WBC 6.0 4.1 - 11.1 K/uL    RBC 3.27 (L) 4.10 - 5.70 M/uL    HGB 10.1 (L) 12.1 - 17.0 g/dL    HCT 33.2 (L) 36.6 - 50.3 %    .5 (H) 80.0 - 99.0 FL    MCH 30.9 26.0 - 34.0 PG    MCHC 30.4 30.0 - 36.5 g/dL    RDW 15.0 (H) 11.5 - 14.5 %    PLATELET 497 (L) 321 - 400 K/uL    MPV 11.6 8.9 - 12.9 FL    NRBC 0.0 0  WBC    ABSOLUTE NRBC 0.00 0.00 - 0.01 K/uL    NEUTROPHILS 75 32 - 75 %    LYMPHOCYTES 9 (L) 12 - 49 %    MONOCYTES 12 5 - 13 %    EOSINOPHILS 2 0 - 7 %    BASOPHILS 0 0 - 1 %    IMMATURE GRANULOCYTES 2 (H) 0.0 - 0.5 %    ABS. NEUTROPHILS 4.5 1.8 - 8.0 K/UL    ABS. LYMPHOCYTES 0.5 (L) 0.8 - 3.5 K/UL    ABS. MONOCYTES 0.7 0.0 - 1.0 K/UL    ABS. EOSINOPHILS 0.1 0.0 - 0.4 K/UL    ABS.  BASOPHILS 0.0 0.0 - 0.1 K/UL    ABS. IMM. GRANS. 0.1 (H) 0.00 - 0.04 K/UL    DF AUTOMATED     METABOLIC PANEL, COMPREHENSIVE    Collection Time: 03/15/18  4:38 AM   Result Value Ref Range    Sodium 147 (H) 136 - 145 mmol/L    Potassium 3.8 3.5 - 5.1 mmol/L    Chloride 111 (H) 97 - 108 mmol/L    CO2 35 (H) 21 - 32 mmol/L    Anion gap 1 (L) 5 - 15 mmol/L    Glucose 77 65 - 100 mg/dL    BUN 20 6 - 20 MG/DL    Creatinine 0.41 (L) 0.70 - 1.30 MG/DL    BUN/Creatinine ratio 49 (H) 12 - 20      GFR est AA >60 >60 ml/min/1.73m2    GFR est non-AA >60 >60 ml/min/1.73m2    Calcium 8.2 (L) 8.5 - 10.1 MG/DL    Bilirubin, total 0.5 0.2 - 1.0 MG/DL    ALT (SGPT) 28 12 - 78 U/L    AST (SGOT) 30 15 - 37 U/L    Alk.  phosphatase 83 45 - 117 U/L    Protein, total 7.1 6.4 - 8.2 g/dL    Albumin 1.8 (L) 3.5 - 5.0 g/dL    Globulin 5.3 (H) 2.0 - 4.0 g/dL    A-G Ratio 0.3 (L) 1.1 - 2.2             Micro:     Blood:        3/13/2018  7:12 AM - [place], Lab [facility]       Component Results      Component Value Ref Range & Units Status     Special Requests: NO SPECIAL REQUESTS   Preliminary     Culture result: NO GROWTH 2 DAYS   Preliminary       Result History          Urine   Component Value Ref Range & Units Status      Special Requests: NO SPECIAL REQUESTS   Preliminary     [place] Count [id number]   COLONIES/mL      Preliminary     Culture result: PSEUDOMONAS AERUGINOSA (A)   Preliminary     Culture result:    Preliminary     POSSIBLE STREPTOCOCCUS SPECIES (A)       Culture & Susceptibility        Antibiotic   Organism Organism Organism       Pseudomonas aeruginosa       AMIKACIN ($)   <=16   ug/mL   S Preliminary         AZTREONAM ($$$$)   16   ug/mL   R Preliminary         CEFEPIME ($$)   <=4   ug/mL   S Preliminary         CEFTAZIDIME ($)   4   ug/mL   S Preliminary         CIPROFLOXACIN ($)   <=1   ug/mL   S Preliminary         GENTAMICIN ($)   <=4   ug/mL   S Preliminary         IMIPENEM   <=1   ug/mL   S Preliminary         LEVOFLOXACIN ($)   <=2   ug/mL   S Preliminary         MEROPENEM ($$)   <=1   ug/mL   S Preliminary         PIPERACILLIN/TAZOBAC ($)   <=16   ug/mL   S Preliminary         TOBRAMYCIN ($)   <=4   ug/mL   S Preliminary                          Imaging:   RLE US  INDICATION:  Leg swelling, pain, DVT suspected; R leg swelling and cellulitis  changes; eval for DVT      COMPARISON: None.      FINDINGS: Duplex Doppler sonography of the right lower extremity was performed  from the groin to the calf. The right common femoral, femoral and popliteal  veins are compressible with normal color-flow and wave forms and response to  physiologic maneuvers including Valsalva and augmentation.      IMPRESSION  IMPRESSION:  No deep venous thrombosis identified.      CT RLE   FINDINGS: There is mild edema along the medial posterior aspect of the right  side. There is no drainable abscess. Note that the skin of the lateral right  thigh could not be imaged due to patient's large body habitus. Vessels enhance  normally. No evidence of deep venous thrombosis. There are prominent nodes in  the right inguinal region. The largest measures 20 x 14 mm. There is no gas  within the soft tissues. No evidence of bone destruction or fracture.     IMPRESSION  IMPRESSION:  1. Edema in the subcutaneous tissues. No evidence of drainable fluid collection  2. Enlarged right inguinal nodes. Given patient's history these are likely  reactive     Assessment / Plan:      Mr Beverly Frost is a 46year old gentleman wt Prader-Willi syndrome, cognitive impairment, obesity, atrial fibrillation S/P cardiac pacemaker, hx of RLE cellulitis admitted for erythema of RLE/cellulitis. Chart reviewed and history obtained from chart review and from discussion with  [facility] mother, who is his care giver who reports that erythema is improved today compared to yesterday.       This admission found to have leukocytosis up to 12, thrombocytopenia and elevated ESR (73) and lactate of 2.9.   Blood cx sent 3/11 and pending. US done 3/11 and no evidence of DVT of RLE. Of note MRSA screening negative 2017. A1C 4.6 8/2014. He has been started on Vancomycin and Unasyn.    1) RLE cellulitis:   CT without any localized fluid collections   Blood cx negative    Recommendations:   Clinically much improved  Can transition to 5 more days  Of Zyvox    Zyvox can cause bone marrow suppression when used long term. Suggest checking CBC in a week as he has had thrombocytopenia        2) Urine culture wt Pseudomonas   UA not very consistent wt UTI and culture with low colony count   He is however someone who may not be able to express symptoms and had incontinence once this am that is new. Given the virulence of Pseudomonas and if there is urinary incontincence/stasis, risk for recurrent UTI is high and therefore, even though low colony count, will treat wt Levaquin 3 days         2) Chronic thrombocytopenia: antibiotics can worsen   Workup per primary team   Checked Hep C/HIV, results pending and will need to be followed upon     3) Prader Willi syndrome   Cognitive impairment      4) Hx of Afib   S/P Pacer   If bacteremia, needs FATMATA      5) DVT Px per primary team      6) Obesity: complicates care      D/W RN and Dr Esequiel Villarreal today           Thank for the opportunity to participate in the care of this patient.  Please contact with questions or concerns.         Kim Squires,   11:20 AM

## 2018-03-15 NOTE — PROGRESS NOTES
Problem: Falls - Risk of  Goal: *Absence of Falls  Document Octavio Fall Risk and appropriate interventions in the flowsheet.    Outcome: Progressing Towards Goal  Fall Risk Interventions:  Mobility Interventions: Patient to call before getting OOB, Assess mobility with egress test    Mentation Interventions: Adequate sleep, hydration, pain control, Bed/chair exit alarm, Reorient patient    Medication Interventions: Evaluate medications/consider consulting pharmacy    Elimination Interventions: Call light in reach, Patient to call for help with toileting needs

## 2018-03-15 NOTE — PROGRESS NOTES
I have reviewed discharge instructions with the parent. The parent verbalized understanding. IV site removed. Patient transported home via wheelchair van. Patient put into wheelchair at bedside and taken out by wheelchair [facility] staff.

## 2018-03-16 NOTE — PROGRESS NOTES
Spoke with Sahil Tran in Dr. Kizzy Lamar office and scheduled a hosp f/u apt for 3/23/2018. Dr. Miroslava Byrd will go to the patient's home.

## 2018-03-30 PROBLEM — J18.9 PNEUMONIA: Status: ACTIVE | Noted: 2018-01-01

## 2018-03-30 PROBLEM — L03.90 CELLULITIS: Status: ACTIVE | Noted: 2018-01-01

## 2018-03-30 PROBLEM — J96.90 RESPIRATORY FAILURE (HCC): Status: ACTIVE | Noted: 2018-01-01

## 2018-03-30 PROBLEM — R41.82 ALTERED MENTAL STATUS: Status: ACTIVE | Noted: 2018-01-01

## 2018-03-30 NOTE — PROGRESS NOTES
Pharmacy Clarification of Prior to Admission Medication Regimen     The patient was not interviewed regarding clarification of the prior to admission medication regimen due to AMS. Patient's mother was present in room and questioned about patient's PTA medication regimen and use of any other inhalers, topical products, over the counter medications, herbal medications, vitamin products or ophthalmic/nasal/otic medication use. Information Obtained From: Patient's Mother, Outpatient Pharmacy, and Rx Query    Pertinent Pharmacy Findings:   cephALEXin (KEFLEX) 500 mg capsule: Patient started a 7 day regimen on 3/27/2018. Patient has completed 4 days of therapy as of 3/30/2018. Antibiotic Indication: UTI; Information for this agent was not available in Rx Query, so MHT called patient's outpatient pharmacy, [address] [place] [address], [phone], and spoke to Fco Quezada, who was able to confirm days supply and qty of tablets filled. PTA medication list was corrected to the following:     Prior to Admission Medications   Prescriptions Last Dose Informant Patient Reported? Taking?   ascorbic acid (VITAMIN C) 500 mg tablet 3/29/2018 at Unknown time Parent Yes Yes   Sig: Take 500 mg by mouth every evening. aspirin (ASPIRIN) 325 mg tablet 3/29/2018 at Unknown time Parent Yes Yes   Sig: Take 325 mg by mouth every evening. cephALEXin (KEFLEX) 500 mg capsule 3/30/2018 at Unknown time Parent Yes Yes   Sig: Take 500 mg by mouth three (3) times daily. cholecalciferol (VITAMIN D3) 1,000 unit cap 3/29/2018 at Unknown time Parent Yes Yes   Sig: Take 1,000 Units by mouth daily. cranberry extract (CRANBERRY) 450 mg tab 3/30/2018 at Unknown time Parent Yes Yes   Sig: Take 1 Tab by mouth daily. ferrous sulfate (IRON) 325 mg (65 mg iron) tablet 3/29/2018 at Unknown time Parent Yes Yes   Sig: Take 325 mg by mouth daily (after dinner).    levothyroxine (SYNTHROID) 100 mcg tablet 3/30/2018 at Unknown time Parent Yes Yes   Sig: Take 100 mcg by mouth Daily (before breakfast). multivitamin (ONE A DAY) tablet 3/29/2018 at Unknown time Parent Yes Yes   Sig: Take 1 tablet by mouth every evening. nystatin (MYCOSTATIN) powder 3/30/2018 at Unknown time Parent Yes Yes   Sig: Apply  to affected area two (2) times a day.  Patient applies a 'generous amount to groin and underarms.'      Facility-Administered Medications: None          Thank you,  Alexander Montez CPhT  Medication History Pharmacy Technician

## 2018-03-30 NOTE — ED NOTES
Pt urinated on self. Pt cleansed with assist of 4. Cushions padded for skin precautions. Pt was moaning and opening eyes with movement. Yeast noted throughout skin in between folds. No open areas noted at time.

## 2018-03-30 NOTE — ED NOTES
[facility] [address] [facility]. Pt sleeping, not arousable to nurse, moaning. nonrebreather in place and 02 at 100%. Pt mother reports pt was \"pretty good\" yesterday. PT got him up and walked for a little while and sat in chair and was playing games on his joce. Throughout the night he was peeing all night long; incontinence, mom reports since his UTI he hasn't been able to make it to the restroom. Doctor David Prabhakar came to see pt on Friday or Monday per mother. Pt was given tylenol for pain of his right leg, mom reports he was treated for cellulitis a week ago. Swelling is still prominent. Mom reports he is dx with lymphedema of BLE. This morning mom noted he was more lethargic and upon EMS arrival 02 was low. Pt is on 2L 02 n/c continuously. EMS provided pt with 02 and he became little bit more arousable. Pt has Claudis Bristow; regardless of what you eat you gain weight, and

## 2018-03-30 NOTE — ED NOTES
Bear huger applied to pt. Pt responds to voice by opening eyes and shaking his head to yes or no questions however he is not verbally speaking. Not moving extreme ties neither, does withdraw from pain.

## 2018-03-30 NOTE — ED PROVIDER NOTES
EMERGENCY DEPARTMENT HISTORY AND PHYSICAL EXAM      Date: 3/30/2018  Patient Name: Ashlyn Phillips    History of Presenting Illness     No chief complaint on file. History Provided By: EMS    HPI: Ashlyn Phillips, 46 y.o. male with PMHx significant for mental retardation, hypothyroidism, A fib, pacemaker placement, and Prader-Willi syndrome, presents via EMS to the ED with cc of SOB and AMS x today. EMS states family noticed AMS and worsening SOB this morning. EMS reports he was satting at 88-90% on 2 LPM NC. He does have at-home oxygen therapy that he uses as needed. EMS placed him on an NRB with increase in O2 sats to 98% en route with resulting increase in cognition. EMS reports BG of 122 en route. Family is en route to the hospital.      PCP: Giacomo Mendieta MD    HPI is limited due to acuity of condition. Current Outpatient Prescriptions   Medication Sig Dispense Refill    linezolid (ZYVOX) 600 mg tablet Take 1 Tab by mouth two (2) times a day. 10 Tab 0    OXYGEN-AIR DELIVERY SYSTEMS 2 L by Nasal route nightly. whenever lying down in bed      cholecalciferol (VITAMIN D3) 1,000 unit cap Take 1,000 Units by mouth daily.  cranberry extract (CRANBERRY) 450 mg tab Take 1 Tab by mouth daily.  aspirin (ASPIRIN) 325 mg tablet Take 325 mg by mouth every evening.  ascorbic acid (VITAMIN C) 500 mg tablet Take 500 mg by mouth every evening.  ferrous sulfate (IRON) 325 mg (65 mg iron) tablet Take 325 mg by mouth daily (after dinner).  multivitamin (ONE A DAY) tablet Take 1 tablet by mouth every evening.  nystatin (MYCOSTATIN) powder Apply 100,000 Units to affected area two (2) times a day. 1 Bottle 1    levothyroxine (SYNTHROID) 100 mcg tablet Take 100 mcg by mouth Daily (before breakfast).          Past History     Past Medical History:  Past Medical History:   Diagnosis Date    [facility]-fib ([facility] [address]     Hypothyroid     Hypothyroidism     Mental retardation     Other ill-defined conditions(799.89)     obesity    Other ill-defined conditions(799.89)     mentally disabled    Prader-Willi syndrome     S/P cardiac pacemaker procedure 2/15/2013    heart block       Past Surgical History:  Past Surgical History:   Procedure Laterality Date    HX HEENT      eye surgery as a baby    HX PACEMAKER         Family History:  Family History   Problem Relation Age of Onset    Hypertension Mother     Heart Disease Father        Social History:  Social History   Substance Use Topics    Smoking status: Never Smoker    Smokeless tobacco: Never Used    Alcohol use No       Allergies:  No Known Allergies      Review of Systems   Review of Systems   Unable to perform ROS: Acuity of condition       Physical Exam   Physical Exam   Constitutional: He is oriented to person, place, and time. He appears well-developed and well-nourished. No distress. HENT:   Head: Normocephalic and atraumatic. Mouth/Throat: Oropharynx is clear and moist. No oropharyngeal exudate. Eyes: Conjunctivae and EOM are normal. Pupils are equal, round, and reactive to light. Right eye exhibits no discharge. Left eye exhibits no discharge. Neck: Normal range of motion. Neck supple. Cardiovascular: Normal rate, regular rhythm and intact distal pulses. Exam reveals no gallop and no friction rub. No murmur heard. Pulmonary/Chest: Breath sounds normal. Accessory muscle usage present. Tachypnea noted. No respiratory distress. He has no wheezes. He has no rales. He exhibits no tenderness. Increased work of breathing   Abdominal: Soft. Bowel sounds are normal. He exhibits no distension and no mass. There is no tenderness. There is no rebound and no guarding. Musculoskeletal: Normal range of motion. He exhibits no edema. Diffuse LLE edema   Lymphadenopathy:     He has no cervical adenopathy. Neurological: He is alert and oriented to person, place, and time. No cranial nerve deficit.  Coordination normal.   Skin: Skin is warm and dry. No rash noted. No erythema. Psychiatric: He has a normal mood and affect. Nursing note and vitals reviewed. Diagnostic Study Results     Labs -     Recent Results (from the past 12 hour(s))   EKG, 12 LEAD, INITIAL    Collection Time: 03/30/18 12:09 PM   Result Value Ref Range    Ventricular Rate 75 BPM    Atrial Rate 394 BPM    QRS Duration 200 ms    Q-T Interval 460 ms    QTC Calculation (Bezet) 513 ms    Calculated R Axis 180 degrees    Calculated T Axis 32 degrees    Diagnosis       Ventricular-paced rhythm  Abnormal ECG  When compared with ECG of 11-MAR-2018 10:07,  No significant change was found     BLOOD GAS, ARTERIAL    Collection Time: 03/30/18 12:30 PM   Result Value Ref Range    pH 7.32 (L) 7.35 - 7.45      PCO2 92 (H) 35.0 - 45.0 mmHg    PO2 88 80 - 100 mmHg    O2 SAT 95 92 - 97 %    BICARBONATE 47 (H) 22 - 26 mmol/L    BASE EXCESS 15.5 mmol/L    O2 METHOD NRM      FIO2 100 %    SPONTANEOUS RATE 14.0      Sample source ARTERIAL      SITE RIGHT RADIAL      ABEBE'S TEST YES     CBC WITH AUTOMATED DIFF    Collection Time: 03/30/18  2:06 PM   Result Value Ref Range    WBC 4.5 4.1 - 11.1 K/uL    RBC 3.33 (L) 4.10 - 5.70 M/uL    HGB 10.4 (L) 12.1 - 17.0 g/dL    HCT 34.5 (L) 36.6 - 50.3 %    .6 (H) 80.0 - 99.0 FL    MCH 31.2 26.0 - 34.0 PG    MCHC 30.1 30.0 - 36.5 g/dL    RDW 14.7 (H) 11.5 - 14.5 %    PLATELET 236 (L) 664 - 400 K/uL    MPV 12.1 8.9 - 12.9 FL    NRBC 0.9 (H) 0  WBC    ABSOLUTE NRBC 0.04 (H) 0.00 - 0.01 K/uL    NEUTROPHILS 77 (H) 32 - 75 %    LYMPHOCYTES 9 (L) 12 - 49 %    MONOCYTES 12 5 - 13 %    EOSINOPHILS 1 0 - 7 %    BASOPHILS 0 0 - 1 %    IMMATURE GRANULOCYTES 1 (H) 0.0 - 0.5 %    ABS. NEUTROPHILS 3.6 1.8 - 8.0 K/UL    ABS. LYMPHOCYTES 0.4 (L) 0.8 - 3.5 K/UL    ABS. MONOCYTES 0.5 0.0 - 1.0 K/UL    ABS. EOSINOPHILS 0.0 0.0 - 0.4 K/UL    ABS. BASOPHILS 0.0 0.0 - 0.1 K/UL    ABS. IMM.  GRANS. 0.0 0.00 - 0.04 K/UL    DF AUTOMATED      PLATELET COMMENTS Large Platelets      RBC COMMENTS MACROCYTOSIS  PRESENT       METABOLIC PANEL, COMPREHENSIVE    Collection Time: 03/30/18  2:06 PM   Result Value Ref Range    Sodium 141 136 - 145 mmol/L    Potassium 4.5 3.5 - 5.1 mmol/L    Chloride 97 97 - 108 mmol/L    CO2 >45 (HH) 21 - 32 mmol/L    Anion gap Cannot be calculated 5 - 15 mmol/L    Glucose 79 65 - 100 mg/dL    BUN 21 (H) 6 - 20 MG/DL    Creatinine 0.40 (L) 0.70 - 1.30 MG/DL    BUN/Creatinine ratio 53 (H) 12 - 20      GFR est AA >60 >60 ml/min/1.73m2    GFR est non-AA >60 >60 ml/min/1.73m2    Calcium 9.1 8.5 - 10.1 MG/DL    Bilirubin, total 0.4 0.2 - 1.0 MG/DL    ALT (SGPT) 47 12 - 78 U/L    AST (SGOT) 51 (H) 15 - 37 U/L    Alk.  phosphatase 83 45 - 117 U/L    Protein, total 8.1 6.4 - 8.2 g/dL    Albumin 2.7 (L) 3.5 - 5.0 g/dL    Globulin 5.4 (H) 2.0 - 4.0 g/dL    A-G Ratio 0.5 (L) 1.1 - 2.2     MAGNESIUM    Collection Time: 03/30/18  2:06 PM   Result Value Ref Range    Magnesium 1.9 1.6 - 2.4 mg/dL   PROTHROMBIN TIME + INR    Collection Time: 03/30/18  2:06 PM   Result Value Ref Range    INR 1.1 0.9 - 1.1      Prothrombin time 10.7 9.0 - 11.1 sec   TROPONIN I    Collection Time: 03/30/18  2:06 PM   Result Value Ref Range    Troponin-I, Qt. <0.04 <0.05 ng/mL   NT-PRO BNP    Collection Time: 03/30/18  2:06 PM   Result Value Ref Range    NT pro-BNP 2858 (H) 0 - 125 PG/ML   LACTIC ACID    Collection Time: 03/30/18  2:08 PM   Result Value Ref Range    Lactic acid 0.6 0.4 - 2.0 MMOL/L   TROPONIN I    Collection Time: 03/30/18  5:31 PM   Result Value Ref Range    Troponin-I, Qt. <0.04 <0.05 ng/mL       Radiologic Studies -   XR CHEST PORT   Final Result        CT Results  (Last 48 hours)    None        CXR Results  (Last 48 hours)               03/30/18 1134  XR CHEST PORT Final result    Impression:  IMPRESSION:   Combination body habitus and poor depth of inspiration accentuates heart size   and pulmonary markings, however patient may have pleural fluid and airspace disease bilaterally greater on the left. Narrative:  INDICATION:Chest Pain       COMPARISON:  3/11/18       FINDINGS:    A portable AP radiograph of the chest was obtained at 1131 hours. Patient is on external cardiac monitor leads and wires are coiled over the   chest. Left-sided pacemaker is again demonstrated. .      There are coarse markings at both bases with possible consolidation of the left   lower lobe and possible pleural fluid. May also be some fluid on the right. Possibility of vascular congestion. Heart size is magnified by projection   although a degree of cardiomegaly and congestive heart failure cannot be   excluded. Medical Decision Making   I am the first provider for this patient. I reviewed the vital signs, available nursing notes, past medical history, past surgical history, family history and social history. Vital Signs-Reviewed the patient's vital signs. Patient Vitals for the past 12 hrs:   Temp Pulse Resp BP SpO2   03/30/18 1524 - 75 18 - 97 %   03/30/18 1500 - 75 16 109/67 97 %   03/30/18 1442 - 83 26 - 98 %   03/30/18 1434 - 75 24 113/73 96 %   03/30/18 1433 - 77 24 - 98 %   03/30/18 1430 (!) 93.8 °F (34.3 °C) 75 21 106/66 97 %   03/30/18 1413 - 75 23 112/65 98 %   03/30/18 1346 - 75 23 - 97 %   03/30/18 1330 - 75 23 106/67 95 %   03/30/18 1312 - 75 25 - 95 %   03/30/18 1300 - 75 25 109/61 96 %   03/30/18 1256 - 75 16 - 96 %   03/30/18 1248 - 75 16 - 95 %   03/30/18 1230 - 75 22 109/69 99 %   03/30/18 1200 - 75 20 111/68 100 %   03/30/18 1115 - 76 24 119/76 100 %   03/30/18 1113 - 75 17 119/76 -       Pulse Oximetry Analysis - 100% on NRB    Cardiac Monitor:   Rate: 76 bpm  Rhythm: Paced      EKG interpretation: (Preliminary) 1209  Rhythm: ventricular-paced; and regular . Rate (approx.): 75; Axis: normal; RI interval: n/a; QRS interval: normal ; ST/T wave: normal; Other findings: abnormal ekg.    Written by MAIDA Romero, as dictated by Mata Betancourt MD.    Records Reviewed: Old Medical Records    Provider Notes (Medical Decision Making):   DDx: PNA, CHF, volume overload, electrolyte abnormality, hypercapnic respiratory failure. ED Course:   Initial assessment performed. The patients presenting problems have been discussed, and they are in agreement with the care plan formulated and outlined with them. I have encouraged them to ask questions as they arise throughout their visit. CONSULT NOTE:   4:06 PM  Mata Betancourt MD spoke with Dr. Dennie Bear,   Specialty: Hospitalist  Discussed pt's hx, disposition, and available diagnostic and imaging results. Reviewed care plans. Consultant will evaluate pt for admission. Written by Maribell Lazar ED Scribe, as dictated by Todd Betancourt MD.    8:14 AM   Spoke with mother at bedside regarding pt case. Offered to attempt to send pt to [facility] for an available ICU bed. Mother states that the transfer is not convenient for her. Consult Note:   8:24 AM  Mata Betancourt MD spoke with Audrey Hooper MD   Specialty: Hospitalist  Discussed pt's hx, disposition, and available diagnostic and imaging results. Reviewed care plans. Consultant states the pt is not suitable for transfer to [facility]  Written by Aliyah Chandra ED Scribe, as dictated by Todd Juarez. Shaun Betancourt MD.     8:30 AM  CT tech states CT head unable to be completed d/t pt's body habitus. Critical Care Time:   CRITICAL CARE NOTE :    4:11 PM  IMPENDING DETERIORATION -Respiratory  ASSOCIATED RISK FACTORS - Hypoxia  MANAGEMENT- Bedside Assessment and Supervision of Care  INTERPRETATION -  Xrays, Blood Gases, ECG and Blood Pressure  INTERVENTIONS - Metobolic interventions  CASE REVIEW - Hospitalist  TREATMENT RESPONSE -Stable  PERFORMED BY - Self    NOTES:  I have spent 55 minutes of critical care time involved in lab review, consultations with specialist, family decision- making, bedside attention and documentation.  During this entire length of time I was immediately available to the patient. Mata Jones MD      Disposition:  4:15 PM  Patient is being admitted to the hospital. The results of their tests and reasons for their admission have been discussed with them and/or available family. They convey agreement and understanding for the need to be admitted and for their admission diagnosis. Consultation has been made with the inpatient physician specialist for hospitalization. PLAN:  1. Admit to hospitalist     Diagnosis     Clinical Impression:   1. Acute respiratory failure with hypercapnia (HCC)    2. Hypothermia, initial encounter        Attestations:    Attestations: This note is prepared by Maximino Anderson, acting as Scribe for Ant Morning. Sheba Jones, [address] [facility] Sheba Jones MD: The scribe's documentation has been prepared under my direction and personally reviewed by me in its entirety. I confirm that the note above accurately reflects all work, treatment, procedures, and medical decision making performed by me.

## 2018-03-30 NOTE — PROGRESS NOTES
Enoxaparin dose adjusted to Q12H per protocol for BMI > 40    Thanks,  Julee Peabody, [place]

## 2018-03-30 NOTE — ED NOTES
Due to short staffing, took a while to have someone attempt IV insertion. Tech attempted IV. Unsucessful. Tech called to bedside for US guided.

## 2018-03-30 NOTE — H&P
[facility]  ACUTE CARE HISTORY AND PHYSICAL    Name:Fatou FLOWERS.  MR#: [id number]  : 1965  ACCOUNT #: [de-identified]   DATE OF SERVICE: 2018    CHIEF COMPLAINT:  Increased obtundation and lack of response. The patient has been unresponsive since this morning. HISTORY OF PRESENT ILLNESS:  Patient is a 51-year-old male with a history of morbid obesity secondary to Prader-Willi syndrome. The patient admitted to the hospital with mother. The patient was recently discharged a couple of weeks back for cellulitis of the right lower extremity. The patient was doing fine until this morning when she started noticing the patient seems confused and increased lethargy. The physical therapy was at the home for home PT had come and also noted the patient being confused. Patient was transferred to the ER, was found to be in CO2 narcosis. However, was responding to the verbal and painful stimuli. The patient has been able to open the eyes. Was open to help some degree. The eyes with spontaneous eye secondary to verbal stimuli. At the time of the admission, the patient was also found to be hypothermic. As per the mother, the patient's right lower extremity is swelling and some cellulitis had not been improving. In fact feels it might have gotten worse over the course of the last couple of days. Patient is unable to give any history because of the condition. PAST MEDICAL HISTORY:  History of atrial fibrillation, hypothyroidism, mental retardation, and Prader-Willi syndrome, status post cardiac pacemaker. PAST SURGICAL HISTORY:  Significant for status post pacemaker placement. ALLERGIES:  NO KNOWN ALLERGIES. MEDICATIONS:  The patient's home medications were as follows:    1. Synthroid 100 mcg p.o. daily. 2.  Aspirin 325 mg p.o. q.a.m. REVIEW OF SYSTEMS:  Could not be done because of the patient's condition.     PHYSICAL EXAMINATION:  VITAL SIGNS:  Blood pressure 103/59, pulse rate of 75, sats of 95%, respiration of 20, and a temperature of 94.4. HEAD, EARS, EYES, NOSE, THROAT:  Pupils were equal, reactive to light. Vision was minimally responsive. Will be verbal and the left eye was open. Was able to open the eyes and opened the eyes partially and was mumbling, unable to carry on any meaningful conversation. No pallor, no icterus. NECK:  Supple. LUNGS:  Decreased air noted bilaterally. CARDIOVASCULAR:  S1, S2 audible. No S3, S4.  Regular. EXTREMITIES:  On the extremities, the right leg was significantly swollen compared to the left. Positive straight leg raise was noted. No cyanosis was noted. LABORATORY DATA:  Showed CMP was normal.  Troponin was normal.  Magnesium 1.9. CBC showed hemoglobin of 10.4, unchanged from the baseline. Blood gases showed a pCO2 of 92. Chest x-ray showed possible left lower lobe pneumonia. ASSESSMENT:  The patient is a 77-year-old male with a history of morbid obesity secondary to Prader-Willi syndrome, presents with respiratory failure and altered mental status secondary to CO2 narcosis. PLAN:    1. The patient will be admitted, started on BiPAP. Will be admitted in the ICU, started on BiPAP and gently try to treat the CO2 narcosis to help with the mental status where intensivist consult has been taking for evaluation. 2.  Pneumonia contributing to the CO2 narcosis and hypothermia. We will start the patient on combination of vancomycin, Zosyn, and azithromycin to cover for possible aspiration pneumonia secondary to CO2 narcosis. 3.  Anemia of chronic disease, stable. We will observe for now. 4.  Hypothyroidism. Continue with Synthroid. 5.  History of atrial fibrillation, status post pacemaker, stable. We will continue to monitor.       MD NEO Beckwith / [place]  D: 03/30/2018 17:43     T: 03/30/2018 18:35  JOB #: [id number]

## 2018-03-30 NOTE — PROGRESS NOTES
Pharmacy Automatic Renal Dosing Protocol - Antimicrobials    Indication for Antimicrobials: CAP    Current Regimen of Each Antimicrobial:  Vancomycin 3000 mg x1 then 2000 mg Q12H (Start Date 3/30; Day # 1)  Piperacillin tazobactam 3.375 grams Q8H (Start Date 3/30, Day 1)    Previous Antimicrobial Therapy:    Vancomycin Goal Level: 15-20 mg/ml    Measured / Extrapolated Vancomycin Level:     Significant Cultures:       Labs:  Recent Labs      18   1406   CREA  0.40*   BUN  21*   WBC  4.5     Temp (24hrs), Av °F (34.4 °C), Min:93.8 °F (34.3 °C), Max:94.4 °F (34.7 °C)        Paralysis, amputations, malnutrition:   Creatinine Clearance (mL/min) or Dialysis: 91    Impression/Plan:   Vancomycin 3000 mg x 1 then 2000 mg Q12H with AUC of 422 trough ~ 15 mcg/ml. Piperacillin tazobactam 3.375 grams Q8H   Azithromycin 500 mg Q24H  BMP daily     Pharmacy will follow daily and adjust medications as appropriate for renal function and/or serum levels. Thank you,  Vinicio Costello, [facility]      Recommended duration of therapy  [email or web address]/[place]/[facility]/Pharmacy/Clinical%20Companion/[email or web address]%[email or web address]. docx    Renal Dosing  [email or web address]/[place]/[facility]/Pharmacy/Clinical%20Companion/Renal%[id number]. pdf

## 2018-03-30 NOTE — ED TRIAGE NOTES
EMS called for AMS and shortness of breath. ,     EMS called for SOB. Initial sats 88-90% on 2 LPM at home. EMS NRB 12 LPM 98%. Patient is more alert.

## 2018-03-31 NOTE — ED NOTES
Bedside and Verbal shift change report given to Mag MERLOS (oncoming nurse) by Osvaldo Cheng (offgoing nurse). Report included the following information SBAR, Kardex, ED Summary, Intake/Output, MAR, Recent Results and Med Rec Status.

## 2018-03-31 NOTE — ED NOTES
Attempted to straight cath pt. Unable to obtain, so placed a purewik placed at 50suction  Pt is voiding on his own, not concerned about urine retention.

## 2018-03-31 NOTE — ED NOTES
Checked patient's BS due to concern that altered mental status may be due to hypoglycemia. Patient's BS 67 on first check, 58 on second check. Hospitalist placed order for D50, D50 administered. Will recheck and continue to monitor.

## 2018-03-31 NOTE — ED NOTES
Bedside and Verbal shift change report given to GAURANG Hathaway (oncoming nurse). Report included the following information: SBAR, ED Summary, MAR and Recent Results.

## 2018-03-31 NOTE — ED NOTES
Paged Dr. Maisha Montemayor about patient possibly being transferred to [facility] and the unsuccessful attempt with the head CT.

## 2018-03-31 NOTE — ED NOTES
Bedside and Verbal shift change received from [address]. Report included the following information: SBAR, ED Summary, MAR and Recent Results.

## 2018-03-31 NOTE — ED NOTES
Patient still not responding to commands, but will open eyes to voice. Patient's vitals stable at this time, will continue to monitor BP.

## 2018-03-31 NOTE — ED NOTES
Patient's blood sugar below 80 again. 12.5mg of D50 administered. Hospitalist aware and order for maintenance fluids with dextrose placed.

## 2018-03-31 NOTE — ED NOTES
Concerned about ability for staff to transfer patient from inpatient bed to CT table. Will discuss with oncoming nurse.

## 2018-03-31 NOTE — PROGRESS NOTES
Pharmacy Automatic Renal Dosing Protocol - Antimicrobials/Vancomycin    Indication for Antimicrobials: CAP/aspiration pneumonia    Current Regimen of Each Antimicrobial:  Vancomycin IV (Start Date 3/30; Day # 2 )  Piperacillin tazobactam 3.375 grams Q8H (Start Date 3/30, Day 2 )  Azithromycin 500 mg IV once daily (Start Date: 3/30; Day 2 )    Vancomycin Goal Level: 15-20 mg/ml    Measured / Extrapolated Vancomycin Level: N/A    Significant Cultures:   3/30: blood: in process  3/11/18: urine: E. Faecium (S vanc); P.aeruginosa (S pip-tazo)    Labs:  Recent Labs      18   0430  18   1406   CREA  0.49*  0.40*   BUN  20  21*   WBC  4.3  4.5     Temp (24hrs), Av.8 °F (35.4 °C), Min:93.8 °F (34.3 °C), Max:98.5 °F (36.9 °C)        Paralysis, amputations, malnutrition:   Creatinine Clearance (mL/min) or Dialysis: >100 mL/min    Impression/Plan:    Scr stable   Afebrile and WBC WNL, so azithromycin adjusted from IV-->oral   Con't current doses of pip-tazo and Vancomycin    A vancomycin trough level has been ordered to be collected prior to 4/1 am dose   Daily BMP ordered per protocol     Pharmacy will follow daily and adjust medications as appropriate for renal function and/or serum levels.     Thank you,    Karina Whitley, PharmD, [address] [facility] Pharmacy Specialist

## 2018-03-31 NOTE — ED NOTES
Spoke with Hospitalist concerning patient's decreasing BP. 500ml bolus ordered. Will reassess following fluid administration.

## 2018-03-31 NOTE — CONSULTS
Laura 12.  MR#: [id number]  : 1965  ACCOUNT #: [de-identified]   DATE OF SERVICE: 2018    REFERRING PHYSICIAN:  Hospitalist Service    INDICATION:  Acute on chronic respiratory failure. CHIEF COMPLAINT:  Unable to obtain second to encephalopathy    HISTORY OF PRESENT ILLNESS: The patient is an unfortunate 68-year-old gentleman with morbid obesity, Prader-Willi syndrome, hypothyroidism and history of a pacemaker placement. He recently was hospitalized in the hospital secondary to cellulitis changes and had been home for approximately 2 weeks; however, the patient required to come back to the hospital secondary to being poorly responsive. He was found to have a pCO2 of greater than 90, but a relatively preserved pH which was mildly acidotic. He was placed on BiPAP therapy with correction of his pH with a pCO2 still of 87. We have been asked to assist in his management. The patient is poorly responsive at this time. MEDICAL HISTORY:  As noted above. ALLERGIES:  NO KNOWN DRUG ALLERGIES. HOME MEDICATIONS:  Synthroid, aspirin and iron. REVIEW OF SYSTEMS:  Cannot do secondary to his mental status. SOCIAL HISTORY:  Nonsmoker, does have a job, lives with his mom. FAMILY HISTORY:  No lung disease. REVIEW OF SYSTEMS:  Cannot obtain secondary to his mental status. PHYSICAL EXAMINATION:  VITAL SIGNS:  Temperature 97.5, with a pulse of 75, respiratory rate 28, blood pressure 100/64, saturations are 96% on BiPAP. GENERAL:  Poorly responsive  gentleman. HEENT:  Normocephalic, atraumatic. NECK:  Supple, no thyroid mass, JVD or carotid bruits. LYMPHATIC:  No palpable supraclavicular, submandibular or cervical  lymphadenopathy. LUNGS:  Coarse. CARDIOVASCULAR:  Regular rate and rhythm. ABDOMEN:  Soft, nontender, obese. EXTREMITIES:  Edema. Cellulitis noted, right greater than left.   NEUROLOGIC:  Does respond to pain.    LABORATORY DATA:  White count of 4.3, with a hemoglobin 9.1, platelet count or 838. Sodium 143, potassium 3.8, chloride 98, CO2 of 44, BUN 20, creatinine 0.49. Blood gas on BiPAP showed a pH of 7.37, with pCO2 of 85, PaO2 of 107. UA was negative. Blood gas upon arrival to the emergency department, a pH 7.32, with a pCO2 of 92. HIV antibody test last hospitalization were negative. DIAGNOSTIC DATA:  Chest x-ray was reviewed, which showed probable pulmonary edema, but difficult study based on body habitus. IMPRESSION:  1. Acute on chronic respiratory failure. 2.  Hypothyroidism. 3.  Prader-Willi  4. Recurrent cellulitis. 5.  Encephalopathy. DISCUSSION AND PLAN:  1. Encephalopathy is likely related to his sepsis from cellulitis, which was previously treated; however, would get a head CT; however, his body habitus may be prohibitive. 2.  Continue BiPAP therapy. 3.  Antibiotics. 4.  Gentle hydration. 5.  TSH was normal, was just checked approximately 2 weeks ago, which was also normal with a normal free T4.    6.  Awaiting ICU bed at this time.       MD WILLY Branch / LUIS  D: 03/31/2018 12:06     T: 03/31/2018 12:32  JOB #: [id number]

## 2018-03-31 NOTE — PROGRESS NOTES
Hospitalist Progress Note    NAME:  Rayne Bosworth  :  1965  MRN:  [id number]  PCP:  Nae Muñoz MD  Date of Service:  3/31/2018    Assessment & Plan:     Acute encephalopathy due to CO2 narcosis from acute on chronic respiratory failure due to acute HCAP (POA) and hypoventilation syndrome in the setting of Super morbid obesity (Body mass index is 66.13 kg/(m^2). ) due to Rhona Nakai:    - Awaiting ICU bed  - Continue BiPAP  - Bronchodilators  - Continue ABXs for HCAP coverage  - Pulmonary on the case    Acute Sepsis (POA) due to bilateral HCAP (POA) and RLE celllulits (POA) contributing to hypothermia:    - Continue ABX  - Continue supportive care  - Continue IVFs  - Bear-hugger as needed\  - Monitor labs  - Monitor BCX    Thrombocytopenia:    - appears chronic  - monitor labs    Hypothyroidism:    - Stable last TSH 1.01 on 3/11/18  - Continue synthroid    ACOD:    - stable  - monitor labs    DNR:   Made DNR during discussion with mother during my visit     Code status: DNR  DVT prophylaxis:  [x]Enoxaparin  []Warfarin  []Hep SQ  []No AC d/t risk of bleeding  []SCDs    Care Plan discussed with: Patient/Family and Nurse  Disposition: Home w/Family, HH PT, OT, RN and TBD       Reason for visit:   Recheck Respiratory failure and AMS    Admission Summary:   Patient is a 51-year-old male with a history of morbid obesity secondary to Prader-Willi syndrome. The patient admitted to the hospital with mother. The patient was recently discharged a couple of weeks back for cellulitis of the right lower extremity. The patient was doing fine until this morning when she started noticing the patient seems confused and increased lethargy. The physical therapy was at the home for home PT had come and also noted the patient being confused. Patient was transferred to the ER, was found to be in CO2 narcosis. However, was responding to the verbal and painful stimuli. The patient has been able to open the eyes. Was open to help some degree. Interval history / Subjective:   Patient seen and examined. Mother at bedside. He remains mostly unresponsive only responding to noxious stimuli but mother said that he was opening eye up with other family members and weakly smiling. He is on BiPAP. CT scan of head ordered by Pulm. But pt doesn't fit in CT. Mother has seen him like this before and doesn't feel it's due to CVA/Hge. She doesn't want him transferred. We discussed code status and she decided to make him DNR which I did. Unable to obtain history or ROS due to clinical condition. Review of Systems:   unable     Vital Signs:   Last 24hrs VS reviewed since prior progress note. Most recent are:  Visit Vitals    BP 97/62    Pulse 75    Temp 97 °F (36.1 °C)    Resp 20    Wt 158.8 kg (350 lb)    SpO2 96%    BMI 66.13 kg/m2         Intake/Output Summary (Last 24 hours) at 03/31/18 1517  Last data filed at 03/31/18 1100   Gross per 24 hour   Intake                0 ml   Output             1751 ml   Net            -1751 ml        Physical Examination:     Visit Vitals    BP 97/62    Pulse 75    Temp 97 °F (36.1 °C)    Resp 20    Wt 158.8 kg (350 lb)    SpO2 96%    BMI 66.13 kg/m2     General:  Not alert or oriented very obtunded, NAD on BiPAP, super obese   Head:  Normocephalic, without obvious abnormality, atraumatic. Eyes:  Conjunctivae/corneas clear. PERRL, EOMs intact. Fundi benign   Ears:  Normal TMs and external ear canals both ears. Lungs:   Decreased BS bilaterally   Heart:  Regular rate and rhythm, S1, S2 normal   Abdomen:   Obese soft, non-tender. Bowel sounds hypoactive. No masses   Extremities: Extremities normal, atraumatic, cellulitis RLE   Pulses: 2+ and symmetric all extremities.    Skin: Erythema RLE above ankles   Neurologic: Obtunded       Data Review:   Review and/or order of clinical lab test  Review and/or order of tests in the radiology section of CPT    Labs:     Recent Labs 03/31/18   0430  03/30/18   1406   WBC  4.3  4.5   HGB  9.1*  10.4*   HCT  30.8*  34.5*   PLT  107*  121*     Recent Labs      03/31/18   0430  03/30/18   1406   NA  143  141   K  3.8  4.5   CL  98  97   CO2  44*  >45*   BUN  20  21*   CREA  0.49*  0.40*   GLU  89  79   CA  8.8  9.1   MG   --   1.9     Recent Labs      03/30/18   1406   SGOT  51*   ALT  47   AP  83   TBILI  0.4   TP  8.1   ALB  2.7*   GLOB  5.4*     Recent Labs      03/30/18   1406   INR  1.1   PTP  10.7      No results for input(s): FE, TIBC, PSAT, FERR in the last 72 hours.    No results found for: FOL, RBCF   Recent Labs      03/31/18   0422  03/30/18   2014   PH  7.37  7.32*   PCO2  85*  89*   PO2  107*  95     Recent Labs      03/31/18   0430  03/30/18   1731  03/30/18   1406   TROIQ  <0.04  <0.04  <0.04     No results found for: CHOL, CHOLX, CHLST, CHOLV, HDL, LDL, LDLC, DLDLP, TGLX, TRIGL, TRIGP, CHHD, CHHDX  Lab Results   Component Value Date/Time    Glucose (POC) 119 (H) 03/31/2018 06:52 AM    Glucose (POC) 72 03/31/2018 06:45 AM    Glucose (POC) 66 03/31/2018 06:40 AM    Glucose (POC) 136 (H) 03/31/2018 03:42 AM    Glucose (POC) 58 (L) 03/31/2018 03:29 AM     Lab Results   Component Value Date/Time    Color YELLOW/STRAW 03/30/2018 10:25 PM    Appearance CLEAR 03/30/2018 10:25 PM    Specific gravity 1.017 03/30/2018 10:25 PM    pH (UA) 5.0 03/30/2018 10:25 PM    Protein NEGATIVE  03/30/2018 10:25 PM    Glucose NEGATIVE  03/30/2018 10:25 PM    Ketone NEGATIVE  03/30/2018 10:25 PM    Bilirubin NEGATIVE  03/30/2018 10:25 PM    Urobilinogen 0.2 03/30/2018 10:25 PM    Nitrites NEGATIVE  03/30/2018 10:25 PM    Leukocyte Esterase SMALL (A) 03/30/2018 10:25 PM    Epithelial cells FEW 03/30/2018 10:25 PM    Bacteria NEGATIVE  03/30/2018 10:25 PM    WBC 0-4 03/30/2018 10:25 PM    RBC 0-5 03/30/2018 10:25 PM       Medications Reviewed:     Current Facility-Administered Medications   Medication Dose Route Frequency    glucose chewable tablet 16 g 4 Tab Oral PRN    dextrose (D50W) injection syrg 12.5-25 g  12.5-25 g IntraVENous PRN    glucagon (GLUCAGEN) injection 1 mg  1 mg IntraMUSCular PRN    dextrose 5% and 0.9% NaCl infusion  75 mL/hr IntraVENous CONTINUOUS    azithromycin (ZITHROMAX) tablet 500 mg  500 mg Oral Q24H    [START ON 4/1/2018] Vancomycin level due 4/1 @ 0500-PLEASE COLLECT  1 Each Other ONCE    sodium chloride (NS) flush 5-10 mL  5-10 mL IntraVENous Q8H    sodium chloride (NS) flush 5-10 mL  5-10 mL IntraVENous PRN    piperacillin-tazobactam (ZOSYN) 3.375 g in 0.9% sodium chloride (MBP/ADV) 100 mL  3.375 g IntraVENous Q8H    sodium chloride (NS) flush 5-10 mL  5-10 mL IntraVENous Q8H    sodium chloride (NS) flush 5-10 mL  5-10 mL IntraVENous PRN    nystatin (MYCOSTATIN) 100,000 unit/gram powder   Topical BID    levothyroxine (SYNTHROID) tablet 100 mcg  100 mcg Oral ACB    vancomycin (VANCOCIN) 2000 mg in  ml infusion  2,000 mg IntraVENous Q12H    enoxaparin (LOVENOX) injection 40 mg  40 mg SubCUTAneous Q12H     Current Outpatient Prescriptions   Medication Sig    cephALEXin (KEFLEX) 500 mg capsule Take 500 mg by mouth three (3) times daily.  nystatin (MYCOSTATIN) powder Apply  to affected area two (2) times a day. Patient applies a 'generous amount to groin and underarms.'    cholecalciferol (VITAMIN D3) 1,000 unit cap Take 1,000 Units by mouth daily.  cranberry extract (CRANBERRY) 450 mg tab Take 1 Tab by mouth daily.  aspirin (ASPIRIN) 325 mg tablet Take 325 mg by mouth every evening.  ascorbic acid (VITAMIN C) 500 mg tablet Take 500 mg by mouth every evening.  ferrous sulfate (IRON) 325 mg (65 mg iron) tablet Take 325 mg by mouth daily (after dinner).  multivitamin (ONE A DAY) tablet Take 1 tablet by mouth every evening.  levothyroxine (SYNTHROID) 100 mcg tablet Take 100 mcg by mouth Daily (before breakfast).      ______________________________________________________________________  Lily Krystina LENGTH OF STAY: - - -  ACTUAL LENGTH OF STAY:          1                 Alex Wolff MD

## 2018-03-31 NOTE — ED NOTES
Per Dr. MCNEAL [facility], watch patient for the next 4-6 hours and then will cross the path of needing a head CT if patient does not improve.

## 2018-03-31 NOTE — ED NOTES
Desitin and barrier cream applied to skin folds. Small excoriation present to left lower extremity in skin fold of upper leg - barrier cream applied to this site.

## 2018-03-31 NOTE — ED NOTES
Patient transferred to room 14 and placed in inpatient bed. Patient remains on BiPap, vital signs stable.

## 2018-03-31 NOTE — ED NOTES
Case d/w pulmonologist.  Administer 40mg of lasix now. Have RT keep pt's pulse ox between 92-95%, may need to adjust FI02.

## 2018-03-31 NOTE — ROUTINE PROCESS
TRANSFER - OUT REPORT:    Verbal report given to [facility] (name) on [facility]  being transferred to CCU (unit) for routine progression of care       Report consisted of patients Situation, Background, Assessment and   Recommendations(SBAR). Information from the following report(s) SBAR, Kardex, ED Summary, Intake/Output, MAR, Recent Results and Med Rec Status was reviewed with the receiving nurse. Lines:   Peripheral IV 03/30/18 Left Antecubital (Active)   Site Assessment Clean, dry, & intact 3/30/2018  2:08 PM   Phlebitis Assessment 0 3/30/2018  2:08 PM   Infiltration Assessment 0 3/30/2018  2:08 PM   Dressing Status Clean, dry, & intact 3/30/2018  2:08 PM       Peripheral IV 03/31/18 Right Antecubital (Active)   Site Assessment Clean, dry, & intact 3/31/2018  5:33 AM   Phlebitis Assessment 0 3/31/2018  5:33 AM   Infiltration Assessment 0 3/31/2018  5:33 AM   Dressing Status Clean, dry, & intact 3/31/2018  5:33 AM   Dressing Type Transparent;Tape 3/31/2018  5:33 AM   Hub Color/Line Status Patent; Flushed;Pink 3/31/2018  5:33 AM        Opportunity for questions and clarification was provided.       Patient transported with:   Registered Nurse

## 2018-03-31 NOTE — ED NOTES
Continue to be concerned about patient's BP. Hospitalist in to assess patient, maintenance fluids to be ordered. Will continue to monitor.

## 2018-03-31 NOTE — PROGRESS NOTES
1600:  Transferred patient from ED room 14 to CCU room 2526. Alert to name, opens eyes spontaneously, moves upper arms, no movement of lower legs; SR with BBB on CM, L upper chest wall pacemaker;+3 pitting pedal edema; BIPAP on with settings FiO2 40%, Rate 20, IPAP 24, EPAP 6, lungs diminished in R upper and lower lobes, moves air well in L upper, purawick intact and replaced with new for urine collection; abdomen soft, obese, active bowel sounds; sacrum with excoriation, open wound, (possibly Stage II pressure ulcer), will place wound consult. 1700: Mother and 2-nieces at bedside to visit. 1740:  BS 67. Administered 25 grams D50 IV. Recheck .    1830:  Greater alertness with mother at bedside. Mother states she sees an improvement in his cognitive status. Still not verbally communicating. 1900: Greater alertness with mother at bedside. Verbally communicates to mother wanting damien and states thank you to me. BIPAP to standby and applied 4 LPM NC. Provided sips of juice, few tablespoons of applesauce, and few tablespoons of icecream.  Provided mouth care following and reapplied BIPAP. Updated [facility] upcoming respiratory therapist on patient's admission to CCU. Bedside and Verbal shift change report given to Bharat Dobbs (oncoming nurse) by Erinn Lacey (offgoing nurse). Report included the following information SBAR, Kardex, Intake/Output, MAR, Recent Results, Cardiac Rhythm SR and Alarm Parameters .

## 2018-04-01 NOTE — PROGRESS NOTES
1910 Bedside and Verbal shift change report given to Geraldo (oncoming nurse) by Demetria Roldan (offgoing nurse). Report included the following information SBAR, Intake/Output, MAR, Recent Results and Cardiac Rhythm SR with BBB. Patient alert , nod appropriately to questions. Bipap 40% , IPAP 24 and IPAP 6, Rate 20. Pox 96. Denies sob or pain. Mother at bedside and will stay for the night. .sacrum excoriated. Body folds excoriated. Maintained skin dry. 2000 BS 78 asymptomatic. 12.5  gm D50 iv given and recheck after 10 minutes .  12 am No changes with 2000 full assessment. VSS. BS 67 asymptomatic and covered with 12.5 gm of D50. Recheck 158.  4 am alert, denies complain. BS 73 asymptomatic. Cover with 12.5 gm and BS up to 120.  5 am pure wick changed, perineum care given, sacrum excoriation. Dry. Applied secura. 630 am BP low 80, map 65. Dr Walsh Jackson making rounds and made aware. No new orders made. 7 am  Bedside and verbal shift report of the following Kardex, Mar, Intake and output. Lab result. Report given to incoming RN Michelle Adair.

## 2018-04-01 NOTE — PROGRESS NOTES
Hospitalist Progress Note    NAME:  Leobardo Donaldson  :  1965  MRN:  [id number]  PCP:  Paz Andrews MD  Date of Service:  2018    Assessment & Plan:     Acute encephalopathy due to CO2 narcosis from acute on chronic respiratory failure due to acute HCAP (POA) and hypoventilation syndrome in the setting of Super morbid obesity (Body mass index is 65.94 kg/(m^2). ) due to Edu Dings:    - Awaiting ICU bed  - Continue BiPAP  - Bronchodilators  - Continue ABXs for HCAP coverage  - Pulmonary on the case    Acute Sepsis (POA) due to bilateral HCAP (POA) and RLE celllulits (POA) contributing to hypothermia:    - Continue ABX  - Continue supportive care  - Continue IVFs  - Bear-hugger as needed  - Monitor labs  - Monitor BCX    Thrombocytopenia:    - appears chronic  - monitor labs    Hypothyroidism:    - Stable last TSH 1.01 on 3/11/18  - Continue synthroid    ACOD:    - slightly worse  - monitor labs    DNR:   Made DNR during discussion with mother during my visit on 3/31/18     Code status: DNR  DVT prophylaxis:  [x]Enoxaparin  []Warfarin  []Hep SQ  []No AC d/t risk of bleeding  []SCDs    Care Plan discussed with: Patient/Family and Nurse  Disposition: Home w/Family, HH PT, OT, RN and TBD       Reason for visit:   Recheck Respiratory failure and AMS    Admission Summary:   Patient is a 59-year-old male with a history of morbid obesity secondary to Prader-Willi syndrome. The patient admitted to the hospital with mother. The patient was recently discharged a couple of weeks back for cellulitis of the right lower extremity. The patient was doing fine until this morning when she started noticing the patient seems confused and increased lethargy. The physical therapy was at the home for home PT had come and also noted the patient being confused. Patient was transferred to the ER, was found to be in CO2 narcosis. However, was responding to the verbal and painful stimuli.   The patient has been able to open the eyes. Was open to help some degree. Interval history / Subjective:   Patient seen and examined. Looks much better today. He is much alert as compared to yesterday. He opens eyes, responds yes or no to questions but is still on BiPAP and bear hugger. Also has hypotension. Denies CP, SOB better, no abd pain and denies leg pain. Review of Systems:   No CP  SOB better    Vital Signs:   Last 24hrs VS reviewed since prior progress note. Most recent are:  Visit Vitals    BP (!) 85/54    Pulse 75    Temp 96.2 °F (35.7 °C)    Resp 20    Ht 5' 1\" (1.549 m)    Wt 158.3 kg (348 lb 15.8 oz)    SpO2 94%    BMI 65.94 kg/m2         Intake/Output Summary (Last 24 hours) at 04/01/18 1444  Last data filed at 04/01/18 1038   Gross per 24 hour   Intake          3556.25 ml   Output              800 ml   Net          2756.25 ml        Physical Examination:     Visit Vitals    BP (!) 85/54    Pulse 75    Temp 96.2 °F (35.7 °C)    Resp 20    Ht 5' 1\" (1.549 m)    Wt 158.3 kg (348 lb 15.8 oz)    SpO2 94%    BMI 65.94 kg/m2     General:  Sleepy but arousable , NAD on BiPAP, super obese   Head:  Normocephalic, without obvious abnormality, atraumatic. Eyes:  Conjunctivae/corneas clear   Ears:  Normal TMs and external ear canals both ears. Lungs:   Decreased BS bilaterally   Heart:  Regular rate and rhythm, S1, S2 normal   Abdomen:   Obese soft, non-tender. Bowel sounds hypoactive.  No masses   Extremities: Extremities normal, atraumatic, cellulitis RLE   Skin: Erythema RLE above ankles better   Neurologic: Drowsy        Data Review:   Review and/or order of clinical lab test  Review and/or order of tests in the radiology section of CPT    Labs:     Recent Labs      03/31/18   0430  03/30/18   1406   WBC  4.3  4.5   HGB  9.1*  10.4*   HCT  30.8*  34.5*   PLT  107*  121*     Recent Labs      04/01/18   0438  03/31/18   0430  03/30/18   1406   NA  144  143  141   K  3.5  3.8  4.5   CL  100  98  97 CO2  43*  44*  >45*   BUN  20  20  21*   CREA  0.55*  0.49*  0.40*   GLU  150*  89  79   CA  8.1*  8.8  9.1   MG   --    --   1.9     Recent Labs      03/30/18   1406   SGOT  51*   ALT  47   AP  83   TBILI  0.4   TP  8.1   ALB  2.7*   GLOB  5.4*     Recent Labs      03/30/18   1406   INR  1.1   PTP  10.7      No results for input(s): FE, TIBC, PSAT, FERR in the last 72 hours.    No results found for: FOL, RBCF   Recent Labs      03/31/18   0422  03/30/18 2014   PH  7.37  7.32*   PCO2  85*  89*   PO2  107*  95     Recent Labs      03/31/18   0430  03/30/18   1731  03/30/18   1406   TROIQ  <0.04  <0.04  <0.04     No results found for: CHOL, CHOLX, CHLST, CHOLV, HDL, LDL, LDLC, DLDLP, TGLX, TRIGL, TRIGP, CHHD, CHHDX  Lab Results   Component Value Date/Time    Glucose (POC) 83 04/01/2018 11:36 AM    Glucose (POC) 102 (H) 04/01/2018 08:21 AM    Glucose (POC) 120 (H) 04/01/2018 04:36 AM    Glucose (POC) 73 04/01/2018 04:27 AM    Glucose (POC) 158 (H) 03/31/2018 11:47 PM     Lab Results   Component Value Date/Time    Color YELLOW/STRAW 03/30/2018 10:25 PM    Appearance CLEAR 03/30/2018 10:25 PM    Specific gravity 1.017 03/30/2018 10:25 PM    pH (UA) 5.0 03/30/2018 10:25 PM    Protein NEGATIVE  03/30/2018 10:25 PM    Glucose NEGATIVE  03/30/2018 10:25 PM    Ketone NEGATIVE  03/30/2018 10:25 PM    Bilirubin NEGATIVE  03/30/2018 10:25 PM    Urobilinogen 0.2 03/30/2018 10:25 PM    Nitrites NEGATIVE  03/30/2018 10:25 PM    Leukocyte Esterase SMALL (A) 03/30/2018 10:25 PM    Epithelial cells FEW 03/30/2018 10:25 PM    Bacteria NEGATIVE  03/30/2018 10:25 PM    WBC 0-4 03/30/2018 10:25 PM    RBC 0-5 03/30/2018 10:25 PM       Medications Reviewed:     Current Facility-Administered Medications   Medication Dose Route Frequency    [START ON 4/2/2018] VANCOMYCIN INFORMATION NOTE   Other ONCE    glucose chewable tablet 16 g  4 Tab Oral PRN    dextrose (D50W) injection syrg 12.5-25 g  12.5-25 g IntraVENous PRN    glucagon (GLUCAGEN) injection 1 mg  1 mg IntraMUSCular PRN    dextrose 5% and 0.9% NaCl infusion  75 mL/hr IntraVENous CONTINUOUS    mupirocin (BACTROBAN) 2 % ointment   Both Nostrils BID    azithromycin (ZITHROMAX) 500 mg in 0.9% sodium chloride (MBP/ADV) 250 mL  500 mg IntraVENous Q24H    miconazole (SECURA) 2 % extra thick cream   Topical BID    sodium chloride (NS) flush 5-10 mL  5-10 mL IntraVENous Q8H    sodium chloride (NS) flush 5-10 mL  5-10 mL IntraVENous PRN    piperacillin-tazobactam (ZOSYN) 3.375 g in 0.9% sodium chloride (MBP/ADV) 100 mL  3.375 g IntraVENous Q8H    sodium chloride (NS) flush 5-10 mL  5-10 mL IntraVENous Q8H    sodium chloride (NS) flush 5-10 mL  5-10 mL IntraVENous PRN    nystatin (MYCOSTATIN) 100,000 unit/gram powder   Topical BID    levothyroxine (SYNTHROID) tablet 100 mcg  100 mcg Oral ACB    enoxaparin (LOVENOX) injection 40 mg  40 mg SubCUTAneous Q12H     ______________________________________________________________________  EXPECTED LENGTH OF STAY: - - -  ACTUAL LENGTH OF STAY:          2                 Adri Mijares MD

## 2018-04-01 NOTE — PROGRESS NOTES
PULMONARY/Critical Care/SLEEP MEDICINE  [facility]  Name: Giovani Solorio   : 1965   MRN: [id number]   Date: 2018 9:07 AM   [x]I have reviewed the flowsheet and previous days notes. []The patient is unable to give any meaningful history or review of systems because the patient is:  []Intubated [x]poorly responsive   []Sedated    []Unresponsive      []The patient is critically ill on      []Mechanical ventilation []Pressors   [x]BiPAP []                 ROS:Review of systems not obtained due to patient factors.     Vital Signs:    Visit Vitals    BP (!) 87/66    Pulse 75    Temp 96.4 °F (35.8 °C)    Resp 20    Ht 5' 1\" (1.549 m)    Wt 158.3 kg (348 lb 15.8 oz)    SpO2 99%    BMI 65.94 kg/m2       O2 Device: BIPAP       Temp (24hrs), Av.3 °F (35.7 °C), Min:95.8 °F (35.4 °C), Max:96.5 °F (35.8 °C)       Intake/Output:   Last shift:         Last 3 shifts:  1901 -  0700  In: 3208.8 [I.V.:3208.8]  Out: 2450 [Urine:2450]    Intake/Output Summary (Last 24 hours) at 18 2327  Last data filed at 18 5211   Gross per 24 hour   Intake          3208.75 ml   Output             1100 ml   Net          2108.75 ml       Ventilator Settings:  Ventilator Mode: NIPPV  Respiratory Rate  Back-Up Rate: 20  Insp Time (sec): 1 sec  Ventilator Volumes  Vt Spont (ml): 598 ml  Ve Observed (l/min): 11.2 l/min  Ventilator Pressures  PC Set: 24  Pressure Support (cm H2O): 6 cm H2O  PIP Observed (cm H2O): 25 cm H2O  PEEP/VENT (cm H2O): 6 cm H20    Physical Exam:    General: []Intubated/sedated []No acute distress []    [x]Ill appearing [] []      HEENT: []Icteric []PERRL []    [x]Anicteric Mucosa:[]moist   []dry []      Resp: [x]Wheeze []Clear to ascultation bilaterally []Accessory muscle use    []Rales []Chest tube:  []Air leak []    [x]Ronchi []Crepitus []   []Coarse bilateral ventilator breath sounds      CV: [x]Regular []Tachycardia []    []Irregular []Bradycardic [] []Murmur []S3 []    []Edema []S4 []      GI: [x]Soft  []NG Tube Bowel sounds: []present []absent    []Firm []PEG     []Distended  [x]obese      : [x]Griffin []Hematuria []    []Clear urine []Edema []      MSK:    []SCDs [x]Edema []    []No deformities [] []      Skin: []Warm []Dry  [x]cellulitis right leg, unchanged    []Cool []Moist []    []Hot  []Diaphoretic []    []Rash  []Cyanosis []      N-psych: []Sedated  [x]Agitated []    []Alert  Follows commands:   []Yes  []No []      Devices: []ET-Tube []Tracheostomy []Chest tube:  Air leak? []Y/[]N    []Central Line []PA Catheter []    []PICC [] []      DATA:   Current Facility-Administered Medications   Medication Dose Route Frequency    glucose chewable tablet 16 g  4 Tab Oral PRN    dextrose (D50W) injection syrg 12.5-25 g  12.5-25 g IntraVENous PRN    glucagon (GLUCAGEN) injection 1 mg  1 mg IntraMUSCular PRN    dextrose 5% and 0.9% NaCl infusion  75 mL/hr IntraVENous CONTINUOUS    mupirocin (BACTROBAN) 2 % ointment   Both Nostrils BID    azithromycin (ZITHROMAX) 500 mg in 0.9% sodium chloride (MBP/ADV) 250 mL  500 mg IntraVENous Q24H    miconazole (SECURA) 2 % extra thick cream   Topical BID    sodium chloride (NS) flush 5-10 mL  5-10 mL IntraVENous Q8H    sodium chloride (NS) flush 5-10 mL  5-10 mL IntraVENous PRN    piperacillin-tazobactam (ZOSYN) 3.375 g in 0.9% sodium chloride (MBP/ADV) 100 mL  3.375 g IntraVENous Q8H    sodium chloride (NS) flush 5-10 mL  5-10 mL IntraVENous Q8H    sodium chloride (NS) flush 5-10 mL  5-10 mL IntraVENous PRN    nystatin (MYCOSTATIN) 100,000 unit/gram powder   Topical BID    levothyroxine (SYNTHROID) tablet 100 mcg  100 mcg Oral ACB    vancomycin (VANCOCIN) 2000 mg in  ml infusion  2,000 mg IntraVENous Q12H    enoxaparin (LOVENOX) injection 40 mg  40 mg SubCUTAneous Q12H       Telemetry: [x]Sinus []A-flutter []Paced    []A-fib []Multiple PVCs                  Labs:  Recent Labs      03/31/18   7144 03/30/18   1406   WBC  4.3  4.5   HGB  9.1*  10.4*   HCT  30.8*  34.5*   PLT  107*  121*     Recent Labs      04/01/18   0438  03/31/18   0430 03/30/18   1406   NA  144  143  141   K  3.5  3.8  4.5   CL  100  98  97   CO2  43*  44*  >45*   GLU  150*  89  79   BUN  20  20  21*   CREA  0.55*  0.49*  0.40*   CA  8.1*  8.8  9.1   MG   --    --   1.9   ALB   --    --   2.7*   TBILI   --    --   0.4   SGOT   --    --   51*   ALT   --    --   47   INR   --    --   1.1     Recent Labs      03/31/18 0422 03/30/18 2014 03/30/18   1230   PH  7.37  7.32*  7.32*   PCO2  85*  89*  92*   PO2  107*  95  88   HCO3  48*  45*  47*   FIO2  40  40  100       Imaging:  []I have personally reviewed the patients radiographs  []Radiographs reviewed with radiologist   []No change from prior, tubes and lines in adequate position  []Improved   []Worsening       IMPRESSION:   · Severe sepsis  · Hypotension but MAP > 65 and making urine  · Cellulitis  · Acute respiratory failure with hypercarbia  · prader willi syndrome  · Morbid obesity   PLAN:   · abx  · bipap  · Check abg  · Diuresis as tolerated  · Follow mental status.   Body habitus prohibits ct scan here per nursing  · icu protocols  · Hold on pressors for now with map > 65  · msof with high risk of death     The patient is: [] acutely ill Risk of deterioration: [] moderate    [x] critically ill  [x] high     []See my orders for details     My assessment/plan was discussed with:  [x]nursing []PT/OT    []respiratory therapy []Dr. Isacc Unger []     []Total critical care time exclusive of procedures     30  minutes  Aj Munoz MD

## 2018-04-01 NOTE — PROGRESS NOTES
0800: Pt. Is alert. Pt. Is able to move all extremities and follows commands. Pt. Is requiring BIPAP continuously. Will switch patient to the Scuba mask due to very red spots to face. Skin break down is imminent. Pt.'s lung sounds are diminished. Bowel sounds active. Pt. Has excoriation (open redness) to ever fold, armpits, groin and sacrum. Pt. Has cellulitis and redness to BLE. Edema present in all extremities. Pt. Has open break down to his sacrum and gluteal crack. Will turn Q 2 hours and apply cream as ordered in the [facility]. Pt. Has 2 PIVS present. Periwick in place. Pt. Has D5 NS infusing at 75 ml/hr. 0930: Pt.'s temp rectally is still 94.8. Order received from Dr. Lucille Garcia for a Bare hugger to keep temp 97.0 F and above. 1000: Received Order for Dr. Lucille Garcia to start a D10 infusion at 75 ml/hr  instead of D5 since patient has been running hypoglycemic over night. 1200: Pt.'s temp is now 96.2. Per Patient's mother, Pt. Tends to run a lower temperature. No other changes from morning assessment. 1600: Pt.'s Temp is now 97.2. Bare hugger removed. Pt. Has not urinated except for 100 ml earlier this morning. Dr. Ricardo Langston made aware. Received order to insert brooks catheter. Pt. Has 37 ml of urine in the bladder on bladder scan. Pt. Has been wearing the scuba mask to relieve pressure to patients face. 1630: Pt.'s blood sugar is now 69. Will give PRN D50%. 1656: Pt.'s Blood sugar is now 118. D10 drip started. 1700: Nursing staff not able to insert brooks catheter and patient has a history of  being hard to get a catheter in. Dr. Ricardo Langston updated. Was ordered to continue to monitor and allow patient time over night to attempt to urinate on his own. Periwick set back up to catch urine. 1900: Report given to Heidy Conway RN.

## 2018-04-01 NOTE — PROGRESS NOTES
Pharmacy Automatic Renal Dosing Protocol - Antimicrobials    Indication for Antimicrobials: CAP/aspiration pneumonia    Current Regimen of Each Antimicrobial:  Vancomycin IV (Start Date 3/30; Day # 3 )  Piperacillin tazobactam 3.375 grams Q8H (Start Date 3/30, Day 3 )  Azithromycin 500 mg daily (Start Date: 3/30; Day 3 )    Vancomycin Goal Level: 15-20 mg/ml    Measured / Extrapolated Vancomycin Level:     Significant Cultures:   3/30: blood: in process  3/11/18: urine: E. Faecium (S vanc); P.aeruginosa (S pip-tazo)    Labs:  Recent Labs      18   0438  18   0430  18   1406   CREA  0.55*  0.49*  0.40*   BUN  20  20  21*   WBC   --   4.3  4.5     Temp (24hrs), Av.3 °F (35.7 °C), Min:95.8 °F (35.4 °C), Max:96.5 °F (35.8 °C)      Paralysis, amputations, malnutrition: prader willi syndrome  Creatinine Clearance (mL/min) or Dialysis: >60 mL/min    Impression/Plan:    Scr stable, however, the vancomycin trough came back at 40.8 mcg/ml with the calculated trough of 40.82 mcg/ml.  HOLD vancomycin   Draw vancomycin random with AM lab  [address] will determine the dose based on the random level (total of 7 days of abx)   Afebrile    Con't current doses of pip-tazo    Daily BMP ordered per protocol     Pharmacy will follow daily and adjust medications as appropriate for renal function and/or serum levels. Thank you,  Julianne Leger, PHARMD      Recommended duration of therapy  [email or web address]. docx    Renal Dosing  [email or web address]. pdf

## 2018-04-02 NOTE — HOME CARE
Please note that this patient was open to [facility] - INPATIENT Physical Therapy and Occupational Therapy services at the time of hospital admission. If services will be needed at discharge, please order to resume them. Thank you.    Lobo Cabello RN  [address]

## 2018-04-02 NOTE — WOUND CARE
Wound Care Consult: Chart reviewed and patient assessed for his skin issues related to skin folds and obesity. Pt. was admitted to the CCU with respiratory failure. He has Abbe Roque syndrome and weighs 348 lbs. He also has lymphedema. Assessment: Various partial thickness fissures in all of the skin folds of the abdomen, groin and thighs and the axilla bilaterally. Some of these are bleeding mildly and all of it is currently being treated with Secura antifungal cream due to satellite lesions and moisture. Treatment: Continue the Antifungal zinc oxide cream (Secura) twice per day. Off load the pressure areas on the sacrum and heels.    Donna Farias RN, BSN, [employer]

## 2018-04-02 NOTE — DIABETES MGMT
DTC Progress Note    Recommendations/ Comments: Pt discussed with rounding team and Dr. Shameka Wallace. Plan to change POC glucose checks to every 6hr. Chart reviewed on [facility] during Multidisciplinary Rounds. A1c:   Lab Results   Component Value Date/Time    Hemoglobin A1c 4.6 08/25/2014 05:40 AM           Recent Glucose Results:   Lab Results   Component Value Date/Time    GLU 83 04/02/2018 04:17 AM    GLUCPOC 82 04/02/2018 07:43 AM    GLUCPOC 91 04/02/2018 04:13 AM    GLUCPOC 91 04/02/2018 01:28 AM        Lab Results   Component Value Date/Time    Creatinine 1.24 04/02/2018 04:17 AM     Estimated Creatinine Clearance: 93.3 mL/min (based on Cr of 1.24). Active Orders   Diet    DIET NPO        PO intake: No data found. Will continue to follow as needed. Thank you.     Macie Litten, [address]  Office: [phone]

## 2018-04-02 NOTE — PROGRESS NOTES
Hospitalist Progress Note    NAME:  Cameron Smoker  :  1965  MRN:  [id number]  PCP:  Lizett Lundy MD  Date of Service:  2018    Assessment & Plan:     Acute encephalopathy due to CO2 narcosis from acute on chronic respiratory failure due to acute HCAP (POA) and hypoventilation syndrome in the setting of Super morbid obesity (Body mass index is 65.94 kg/(m^2). ) due to Dakota Curiel:    - Improving but still in ICU  - Continue BiPAP as needed  - Bronchodilators  - Continue ABXs for HCAP coverage  - CXR IN AM  - Pulmonary on the case    Acute Sepsis (POA) due to bilateral HCAP (POA) and RLE celllulits (POA) contributing to hypothermia:    - Much improved  - Continue ABX  - Continue supportive care  - Continue IVFs  - Monitor labs  - Monitor BCX    Thrombocytopenia:    - appears chronic  - monitor labs    Hypothyroidism:    - Stable last TSH 1.01 on 3/11/18  - Continue synthroid    ACOD:    - slightly better  - monitor labs    DNR:   Made DNR during discussion with mother during my visit on 3/31/18     Code status: DNR  DVT prophylaxis:  [x]Enoxaparin  []Warfarin  []Hep SQ  []No AC d/t risk of bleeding  []SCDs    Care Plan discussed with: Patient/Family and Nurse  Disposition: Home w/Family, HH PT, OT, RN and TBD       Reason for visit:   Recheck Respiratory failure and AMS    Admission Summary:   Patient is a 66-year-old male with a history of morbid obesity secondary to Prader-Willi syndrome. The patient admitted to the hospital with mother. The patient was recently discharged a couple of weeks back for cellulitis of the right lower extremity. The patient was doing fine until this morning when she started noticing the patient seems confused and increased lethargy. The physical therapy was at the home for home PT had come and also noted the patient being confused. Patient was transferred to the ER, was found to be in CO2 narcosis. However, was responding to the verbal and painful stimuli. The patient has been able to open the eyes. Was open to help some degree. Interval history / Subjective:   Continues to improve and feel better. Was alert and in NAD as he played lottery scratchers today. Was on mask but then but back on BiPAP which he tolerates well. Review of Systems:   No CP  SOB better  No Abd pain    Vital Signs:   Last 24hrs VS reviewed since prior progress note. Most recent are:  Visit Vitals    /88    Pulse 75    Temp 96 °F (35.6 °C)    Resp 18    Ht 5' 1\" (1.549 m)    Wt 158.3 kg (348 lb 15.8 oz)    SpO2 97%    BMI 65.94 kg/m2         Intake/Output Summary (Last 24 hours) at 04/02/18 1923  Last data filed at 04/02/18 1800   Gross per 24 hour   Intake          2616.66 ml   Output              210 ml   Net          2406.66 ml        Physical Examination:     Visit Vitals    /88    Pulse 75    Temp 96 °F (35.6 °C)    Resp 18    Ht 5' 1\" (1.549 m)    Wt 158.3 kg (348 lb 15.8 oz)    SpO2 97%    BMI 65.94 kg/m2     General:  Alert , NAD on BiPAP, super obese   Head:  Normocephalic, without obvious abnormality, atraumatic. Eyes:  Conjunctivae/corneas clear   Lungs:   Decreased BS bilaterally   Heart:  S1S2 RRR   Abdomen:   Obese soft, non-tender. Bowel sounds hypoactive.  No masses   Extremities: Extremities normal, atraumatic, cellulitis RLE   Skin: Erythema RLE above ankles improving   Neurologic: Drowsy        Data Review:   Review and/or order of clinical lab test  Review and/or order of tests in the radiology section of [facility]    Labs:     Recent Labs      04/02/18 0417 03/31/18   0430   WBC  5.1  4.3   HGB  9.4*  9.1*   HCT  30.0*  30.8*   PLT  99*  107*     Recent Labs      04/02/18 0417 04/01/18 0438 03/31/18   0430   NA  142  144  143   K  3.4*  3.5  3.8   CL  99  100  98   CO2  42*  43*  44*   BUN  23*  20  20   CREA  1.24  0.55*  0.49*   GLU  83  150*  89   CA  8.4*  8.1*  8.8   MG  1.8   --    --    PHOS  2.2*   --    --      No results for input(s): SGOT, GPT, ALT, AP, TBIL, TBILI, TP, ALB, GLOB, GGT, AML, LPSE in the last 72 hours. No lab exists for component: AMYP, HLPSE  No results for input(s): INR, PTP, APTT in the last 72 hours. No lab exists for component: INREXT, INREXT   No results for input(s): FE, TIBC, PSAT, FERR in the last 72 hours.    No results found for: FOL, RBCF   Recent Labs      04/02/18   0831  03/31/18   0422   PH  7.51*  7.37   PCO2  56*  85*   PO2  75*  107*     Recent Labs      03/31/18   0430   TROIQ  <0.04     No results found for: CHOL, CHOLX, CHLST, CHOLV, HDL, LDL, LDLC, DLDLP, TGLX, TRIGL, TRIGP, CHHD, CHHDX  Lab Results   Component Value Date/Time    Glucose (POC) 96 04/02/2018 05:00 PM    Glucose (POC) 94 04/02/2018 11:57 AM    Glucose (POC) 82 04/02/2018 07:43 AM    Glucose (POC) 91 04/02/2018 04:13 AM    Glucose (POC) 91 04/02/2018 01:28 AM     Lab Results   Component Value Date/Time    Color YELLOW/STRAW 03/30/2018 10:25 PM    Appearance CLEAR 03/30/2018 10:25 PM    Specific gravity 1.017 03/30/2018 10:25 PM    pH (UA) 5.0 03/30/2018 10:25 PM    Protein NEGATIVE  03/30/2018 10:25 PM    Glucose NEGATIVE  03/30/2018 10:25 PM    Ketone NEGATIVE  03/30/2018 10:25 PM    Bilirubin NEGATIVE  03/30/2018 10:25 PM    Urobilinogen 0.2 03/30/2018 10:25 PM    Nitrites NEGATIVE  03/30/2018 10:25 PM    Leukocyte Esterase SMALL (A) 03/30/2018 10:25 PM    Epithelial cells FEW 03/30/2018 10:25 PM    Bacteria NEGATIVE  03/30/2018 10:25 PM    WBC 0-4 03/30/2018 10:25 PM    RBC 0-5 03/30/2018 10:25 PM       Medications Reviewed:     Current Facility-Administered Medications   Medication Dose Route Frequency    balsam peru-castor oil (VENELEX)  mg/gram ointment   Topical BID    piperacillin-tazobactam (ZOSYN) 4.5 g in 0.9% sodium chloride (MBP/ADV) 100 mL  4.5 g IntraVENous Q8H    [START ON 4/3/2018] levothyroxine (SYNTHROID) tablet 100 mcg  100 mcg Oral 6am    VANCOMYCIN INFORMATION NOTE   Other Rx Dosing/Monitoring  sodium chloride 0.9 % bolus infusion 500 mL  500 mL IntraVENous ONCE    dextrose 10% infusion  75 mL/hr IntraVENous CONTINUOUS    glucose chewable tablet 16 g  4 Tab Oral PRN    dextrose (D50W) injection syrg 12.5-25 g  12.5-25 g IntraVENous PRN    glucagon (GLUCAGEN) injection 1 mg  1 mg IntraMUSCular PRN    mupirocin (BACTROBAN) 2 % ointment   Both Nostrils BID    azithromycin (ZITHROMAX) 500 mg in 0.9% sodium chloride (MBP/ADV) 250 mL  500 mg IntraVENous Q24H    miconazole (SECURA) 2 % extra thick cream   Topical BID    sodium chloride (NS) flush 5-10 mL  5-10 mL IntraVENous Q8H    sodium chloride (NS) flush 5-10 mL  5-10 mL IntraVENous PRN    sodium chloride (NS) flush 5-10 mL  5-10 mL IntraVENous Q8H    sodium chloride (NS) flush 5-10 mL  5-10 mL IntraVENous PRN    nystatin (MYCOSTATIN) 100,000 unit/gram powder   Topical BID    enoxaparin (LOVENOX) injection 40 mg  40 mg SubCUTAneous Q12H     ______________________________________________________________________  EXPECTED LENGTH OF STAY: 4d 21h  ACTUAL LENGTH OF STAY:          3                 Nahed Gil MD

## 2018-04-02 NOTE — PROGRESS NOTES
0730: Report received from [facility] [facility].    0800: Pt. Is alert. Pt. Is able to move all extremities and follows commands. Pt. Is requiring BIPAP continuously. Pt. Is being switched between the regular mask and scuba mask. Pt.'s lung sounds are diminished. Bowel sounds active. Pt. Has excoriation (open redness) to ever fold, armpits, groin and sacrum. Pt. Has cellulitis and redness to BLE. Edema present in all extremities. Pt. Has open break down to his sacrum and gluteal crack. Will turn Q 2 hours and apply cream as ordered in the [facility]. Pt. Has 2 PIVS present. Periwick in place. Pt. Has D10 infusing at 75 ml/hr. Dr. Savannah Cade made aware that patient has not voided in 24 hours and Creatine is a point higher. Ordered to continue to monitor for now. 7942: Pt. Is now on a 50% Ventimask and oxygen saturation is 95%. Will monitor patient closely. New bariatric bed ordered. Pt. Has a Pressure injury to his right thigh from pressing against the bed. Will order wound care consult for all his excoriation in all his folds and this new injury. 1200: Pt. Reassessed. Dr. Alicia Todd and nurse inserted Brooks catheter. 150 ml of yellow urine output immediatly. 1450: Wound care at the bedside to assess patients skin. 1500: Made Dr. Savannah Cade aware of patients output of 150 ml since brooks insertion. Will await orders. 1600: Dr. Savannah Cade made aware of patient's hypotension. SBP in the 80's. Will await orders. 1800: Pt. Is now on 2 liters via nasal canula. Pt.'s oxygen saturation is 95%.     1900: Report given to [facility], [address] [facility].

## 2018-04-02 NOTE — PROCEDURES
Failed brooks per nursing and me. 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 Procedure: Flexible bedside cystoscopy, difficult brooks placement. Surgeon: Alayne Shone  Anesthesia: none  Findings. Hypoplastic penis, small urethra. Procedure in detail: sterile prep and drape accomplished. I placed the flex scope within his foreskin and saw glans/meatus. I placed a sensor wire through the meatus. It did not seem to make it to the bladder on a couple of attempts. I converted a 14 cath to Hoh tip, advanced over the wire until the cath was hubbed. Clear urine drained. The balloon was blown up, and less than 100 total urine drained. Catheters will not be  Likely to be placed blindly the future, but this one can be removed at discretion of primary service.

## 2018-04-02 NOTE — INTERDISCIPLINARY ROUNDS
Interdisciplinary team rounds were held with the following team members:Case Management, Diabetes Treatment Specialist, Nursing, Nutrition, Occupational Therapy, Physical Therapy,Pharmacy, Physician and Respiratory Therapy. Plan of care discussed. See clinical pathway and/or care plan for interventions and desired outcomes.     Goals: urology consult for brooks catheter placement

## 2018-04-02 NOTE — PROGRESS NOTES
1900: received report from [address] [place] [address] Mauricio S. SBAR, medications, assessment, orders, and plan of care discussed. Patient alert and responds with nodding yes or no to questions. 2200: patient's blood pressure and MAP are frequently under 90 systolic and under 60 for MAP. Repositioning patient and monitoring. O2 is also being increased in short intervals when turing the patient. 0250: repositioned patient and placed blood pressure cuff on left lower arm. Blood pressure 113/63. Will continue to use left arm for blood pressure monitoring. 0430: patients CO2 is 42.    0700: patient has had no output in his external catheter and there is no wet incontinence issues on the bed pad. Patient was bladder scanned an there was only 2ml stated in the bladder. Pati MERLOS aware and will monitor. Report given to Pati MERLOS, JOSE MAR, medications, assessment, orders, and plan of care discussed.

## 2018-04-02 NOTE — PROGRESS NOTES
[facility]  Pulmonary, Critical Care, and Sleep Medicine    Name: Rayne Bosworth MRN: [id number]   : 1965 Hospital: [facility]   Date: 2018        Critical Care Patient Consult    IMPRESSION:   · Encephalopathy-seems to be approaching his baseline per his mother. · Acute Renal failure,No urine output, increased Cr; Nurse was  unable to place brooks cath. · Acute on Chronic Respiratory failure  · Hypoglycemia, on D10 infusion. · Had hypothermia on presentation. · Chronic lymphedema, worse on left that right, has chronic cellulitis of his legs. · Chronic hypoventilation syndrome. · Chronic thrombocytopenia. · Morbid obesity  · Prader Jazmin Debra Syndrome  · Hypothyroidism  · PPM  · Pt is Do not resuscitate per his mother. · Acutely ill, moderate risk of decompensation. RECOMMENDATIONS:   · Weaned off the BIPAP  · Will ask urology to assist with placing brooks  · Will monitor UOP  · Will need to monitor Cr  · Monitor for further hypoglycemia, has POC glucose and D10 infusion. · Give additional volume to see if dehydration is component of issue. Subjective/History:   Cc: per his family was altered mental status    HPI: This patient has been seen and evaluated at the request of Dr. Donald Crandall for above. Patient is a 46 y.o. male who presents for above. He was seen on the bipap the am. NO distress. He is not able to communicate due to Prader Willi syndrome and pt on bipap. Discussed with nurse this am.     Per his mother pt was more sleepy, and confused. He seems to be more alert today. Had some hypotension yesterday. The patient is critically ill and can not provide additional history due to Unable to speak and Unable to comprehend.      Past Medical History:   Diagnosis Date    A-fib ([facility] [address]     Hypothyroid     Hypothyroidism     Mental retardation     Other ill-defined conditions(799.89)     obesity    Other ill-defined conditions(799.89)     mentally disabled    Prader-Willi syndrome     S/P cardiac pacemaker procedure 2/15/2013    heart block      Past Surgical History:   Procedure Laterality Date    HX HEENT      eye surgery as a baby    HX PACEMAKER        Prior to Admission medications    Medication Sig Start Date End Date Taking? Authorizing Provider   cephALEXin (KEFLEX) 500 mg capsule Take 500 mg by mouth three (3) times daily. 3/27/18  Yes Delia Perea MD   nystatin (MYCOSTATIN) powder Apply  to affected area two (2) times a day. Patient applies a 'generous amount to groin and underarms.'   Yes Historical Provider   cholecalciferol (VITAMIN D3) 1,000 unit cap Take 1,000 Units by mouth daily. Yes Historical Provider   cranberry extract (CRANBERRY) 450 mg tab Take 1 Tab by mouth daily. Yes Historical Provider   aspirin (ASPIRIN) 325 mg tablet Take 325 mg by mouth every evening. Yes Historical Provider   ascorbic acid (VITAMIN C) 500 mg tablet Take 500 mg by mouth every evening. Yes Historical Provider   ferrous sulfate (IRON) 325 mg (65 mg iron) tablet Take 325 mg by mouth daily (after dinner). Yes Historical Provider   multivitamin (ONE A DAY) tablet Take 1 tablet by mouth every evening. Yes Historical Provider   levothyroxine (SYNTHROID) 100 mcg tablet Take 100 mcg by mouth Daily (before breakfast).    Yes Marquita Juárez MD     Current Facility-Administered Medications   Medication Dose Route Frequency    dextrose 10% infusion  75 mL/hr IntraVENous CONTINUOUS    mupirocin (BACTROBAN) 2 % ointment   Both Nostrils BID    azithromycin (ZITHROMAX) 500 mg in 0.9% sodium chloride (MBP/ADV) 250 mL  500 mg IntraVENous Q24H    miconazole (SECURA) 2 % extra thick cream   Topical BID    sodium chloride (NS) flush 5-10 mL  5-10 mL IntraVENous Q8H    piperacillin-tazobactam (ZOSYN) 3.375 g in 0.9% sodium chloride (MBP/ADV) 100 mL  3.375 g IntraVENous Q8H    sodium chloride (NS) flush 5-10 mL  5-10 mL IntraVENous Q8H    nystatin (MYCOSTATIN) 100,000 unit/gram powder   Topical BID    levothyroxine (SYNTHROID) tablet 100 mcg  100 mcg Oral ACB    enoxaparin (LOVENOX) injection 40 mg  40 mg SubCUTAneous Q12H     No Known Allergies   Social History   Substance Use Topics    Smoking status: Never Smoker    Smokeless tobacco: Never Used    Alcohol use No      Family History   Problem Relation Age of Onset    Hypertension Mother     Heart Disease Father         Review of Systems:  Review of systems not obtained due to patient factors. Objective:   Vital Signs:    Visit Vitals    BP 91/62    Pulse 76    Temp 97.2 °F (36.2 °C)    Resp 20    Ht 5' 1\" (1.549 m)    Wt 158.3 kg (348 lb 15.8 oz)    SpO2 95%    BMI 65.94 kg/m2       O2 Device: BIPAP       Temp (24hrs), Av.7 °F (35.9 °C), Min:94.8 °F (34.9 °C), Max:97.6 °F (36.4 °C)       Intake/Output:   Last shift:         Last 3 shifts:  190 -  0700  In: 2146.3 [I.V.:2146.3]  Out: 800 [Urine:800]    Intake/Output Summary (Last 24 hours) at 18 0734  Last data filed at 18 1900   Gross per 24 hour   Intake           768.75 ml   Output              100 ml   Net           668.75 ml     Hemodynamics:   PAP:   CO:     Wedge:   CI:     CVP:    SVR:       PVR:       Ventilator Settings:  Mode Rate Tidal Volume Pressure FiO2 PEEP   NIPPV      6 cm H2O 50 % 6 cm H20     Peak airway pressure: 24 cm H2O    Minute ventilation: 20.1 l/min      Physical Exam:    General:  Alert, cooperative, no distress, appears stated age. Morbid obese, no distress, had bipap mask in place when seen. Head:  Normocephalic, without obvious abnormality, atraumatic. Eyes:  Conjunctivae/corneas clear. PERRL, EOMs intact. Nose: Nares normal. Septum midline. Mucosa normal. No drainage or sinus tenderness.    Throat: Lips, mucosa, and tongue normal. Teeth and gums normal.   Neck: Supple, symmetrical, trachea midline, no adenopathy, thyroid: no enlargment/tenderness/nodules, no carotid bruit and no JVD. Back:   Symmetric, no curvature. ROM normal.   Lungs:   Clear to auscultation bilaterally. Has decreased BS in bases bilaterally. Chest wall:  No tenderness or deformity. Heart:  Regular rate and rhythm, S1, S2 normal, no murmur, click, rub or gallop. Abdomen:   Obese, Soft, non-tender. Bowel sounds normal. No masses,  No organomegaly. Extremities: Extremities normal, atraumatic, has 2-3+ Bilateral edema, appear to have chronic lower extremity edema. Seems to have redness of the right leg consistent with cellulitis. Pulses: 2+ and symmetric all extremities. Skin: Skin color, texture, turgor normal. No rashes or lesions   Lymph nodes: Cervical, supraclavicular, and axillary nodes normal.   Neurologic: Grossly nonfocal, not able to fully assess due to his underlying medical condition. Psych: no overt anxiety or depression.         Data:     Recent Results (from the past 24 hour(s))   GLUCOSE, POC    Collection Time: 04/01/18  8:21 AM   Result Value Ref Range    Glucose (POC) 102 (H) 65 - 100 mg/dL    Performed by Quilla Seal, POC    Collection Time: 04/01/18 11:36 AM   Result Value Ref Range    Glucose (POC) 83 65 - 100 mg/dL    Performed by Quilla Seal, POC    Collection Time: 04/01/18  4:30 PM   Result Value Ref Range    Glucose (POC) 69 65 - 100 mg/dL    Performed by Quilla Seal, POC    Collection Time: 04/01/18  4:56 PM   Result Value Ref Range    Glucose (POC) 118 (H) 65 - 100 mg/dL    Performed by Leni Ruiz    GLUCOSE, POC    Collection Time: 04/01/18  9:43 PM   Result Value Ref Range    Glucose (POC) 92 65 - 100 mg/dL    Performed by Rell Coca    GLUCOSE, POC    Collection Time: 04/02/18  1:28 AM   Result Value Ref Range    Glucose (POC) 91 65 - 100 mg/dL    Performed by Rell Coca    GLUCOSE, POC    Collection Time: 04/02/18  4:13 AM   Result Value Ref Range    Glucose (POC) 91 65 - 100 mg/dL    Performed by Marjan Ache    METABOLIC PANEL, BASIC    Collection Time: 04/02/18  4:17 AM   Result Value Ref Range    Sodium 142 136 - 145 mmol/L    Potassium 3.4 (L) 3.5 - 5.1 mmol/L    Chloride 99 97 - 108 mmol/L    CO2 42 (HH) 21 - 32 mmol/L    Anion gap 1 (L) 5 - 15 mmol/L    Glucose 83 65 - 100 mg/dL    BUN 23 (H) 6 - 20 MG/DL    Creatinine 1.24 0.70 - 1.30 MG/DL    BUN/Creatinine ratio 19 12 - 20      GFR est AA >60 >60 ml/min/1.73m2    GFR est non-AA >60 >60 ml/min/1.73m2    Calcium 8.4 (L) 8.5 - 10.1 MG/DL   VANCOMYCIN, RANDOM    Collection Time: 04/02/18  4:17 AM   Result Value Ref Range    Vancomycin, random 47.2 (HH) UG/ML   CBC WITH AUTOMATED DIFF    Collection Time: 04/02/18  4:17 AM   Result Value Ref Range    WBC 5.1 4.1 - 11.1 K/uL    RBC 3.05 (L) 4.10 - 5.70 M/uL    HGB 9.4 (L) 12.1 - 17.0 g/dL    HCT 30.0 (L) 36.6 - 50.3 %    MCV 98.4 80.0 - 99.0 FL    MCH 30.8 26.0 - 34.0 PG    MCHC 31.3 30.0 - 36.5 g/dL    RDW 15.3 (H) 11.5 - 14.5 %    PLATELET 99 (L) 421 - 400 K/uL    MPV 12.4 8.9 - 12.9 FL    NRBC 0.0 0  WBC    ABSOLUTE NRBC 0.00 0.00 - 0.01 K/uL    NEUTROPHILS 74 32 - 75 %    LYMPHOCYTES 9 (L) 12 - 49 %    MONOCYTES 14 (H) 5 - 13 %    EOSINOPHILS 2 0 - 7 %    BASOPHILS 0 0 - 1 %    IMMATURE GRANULOCYTES 1 (H) 0.0 - 0.5 %    ABS. NEUTROPHILS 3.8 1.8 - 8.0 K/UL    ABS. LYMPHOCYTES 0.5 (L) 0.8 - 3.5 K/UL    ABS. MONOCYTES 0.7 0.0 - 1.0 K/UL    ABS. EOSINOPHILS 0.1 0.0 - 0.4 K/UL    ABS. BASOPHILS 0.0 0.0 - 0.1 K/UL    ABS. IMM. GRANS. 0.0 0.00 - 0.04 K/UL    DF AUTOMATED     MAGNESIUM    Collection Time: 04/02/18  4:17 AM   Result Value Ref Range    Magnesium 1.8 1.6 - 2.4 mg/dL   PHOSPHORUS    Collection Time: 04/02/18  4:17 AM   Result Value Ref Range    Phosphorus 2.2 (L) 2.6 - 4.7 MG/DL   LACTIC ACID    Collection Time: 04/02/18  4:17 AM   Result Value Ref Range    Lactic acid 0.8 0.4 - 2.0 MMOL/L             Telemetry:NSR.      Imaging:  I have personally reviewed the patients radiographs and have reviewed the reports:  3-30-18: IMPRESSION:  Combination body habitus and poor depth of inspiration accentuates heart size  and pulmonary markings, however patient may have pleural fluid and airspace  disease bilaterally greater on the left.            4-3-18: CXR: reviewed report and images. COMPARISON:  3/30/2018     FINDINGS: A portable AP radiograph of the chest was obtained at 0433 hours. The  patient is on a cardiac monitor. Bibasilar airspace disease and/or pleural  effusions persist with a decrease in the left perihilar region.  This study is  compromised by patient body habitus.     IMPRESSION: Persistent bibasilar airspace disease and/or fluid      Krunal Barker MD

## 2018-04-03 NOTE — PROGRESS NOTES
Interdisciplinary team rounds were held 4/3/2018  with the following team members :Care Management, Diabetes Treatment Specialist, Nursing, Nutrition, Pharmacy, Physician, Respiratory Therapy and Clinical Coordinator. Plan of care discussed. Goal: Adjust medications, renal consult. continue to monitor and support. See MD orders and progress notes for further  interventions and desired outcomes.

## 2018-04-03 NOTE — PROGRESS NOTES
1419: [facility] lab calls with critical result; vancomycin result 42.1. MD Shantanu Celestin notified of results.

## 2018-04-03 NOTE — PROGRESS NOTES
CM Note:  Met with pt's mother for d/c planning. PTA pt was independent with ADL's, his mother assisting him with some; he walks using a RW. Pt does not drive; his mother provides transport. Pt open to [facility] - INPATIENT. His emergency contact is his mother, Roberth Burnett ([phone]), who will transport him at d/c. Pt has Rx coverage and gets his medications from [facility] on [address]. Pt will need a resumption of care order with [facility] for PT/OT. Will continue to follow. GAURANG Doss    Care Management Interventions  PCP Verified by CM: Yes (Dr. Charlotte Selby.)  Palliative Care Criteria Met (RRAT>21 & CHF Dx)?: No  MyChart Signup: No  Discharge Durable Medical Equipment: No  Physical Therapy Consult: No  Occupational Therapy Consult: No  Speech Therapy Consult: No  Current Support Network: Relative's Home (Pt lives with his mother in a 2 story house with 14 steps between floors and 4-5 entry steps.   Pt has a porch lift .)  Confirm Follow Up Transport: Family (Pt's mother to transport him at d/c.)  Plan discussed with Pt/Family/Caregiver: Yes  Discharge Location  Discharge Placement: Home with two level

## 2018-04-03 NOTE — PROGRESS NOTES
[facility]  Pulmonary, Critical Care, and Sleep Medicine    Name: Nas Roper MRN: [id number]   : 1965 Hospital: [facility]   Date: 4/3/2018        Critical Care Patient Consult    IMPRESSION:   · Encephalopathy-seems to be approaching his baseline per his mother. Still not as interactive as normal.   · Acute Renal failure,No urine output, increased Cr; Urology stated pt was a difficult cath, would keep brooks cath in place for now. Not to be on nurse driven removal protocol. · Acute on Chronic Respiratory failure, tolerating bipap use. Would continue to use at night time. · Hypoglycemia, on D10 infusion. Still not taking in reliable amount by mouth. · Chronic lymphedema, worse on left that right, has chronic cellulitis of his legs. Right leg seems more red and swollen. · Chronic hypoventilation syndrome. Compensated based on pH. · Chronic thrombocytopenia. · Morbid obesity  · Prader Zo Knows Syndrome  · Hypothyroidism  · PPM  · Pt is Do not resuscitate per his mother. · Acutely ill, moderate risk of decompensation. RECOMMENDATIONS:   · Weaned off the BIPAP, can use at night. · Urology had to place brooks, keep it in. · Acute renal failure. Appreciate Dr. Venu Alba assistance. · Will monitor UOP  · Hypotension, will give additional volume bolus. · Monitor for further hypoglycemia, has POC glucose and D10 infusion. Subjective/History:     Last 24 hrs: pt is more alert and interactive. Off bipap this am. Due to his underlying condition he is not able to give any hx. His mother is at his bedside. Cc: per his family was altered mental status    HPI: This patient has been seen and evaluated at the request of Dr. Giuliano Pena for above. Patient is a 46 y.o. male who presents for above. He was seen on the bipap the am. NO distress. He is not able to communicate due to Prader Willi syndrome and pt on bipap.  Discussed with nurse this am. Per his mother pt was more sleepy, and confused. He seems to be more alert today. Had some hypotension yesterday. The patient is critically ill and can not provide additional history due to Unable to speak and Unable to comprehend. Past Medical History:   Diagnosis Date    A[facility]fib ([facility] [address]     Hypothyroid     Hypothyroidism     Mental retardation     Other ill-defined conditions(799.89)     obesity    Other ill-defined conditions(799.89)     mentally disabled    Prader-Willi syndrome     S/P cardiac pacemaker procedure 2/15/2013    heart block      Past Surgical History:   Procedure Laterality Date    HX HEENT      eye surgery as a baby    HX PACEMAKER        Prior to Admission medications    Medication Sig Start Date End Date Taking? Authorizing Provider   cephALEXin (KEFLEX) 500 mg capsule Take 500 mg by mouth three (3) times daily. 3/27/18  Yes Reese Draper MD   nystatin (MYCOSTATIN) powder Apply  to affected area two (2) times a day. Patient applies a 'generous amount to groin and underarms.'   Yes Historical Provider   cholecalciferol (VITAMIN D3) 1,000 unit cap Take 1,000 Units by mouth daily. Yes Historical Provider   cranberry extract (CRANBERRY) 450 mg tab Take 1 Tab by mouth daily. Yes Historical Provider   aspirin (ASPIRIN) 325 mg tablet Take 325 mg by mouth every evening. Yes Historical Provider   ascorbic acid (VITAMIN C) 500 mg tablet Take 500 mg by mouth every evening. Yes Historical Provider   ferrous sulfate (IRON) 325 mg (65 mg iron) tablet Take 325 mg by mouth daily (after dinner). Yes Historical Provider   multivitamin (ONE A DAY) tablet Take 1 tablet by mouth every evening. Yes Historical Provider   levothyroxine (SYNTHROID) 100 mcg tablet Take 100 mcg by mouth Daily (before breakfast).    Yes Marquita Juárez MD     Current Facility-Administered Medications   Medication Dose Route Frequency    sodium chloride 0.9 % bolus infusion 500 mL  500 mL IntraVENous ONCE  balsam peru-castor oil (VENELEX)  mg/gram ointment   Topical BID    piperacillin-tazobactam (ZOSYN) 4.5 g in 0.9% sodium chloride (MBP/ADV) 100 mL  4.5 g IntraVENous Q8H    levothyroxine (SYNTHROID) tablet 100 mcg  100 mcg Oral 6am    VANCOMYCIN INFORMATION NOTE   Other Rx Dosing/Monitoring    dextrose 10% infusion  75 mL/hr IntraVENous CONTINUOUS    mupirocin (BACTROBAN) 2 % ointment   Both Nostrils BID    azithromycin (ZITHROMAX) 500 mg in 0.9% sodium chloride (MBP/ADV) 250 mL  500 mg IntraVENous Q24H    miconazole (SECURA) 2 % extra thick cream   Topical BID    sodium chloride (NS) flush 5-10 mL  5-10 mL IntraVENous Q8H    sodium chloride (NS) flush 5-10 mL  5-10 mL IntraVENous Q8H    nystatin (MYCOSTATIN) 100,000 unit/gram powder   Topical BID    enoxaparin (LOVENOX) injection 40 mg  40 mg SubCUTAneous Q12H     No Known Allergies   Social History   Substance Use Topics    Smoking status: Never Smoker    Smokeless tobacco: Never Used    Alcohol use No      Family History   Problem Relation Age of Onset    Hypertension Mother     Heart Disease Father         Review of Systems:  Review of systems not obtained due to patient factors.     Objective:   Vital Signs:    Visit Vitals    /78    Pulse 75    Temp (!) 94.7 °F (34.8 °C)    Resp 18    Ht 5' 1\" (1.549 m)    Wt 158.3 kg (348 lb 15.8 oz)    SpO2 99%    BMI 65.94 kg/m2       O2 Device: Nasal cannula   O2 Flow Rate (L/min): 2 l/min   Temp (24hrs), Av.3 °F (35.7 °C), Min:94.7 °F (34.8 °C), Max:97.2 °F (36.2 °C)       Intake/Output:   Last shift:       07 -  1900  In: 150 [I.V.:150]  Out: 47 [Urine:47]  Last 3 shifts:  190 -  0700  In: 3716.7 [I.V.:3716.7]  Out: 370 [Urine:370]    Intake/Output Summary (Last 24 hours) at 18 1219  Last data filed at 18 1000   Gross per 24 hour   Intake          2391.66 ml   Output              417 ml   Net          1974.66 ml     Hemodynamics:   PAP:   CO: Wedge:   CI:     CVP:    SVR:       PVR:       Ventilator Settings:  Mode Rate Tidal Volume Pressure FiO2 PEEP   NIPPV      18 cm H2O 40 % 6 cm H20     Peak airway pressure: 22 cm H2O    Minute ventilation: 15 l/min      Physical Exam:    General:  Alert, cooperative, no distress, appears stated age. Morbid obese, no distress,has nC oxygen in place. Head:  Normocephalic, without obvious abnormality, atraumatic. Eyes:  Conjunctivae/corneas clear. PERRL, EOMs intact. Nose: Nares normal. Septum midline. Mucosa normal. No drainage or sinus tenderness. Throat: Lips, mucosa, and tongue normal. Teeth and gums normal.   Neck: Supple, symmetrical, trachea midline, no adenopathy, thyroid: no enlargment/tenderness/nodules, no carotid bruit and no JVD. Back:   Symmetric, no curvature. ROM normal.   Lungs:   Clear to auscultation bilaterally. Has decreased BS in bases bilaterally. Breathing comfortably. Chest wall:  No tenderness or deformity. Heart:  Regular rate and rhythm, S1, S2 normal, no murmur, click, rub or gallop. Abdomen:   Obese, Soft, non-tender. Bowel sounds normal. No masses,  No organomegaly. Extremities: Extremities normal, atraumatic, has 2-3+ Bilateral edema, appear to have chronic lower extremity edema. Seems to have redness of the right leg consistent with cellulitis. Pulses: 2+ and symmetric all extremities. Skin: Skin color, texture, turgor normal. No rashes or lesions   Lymph nodes: Cervical, supraclavicular, and axillary nodes normal.   Neurologic: Grossly nonfocal, not able to fully assess due to his underlying medical condition. Psych: no overt anxiety or depression.         Data:     Recent Results (from the past 24 hour(s))   GLUCOSE, POC    Collection Time: 04/02/18  5:00 PM   Result Value Ref Range    Glucose (POC) 96 65 - 100 mg/dL    Performed by [employer]    GLUCOSE, POC    Collection Time: 04/03/18  1:01 AM   Result Value Ref Range    Glucose (POC) 96 65 - 100 mg/dL    Performed by Roselia Pederson    CBC WITH AUTOMATED DIFF    Collection Time: 04/03/18  4:56 AM   Result Value Ref Range    WBC 5.2 4.1 - 11.1 K/uL    RBC 3.09 (L) 4.10 - 5.70 M/uL    HGB 9.5 (L) 12.1 - 17.0 g/dL    HCT 30.5 (L) 36.6 - 50.3 %    MCV 98.7 80.0 - 99.0 FL    MCH 30.7 26.0 - 34.0 PG    MCHC 31.1 30.0 - 36.5 g/dL    RDW 15.3 (H) 11.5 - 14.5 %    PLATELET 77 (L) 204 - 400 K/uL    MPV 12.3 8.9 - 12.9 FL    NRBC 0.0 0  WBC    ABSOLUTE NRBC 0.00 0.00 - 0.01 K/uL    NEUTROPHILS 79 (H) 32 - 75 %    LYMPHOCYTES 6 (L) 12 - 49 %    MONOCYTES 12 5 - 13 %    EOSINOPHILS 2 0 - 7 %    BASOPHILS 0 0 - 1 %    IMMATURE GRANULOCYTES 1 (H) 0.0 - 0.5 %    ABS. NEUTROPHILS 4.1 1.8 - 8.0 K/UL    ABS. LYMPHOCYTES 0.3 (L) 0.8 - 3.5 K/UL    ABS. MONOCYTES 0.6 0.0 - 1.0 K/UL    ABS. EOSINOPHILS 0.1 0.0 - 0.4 K/UL    ABS. BASOPHILS 0.0 0.0 - 0.1 K/UL    ABS. IMM. GRANS. 0.0 0.00 - 0.04 K/UL    DF AUTOMATED     METABOLIC PANEL, COMPREHENSIVE    Collection Time: 04/03/18  4:56 AM   Result Value Ref Range    Sodium 142 136 - 145 mmol/L    Potassium 3.6 3.5 - 5.1 mmol/L    Chloride 100 97 - 108 mmol/L    CO2 39 (H) 21 - 32 mmol/L    Anion gap 3 (L) 5 - 15 mmol/L    Glucose 85 65 - 100 mg/dL    BUN 26 (H) 6 - 20 MG/DL    Creatinine 1.90 (H) 0.70 - 1.30 MG/DL    BUN/Creatinine ratio 14 12 - 20      GFR est AA 45 (L) >60 ml/min/1.73m2    GFR est non-AA 37 (L) >60 ml/min/1.73m2    Calcium 8.2 (L) 8.5 - 10.1 MG/DL    Bilirubin, total 0.9 0.2 - 1.0 MG/DL    ALT (SGPT) 24 12 - 78 U/L    AST (SGOT) 33 15 - 37 U/L    Alk.  phosphatase 66 45 - 117 U/L    Protein, total 6.8 6.4 - 8.2 g/dL    Albumin 2.0 (L) 3.5 - 5.0 g/dL    Globulin 4.8 (H) 2.0 - 4.0 g/dL    A-G Ratio 0.4 (L) 1.1 - 2.2     MAGNESIUM    Collection Time: 04/03/18  4:56 AM   Result Value Ref Range    Magnesium 1.8 1.6 - 2.4 mg/dL   PHOSPHORUS    Collection Time: 04/03/18  4:56 AM   Result Value Ref Range    Phosphorus 3.2 2.6 - 4.7 MG/DL   GLUCOSE, POC    Collection Time: 04/03/18  6:26 AM   Result Value Ref Range    Glucose (POC) 88 65 - 100 mg/dL    Performed by [address] [place] [address],[address], POC    Collection Time: 04/03/18 11:54 AM   Result Value Ref Range    Glucose (POC) 90 65 - 100 mg/dL    Performed by [employer] [facility]              Telemetry:NSR. Imaging:  I have personally reviewed the patients radiographs and have reviewed the reports:  3-30-18:    IMPRESSION:  Combination body habitus and poor depth of inspiration accentuates heart size  and pulmonary markings, however patient may have pleural fluid and airspace  disease bilaterally greater on the left.          Florence Alva MD

## 2018-04-03 NOTE — PROGRESS NOTES
Unable to obtain axillary or oral reading for temperature with thermometer. Rectal temp 94.7. Cece hugger placed on patient. 5017 Patient with urine output less than 30cc over past few hours. Dr. Lex Wilder in to see patient and notified of low urine outputs. No change in orders. 1200 Still unable to obtain a temperature reading orally or axillary. Rectal temp re-check 93.7. Cece hugger remains on patient. Patient skin feels warm to touch and patient does not report feeling cold. 1202 noon BP 81/49. Recheck of BP 82/45. Patient with no complaints at this time. Dr. Morena Yen paged. 1203 Dr. Morena Yen called back and updated on patients BP and temperature. Order received for 500cc NS fluid bolus over 1 hour. 1206 NS bolus started at this time. Talked with patients mother and updated her on patients BP and the fluid bolus. 1249 Patients BP 82/57. Dr. Morena Yen in and notified of BP still in the 74'X systolic with bolus almost completed. Dr. Morena Yen placing new orders for patient. 1323 BP 87/55. Dr. Amee Bro notified of BP still in the 80's after fluid bolus. Order received for another 500cc NS bolus. 1326 2nd 500 cc NS bolus started at this time. 1337 axillary temp. 97.3. Cece hugger off at this time. 1400 97/61.  1900 No changes. Report to Edwin.

## 2018-04-03 NOTE — CONSULTS
Consultation Note    NAME: Erick Perry   :  1965   MRN:  [id number]     Date/Time:  4/3/2018 9:51 AM    I have been asked to see this patient by Dr. Doreen Reyes  for advice/opinion re: TRINIDAD. Assessment :    Plan:  TRINIDAD  Hypotension  Hypothermia  Anemia, chronic thrombocytopenia  Super morbid obesity/Prader-Willi  Right LE cellulitis/sepsis/bilateral HCAP  Obesity hypoventilation syndrome    Chronic CO2 retention    Respiratory alkalosis at this time (he has a chronic compensatory metabolic alkalosis) Creatinine at baseline is 0.5 (low muscle mass) and has been on the rise since yesterday (now 1.9); 3/30 urine bland; oliguric (now with a brooks - had to be placed with bedside cytoscopy by Urology); Hypotension early am on ; on Zosyn/vanc and zithromax    Likely ATN from sepsis/hypotension +/- from vanco    Continue IVF's and prn NS bolus for hypotension; will re-eval urine sediment and check FeNa and urine eos    If able, try to stop Vanco     No acute need for RRT (because of low muscle mass, it might become an issue at a creatinine of 3 to 4). I spoke to the niece and encouraged that the family give thought as to would they want HD if it is needed. Subjective:   CHIEF COMPLAINT:  trinidad    HISTORY OF PRESENT ILLNESS:     Wilson Kyle is a 46 y.o.   male who has a history of hypothyroid, MR, super morbid obesity, Prader-Willi and recent cellulitis. Mr. Jorge Alberto Argueta is unable to give history. I reviewed the chart and spoke with his niece in the room. His Mom just stepped out. Prior to  he was working, but was hospitalized with CAP in late November and has not been able to go back to work since. He was again hospitalized in early March with cellulitis. Worsened debilitation as a result. Admitted with AMS. He was quite hypotension over the weekend. He has decreased UOP. Now has a brooks (difficult placement).      I did initiate conversation regarding RRT (not needed now, but could be an issue in the next 48-72 hours). I wouldn't offer chronic HD (he would be a poor chronic HD candidate b/c comorbid conditions), but acute (and likely temporary HD) would depend on family wishes. Past Medical History:   Diagnosis Date    [facility] ([facility] [address]     Hypothyroid     Hypothyroidism     Mental retardation     Other ill-defined conditions(799.89)     obesity    Other ill-defined conditions(799.89)     mentally disabled    Prader-Willi syndrome     S/P cardiac pacemaker procedure 2/15/2013    heart block      Past Surgical History:   Procedure Laterality Date    HX HEENT      eye surgery as a baby    HX PACEMAKER       Social History   Substance Use Topics    Smoking status: Never Smoker    Smokeless tobacco: Never Used    Alcohol use No      Family History   Problem Relation Age of Onset    Hypertension Mother     Heart Disease Father       No Known Allergies   Prior to Admission medications    Medication Sig Start Date End Date Taking? Authorizing Provider   cephALEXin (KEFLEX) 500 mg capsule Take 500 mg by mouth three (3) times daily. 3/27/18  Yes Reese Draper MD   nystatin (MYCOSTATIN) powder Apply  to affected area two (2) times a day. Patient applies a 'generous amount to groin and underarms.'   Yes Historical Provider   cholecalciferol (VITAMIN D3) 1,000 unit cap Take 1,000 Units by mouth daily. Yes Historical Provider   cranberry extract (CRANBERRY) 450 mg tab Take 1 Tab by mouth daily. Yes Historical Provider   aspirin (ASPIRIN) 325 mg tablet Take 325 mg by mouth every evening. Yes Historical Provider   ascorbic acid (VITAMIN C) 500 mg tablet Take 500 mg by mouth every evening. Yes Historical Provider   ferrous sulfate (IRON) 325 mg (65 mg iron) tablet Take 325 mg by mouth daily (after dinner). Yes Historical Provider   multivitamin (ONE A DAY) tablet Take 1 tablet by mouth every evening.    Yes Historical Provider   levothyroxine (SYNTHROID) 100 mcg tablet Take 100 mcg by mouth Daily (before breakfast).    Yes Phys Other, MD     REVIEW OF SYSTEMS:     [x]  Unable to obtain reliable ROS due to  [x] mental status  [] sedated   [] intubated   [] Total of 12 systems reviewed as follows:  Constitutional: negative fever, negative chills, negative weight loss  Eyes:   negative visual changes  ENT:   negative sore throat, tongue or lip swelling  Respiratory:  negative cough, negative dyspnea  Cards:  negative for chest pain, palpitations, lower extremity edema  GI:   negative for nausea, vomiting, diarrhea, and abdominal pain  :  negative for frequency, dysuria  Integument:  negative for rash and pruritus  Heme:  negative for easy bruising and gum/nose bleeding  Musculoskel: negative for myalgias,  back pain and muscle weakness  Neuro:  negative for headaches, dizziness, vertigo  Psych:  negative for feelings of anxiety, depression   Travel?: none    Objective:   VITALS:    Visit Vitals    BP 98/71    Pulse 75    Temp (!) 94.7 °F (34.8 °C)    Resp 16    Ht 5' 1\" (1.549 m)    Wt 158.3 kg (348 lb 15.8 oz)    SpO2 95%    BMI 65.94 kg/m2     PHYSICAL EXAM:  Gen:  []  WD []  WN  [] cachectic []  thin [x]  obese []  disheveled             [x]  ill apearing  [x]   Critical  []   Chronic    []  No acute distress    HEENT:   [x] NC/AT/PERRLA/EOMI    [] pink conjunctivae      [] pale conjunctivae                  PERRL  [] yes  [] no      [] moist mucosa    [] dry mucosa    hearing intact to voice [x] yes  [] No                 NECK:   supple [x] yes  [] no        masses [] yes  [] No               thyroid  []  non tender  []  tender    RESP:   [x] CTA bilaterally, anterior/no wheezing/rhonchi/rales/crackles    [] rhonchi bilaterally - no dullness  [] wheezing   [] rhonchi   [] crackles     use of accessory muscles [] yes [x] no    CARD:   [x]  regular rate and rhythm/No murmurs/rubs/gallops    murmur  [] yes ()  [] no      Rubs  [] yes  [] no       Gallops [] yes  [] no    Rate []  regular  [] irregular        carotid bruits  [] Right  []  Left                 LE edema [x] yes  [] no           JVP  []  yes   []  no    ABD:    [x] soft/non distended/non tender/+bowel sounds/no HSM    []  Rigid    tenderness [] yes [] no   Liver enlargement  []  yes []  no                Spleen enlargement  []  yes []  no     distended []  yes [] no     bowel sound  [] hypoactive   [] hyperactive    LYMPH:    Neck []  yes [x]  no       Axillae []  yes []  no    SKIN:   Rashes [x]  yes   []  no    Ulcers []  yes   []  no               [] tight to palpitation    skin turgor [x]  good  [] poor  [] decreased               Cyanosis/clubbing []  yes []  no    NEUR:   [x] cranial nerves II-XII grossly intact       [] Cranial nerves deficit                 []  facial droop    []  slurred speech   [] aphasic     [] Strength normal     []  weakness  []  LUE  []   RUE/ []  LLE  []   RLE    follows commands  []  yes []  no           PSYCH:   insight [] poor [] good   Alert and Oriented to  [] person  [] place  []  time                    [] depressed [] anxious [] agitated  [] lethargic [] stuporous  [] sedated     LAB DATA REVIEWED:    Recent Labs      04/03/18 0456 04/02/18 0417   WBC  5.2  5.1   HGB  9.5*  9.4*   HCT  30.5*  30.0*   PLT  77*  99*     Recent Labs      04/03/18 0456  04/02/18 0417  04/01/18   0438   NA  142  142  144   K  3.6  3.4*  3.5   CL  100  99  100   CO2  39*  42*  43*   BUN  26*  23*  20   CREA  1.90*  1.24  0.55*   GLU  85  83  150*   CA  8.2*  8.4*  8.1*   MG  1.8  1.8   --    PHOS  3.2  2.2*   --      Recent Labs      04/03/18 0456   SGOT  33   ALT  24   AP  66   TBILI  0.9   ALB  2.0*   GLOB  4.8*     No results for input(s): INR, PTP, APTT in the last 72 hours. No lab exists for component: INREXT   No results for input(s): FE, TIBC, PSAT, FERR in the last 72 hours.    Recent Labs      04/02/18   0831   PH  7.51*   PCO2  56*   PO2  75*     No results for input(s): CPK, CKMB in the last 72 hours.    No lab exists for component: TROPONINI  Lab Results   Component Value Date/Time    Glucose (POC) 88 04/03/2018 06:26 AM    Glucose (POC) 96 04/03/2018 01:01 AM    Glucose (POC) 96 04/02/2018 05:00 PM    Glucose (POC) 94 04/02/2018 11:57 AM    Glucose (POC) 82 04/02/2018 07:43 AM       Procedures: see electronic medical records for all procedures/Xrays and details which were not copied into this note but were reviewed prior to creation of Plan.    ________________________________________________________________________       ___________________________________________________  Consulting Physician:  Deandre Peng MD

## 2018-04-03 NOTE — PROGRESS NOTES
Hospitalist Progress Note    NAME: Ashlyn Phillips   :  1965   MRN:  [id number]       Assessment / Plan:  Acute encephalopathy POA: 2ry to CO2 narcosis vs infection, seems a little better but not back to baseline yet. Acute on chronic respiratory failure/Hypoventilation Sd. POA: off BiPAP, so far tolerating NC. Acute HCAP (POA) c/w ABX, bronchodilators, monitor. Super morbid obesity (Body mass index is 65.94 kg/(m^2). ) due to Prader Willi    Acute Sepsis (POA) due to bilateral HCAP (POA) and RLE celllulits (POA) contributing to hypothermia:  c/w ABX. Monitor. Thrombocytopenia:   appears chronic, monitor labs, start folic acid  Hypothyroidism:   Stable last TSH 1.01 on 3/11/18, Continue synthroid  ARF: due to sepsis, poor urine output, c/w IVF, monitor. Code status: DNR  Prophylaxis: Lovenox  Recommended Disposition: SNF/LTC and  PT, OT, RN     Subjective:     Chief Complaint / Reason for Physician Visit  Lethargic unable to provide a meaningful history  Discussed with RN events overnight. Review of Systems:  Symptom Y/N Comments  Symptom Y/N Comments   Fever/Chills    Chest Pain     Poor Appetite    Edema     Cough    Abdominal Pain     Sputum    Joint Pain     SOB/CLEMENTE    Pruritis/Rash     Nausea/vomit    Tolerating PT/OT     Diarrhea    Tolerating Diet     Constipation    Other       Could NOT obtain due to: Prader Willi Sd.     Objective:     VITALS:   Last 24hrs VS reviewed since prior progress note.  Most recent are:  Patient Vitals for the past 24 hrs:   Temp Pulse Resp BP SpO2   18 1500 - 75 20 114/76 94 %   18 1400 - 75 16 97/61 95 %   18 1337 97.3 °F (36.3 °C) - - - -   18 1323 - 75 21 (!) 87/55 95 %   18 1308 - 75 20 (!) 86/59 94 %   18 1300 - 75 13 (!) 83/52 95 %   18 1203 - 75 19 (!) 82/50 95 %   18 1202 - 75 16 (!) 82/48 95 %   18 1200 (!) 93.7 °F (34.3 °C) 75 19 (!) 81/49 95 %   18 1100 - 75 18 100/78 99 %   18 1000 - 75 19 103/70 96 %   04/03/18 0900 - 75 16 98/71 95 %   04/03/18 0800 (!) 94.7 °F (34.8 °C) 75 18 92/66 95 %   04/03/18 0700 - 75 16 94/62 96 %   04/03/18 0600 - 75 20 97/67 96 %   04/03/18 0500 - 75 13 97/66 94 %   04/03/18 0400 97.2 °F (36.2 °C) 75 20 102/71 93 %   04/03/18 0352 - 75 20 - 93 %   04/03/18 0200 - 75 (!) 32 105/72 95 %   04/03/18 0100 - 75 20 (!) 86/65 96 %   04/03/18 0000 96.5 °F (35.8 °C) 75 19 104/62 94 %   04/02/18 2211 - 75 20 - 97 %   04/02/18 2100 - 75 16 116/82 94 %   04/02/18 2025 - 76 21 116/67 95 %   04/02/18 2000 97.1 °F (36.2 °C) 75 21 - 94 %   04/02/18 1953 - - - - 95 %   04/02/18 1900 - 75 17 104/80 94 %   04/02/18 1800 - 75 18 123/88 97 %   04/02/18 1700 - 75 17 106/74 95 %   04/02/18 1610 - - - - 93 %   04/02/18 1600 96 °F (35.6 °C) 75 19 (!) 84/54 -       Intake/Output Summary (Last 24 hours) at 04/03/18 1535  Last data filed at 04/03/18 1400   Gross per 24 hour   Intake          3041.66 ml   Output              302 ml   Net          2739.66 ml        PHYSICAL EXAM:  General: WD, WN. Lethargic, confused, no acute distress    EENT:  EOMI. Anicteric sclerae. MMM  Resp:  Coarse BS, poor air entry  CV:  Regular  rhythm,  No edema  GI:  Soft, Non distended, Non tender.  +Bowel sounds  Neurologic:  Alert and oriented X 1, slow speech,   Psych:   Poor  insight. Not anxious nor agitated  Skin:  No rashes. No jaundice    Reviewed most current lab test results and cultures  YES  Reviewed most current radiology test results   YES  Review and summation of old records today    NO  Reviewed patient's current orders and MAR    YES  PMH/SH reviewed - no change compared to H&P  ________________________________________________________________________  Care Plan discussed with:    Comments   Patient y    Family  y mother   RN y    Care Manager     Consultant                        Multidiciplinary team rounds were held today with , nursing, pharmacist and clinical coordinator.   Patient's plan of care was discussed; medications were reviewed and discharge planning was addressed. ________________________________________________________________________  Total NON critical care TIME:  35   Minutes    Total CRITICAL CARE TIME Spent:   Minutes non procedure based      Comments   >50% of visit spent in counseling and coordination of care y    ________________________________________________________________________  Ferny Franco MD     Procedures: see electronic medical records for all procedures/Xrays and details which were not copied into this note but were reviewed prior to creation of Plan. LABS:  I reviewed today's most current labs and imaging studies.   Pertinent labs include:  Recent Labs      04/03/18 0456 04/02/18   0417   WBC  5.2  5.1   HGB  9.5*  9.4*   HCT  30.5*  30.0*   PLT  77*  99*     Recent Labs      04/03/18   0456  04/02/18   0417  04/01/18   0438   NA  142  142  144   K  3.6  3.4*  3.5   CL  100  99  100   CO2  39*  42*  43*   GLU  85  83  150*   BUN  26*  23*  20   CREA  1.90*  1.24  0.55*   CA  8.2*  8.4*  8.1*   MG  1.8  1.8   --    PHOS  3.2  2.2*   --    ALB  2.0*   --    --    TBILI  0.9   --    --    SGOT  33   --    --    ALT  24   --    --        Signed: Ferny Franco MD

## 2018-04-04 NOTE — PROGRESS NOTES
[facility]  Pulmonary, Critical Care, and Sleep Medicine    Name: Rita Banegas MRN: [id number]   : 1965 Hospital: [facility]   Date: 2018        Critical Care Patient Consult    IMPRESSION:   · Encephalopathy-per his mother still not back to baseline. He is responsive. · Acute Renal failure,No urine output, increased Cr; Urology stated pt was a difficult cath, would keep brooks cath in place for now. Not to be on nurse driven removal protocol. Renal is following. · Acute on Chronic Respiratory failure, tolerating bipap use. Would continue to use bipap at night time. On NC during day. His CXR revealed pulmonary edema, possible basilar effusions and possible pulmoary infiltrates. On empiric treatment for pneumonia. · Due to pts hypotension, hypothermia, and hypoglycemia placed him on hydrocortisone yesterday, If stable will try to wean down over next several days. · Hypoglycemia, on D10 infusion. Still not taking in reliable amount by mouth. Will continue the dextrose infusion. · Chronic lymphedema, worse on left that right, has chronic cellulitis of his legs. Right leg seems more red and swollen. The cellulitis is a little better, still moderate edema. · Chronic hypoventilation syndrome. · Chronic thrombocytopenia. · Morbid obesity  · Prader Gina Screen Syndrome  · Hypothyroidism  · PPM  · Pt is Do not resuscitate per his mother. · Acutely ill, moderate risk of decompensation. Still not medical back to baseline. Kidney function is worse and his  CXR is concerning. RECOMMENDATIONS:   · Weaned off the BIPAP, can use at night. Will recheck ABG in am.  · Will repeat CXR in am.   · Urology had to place brooks, keep it in. · Acute renal failure. Appreciate Dr. Kristy Miller assistance. Needs ongoing monitoring, Recheck Cr, K, Mg in am.   · Will monitor UOP  · Monitor for further hypoglycemia, has POC glucose and D10 infusion.       Subjective/History: 4-4-18: Pt has been on nc this am. Was on bipap last night. Had a cough which seemed to be new per his mother. Not able to get ROS or HPI from pt due to his Prader Willi syndrome. 4-3-18: hrs: pt is more alert and interactive. Off bipap this am. Due to his underlying condition he is not able to give any hx. His mother is at his bedside. Cc: per his family was altered mental status    HPI: This patient has been seen and evaluated at the request of Dr. Char Armstrong for above. Patient is a 46 y.o. male who presents for above. He was seen on the bipap the am. NO distress. He is not able to communicate due to Prader Willi syndrome and pt on bipap. Discussed with nurse this am.     Per his mother pt was more sleepy, and confused. He seems to be more alert today. Had some hypotension yesterday. The patient is critically ill and can not provide additional history due to Unable to speak and Unable to comprehend. Past Medical History:   Diagnosis Date    A-fib ([facility] [address]     Hypothyroid     Hypothyroidism     Mental retardation     Other ill-defined conditions(799.89)     obesity    Other ill-defined conditions(799.89)     mentally disabled    Prader-Willi syndrome     S/P cardiac pacemaker procedure 2/15/2013    heart block      Past Surgical History:   Procedure Laterality Date    HX HEENT      eye surgery as a baby    HX PACEMAKER        Prior to Admission medications    Medication Sig Start Date End Date Taking? Authorizing Provider   cephALEXin (KEFLEX) 500 mg capsule Take 500 mg by mouth three (3) times daily. 3/27/18  Yes Esther Goldman MD   nystatin (MYCOSTATIN) powder Apply  to affected area two (2) times a day. Patient applies a 'generous amount to groin and underarms.'   Yes Historical Provider   cholecalciferol (VITAMIN D3) 1,000 unit cap Take 1,000 Units by mouth daily. Yes Historical Provider   cranberry extract (CRANBERRY) 450 mg tab Take 1 Tab by mouth daily.    Yes Historical Provider   aspirin (ASPIRIN) 325 mg tablet Take 325 mg by mouth every evening. Yes Historical Provider   ascorbic acid (VITAMIN C) 500 mg tablet Take 500 mg by mouth every evening. Yes Historical Provider   ferrous sulfate (IRON) 325 mg (65 mg iron) tablet Take 325 mg by mouth daily (after dinner). Yes Historical Provider   multivitamin (ONE A DAY) tablet Take 1 tablet by mouth every evening. Yes Historical Provider   levothyroxine (SYNTHROID) 100 mcg tablet Take 100 mcg by mouth Daily (before breakfast). Yes Marquita Juárez MD     Current Facility-Administered Medications   Medication Dose Route Frequency    piperacillin-tazobactam (ZOSYN) 2.25 g in 0.9% sodium chloride (MBP/ADV) 50 mL  2.25 g IntraVENous Q8H    hydrocortisone Sod Succ (PF) (SOLU-CORTEF) injection 50 mg  50 mg IntraVENous S5J    folic acid (FOLVITE) tablet 1 mg  1 mg Oral DAILY    balsam peru-castor oil (VENELEX)  mg/gram ointment   Topical BID    levothyroxine (SYNTHROID) tablet 100 mcg  100 mcg Oral 6am    VANCOMYCIN INFORMATION NOTE   Other Rx Dosing/Monitoring    dextrose 10% infusion  75 mL/hr IntraVENous CONTINUOUS    mupirocin (BACTROBAN) 2 % ointment   Both Nostrils BID    miconazole (SECURA) 2 % extra thick cream   Topical BID    sodium chloride (NS) flush 5-10 mL  5-10 mL IntraVENous Q8H    sodium chloride (NS) flush 5-10 mL  5-10 mL IntraVENous Q8H    nystatin (MYCOSTATIN) 100,000 unit/gram powder   Topical BID    enoxaparin (LOVENOX) injection 40 mg  40 mg SubCUTAneous Q12H     No Known Allergies   Social History   Substance Use Topics    Smoking status: Never Smoker    Smokeless tobacco: Never Used    Alcohol use No      Family History   Problem Relation Age of Onset    Hypertension Mother     Heart Disease Father         Review of Systems:  Review of systems not obtained due to patient factors.     Objective:   Vital Signs:    Visit Vitals    /71    Pulse 75    Temp 97.4 °F (36.3 °C)    Resp 12  Ht 5' 1\" (1.549 m)    Wt 158.3 kg (348 lb 15.8 oz)    SpO2 96%    BMI 65.94 kg/m2       O2 Device: Nasal cannula   O2 Flow Rate (L/min): 2 l/min   Temp (24hrs), Av.4 °F (35.8 °C), Min:93.7 °F (34.3 °C), Max:97.4 °F (36.3 °C)       Intake/Output:   Last shift:         Last 3 shifts:  190 -  0700  In: 2364 [I.V.:4125]  Out: 380 [Urine:380]    Intake/Output Summary (Last 24 hours) at 18 0736  Last data filed at 18 0400   Gross per 24 hour   Intake             3100 ml   Output              220 ml   Net             2880 ml     Hemodynamics:   PAP:   CO:     Wedge:   CI:     CVP:    SVR:       PVR:       Ventilator Settings:  Mode Rate Tidal Volume Pressure FiO2 PEEP   NIPPV      18 cm H2O 40 % 6 cm H20     Peak airway pressure: 22 cm H2O    Minute ventilation: 15 l/min      Physical Exam:    General:  Alert, cooperative, no distress, appears stated age. Morbid obese, no distress,has nC oxygen in place. Head:  Normocephalic, without obvious abnormality, atraumatic. Eyes:  Conjunctivae/corneas clear. PERRL, EOMs intact. Nose: Nares normal. Septum midline. Mucosa normal. No drainage or sinus tenderness. Throat: Lips, mucosa, and tongue normal. Teeth and gums normal.   Neck: Supple, symmetrical, trachea midline, no adenopathy, thyroid: no enlargment/tenderness/nodules, no carotid bruit and no JVD. Back:   Symmetric, no curvature. ROM normal.   Lungs:   Clear to auscultation bilaterally. Has decreased BS in bases bilaterally. Breathing comfortably. On NC oxygen. Chest wall:  No tenderness or deformity. Heart:  Regular rate and rhythm, S1, S2 normal, no murmur, click, rub or gallop. Abdomen:   Obese, Soft, non-tender. Bowel sounds normal. No masses,  No organomegaly. Extremities: Extremities normal, atraumatic, has 2-3+ Bilateral edema, appear to have chronic lower extremity edema. Has lessredness of the right leg today. Pulses: 2+ and symmetric all extremities.    Skin: Skin color, texture, turgor normal. No rashes or lesions   Lymph nodes: Cervical, supraclavicular nodes are  normal.   Neurologic: Grossly nonfocal, not able to fully assess due to his underlying medical condition. Psych: no overt anxiety or depression. Data:     Recent Results (from the past 24 hour(s))   VANCOMYCIN, RANDOM    Collection Time: 04/03/18 10:47 AM   Result Value Ref Range    Vancomycin, random 42.1 (HH) UG/ML   EOSINOPHILS, URINE    Collection Time: 04/03/18 10:47 AM   Result Value Ref Range    Eosinophils,urine NEGATIVE      GLUCOSE, POC    Collection Time: 04/03/18 11:54 AM   Result Value Ref Range    Glucose (POC) 90 65 - 100 mg/dL    Performed by Marilyn Bound    GLUCOSE, POC    Collection Time: 04/03/18  5:45 PM   Result Value Ref Range    Glucose (POC) 99 65 - 100 mg/dL    Performed by Marilyn Bound    GLUCOSE, POC    Collection Time: 04/04/18 12:05 AM   Result Value Ref Range    Glucose (POC) 129 (H) 65 - 100 mg/dL    Performed by Ra Kasper    CBC WITH AUTOMATED DIFF    Collection Time: 04/04/18  3:46 AM   Result Value Ref Range    WBC 7.5 4.1 - 11.1 K/uL    RBC 3.27 (L) 4.10 - 5.70 M/uL    HGB 10.0 (L) 12.1 - 17.0 g/dL    HCT 32.8 (L) 36.6 - 50.3 %    .3 (H) 80.0 - 99.0 FL    MCH 30.6 26.0 - 34.0 PG    MCHC 30.5 30.0 - 36.5 g/dL    RDW 14.8 (H) 11.5 - 14.5 %    PLATELET 71 (L) 701 - 400 K/uL    MPV 12.1 8.9 - 12.9 FL    NRBC 0.0 0  WBC    ABSOLUTE NRBC 0.00 0.00 - 0.01 K/uL    NEUTROPHILS 93 (H) 32 - 75 %    LYMPHOCYTES 3 (L) 12 - 49 %    MONOCYTES 3 (L) 5 - 13 %    EOSINOPHILS 0 0 - 7 %    BASOPHILS 0 0 - 1 %    IMMATURE GRANULOCYTES 1 (H) 0.0 - 0.5 %    ABS. NEUTROPHILS 7.0 1.8 - 8.0 K/UL    ABS. LYMPHOCYTES 0.2 (L) 0.8 - 3.5 K/UL    ABS. MONOCYTES 0.2 0.0 - 1.0 K/UL    ABS. EOSINOPHILS 0.0 0.0 - 0.4 K/UL    ABS. BASOPHILS 0.0 0.0 - 0.1 K/UL    ABS. IMM.  GRANS. 0.1 (H) 0.00 - 0.04 K/UL    DF AUTOMATED      RBC COMMENTS MACROCYTOSIS  1+       MAGNESIUM    Collection Time: 04/04/18  3:46 AM   Result Value Ref Range    Magnesium 2.0 1.6 - 2.4 mg/dL   METABOLIC PANEL, COMPREHENSIVE    Collection Time: 04/04/18  3:46 AM   Result Value Ref Range    Sodium 139 136 - 145 mmol/L    Potassium 4.2 3.5 - 5.1 mmol/L    Chloride 98 97 - 108 mmol/L    CO2 35 (H) 21 - 32 mmol/L    Anion gap 6 5 - 15 mmol/L    Glucose 125 (H) 65 - 100 mg/dL    BUN 27 (H) 6 - 20 MG/DL    Creatinine 2.37 (H) 0.70 - 1.30 MG/DL    BUN/Creatinine ratio 11 (L) 12 - 20      GFR est AA 35 (L) >60 ml/min/1.73m2    GFR est non-AA 29 (L) >60 ml/min/1.73m2    Calcium 8.0 (L) 8.5 - 10.1 MG/DL    Bilirubin, total 0.6 0.2 - 1.0 MG/DL    ALT (SGPT) 25 12 - 78 U/L    AST (SGOT) 36 15 - 37 U/L    Alk. phosphatase 71 45 - 117 U/L    Protein, total 6.7 6.4 - 8.2 g/dL    Albumin 2.1 (L) 3.5 - 5.0 g/dL    Globulin 4.6 (H) 2.0 - 4.0 g/dL    A-G Ratio 0.5 (L) 1.1 - 2.2               Telemetry:NSR. Imaging:  I have personally reviewed the patients radiographs and have reviewed the reports:  3-30-18:    IMPRESSION:  Combination body habitus and poor depth of inspiration accentuates heart size  and pulmonary markings, however patient may have pleural fluid and airspace  disease bilaterally greater on the left.          Issa Lo MD

## 2018-04-04 NOTE — PROGRESS NOTES
NAME: Mckenzie Capps        :  1965        MRN:  [id number]        Assessment :    Plan:  --TRINIDAD    Hypotension    Hypothermia    Anemia, chronic thrombocytopenia    Super morbid obesity/Prader-Willi    Right LE cellulitis/sepsis/bilateral HCAP    Obesity hypoventilation syndrome     Chronic CO2 retention     Respiratory alkalosis at this time (he has a chronic compensatory metabolic alkalosis) --Creatinine at baseline is 0.5 (low muscle mass) and has been on the rise since yesterday (now 1.9 to 2.4)    oliguric (brooks - had to be placed with bedside cytoscopy by Urology)    Likely ATN from sepsis/hypotension +/- from vanco     Continue IVF's and prn NS bolus for hypotension     If able, try to stop Vanco      No acute need for RRT (because of low muscle mass, it might become an issue at a creatinine of 3 to 4). Subjective:     Chief Complaint:  Sleepy. In some discomfort with leg. I had a long talk with his Mom in the room. We both agree that outpatient/long term HD would not be feasible and would drastically worsen quality of life and would not be an option. The harder decision for her is whether to try \"temporary\" HD if needed. We discussed that if the \"temporary\" HD doesn't end up being temporary, then we would need to stop HD given the above discussion regarding outpatient HD. I answered questions and she is going to continue to consider. Review of Systems:    Symptom Y/N Comments  Symptom Y/N Comments   Fever/Chills    Chest Pain     Poor Appetite    Edema     Cough    Abdominal Pain     Sputum    Joint Pain     SOB/CLEMENTE    Pruritis/Rash     Nausea/vomit    Tolerating PT/OT     Diarrhea    Tolerating Diet     Constipation    Other       Could not obtain due to: See above     Objective:     VITALS:   Last 24hrs VS reviewed since prior progress note.  Most recent are:  Visit Vitals    BP 94/54    Pulse 75    Temp 97.2 °F (36.2 °C)    Resp 19    Ht 5' 1\" (1.549 m)    Wt 158.3 kg (348 lb 15.8 oz)    SpO2 91%    BMI 65.94 kg/m2       Intake/Output Summary (Last 24 hours) at 04/04/18 0953  Last data filed at 04/04/18 0400   Gross per 24 hour   Intake             2875 ml   Output              178 ml   Net             2697 ml      Telemetry Reviewed:     PHYSICAL EXAM:  General: Obese in the ICU  Resp:  CTA anterior. No Wheezing/Rhonchi/Rales. No access. muscle use  CV:  Regular  rhythm,  No murmur (), No Rubs, No Gallops. + edema  GI:  Soft, obese, Non distended, Non tender.  +Bowel sounds, no HSM      Lab Data Reviewed: (see below)    Medications Reviewed: (see below)    PMH/SH reviewed - no change compared to H&P  ________________________________________________________________________  Care Plan discussed with:  Patient     Family  y    RN     Care Manager                    Consultant:          Comments   >50% of visit spent in counseling and coordination of care       ________________________________________________________________________  Leona Templeton MD     Procedures: see electronic medical records for all procedures/Xrays and details which  were not copied into this note but were reviewed prior to creation of Plan. LABS:  Recent Labs      04/04/18 0346 04/03/18   0456   WBC  7.5  5.2   HGB  10.0*  9.5*   HCT  32.8*  30.5*   PLT  71*  77*     Recent Labs      04/04/18   0346 04/03/18   0456  04/02/18   0417   NA  139  142  142   K  4.2  3.6  3.4*   CL  98  100  99   CO2  35*  39*  42*   BUN  27*  26*  23*   CREA  2.37*  1.90*  1.24   GLU  125*  85  83   CA  8.0*  8.2*  8.4*   MG  2.0  1.8  1.8   PHOS   --   3.2  2.2*     Recent Labs      04/04/18   0346  04/03/18   0456   SGOT  36  33   AP  71  66   TP  6.7  6.8   ALB  2.1*  2.0*   GLOB  4.6*  4.8*     No results for input(s): INR, PTP, APTT in the last 72 hours.     No lab exists for component: INREXT   No results for input(s): FE, TIBC, PSAT, FERR in the last 72 hours. No results found for: FOL, RBCF   Recent Labs      04/02/18   0831   PH  7.51*   PCO2  56*   PO2  75*     No results for input(s): CPK, CKMB in the last 72 hours.     No lab exists for component: TROPONINI  No components found for: [place]  Lab Results   Component Value Date/Time    Color YELLOW/STRAW 03/30/2018 10:25 PM    Appearance CLEAR 03/30/2018 10:25 PM    Specific gravity 1.017 03/30/2018 10:25 PM    pH (UA) 5.0 03/30/2018 10:25 PM    Protein NEGATIVE  03/30/2018 10:25 PM    Glucose NEGATIVE  03/30/2018 10:25 PM    Ketone NEGATIVE  03/30/2018 10:25 PM    Bilirubin NEGATIVE  03/30/2018 10:25 PM    Urobilinogen 0.2 03/30/2018 10:25 PM    Nitrites NEGATIVE  03/30/2018 10:25 PM    Leukocyte Esterase SMALL (A) 03/30/2018 10:25 PM    Epithelial cells FEW 03/30/2018 10:25 PM    Bacteria NEGATIVE  03/30/2018 10:25 PM    WBC 0-4 03/30/2018 10:25 PM    RBC 0-5 03/30/2018 10:25 PM       MEDICATIONS:  Current Facility-Administered Medications   Medication Dose Route Frequency    piperacillin-tazobactam (ZOSYN) 4.5 g in 0.9% sodium chloride (MBP/ADV) 100 mL  4.5 g IntraVENous Q12H    hydrocortisone Sod Succ (PF) (SOLU-CORTEF) injection 50 mg  50 mg IntraVENous R5X    folic acid (FOLVITE) tablet 1 mg  1 mg Oral DAILY    balsam peru-castor oil (VENELEX)  mg/gram ointment   Topical BID    levothyroxine (SYNTHROID) tablet 100 mcg  100 mcg Oral 6am    VANCOMYCIN INFORMATION NOTE   Other Rx Dosing/Monitoring    dextrose 10% infusion  75 mL/hr IntraVENous CONTINUOUS    glucose chewable tablet 16 g  4 Tab Oral PRN    dextrose (D50W) injection syrg 12.5-25 g  12.5-25 g IntraVENous PRN    glucagon (GLUCAGEN) injection 1 mg  1 mg IntraMUSCular PRN    mupirocin (BACTROBAN) 2 % ointment   Both Nostrils BID    miconazole (SECURA) 2 % extra thick cream   Topical BID    sodium chloride (NS) flush 5-10 mL  5-10 mL IntraVENous Q8H    sodium chloride (NS) flush 5-10 mL  5-10 mL IntraVENous PRN    sodium chloride (NS) flush 5-10 mL  5-10 mL IntraVENous Q8H    sodium chloride (NS) flush 5-10 mL  5-10 mL IntraVENous PRN    nystatin (MYCOSTATIN) 100,000 unit/gram powder   Topical BID    enoxaparin (LOVENOX) injection 40 mg  40 mg SubCUTAneous Q12H

## 2018-04-04 NOTE — PROGRESS NOTES
Problem: Falls - Risk of  Goal: *Absence of Falls  Document Octavio Fall Risk and appropriate interventions in the flowsheet.    Outcome: Progressing Towards Goal  Fall Risk Interventions:  Mobility Interventions: Assess mobility with egress test, Bed/chair exit alarm, Communicate number of staff needed for ambulation/transfer, Mechanical lift, OT consult for ADLs, Patient to call before getting OOB, PT Consult for mobility concerns, PT Consult for assist device competence, Strengthening exercises (ROM-active/passive), Utilize walker, cane, or other assitive device, Utilize gait belt for transfers/ambulation    Mentation Interventions: Adequate sleep, hydration, pain control, Bed/chair exit alarm, Door open when patient unattended, Evaluate medications/consider consulting pharmacy, Eyeglasses and hearing aids, Familiar objects from home, Family/sitter at bedside, Gait belt with transfers/ambulation, Increase mobility, More frequent rounding, Reorient patient, Room close to nurse's station, Self-releasing belt, Toileting rounds, Update white board    Medication Interventions: Assess postural VS orthostatic hypotension, Evaluate medications/consider consulting pharmacy, Bed/chair exit alarm, Patient to call before getting OOB, Teach patient to arise slowly, Utilize gait belt for transfers/ambulation    Elimination Interventions: Bed/chair exit alarm, Call light in reach, Patient to call for help with toileting needs, Toileting schedule/hourly rounds             Comments: Bed alarm

## 2018-04-04 NOTE — PROGRESS NOTES
Pharmacy Automatic Renal Dosing Protocol - Antimicrobials    Indication for Antimicrobials: CAP/aspiration pneumonia    Current Regimen of Each Antimicrobial:  Piperacillin-tazobactam 4.5 gm IV every 12 hours (Started 3/30/18; Day #6 of 7)  Vancomycin dosed by levels (Started 3/30/18; Day #6 of 7)    Previous Antimicrobials:  Azithromycin 500 mg daily (Start Date: 3/30; Day 5)    Vancomycin Goal Level: 15-20 mcg/ml    Measured / Extrapolated Vancomycin Level:   18 at 0438 Vancomycin trough = 40.8 mcg/mL  18 at 0417 Vancomycin random level = 47.2 mcg/mL  4/3/18 at 1047 Vancomycin random level = 42.1 mcg/mL    Significant Cultures:   3/30/18 Blood culture = No growth (FINAL)    Labs:  Recent Labs      18   0346  18   0456  18   0417   CREA  2.37*  1.90*  1.24   BUN  27*  26*  23*   WBC  7.5  5.2  5.1     Temp (24hrs), Av.8 °F (36 °C), Min:93.7 °F (34.3 °C), Max:97.4 °F (36.3 °C)    Paralysis, amputations, malnutrition: Prader willi syndrome    Creatinine Clearance (mL/min) or Dialysis: Likely less than 20 mL/min given SCr rise from baseline, patient's baseline reduced muscle mass, and patient's oliguria    Impression/Plan:    TRINIDAD (Unsure if vancomycin induced the TRINIDAD or if the TRINIDAD came first resulting in elevated vancomycin levels).  Piperacillin-tazobactam dose adjusted to 4.5 gm IV every 12 hours today based on reduced renal function. 7 day total duration entered for piperacillin-tazobactam.   Vancomycin random level on 4/3/18 was still elevated. Given significantly elevated random vancomycin level, patient will likely have vancomycin levels greater than 15 mcg/mL to complete the 7 day vancomycin course (unless that patient's renal function recovers significantly over the next 24 hours). Pharmacy will follow daily and adjust medications as appropriate for renal function and/or serum levels.     Thank you,  Jose Scott, CARSOND

## 2018-04-04 NOTE — PROGRESS NOTES
Hospitalist Progress Note    NAME: Audie Barrett   :  1965   MRN:  [id number]       Assessment / Plan:  Acute encephalopathy POA: 2ry to CO2 narcosis vs infection, seems a little better but not back to baseline yet. Acute on chronic respiratory failure/Hypoventilation Sd. POA: off BiPAP, so far tolerating NC, uses home O2 at night. Acute HCAP (POA) c/w ABX, bronchodilators, monitor. Super morbid obesity (Body mass index is 65.94 kg/(m^2). ) due to Prader Willi    Acute Sepsis (POA) due to bilateral HCAP (POA) and RLE celllulits (POA) contributing to hypothermia:  c/w ABX. Monitor. Thrombocytopenia:   appears chronic, monitor labs, start folic acid  Hypothyroidism:   Stable last TSH 1.01 on 3/11/18, Continue synthroid  ARF: due to sepsis vs Vancomycin, poor urine output, c/w IVF, monitor. Nephrology in the case, creatinine trending up. He is edematous and his weight is up, may need diuresis soon, will check ECHO and BNP. Code status: DNR  Prophylaxis: Lovenox  Recommended Disposition: SNF/LTC and  PT, OT, RN     Subjective:     Chief Complaint / Reason for Physician Visit  Awake, confused unable to provide a meaningful history  Discussed with RN events overnight. Review of Systems:  Symptom Y/N Comments  Symptom Y/N Comments   Fever/Chills    Chest Pain     Poor Appetite    Edema     Cough    Abdominal Pain     Sputum    Joint Pain     SOB/CLEMENTE    Pruritis/Rash     Nausea/vomit    Tolerating PT/OT     Diarrhea    Tolerating Diet     Constipation    Other       Could NOT obtain due to: Prader Willi Sd.     Objective:     VITALS:   Last 24hrs VS reviewed since prior progress note.  Most recent are:  Patient Vitals for the past 24 hrs:   Temp Pulse Resp BP SpO2   18 1022 - 75 13 (!) 89/53 96 %   18 0912 - 75 19 94/54 91 %   18 0800 97.2 °F (36.2 °C) 75 12 98/55 96 %   18 0700 - 75 12 94/56 95 %   18 0600 - 75 12 108/71 96 %   18 0500 - 75 13 106/68 96 % 04/04/18 0400 97.4 °F (36.3 °C) 75 16 109/76 95 %   04/04/18 0300 - 75 13 107/72 96 %   04/04/18 0200 - 75 12 108/63 96 %   04/04/18 0100 - 75 13 110/69 95 %   04/04/18 0000 97.3 °F (36.3 °C) 75 12 95/59 95 %   04/03/18 2300 - 75 12 94/59 95 %   04/03/18 2200 - 75 20 107/67 94 %   04/03/18 2100 - 75 20 107/59 95 %   04/03/18 2000 97.3 °F (36.3 °C) 75 17 (!) 107/22 94 %   04/03/18 1900 - 75 18 (!) 112/92 94 %   04/03/18 1800 - 75 21 108/77 95 %   04/03/18 1700 - 75 13 113/82 94 %   04/03/18 1600 97.3 °F (36.3 °C) 75 13 119/88 93 %   04/03/18 1500 - 75 20 114/76 94 %   04/03/18 1400 - 75 16 97/61 95 %   04/03/18 1337 97.3 °F (36.3 °C) - - - -       Intake/Output Summary (Last 24 hours) at 04/04/18 1324  Last data filed at 04/04/18 1000   Gross per 24 hour   Intake             1975 ml   Output              195 ml   Net             1780 ml        PHYSICAL EXAM:  General: WD, WN. awake, confused, no acute distress    EENT:  EOMI. Anicteric sclerae. MMM  Resp:  Coarse BS, poor air entry  CV:  Regular  rhythm,  + edema  GI:  Soft, Non distended, Non tender.  +Bowel sounds  Neurologic:  Alert and oriented X 1, slow speech,   Psych:   Poor  insight. Not anxious nor agitated  Skin:  No rashes. No jaundice    Reviewed most current lab test results and cultures  YES  Reviewed most current radiology test results   YES  Review and summation of old records today    NO  Reviewed patient's current orders and MAR    YES  PMH/SH reviewed - no change compared to H&P  ________________________________________________________________________  Care Plan discussed with:    Comments   Patient y    Family  y mother   RN y    Care Manager     Consultant                        Multidiciplinary team rounds were held today with , nursing, pharmacist and clinical coordinator. Patient's plan of care was discussed; medications were reviewed and discharge planning was addressed. ________________________________________________________________________  Total NON critical care TIME:  25   Minutes    Total CRITICAL CARE TIME Spent:   Minutes non procedure based      Comments   >50% of visit spent in counseling and coordination of care y    ________________________________________________________________________  Stevie Sweeney MD     Procedures: see electronic medical records for all procedures/Xrays and details which were not copied into this note but were reviewed prior to creation of Plan. LABS:  I reviewed today's most current labs and imaging studies.   Pertinent labs include:  Recent Labs      04/04/18 0346 04/03/18 0456 04/02/18   0417   WBC  7.5  5.2  5.1   HGB  10.0*  9.5*  9.4*   HCT  32.8*  30.5*  30.0*   PLT  71*  77*  99*     Recent Labs      04/04/18 0346 04/03/18 0456 04/02/18   0417   NA  139  142  142   K  4.2  3.6  3.4*   CL  98  100  99   CO2  35*  39*  42*   GLU  125*  85  83   BUN  27*  26*  23*   CREA  2.37*  1.90*  1.24   CA  8.0*  8.2*  8.4*   MG  2.0  1.8  1.8   PHOS   --   3.2  2.2*   ALB  2.1*  2.0*   --    TBILI  0.6  0.9   --    SGOT  36  33   --    ALT  25  24   --        Signed: Stevie Sweeney MD

## 2018-04-04 NOTE — PROGRESS NOTES
Pt report received from night RNBrandin    0800 Pt assesses as noted, lethargic, Mother at bedside    1000 Pt ate nothing from clear liquid breakfast tray. UOP remains less than 30 cc/hr    1200 Pt reassessed as noted, full liquid diet ordered for lunch    1400 Pt ate 100% lunch, continued low UOP    1600 Pt reassessed as noted, more alert, sitting up in bed, more responsive and communicative. Will continue to monitor.

## 2018-04-04 NOTE — PROGRESS NOTES
Interdisciplinary team rounds were held 4/4/2018  with the following team members:Care Management, Diabetes Treatment Specialist, Nursing, Nutrition, Pharmacy, Physician, Respiratory Therapy and Clinical Coordinator. Plan of care discussed. Goal: See MD orders and progress notes for further  interventions and desired outcomes.

## 2018-04-05 NOTE — PROGRESS NOTES
NAME: Mar Yun        :  1965        MRN:  [id number]        Assessment :    Plan:  --TRINIDAD    Hypotension    Hypothermia    Anemia, chronic thrombocytopenia    Super morbid obesity/Prader-Willi    Right LE cellulitis/sepsis/bilateral HCAP    Obesity hypoventilation syndrome     Chronic CO2 retention     Respiratory alkalosis at this time (he has a chronic compensatory metabolic alkalosis) --Creatinine at baseline is 0.5 (low muscle mass) and continues on the rise (now 2.4 to 2.7)    oligoanuric - less uop now which is not a good prognostic sign for renal recovery any time soon (brooks - had to be placed with bedside cytoscopy by Urology)    Likely ATN from sepsis/hypotension +/- from vanco     Continue IVF's and prn NS bolus for hypotension     No acute need for RRT (because of low muscle mass, it might become an issue at a creatinine of 3 to 4 - ie in the next day or so). I have had long discussions with his Mom. We agree that chronic HD would not be feasible/desired. A harder issue is whether to try acute HD (\"temporary\" dialysis) - the hard part being that it might turn out to be a chronic need and also that it would include invasive procedures. His Mom is having a hard time deciding. Will ask Palliative to see to help clarify care decisions. Subjective:     Chief Complaint:  A bit more alert. Poor historian. I spoke with his Mom and his ICU nurse in the room. Review of Systems:    Symptom Y/N Comments  Symptom Y/N Comments   Fever/Chills    Chest Pain     Poor Appetite    Edema     Cough    Abdominal Pain     Sputum    Joint Pain     SOB/CLEMENTE    Pruritis/Rash     Nausea/vomit    Tolerating PT/OT     Diarrhea    Tolerating Diet     Constipation    Other       Could not obtain due to: See above     Objective:     VITALS:   Last 24hrs VS reviewed since prior progress note.  Most recent are:  Visit Vitals    BP 95/59    Pulse 75    Temp 96.4 °F (35.8 °C)    Resp 21    Ht 5' 1\" (1.549 m)    Wt (!) 161.5 kg (356 lb 0.7 oz)    SpO2 94%    BMI 67.27 kg/m2       Intake/Output Summary (Last 24 hours) at 04/05/18 1903  Last data filed at 04/05/18 0602   Gross per 24 hour   Intake          1127.08 ml   Output              237 ml   Net           890.08 ml      Telemetry Reviewed:     PHYSICAL EXAM:  General: Obese in the ICU  Resp:  CTA anterior. No Wheezing/Rhonchi/Rales. No access. muscle use  CV:  Regular  rhythm,  No murmur (), No Rubs, No Gallops. + edema  GI:  Soft, obese, Non distended, Non tender.  +Bowel sounds, no HSM      Lab Data Reviewed: (see below)    Medications Reviewed: (see below)    PMH/ reviewed - no change compared to H&P  ________________________________________________________________________  Care Plan discussed with:  Patient     Family  y    RN     Care Manager                    Consultant:          Comments   >50% of visit spent in counseling and coordination of care       ________________________________________________________________________  Leona Templeton MD     Procedures: see electronic medical records for all procedures/Xrays and details which  were not copied into this note but were reviewed prior to creation of Plan. LABS:  Recent Labs      04/05/18 0344 04/04/18   0346   WBC  7.3  7.5   HGB  10.1*  10.0*   HCT  33.4*  32.8*   PLT  71*  71*     Recent Labs      04/05/18   0344  04/04/18 0346  04/03/18   0456   NA  138  139  142   K  4.0  4.2  3.6   CL  98  98  100   CO2  36*  35*  39*   BUN  27*  27*  26*   CREA  2.69*  2.37*  1.90*   GLU  123*  125*  85   CA  7.7*  8.0*  8.2*   MG  1.8  2.0  1.8   PHOS  5.7*   --   3.2     Recent Labs      04/05/18   0344  04/04/18   0346  04/03/18   0456   SGOT  26  36  33   AP  70  71  66   TP  6.9  6.7  6.8   ALB  2.0*  2.1*  2.0*   GLOB  4.9*  4.6*  4.8*     No results for input(s): INR, PTP, APTT in the last 72 hours.     No lab exists for component: INREXT, INREXT   No results for input(s): FE, TIBC, PSAT, FERR in the last 72 hours. No results found for: FOL, RBCF   Recent Labs      04/02/18   0831   PH  7.51*   PCO2  56*   PO2  75*     No results for input(s): CPK, CKMB in the last 72 hours.     No lab exists for component: TROPONINI  No components found for: [place]  Lab Results   Component Value Date/Time    Color YELLOW/STRAW 03/30/2018 10:25 PM    Appearance CLEAR 03/30/2018 10:25 PM    Specific gravity 1.017 03/30/2018 10:25 PM    pH (UA) 5.0 03/30/2018 10:25 PM    Protein NEGATIVE  03/30/2018 10:25 PM    Glucose NEGATIVE  03/30/2018 10:25 PM    Ketone NEGATIVE  03/30/2018 10:25 PM    Bilirubin NEGATIVE  03/30/2018 10:25 PM    Urobilinogen 0.2 03/30/2018 10:25 PM    Nitrites NEGATIVE  03/30/2018 10:25 PM    Leukocyte Esterase SMALL (A) 03/30/2018 10:25 PM    Epithelial cells FEW 03/30/2018 10:25 PM    Bacteria NEGATIVE  03/30/2018 10:25 PM    WBC 0-4 03/30/2018 10:25 PM    RBC 0-5 03/30/2018 10:25 PM       MEDICATIONS:  Current Facility-Administered Medications   Medication Dose Route Frequency    piperacillin-tazobactam (ZOSYN) 4.5 g in 0.9% sodium chloride (MBP/ADV) 100 mL  4.5 g IntraVENous Q12H    enoxaparin (LOVENOX) injection 40 mg  40 mg SubCUTAneous Q24H    sodium chloride 0.9 % in dextrose 10% 1,040 mL infusion   IntraVENous CONTINUOUS    hydrocortisone Sod Succ (PF) (SOLU-CORTEF) injection 50 mg  50 mg IntraVENous Q5L    folic acid (FOLVITE) tablet 1 mg  1 mg Oral DAILY    balsam peru-castor oil (VENELEX)  mg/gram ointment   Topical BID    levothyroxine (SYNTHROID) tablet 100 mcg  100 mcg Oral 6am    VANCOMYCIN INFORMATION NOTE   Other Rx Dosing/Monitoring    glucose chewable tablet 16 g  4 Tab Oral PRN    dextrose (D50W) injection syrg 12.5-25 g  12.5-25 g IntraVENous PRN    glucagon (GLUCAGEN) injection 1 mg  1 mg IntraMUSCular PRN    mupirocin (BACTROBAN) 2 % ointment   Both Nostrils BID    miconazole (SECURA) 2 % extra thick cream   Topical BID    sodium chloride (NS) flush 5-10 mL  5-10 mL IntraVENous Q8H    sodium chloride (NS) flush 5-10 mL  5-10 mL IntraVENous PRN    sodium chloride (NS) flush 5-10 mL  5-10 mL IntraVENous Q8H    sodium chloride (NS) flush 5-10 mL  5-10 mL IntraVENous PRN    nystatin (MYCOSTATIN) 100,000 unit/gram powder   Topical BID

## 2018-04-05 NOTE — PROGRESS NOTES
Hospitalist Progress Note    NAME: Leobardo Donaldson   :  1965   MRN:  [id number]       Assessment / Plan:  Acute encephalopathy POA: 2ry to CO2 narcosis vs infection, seems a little better but not back to baseline yet. Acute on chronic respiratory failure/Hypoventilation Sd. POA: on full face BiPAP today , sounds congested  Acute HCAP (POA) c/w ABX, bronchodilators, monitor. Super morbid obesity (Body mass index is 65.94 kg/(m^2). ) due to Prader Willi    Acute Sepsis (POA) due to bilateral HCAP (POA) and RLE celllulits (POA) contributing to hypothermia:  c/w ABX. Monitor. Thrombocytopenia:   appears chronic, monitor labs, start folic acid  Hypothyroidism:   Stable last TSH 1.01 on 3/11/18, Continue synthroid  ARF: due to sepsis vs Vancomycin, poor urine output, c/w IVF, monitor. Nephrology in the case, creatinine trending up. He is edematous and his weight is up, BNP is high, at this rate will need HD soon but I think he is a poor candidate for it, Nephrology help appreciated. Code status: DNR  Prophylaxis: Lovenox  Recommended Disposition: SNF/LTC and  PT, OT, RN     Prognosis is Poor     Subjective:     Chief Complaint / Reason for Physician Visit  Awake, confused unable to provide a meaningful history  Discussed with RN events overnight. Review of Systems:  Symptom Y/N Comments  Symptom Y/N Comments   Fever/Chills    Chest Pain     Poor Appetite    Edema     Cough    Abdominal Pain     Sputum    Joint Pain     SOB/CLEMENTE    Pruritis/Rash     Nausea/vomit    Tolerating PT/OT     Diarrhea    Tolerating Diet     Constipation    Other       Could NOT obtain due to: Prader Willi Sd.     Objective:     VITALS:   Last 24hrs VS reviewed since prior progress note.  Most recent are:  Patient Vitals for the past 24 hrs:   Temp Pulse Resp BP SpO2   18 1200 - 75 20 91/49 97 %   18 1120 - 75 21 - 95 %   18 1100 - 75 15 91/50 95 %   18 1005 - - - (!) 88/49 -   18 1000 - 75 15 (!) 83/47 95 %   04/05/18 0900 - 75 24 95/59 94 %   04/05/18 0800 - - - 96/58 -   04/05/18 0700 - 75 19 93/59 97 %   04/05/18 0600 - 75 21 95/59 94 %   04/05/18 0500 - 75 15 99/70 94 %   04/05/18 0400 96.4 °F (35.8 °C) 75 17 102/74 94 %   04/05/18 0300 - 75 21 100/71 95 %   04/05/18 0200 - 75 15 (!) 84/53 96 %   04/05/18 0100 - 75 12 95/65 96 %   04/05/18 0000 97.6 °F (36.4 °C) 75 12 94/54 95 %   04/04/18 2300 - 75 17 107/45 94 %   04/04/18 2200 - 75 27 99/59 94 %   04/04/18 2100 - 76 20 90/70 94 %   04/04/18 2000 97.6 °F (36.4 °C) 75 19 96/56 92 %   04/04/18 1900 - 75 18 98/53 93 %   04/04/18 1800 - 75 23 91/47 96 %   04/04/18 1700 - 75 15 (!) 89/46 96 %   04/04/18 1600 97.5 °F (36.4 °C) - - - -   04/04/18 1500 - 75 14 103/67 96 %       Intake/Output Summary (Last 24 hours) at 04/05/18 1401  Last data filed at 04/05/18 1120   Gross per 24 hour   Intake              800 ml   Output              145 ml   Net              655 ml        PHYSICAL EXAM:  General: WD, WN. awake, confused, no acute distress    EENT:  EOMI. Anicteric sclerae. MMM  Resp:  Coarse BS, poor air entry  CV:  Regular  rhythm,  + edema  GI:  Soft, Non distended, Non tender.  +Bowel sounds  Neurologic:  Alert and oriented X 1, slow speech,   Psych:   Poor  insight. Not anxious nor agitated  Skin:  No rashes. No jaundice    Reviewed most current lab test results and cultures  YES  Reviewed most current radiology test results   YES  Review and summation of old records today    NO  Reviewed patient's current orders and MAR    YES  PMH/SH reviewed - no change compared to H&P  ________________________________________________________________________  Care Plan discussed with:    Comments   Patient y    Family  y mother   RN y    Care Manager     Consultant                        Multidiciplinary team rounds were held today with , nursing, pharmacist and clinical coordinator.   Patient's plan of care was discussed; medications were reviewed and discharge planning was addressed. ________________________________________________________________________  Total NON critical care TIME:  25   Minutes    Total CRITICAL CARE TIME Spent:   Minutes non procedure based      Comments   >50% of visit spent in counseling and coordination of care y    ________________________________________________________________________  Netta He MD     Procedures: see electronic medical records for all procedures/Xrays and details which were not copied into this note but were reviewed prior to creation of Plan. LABS:  I reviewed today's most current labs and imaging studies.   Pertinent labs include:  Recent Labs      04/05/18 0344 04/04/18 0346 04/03/18   0456   WBC  7.3  7.5  5.2   HGB  10.1*  10.0*  9.5*   HCT  33.4*  32.8*  30.5*   PLT  71*  71*  77*     Recent Labs      04/05/18 0344 04/04/18 0346  04/03/18   0456   NA  138  139  142   K  4.0  4.2  3.6   CL  98  98  100   CO2  36*  35*  39*   GLU  123*  125*  85   BUN  27*  27*  26*   CREA  2.69*  2.37*  1.90*   CA  7.7*  8.0*  8.2*   MG  1.8  2.0  1.8   PHOS  5.7*   --   3.2   ALB  2.0*  2.1*  2.0*   TBILI  0.4  0.6  0.9   SGOT  26  36  33   ALT  24  25  24       Signed: Netta He MD

## 2018-04-05 NOTE — PROGRESS NOTES
[facility]  Pulmonary, Critical Care, and Sleep Medicine    Name: Tanisha Parikh MRN: [id number]   : 1965 Hospital: [facility]   Date: 2018        Critical Care Patient Consult    IMPRESSION:   · Encephalopathy-per his mother still not back to baseline. He is responsive. · His kidneys and lungs are not improving. Overall prognosis is looking pretty poor. · Acute Renal failure,No urine output, increased Cr; Urology stated pt was a difficult cath, would keep brooks cath in place for now. Not to be on nurse driven removal protocol. Renal is following. · Acute on Chronic Respiratory failure-much worse today, Did not use last night and his pco2 had increased again this am to: 96. pao2 of 88. His CXR demonstrates ongoing pulmonary edema, possible basilar effusions and possible pulmoary infiltrates. On empiric treatment for pneumonia. · Due to pts hypotension, hypothermia, and hypoglycemia placed him on hydrocortisone yesterday, If stable will try to wean down over next several days. · Hypoglycemia, on D10 infusion. Still not taking in reliable amount by mouth. Will continue the dextrose infusion. · Chronic lymphedema, worse on left that right, has chronic cellulitis of his legs. Right leg seems more red and swollen. Cellulitis seems stable. · Chronic hypoventilation syndrome, now made acutely worse with above. · Chronic thrombocytopenia. · Morbid obesity  · Prader Marine Longs Syndrome  · Hypothyroidism  · PPM  · Pt is Do not resuscitate per his mother. · His mother is deciding on further treatment of the acute renal failure and respiratory failure. · Critically ill, High  risk of decompensation. 35 mi CC, EOP  · Prognosis is looking poor. · Pt would not be a good long term HD candidate.         RECOMMENDATIONS:   · Will need  continual bipap, will continue with bipap support,   · Palliative care consult place for support of the mother in her medical decision making. · Will repeat CXR in am.   · Urology had to place brooks, keep it in. · Acute renal failure. Appreciate Dr. Kristy Miller assistance. Needs ongoing monitoring, ongoing discussions of direction to care for pt. · Will monitor UOP  · Monitor for further hypoglycemia, has POC glucose and D10 infusion. Subjective/History:   Last 24 hrs: pt seems to be doing worse. Pt has not been as responsive today. WE check ABG this am and his pco2 was up to 90. His Cr has not improved. Mother is considering what direction to proceed. Pt is not able to take in anything by mouth due to poor mental status. 4-4-18: Pt has been on nc this am. Was on bipap last night. Had a cough which seemed to be new per his mother. Not able to get ROS or HPI from pt due to his Prader Willi syndrome. 4-3-18: hrs: pt is more alert and interactive. Off bipap this am. Due to his underlying condition he is not able to give any hx. His mother is at his bedside. Cc: per his family was altered mental status    HPI: This patient has been seen and evaluated at the request of Dr. Tia Rosario for above. Patient is a 46 y.o. male who presents for above. He was seen on the bipap the am. NO distress. He is not able to communicate due to Prader Willi syndrome and pt on bipap. Discussed with nurse this am.     Per his mother pt was more sleepy, and confused. He seems to be more alert today. Had some hypotension yesterday. The patient is critically ill and can not provide additional history due to Unable to speak and Unable to comprehend.      Past Medical History:   Diagnosis Date    A-fib ([address]     Hypothyroid     Hypothyroidism     Mental retardation     Other ill-defined conditions(799.89)     obesity    Other ill-defined conditions(799.89)     mentally disabled    Prader-Willi syndrome     S/P cardiac pacemaker procedure 2/15/2013    heart block      Past Surgical History:   Procedure Laterality Date    HX HEENT      eye surgery as a baby    HX PACEMAKER        Prior to Admission medications    Medication Sig Start Date End Date Taking? Authorizing Provider   cephALEXin (KEFLEX) 500 mg capsule Take 500 mg by mouth three (3) times daily. 3/27/18  Yes Aron Montgomery MD   nystatin (MYCOSTATIN) powder Apply  to affected area two (2) times a day. Patient applies a 'generous amount to groin and underarms.'   Yes Historical Provider   cholecalciferol (VITAMIN D3) 1,000 unit cap Take 1,000 Units by mouth daily. Yes Historical Provider   cranberry extract (CRANBERRY) 450 mg tab Take 1 Tab by mouth daily. Yes Historical Provider   aspirin (ASPIRIN) 325 mg tablet Take 325 mg by mouth every evening. Yes Historical Provider   ascorbic acid (VITAMIN C) 500 mg tablet Take 500 mg by mouth every evening. Yes Historical Provider   ferrous sulfate (IRON) 325 mg (65 mg iron) tablet Take 325 mg by mouth daily (after dinner). Yes Historical Provider   multivitamin (ONE A DAY) tablet Take 1 tablet by mouth every evening. Yes Historical Provider   levothyroxine (SYNTHROID) 100 mcg tablet Take 100 mcg by mouth Daily (before breakfast).    Yes Marquita Juárez MD     Current Facility-Administered Medications   Medication Dose Route Frequency    heparin (porcine) injection 5,000 Units  5,000 Units SubCUTAneous Q8H    piperacillin-tazobactam (ZOSYN) 4.5 g in 0.9% sodium chloride (MBP/ADV) 100 mL  4.5 g IntraVENous Q12H    sodium chloride 0.9 % in dextrose 10% 1,040 mL infusion   IntraVENous CONTINUOUS    hydrocortisone Sod Succ (PF) (SOLU-CORTEF) injection 50 mg  50 mg IntraVENous W7C    folic acid (FOLVITE) tablet 1 mg  1 mg Oral DAILY    balsam peru-castor oil (VENELEX)  mg/gram ointment   Topical BID    levothyroxine (SYNTHROID) tablet 100 mcg  100 mcg Oral 6am    VANCOMYCIN INFORMATION NOTE   Other Rx Dosing/Monitoring    miconazole (SECURA) 2 % extra thick cream   Topical BID    sodium chloride (NS) flush 5-10 mL 5-10 mL IntraVENous Q8H    sodium chloride (NS) flush 5-10 mL  5-10 mL IntraVENous Q8H    nystatin (MYCOSTATIN) 100,000 unit/gram powder   Topical BID     No Known Allergies   Social History   Substance Use Topics    Smoking status: Never Smoker    Smokeless tobacco: Never Used    Alcohol use No      Family History   Problem Relation Age of Onset    Hypertension Mother     Heart Disease Father         Review of Systems:  Review of systems not obtained due to patient factors. Objective:   Vital Signs:    Visit Vitals    BP 91/49    Pulse 75    Temp 96.4 °F (35.8 °C)  Comment: gilbertpriscilla combs reactivated    Resp 20    Ht 5' 1\" (1.549 m)    Wt (!) 161.5 kg (356 lb 0.7 oz)    SpO2 97%    BMI 67.27 kg/m2       O2 Device: BIPAP   O2 Flow Rate (L/min): 2 l/min   Temp (24hrs), Av.3 °F (36.3 °C), Min:96.4 °F (35.8 °C), Max:97.6 °F (36.4 °C)       Intake/Output:   Last shift:      701 - 1900  In: -   Out: 5 [Urine:5]  Last 3 shifts: 1901 -  07  In: 1902.1 [P.O.:120; I.V.:1782.1]  Out: 327 [Urine:327]    Intake/Output Summary (Last 24 hours) at 18 1323  Last data filed at 18 1120   Gross per 24 hour   Intake           994.58 ml   Output              163 ml   Net           831.58 ml     Hemodynamics:   PAP:   CO:     Wedge:   CI:     CVP:    SVR:       PVR:       Ventilator Settings:  Mode Rate Tidal Volume Pressure FiO2 PEEP   NIPPV      18 cm H2O 40 % 6 cm H20     Peak airway pressure: 23 cm H2O    Minute ventilation: 13.5 l/min      Physical Exam:    General:  Sleepy not  cooperative, no distress, appears stated age. Morbid obese, no distress,has nC oxygen in place. Head:  Normocephalic, without obvious abnormality, atraumatic. Eyes:  Conjunctivae/corneas clear. PERRL, EOMs intact. Nose: Nares normal. Septum midline. Mucosa normal. No drainage or sinus tenderness.    Throat: Lips, mucosa, and tongue normal. Teeth and gums normal.   Neck: Supple, symmetrical, trachea midline, no adenopathy, thyroid: no enlargment/tenderness/nodules, no carotid bruit and no JVD. Back:   Symmetric, no curvature. ROM normal.   Lungs:   Clear to auscultation bilaterally. Has decreased BS in bases bilaterally. Breathing comfortably. On NC oxygen. NO distress. Chest wall:  No tenderness or deformity. Heart:  Regular rate and rhythm, S1, S2 normal, no murmur, click, rub or gallop. Abdomen:   Obese, Soft, non-tender. Bowel sounds normal. No masses,  No organomegaly. Extremities: Extremities normal, atraumatic, has 2-3+ Bilateral edema, appear to have chronic lower extremity edema. Has lessredness of the right leg today. Pulses: 2+ and symmetric all extremities. Skin: Skin color, texture, turgor normal. Has areas of bruising. Lymph nodes: Cervical, supraclavicular nodes are  normal.   Neurologic: Grossly nonfocal, not able to fully assess due to his underlying medical condition. Psych: no overt anxiety or depression. Data:     Recent Results (from the past 24 hour(s))   NT-PRO BNP    Collection Time: 04/04/18  1:30 PM   Result Value Ref Range    NT pro-BNP 9256 (H) 0 - 125 PG/ML   GLUCOSE, POC    Collection Time: 04/04/18  4:07 PM   Result Value Ref Range    Glucose (POC) 143 (H) 65 - 100 mg/dL    Performed by Vance Morel    CBC WITH AUTOMATED DIFF    Collection Time: 04/05/18  3:44 AM   Result Value Ref Range    WBC 7.3 4.1 - 11.1 K/uL    RBC 3.33 (L) 4.10 - 5.70 M/uL    HGB 10.1 (L) 12.1 - 17.0 g/dL    HCT 33.4 (L) 36.6 - 50.3 %    .3 (H) 80.0 - 99.0 FL    MCH 30.3 26.0 - 34.0 PG    MCHC 30.2 30.0 - 36.5 g/dL    RDW 14.4 11.5 - 14.5 %    PLATELET 71 (L) 003 - 400 K/uL    MPV 12.7 8.9 - 12.9 FL    NRBC 0.0 0  WBC    ABSOLUTE NRBC 0.00 0.00 - 0.01 K/uL    NEUTROPHILS 93 (H) 32 - 75 %    LYMPHOCYTES 3 (L) 12 - 49 %    MONOCYTES 3 (L) 5 - 13 %    EOSINOPHILS 0 0 - 7 %    BASOPHILS 0 0 - 1 %    IMMATURE GRANULOCYTES 1 (H) 0.0 - 0.5 %    ABS.  NEUTROPHILS 6. 8 1.8 - 8.0 K/UL    ABS. LYMPHOCYTES 0.2 (L) 0.8 - 3.5 K/UL    ABS. MONOCYTES 0.2 0.0 - 1.0 K/UL    ABS. EOSINOPHILS 0.0 0.0 - 0.4 K/UL    ABS. BASOPHILS 0.0 0.0 - 0.1 K/UL    ABS. IMM. GRANS. 0.1 (H) 0.00 - 0.04 K/UL    DF SMEAR SCANNED      RBC COMMENTS MACROCYTOSIS  1+        RBC COMMENTS STOMATOCYTES  PRESENT        RBC COMMENTS BASOPHILIC STIPPLING  PRESENT       METABOLIC PANEL, COMPREHENSIVE    Collection Time: 04/05/18  3:44 AM   Result Value Ref Range    Sodium 138 136 - 145 mmol/L    Potassium 4.0 3.5 - 5.1 mmol/L    Chloride 98 97 - 108 mmol/L    CO2 36 (H) 21 - 32 mmol/L    Anion gap 4 (L) 5 - 15 mmol/L    Glucose 123 (H) 65 - 100 mg/dL    BUN 27 (H) 6 - 20 MG/DL    Creatinine 2.69 (H) 0.70 - 1.30 MG/DL    BUN/Creatinine ratio 10 (L) 12 - 20      GFR est AA 30 (L) >60 ml/min/1.73m2    GFR est non-AA 25 (L) >60 ml/min/1.73m2    Calcium 7.7 (L) 8.5 - 10.1 MG/DL    Bilirubin, total 0.4 0.2 - 1.0 MG/DL    ALT (SGPT) 24 12 - 78 U/L    AST (SGOT) 26 15 - 37 U/L    Alk. phosphatase 70 45 - 117 U/L    Protein, total 6.9 6.4 - 8.2 g/dL    Albumin 2.0 (L) 3.5 - 5.0 g/dL    Globulin 4.9 (H) 2.0 - 4.0 g/dL    A-G Ratio 0.4 (L) 1.1 - 2.2     MAGNESIUM    Collection Time: 04/05/18  3:44 AM   Result Value Ref Range    Magnesium 1.8 1.6 - 2.4 mg/dL   PHOSPHORUS    Collection Time: 04/05/18  3:44 AM   Result Value Ref Range    Phosphorus 5.7 (H) 2.6 - 4.7 MG/DL   BLOOD GAS, ARTERIAL    Collection Time: 04/05/18 11:05 AM   Result Value Ref Range    pH 7.17 (LL) 7.35 - 7.45      PCO2 96 (H) 35.0 - 45.0 mmHg    PO2 88 80 - 100 mmHg    O2 SAT 94 92 - 97 %    BICARBONATE 34 (H) 22 - 26 mmol/L    BASE EXCESS 2.4 mmol/L    O2 METHOD NASAL O2      O2 FLOW RATE 2.00 L/min    Sample source ARTERIAL      SITE LEFT RADIAL      ABEBE'S TEST YES      Critical value read back Juliocesar Robertson MD              Telemetry:NSR.      Imaging:  I have personally reviewed the patients radiographs and have reviewed the reports:  3-30-18: IMPRESSION:  Combination body habitus and poor depth of inspiration accentuates heart size  and pulmonary markings, however patient may have pleural fluid and airspace  disease bilaterally greater on the left.          Justina Foster MD

## 2018-04-05 NOTE — PROGRESS NOTES
Pt bedside report received from Bre rhodes RN    0800 Pt assessed    0910 Discussed no urinary out with Dr. Alicia Guido. No changes in treatments at this time. Palliative consult agreed to by pt Mother.     1130 Pt on bipap, UOP total 5 cc    1200 Pt reassessed as noted, echo at bedside    1600 Pt eating small amounts periodically on bipap breaks of 3-4 minutes

## 2018-04-05 NOTE — PROGRESS NOTES
Interdisciplinary team rounds were held 4/5/2018 with the following team members: Care Management, Diabetes Treatment Specialist, Nursing, Nutrition, Pharmacy, Physician and Clinical Coordinator. Plan of care discussed. Goal: See MD orders and progress notes for further  interventions and desired outcomes.

## 2018-04-06 PROBLEM — Z71.89 COUNSELING REGARDING GOALS OF CARE: Status: ACTIVE | Noted: 2018-01-01

## 2018-04-06 PROBLEM — G93.40 ACUTE ENCEPHALOPATHY: Status: ACTIVE | Noted: 2018-01-01

## 2018-04-06 NOTE — PROGRESS NOTES
Hospitalist Progress Note    NAME: Rayne Bosworth   :  1965   MRN:  [id number]       Assessment / Plan:  Acute encephalopathy POA: 2ry to CO2 narcosis vs infection, awake but not back to baseline yet. Acute on chronic respiratory failure/Hypoventilation Sd. POA: on NC today , sounds congested, air entry still poor  Acute HCAP (POA) c/w Zosyn, bronchodilators, monitor. Super morbid obesity (Body mass index is 65.94 kg/(m^2). ) due to Prader Willi    Acute Sepsis (POA) due to bilateral HCAP (POA) and RLE celllulits (POA) contributing to hypothermia:  c/w ABX. Monitor. Thrombocytopenia:   appears chronic, monitor labs, start folic acid  Hypothyroidism:   Stable last TSH 1.01 on 3/11/18, Continue synthroid  ARF: due to sepsis vs Vancomycin, poor urine output, c/w gentle hydration, monitor. Nephrology in the case, creatinine continues trending up. He is edematous and his weight is up, BNP is high, CXR shows pulmonary edema, at this rate will need HD soon but I think he is a poor candidate for it, Nephrology help appreciated. Mother having a hard time deciding, if no HD he may need Hospice  Code status: DNR  Prophylaxis: Lovenox  Recommended Disposition: SNF/LTC and  PT, OT, RN     Prognosis is Poor     Subjective:     Chief Complaint / Reason for Physician Visit  Awake, confused unable to provide a meaningful history  Discussed with RN events overnight. Review of Systems:  Symptom Y/N Comments  Symptom Y/N Comments   Fever/Chills    Chest Pain     Poor Appetite    Edema     Cough    Abdominal Pain     Sputum    Joint Pain     SOB/CLEMENTE    Pruritis/Rash     Nausea/vomit    Tolerating PT/OT     Diarrhea    Tolerating Diet     Constipation    Other       Could NOT obtain due to: Prader Willi Sd.     Objective:     VITALS:   Last 24hrs VS reviewed since prior progress note.  Most recent are:  Patient Vitals for the past 24 hrs:   Temp Pulse Resp BP SpO2   18 1400 - 75 28 110/58 96 %   18 1300 - 75 25 104/56 96 %   04/06/18 1200 97 °F (36.1 °C) 75 24 (!) 87/53 95 %   04/06/18 1100 - 75 21 94/52 95 %   04/06/18 1000 - 75 23 99/48 94 %   04/06/18 0900 - 75 21 96/52 -   04/06/18 0800 96.5 °F (35.8 °C) 75 18 112/71 95 %   04/06/18 0700 - 75 20 106/64 96 %   04/06/18 0600 - 75 24 109/69 95 %   04/06/18 0516 - 75 19 - 95 %   04/06/18 0500 - 75 19 105/62 95 %   04/06/18 0400 96.3 °F (35.7 °C) 75 20 100/56 96 %   04/06/18 0300 - 75 20 104/62 95 %   04/06/18 0200 96.1 °F (35.6 °C) 75 17 101/52 94 %   04/06/18 0100 - 75 25 98/50 94 %   04/06/18 0000 95.9 °F (35.5 °C) 75 17 93/55 95 %   04/05/18 2357 - 75 19 - 95 %   04/05/18 2300 - 75 20 92/58 96 %   04/05/18 2200 - 75 19 92/56 95 %   04/05/18 2100 - 75 22 93/50 93 %   04/05/18 2000 96 °F (35.6 °C) 75 19 98/55 92 %   04/05/18 1900 - 75 28 (!) 86/46 96 %   04/05/18 1800 96.2 °F (35.7 °C) 75 27 (!) 88/49 96 %   04/05/18 1700 - 75 20 (!) 88/55 96 %   04/05/18 1600 - 75 28 (!) 87/50 95 %   04/05/18 1535 - 75 22 - 95 %   04/05/18 1500 - 75 25 (!) 88/45 95 %       Intake/Output Summary (Last 24 hours) at 04/06/18 1427  Last data filed at 04/06/18 1403   Gross per 24 hour   Intake           1682.5 ml   Output              273 ml   Net           1409.5 ml        PHYSICAL EXAM:  General: WD, WN. awake, confused, no acute distress    EENT:  EOMI. Anicteric sclerae. MMM  Resp:  Coarse BS, poor air entry, mild crackles  CV:  Regular  rhythm,  + edema  GI:  Soft, Non distended, Non tender.  +Bowel sounds  Neurologic:  Alert and oriented X 1, slow speech,   Psych:   Poor  insight. Not anxious nor agitated  Skin:  No rashes.   No jaundice    Reviewed most current lab test results and cultures  YES  Reviewed most current radiology test results   YES  Review and summation of old records today    NO  Reviewed patient's current orders and MAR    YES  PMH/SH reviewed - no change compared to H&P  ________________________________________________________________________  Care Plan discussed with:    Comments   Patient y    Family  y mother   RN y    Care Manager     Consultant                        Multidiciplinary team rounds were held today with , nursing, pharmacist and clinical coordinator. Patient's plan of care was discussed; medications were reviewed and discharge planning was addressed. ________________________________________________________________________  Total NON critical care TIME:  25   Minutes    Total CRITICAL CARE TIME Spent:   Minutes non procedure based      Comments   >50% of visit spent in counseling and coordination of care y    ________________________________________________________________________  Rosa Elena Quinn MD     Procedures: see electronic medical records for all procedures/Xrays and details which were not copied into this note but were reviewed prior to creation of Plan. LABS:  I reviewed today's most current labs and imaging studies.   Pertinent labs include:  Recent Labs      04/06/18   0405  04/05/18   0344  04/04/18   0346   WBC  7.6  7.3  7.5   HGB  9.5*  10.1*  10.0*   HCT  31.0*  33.4*  32.8*   PLT  71*  71*  71*     Recent Labs      04/06/18   0405  04/05/18   0344  04/04/18   0346   NA  140  138  139   K  4.0  4.0  4.2   CL  101  98  98   CO2  34*  36*  35*   GLU  113*  123*  125*   BUN  31*  27*  27*   CREA  3.05*  2.69*  2.37*   CA  7.9*  7.7*  8.0*   MG  1.9  1.8  2.0   PHOS  5.4*  5.7*   --    ALB  1.8*  2.0*  2.1*   TBILI  0.3  0.4  0.6   SGOT  22  26  36   ALT  21  24  25       Signed: Rosa Elena Quinn MD

## 2018-04-06 NOTE — PROGRESS NOTES
Spiritual Care Assessment/Progress Note  [facility]      NAME: Consuelo Rangel      MRN: [id number]  AGE: 46 y.o. SEX: male  Adventism Affiliation: No preference   Language: English     4/6/2018     Total Time (in minutes): 15     Spiritual Assessment begun in [facility] CRITICAL CARE 2 through conversation with:         []Patient        [x] Family    [] Friend(s)        Reason for Consult: Palliative Care, Initial/Spiritual Assessment     Spiritual beliefs: (Please include comment if needed)     [x] Involved in a rodolfo tradition/spiritual practice:     [x] Supported by a rodolfo community:      [] Claims no spiritual orientation:      [] Seeking spiritual identity:           [] Adheres to an individual form of spirituality:      [] Not able to assess:                     Identified resources for coping:      [x] Prayer                  [] Devotional reading               [] Music                  [] Guided Imagery     [x] Family/friends                 [] Pet visits     [] Other:         Interventions offered during this visit: (See comments for more details)    Patient Interventions: Initial/Spiritual assessment, patient floor, Prayer (assurance of)     Family/Friend(s): Affirmation of rodolfo, Iconic (affirming the presence of God/Higher Power), Prayer (assurance of)     Plan of Care:     [] Discuss Spiritual/Cultural needs    [] Support AMD and/or advance care planning process      [] Support grieving process   [] Coordinate Rites/Rituals    [] Coordination with community clergy   [x] No spiritual needs identified at this time   [] Detailed Plan of Care below (See Comments)  [] Make referral to Music Therapy  [] Make referral to Pet Therapy     [] Make referral to Addiction services  [] Make referral to [facility]  [] Make referral to Spiritual Care Partner  [] No future visits requested             Comments:   Initial visit in CCU for new palliative consult.   Patient's mother was feeding patient when I arrived. Patient is non-verbal at baseline according to his nurse, Abner Lennox. He was able to nod affirmatively when I spoke to him. Patient's mother seems quite protective of her son and kept conversation rather superficial.  She shared they attend [facility]. She expressed no spiritual needs or concerns at this time. Advised of  availability for spiritual support as desired. Offered assurance of prayer.     HANNAH Mobley, [facility], [address] [place] [address] Paging Service  [phone])

## 2018-04-06 NOTE — INTERDISCIPLINARY ROUNDS
Interdisciplinary team rounds were held 4/6/2018 with the following team members:Care Management, Diabetes Treatment Specialist, Nursing, Nutrition, Pharmacy and Physician. Plan of care discussed. See clinical pathway and/or care plan for interventions and desired outcomes.

## 2018-04-06 NOTE — PROGRESS NOTES
[facility]  Pulmonary, Critical Care, and Sleep Medicine    Name: Ramona Jaramillo MRN: [id number]   : 1965 Hospital: [facility]   Date: 2018        Critical Care Patient Consult    IMPRESSION:   · Encephalopathy-per his mother still not back to baseline. He is responsive. · His kidneys and lungs are not improving. Overall prognosis is looking pretty poor. Discussed with pts mother, Dr. Geoff Mathur, Desire to continue current support, All agree would be almost impossible to manage as a Chronic HD pt, Short term dialysis is being considered. He has needed ongoing bipap support, remains hypothermic, hypoglycemic. · Acute Renal failure,No urine output, increased Cr; Urology stated pt was a difficult urinary cath placement , would keep brooks cath in place for now. Not to be on nurse driven removal protocol. Renal is following. · Acute on Chronic Respiratory failure-much worse today, Would continue bipap support as long as tolerated, Pt is Do Not intubate. Lor Cortes His CXR demonstrates ongoing pulmonary edema, possible basilar effusions and possible pulmoary infiltrates. On empiric treatment for pneumonia. · Due to pts hypotension, hypothermia, and hypoglycemia placed him on hydrocortisone He has not been stable enough to try to decrease. · Hypoglycemia, on D10 infusion. Still not taking in reliable amount by mouth. Will continue the dextrose infusion. · Chronic lymphedema, worse on left that right, has chronic cellulitis of his legs. Right leg seems more red and swollen. Cellulitis seems stable. · Chronic hypoventilation syndrome, now made acutely worse with above. · Chronic thrombocytopenia. 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   · Hypothyroidism  · PPM  · Pt is Do not resuscitate per his mother.    · His mother is deciding on further treatment of the acute renal failure and respiratory failure. · Critically ill, High  risk of decompensation. 28 mi CC, EOP remains critically ill, high level of ongoing support. · Prognosis is looking poor. · Pt would not be a good long term HD candidate. · Discussed with nursing, CM, Pharmacy, Dr. Marilyn Chung. RECOMMENDATIONS:   · Will need  continual bipap due to hypercarbia, will continue with bipap support,   · Palliative care consult appreciated  · Need to follow blood sugars while he is on Dextrose infusion   · Will repeat CXR in am.   · Urology had to place brooks, keep it in. · Acute renal failure. Appreciate Dr. Maxx Silva assistance. Needs ongoing monitoring, ongoing discussions of direction to care for pt. · Will monitor UOP  · Monitor for further hypoglycemia, has POC glucose and D10 infusion. Subjective/History:   Last 24 hrs:Pt remains critically ill, not improving. Remains on warming blanket, bipap, not having any improvement in renal function. 4-5-18:  pt seems to be doing worse. Pt has not been as responsive today. WE check ABG this am and his pco2 was up to 90. His Cr has not improved. Mother is considering what direction to proceed. Pt is not able to take in anything by mouth due to poor mental status. 4-4-18: Pt has been on nc this am. Was on bipap last night. Had a cough which seemed to be new per his mother. Not able to get ROS or HPI from pt due to his Prader Willi syndrome. 4-3-18: hrs: pt is more alert and interactive. Off bipap this am. Due to his underlying condition he is not able to give any hx. His mother is at his bedside. Cc: per his family was altered mental status    HPI: This patient has been seen and evaluated at the request of Dr. Claudetta Dao for above. Patient is a 46 y.o. male who presents for above. He was seen on the bipap the am. NO distress. He is not able to communicate due to Prader Willi syndrome and pt on bipap.  Discussed with nurse this am.     Per his mother pt was more sleepy, and confused. He seems to be more alert today. Had some hypotension yesterday. The patient is critically ill and can not provide additional history due to Unable to speak and Unable to comprehend. Past Medical History:   Diagnosis Date    A-fib ([facility] [address]     Hypothyroid     Hypothyroidism     Mental retardation     Other ill-defined conditions(799.89)     obesity    Other ill-defined conditions(799.89)     mentally disabled    Prader-Willi syndrome     S/P cardiac pacemaker procedure 2/15/2013    heart block      Past Surgical History:   Procedure Laterality Date    HX HEENT      eye surgery as a baby    HX PACEMAKER        Prior to Admission medications    Medication Sig Start Date End Date Taking? Authorizing Provider   cephALEXin (KEFLEX) 500 mg capsule Take 500 mg by mouth three (3) times daily. 3/27/18  Yes Edgardo Camacho MD   nystatin (MYCOSTATIN) powder Apply  to affected area two (2) times a day. Patient applies a 'generous amount to groin and underarms.'   Yes Historical Provider   cholecalciferol (VITAMIN D3) 1,000 unit cap Take 1,000 Units by mouth daily. Yes Historical Provider   cranberry extract (CRANBERRY) 450 mg tab Take 1 Tab by mouth daily. Yes Historical Provider   aspirin (ASPIRIN) 325 mg tablet Take 325 mg by mouth every evening. Yes Historical Provider   ascorbic acid (VITAMIN C) 500 mg tablet Take 500 mg by mouth every evening. Yes Historical Provider   ferrous sulfate (IRON) 325 mg (65 mg iron) tablet Take 325 mg by mouth daily (after dinner). Yes Historical Provider   multivitamin (ONE A DAY) tablet Take 1 tablet by mouth every evening. Yes Historical Provider   levothyroxine (SYNTHROID) 100 mcg tablet Take 100 mcg by mouth Daily (before breakfast).    Yes Marquita Juárez MD     Current Facility-Administered Medications   Medication Dose Route Frequency    heparin (porcine) injection 5,000 Units  5,000 Units SubCUTAneous Q8H    piperacillin-tazobactam (ZOSYN) 4.5 g in 0.9% sodium chloride (MBP/ADV) 100 mL  4.5 g IntraVENous Q12H    sodium chloride 0.9 % in dextrose 10% 1,040 mL infusion   IntraVENous CONTINUOUS    hydrocortisone Sod Succ (PF) (SOLU-CORTEF) injection 50 mg  50 mg IntraVENous W4O    folic acid (FOLVITE) tablet 1 mg  1 mg Oral DAILY    balsam peru-castor oil (VENELEX)  mg/gram ointment   Topical BID    levothyroxine (SYNTHROID) tablet 100 mcg  100 mcg Oral 6am    VANCOMYCIN INFORMATION NOTE   Other Rx Dosing/Monitoring    miconazole (SECURA) 2 % extra thick cream   Topical BID    sodium chloride (NS) flush 5-10 mL  5-10 mL IntraVENous Q8H    sodium chloride (NS) flush 5-10 mL  5-10 mL IntraVENous Q8H    nystatin (MYCOSTATIN) 100,000 unit/gram powder   Topical BID     No Known Allergies   Social History   Substance Use Topics    Smoking status: Never Smoker    Smokeless tobacco: Never Used    Alcohol use No      Family History   Problem Relation Age of Onset    Hypertension Mother     Heart Disease Father         Review of Systems:  Review of systems not obtained due to patient factors. Objective:   Vital Signs:    Visit Vitals    BP 96/52    Pulse 75    Temp 96.5 °F (35.8 °C)    Resp 21    Ht 5' 1\" (1.549 m)    Wt (!) 162.8 kg (358 lb 14.5 oz)    SpO2 95%    BMI 67.82 kg/m2       O2 Device: Nasal cannula   O2 Flow Rate (L/min): 4 l/min   Temp (24hrs), Av.2 °F (35.7 °C), Min:95.9 °F (35.5 °C), Max:96.5 °F (35.8 °C)       Intake/Output:   Last shift:      701 - 1900  In: 50 [I.V.:50]  Out: 20 [Urine:20]  Last 3 shifts: 1901 -  07  In: 6517 [P.O.:300;  I.V.:2000]  Out: 288 [Urine:288]    Intake/Output Summary (Last 24 hours) at 18 0924  Last data filed at 18 0800   Gross per 24 hour   Intake             1630 ml   Output              213 ml   Net             1417 ml     Hemodynamics:   PAP:   CO:     Wedge:   CI:     CVP:    SVR:       PVR:       Ventilator Settings:  Mode Rate Tidal Volume Pressure FiO2 PEEP   NIPPV      18 cm H2O 40 % 6 cm H20     Peak airway pressure: 16 cm H2O    Minute ventilation: 4.8 l/min      Physical Exam:    General:  Sleepy not  cooperative, no distress, appears stated age. Morbid obese, no distress, bipap is in place. Head:  Normocephalic, without obvious abnormality, atraumatic. Eyes:  Conjunctivae/corneas clear. PERRL, EOMs intact. Nose: Nares normal. Septum midline. Mucosa normal. No drainage or sinus tenderness. Throat: Lips, mucosa, and tongue normal. Teeth and gums normal.   Neck: Supple, symmetrical, trachea midline, no adenopathy, thyroid: no enlargment/tenderness/nodules, no carotid bruit and no JVD. Back:   Symmetric, no curvature. ROM normal.   Lungs:   Clear to auscultation bilaterally. Has decreased BS in bases bilaterally. Breathing comfortably. On NC oxygen. NO distress. Chest wall:  No tenderness or deformity. Heart:  Regular rate and rhythm, S1, S2 normal, no murmur, click, rub or gallop. Abdomen:   Obese, Soft, non-tender. Bowel sounds normal. No masses,  No organomegaly. Extremities: Extremities normal, atraumatic, has 2-3+ Bilateral edema, appear to have chronic lower extremity edema. Has lessredness of the right leg today. Pulses: 2+ and symmetric all extremities. Skin: Skin color, texture, turgor normal. Has areas of bruising over his body. .    Lymph nodes: Cervical, supraclavicular nodes are  normal.   Neurologic: Very sleepy, moves extremities intermittently. Grossly nonfocal, not able to fully assess due to his underlying medical condition. Psych: no overt anxiety or depression. But again not really able to assess due to his sleepy state.         Data:     Recent Results (from the past 24 hour(s))   BLOOD GAS, ARTERIAL    Collection Time: 04/05/18 11:05 AM   Result Value Ref Range    pH 7.17 (LL) 7.35 - 7.45      PCO2 96 (H) 35.0 - 45.0 mmHg    PO2 88 80 - 100 mmHg    O2 SAT 94 92 - 97 % BICARBONATE 34 (H) 22 - 26 mmol/L    BASE EXCESS 2.4 mmol/L    O2 METHOD NASAL O2      O2 FLOW RATE 2.00 L/min    Sample source ARTERIAL      SITE LEFT RADIAL      ABEBE'S TEST YES      Critical value read back Louis Mejias MD    METABOLIC PANEL, COMPREHENSIVE    Collection Time: 04/06/18  4:05 AM   Result Value Ref Range    Sodium 140 136 - 145 mmol/L    Potassium 4.0 3.5 - 5.1 mmol/L    Chloride 101 97 - 108 mmol/L    CO2 34 (H) 21 - 32 mmol/L    Anion gap 5 5 - 15 mmol/L    Glucose 113 (H) 65 - 100 mg/dL    BUN 31 (H) 6 - 20 MG/DL    Creatinine 3.05 (H) 0.70 - 1.30 MG/DL    BUN/Creatinine ratio 10 (L) 12 - 20      GFR est AA 26 (L) >60 ml/min/1.73m2    GFR est non-AA 22 (L) >60 ml/min/1.73m2    Calcium 7.9 (L) 8.5 - 10.1 MG/DL    Bilirubin, total 0.3 0.2 - 1.0 MG/DL    ALT (SGPT) 21 12 - 78 U/L    AST (SGOT) 22 15 - 37 U/L    Alk. phosphatase 59 45 - 117 U/L    Protein, total 6.9 6.4 - 8.2 g/dL    Albumin 1.8 (L) 3.5 - 5.0 g/dL    Globulin 5.1 (H) 2.0 - 4.0 g/dL    A-G Ratio 0.4 (L) 1.1 - 2.2     MAGNESIUM    Collection Time: 04/06/18  4:05 AM   Result Value Ref Range    Magnesium 1.9 1.6 - 2.4 mg/dL   PHOSPHORUS    Collection Time: 04/06/18  4:05 AM   Result Value Ref Range    Phosphorus 5.4 (H) 2.6 - 4.7 MG/DL   CBC WITH AUTOMATED DIFF    Collection Time: 04/06/18  4:05 AM   Result Value Ref Range    WBC 7.6 4.1 - 11.1 K/uL    RBC 3.08 (L) 4.10 - 5.70 M/uL    HGB 9.5 (L) 12.1 - 17.0 g/dL    HCT 31.0 (L) 36.6 - 50.3 %    .6 (H) 80.0 - 99.0 FL    MCH 30.8 26.0 - 34.0 PG    MCHC 30.6 30.0 - 36.5 g/dL    RDW 14.6 (H) 11.5 - 14.5 %    PLATELET 71 (L) 382 - 400 K/uL    MPV 13.0 (H) 8.9 - 12.9 FL    NRBC 0.0 0  WBC    ABSOLUTE NRBC 0.00 0.00 - 0.01 K/uL    NEUTROPHILS 93 (H) 32 - 75 %    LYMPHOCYTES 3 (L) 12 - 49 %    MONOCYTES 3 (L) 5 - 13 %    EOSINOPHILS 0 0 - 7 %    BASOPHILS 0 0 - 1 %    IMMATURE GRANULOCYTES 1 (H) 0.0 - 0.5 %    ABS. NEUTROPHILS 7.1 1.8 - 8.0 K/UL    ABS.  LYMPHOCYTES 0.2 (L) 0.8 - 3.5 K/UL    ABS. MONOCYTES 0.2 0.0 - 1.0 K/UL    ABS. EOSINOPHILS 0.0 0.0 - 0.4 K/UL    ABS. BASOPHILS 0.0 0.0 - 0.1 K/UL    ABS. IMM. GRANS. 0.1 (H) 0.00 - 0.04 K/UL    DF AUTOMATED      RBC COMMENTS NORMOCYTIC, NORMOCHROMIC     GLUCOSE, POC    Collection Time: 04/06/18  4:15 AM   Result Value Ref Range    Glucose (POC) 113 (H) 65 - 100 mg/dL    Performed by [facility]    GLUCOSE, POC    Collection Time: 04/06/18  5:49 AM   Result Value Ref Range    Glucose (POC) 110 (H) 65 - 100 mg/dL    Performed by [facility]              Telemetry:NSR. Imaging:  I have personally reviewed the patients radiographs and have reviewed the reports:  4-6-18: INDICATION:  Interstitial edema     COMPARISON:  Prior day     FINDINGS: A portable AP radiograph of the chest was obtained at 0422 hours. The  patient is on a cardiac monitor. There is moderate pulmonary edema with  bilateral pleural effusions.   The cardiac and mediastinal contours are normal.   The bones and soft tissues are grossly within normal limits.      IMPRESSION: Stable moderate pulmonary edema with bilateral effusions          Ligia Hernandez MD

## 2018-04-06 NOTE — PROGRESS NOTES
NAME: Geoff Dorado        :  1965        MRN:  [id number]        Assessment :    Plan:  --TRINIDAD    Hypotension    Hypothermia    Anemia, chronic thrombocytopenia    Super morbid obesity/Prader-Willi    Right LE cellulitis/sepsis/bilateral HCAP    Obesity hypoventilation syndrome     Chronic CO2 retention     Respiratory alkalosis at this time (he has a chronic compensatory metabolic alkalosis) --Creatinine at baseline is 0.5 (low muscle mass) and continues on the rise (now 2.7 to 3.1)    Oligoanuric; brooks - had to be placed with bedside cytoscopy by Urology    ATN from sepsis/hypotension +/- from vanco       Near the need for RRT from a respiratory/volume standpoint    I have had long discussions with his Mom. We agree that chronic HD would not be feasible/desired. A harder issue is whether to try acute HD (\"temporary\" dialysis) - the hard part being that it might turn out to be a chronic need and also that it would include invasive procedures. His Mom is having a hard time deciding. Palliative to help clarify care decisions. Will try to [place] back with Ms. Mellissa Johnson once her other son arrives. Subjective:     Chief Complaint:  On Bipap. Poor historian. I spoke with his Mom and his ICU nurse in the room. I spoke with [place] Later with Palliative. Review of Systems:    Symptom Y/N Comments  Symptom Y/N Comments   Fever/Chills    Chest Pain     Poor Appetite    Edema     Cough    Abdominal Pain     Sputum    Joint Pain     SOB/CLEMENTE    Pruritis/Rash     Nausea/vomit    Tolerating PT/OT     Diarrhea    Tolerating Diet     Constipation    Other       Could not obtain due to: See above     Objective:     VITALS:   Last 24hrs VS reviewed since prior progress note.  Most recent are:  Visit Vitals    BP 94/52    Pulse 75    Temp 96.5 °F (35.8 °C)    Resp 21    Ht 5' 1\" (1.549 m)    Wt (!) 162.8 kg (358 lb 14.5 oz)    SpO2 95%    BMI 67.82 kg/m2       Intake/Output Summary (Last 24 hours) at 04/06/18 1149  Last data filed at 04/06/18 1100   Gross per 24 hour   Intake             1680 ml   Output              228 ml   Net             1452 ml      Telemetry Reviewed:     PHYSICAL EXAM:  General: Obese in the ICU on bipap  Resp:  Rhonchi/Rales. No access. muscle use  CV:  Regular  rhythm,  No murmur (), No Rubs, No Gallops. + edema  GI:  Soft, obese, Non distended, Non tender.  +Bowel sounds, no HSM      Lab Data Reviewed: (see below)    Medications Reviewed: (see below)    PMH/SH reviewed - no change compared to H&P  ________________________________________________________________________  Care Plan discussed with:  Patient     Family  y    RN y    Care Manager                    Consultant:          Comments   >50% of visit spent in counseling and coordination of care       ________________________________________________________________________  Misbah Huber MD     Procedures: see electronic medical records for all procedures/Xrays and details which  were not copied into this note but were reviewed prior to creation of Plan. LABS:  Recent Labs      04/06/18   0405  04/05/18   0344   WBC  7.6  7.3   HGB  9.5*  10.1*   HCT  31.0*  33.4*   PLT  71*  71*     Recent Labs      04/06/18   0405  04/05/18   0344  04/04/18   0346   NA  140  138  139   K  4.0  4.0  4.2   CL  101  98  98   CO2  34*  36*  35*   BUN  31*  27*  27*   CREA  3.05*  2.69*  2.37*   GLU  113*  123*  125*   CA  7.9*  7.7*  8.0*   MG  1.9  1.8  2.0   PHOS  5.4*  5.7*   --      Recent Labs      04/06/18   0405  04/05/18   0344  04/04/18   0346   SGOT  22  26  36   AP  59  70  71   TP  6.9  6.9  6.7   ALB  1.8*  2.0*  2.1*   GLOB  5.1*  4.9*  4.6*     No results for input(s): INR, PTP, APTT in the last 72 hours. No lab exists for component: INREXT, INREXT   No results for input(s): FE, TIBC, PSAT, FERR in the last 72 hours.    No results found for: FOL, RBCF Recent Labs      04/05/18   1105   PH  7.17*   PCO2  96*   PO2  88     No results for input(s): CPK, CKMB in the last 72 hours.     No lab exists for component: TROPONINI  No components found for: [place]  Lab Results   Component Value Date/Time    Color YELLOW/STRAW 03/30/2018 10:25 PM    Appearance CLEAR 03/30/2018 10:25 PM    Specific gravity 1.017 03/30/2018 10:25 PM    pH (UA) 5.0 03/30/2018 10:25 PM    Protein NEGATIVE  03/30/2018 10:25 PM    Glucose NEGATIVE  03/30/2018 10:25 PM    Ketone NEGATIVE  03/30/2018 10:25 PM    Bilirubin NEGATIVE  03/30/2018 10:25 PM    Urobilinogen 0.2 03/30/2018 10:25 PM    Nitrites NEGATIVE  03/30/2018 10:25 PM    Leukocyte Esterase SMALL (A) 03/30/2018 10:25 PM    Epithelial cells FEW 03/30/2018 10:25 PM    Bacteria NEGATIVE  03/30/2018 10:25 PM    WBC 0-4 03/30/2018 10:25 PM    RBC 0-5 03/30/2018 10:25 PM       MEDICATIONS:  Current Facility-Administered Medications   Medication Dose Route Frequency    heparin (porcine) injection 5,000 Units  5,000 Units SubCUTAneous Q8H    sodium chloride 0.9 % in dextrose 10% 1,040 mL infusion   IntraVENous CONTINUOUS    hydrocortisone Sod Succ (PF) (SOLU-CORTEF) injection 50 mg  50 mg IntraVENous L5M    folic acid (FOLVITE) tablet 1 mg  1 mg Oral DAILY    balsam peru-castor oil (VENELEX)  mg/gram ointment   Topical BID    levothyroxine (SYNTHROID) tablet 100 mcg  100 mcg Oral 6am    VANCOMYCIN INFORMATION NOTE   Other Rx Dosing/Monitoring    glucose chewable tablet 16 g  4 Tab Oral PRN    dextrose (D50W) injection syrg 12.5-25 g  12.5-25 g IntraVENous PRN    glucagon (GLUCAGEN) injection 1 mg  1 mg IntraMUSCular PRN    miconazole (SECURA) 2 % extra thick cream   Topical BID    sodium chloride (NS) flush 5-10 mL  5-10 mL IntraVENous Q8H    sodium chloride (NS) flush 5-10 mL  5-10 mL IntraVENous PRN    sodium chloride (NS) flush 5-10 mL  5-10 mL IntraVENous Q8H    sodium chloride (NS) flush 5-10 mL  5-10 mL IntraVENous PRN  nystatin (MYCOSTATIN) 100,000 unit/gram powder   Topical BID

## 2018-04-06 NOTE — CONSULTS
Palliative Medicine Consult  [facility]: [id number] ([id number])    Patient Name: Daniel Faria  YOB: 1965    Date of Initial Consult: 4/6/18  Reason for Consult: care decisions  Requesting Provider: Mamta Busch   Primary Care Physician: Shoaib Villatoro MD     SUMMARY:   Daniel Faria is a 46 y.o. with a past history of mental retardation, hypothyroidism, A fib, pacemaker placement, morbidly obese Prader-Willi syndrome, who was admitted on 3/30/2018 from home with a diagnosis of acute respiratory failure with hypercapnia, hypothermia. Current medical issues leading to Palliative Medicine involvement include: admitted to CCU on bipap. 4/2: 50% ventimask, later in the day 2L via NC. Pt alert, awake. Urine output diminishing in amount each day and creatinine increasing. 4/3: Cr 1.9 (baseline 0.5), now oliguric.  vanc level 42.1.  4/4: cr 2.4,  UO less than 30 ml/hr. Due to hypotension, hypoglycemia and hypothermia started on hydrocortisone. 4/5: Cr. 2.7. Kidneys and lungs are not improving, overall prognosis is poor, anuric. 4/6: Cr. 3.1    Psychosocial: lives with mother, independent with ADLs, uses RW, does not drive. PALLIATIVE DIAGNOSES:   1. Acute respiratory failure with C02 narcosis  2. Hypotension   3. Hypothyroidism  4. Recurrent cellulitis of RLE  5. Acute encephalopathy   6. Super morbidly obese  7. Acute sepsis due to bilat HCAP and RLE cellulitis  8. Thrombocytopenia   9. Acute renal failure: oliguric, obesity, hypoventilation syndrome  10. Care decisions   PLAN:   1. Met with pt's brother Chuck Peoples, mother David Altamirano, and : discussed pt's overall condition, renal failure, whether or not pt would tolerate acute dialysis, that pt is not a good candidate for chronic dialysis, even if pt's renal function returns, he will be debilitated and require SNF/rehab level care. Family reports pt would never cooperate with any discharge plan except to return home.  Family does not want to put pt through dialysis as it would be too traumatic for him. 2. Continue current level of care and reassess on Monday. If his condition worsens, will consider comfort care. Dr. Carine Lucas is here this weekend and can facilitate if needed. 3. Mom requesting to stop fingersticks; discussed with RN who will discuss with . 4. Initial consult note routed to primary continuity provider  5. Communicated plan of care with: Palliative IDT, RN     GOALS OF CARE / TREATMENT PREFERENCES:     GOALS OF CARE:  Patient/Health Care Proxy Stated Goals: Prolong life   1. Continue current level of care  2. Reassess Monday  3. No dialysis      TREATMENT PREFERENCES:   Code Status: DNR    Advance Care Planning:  Advance Care Planning 4/6/2018   Patient's Healthcare Decision Maker is: Verbal statement (Legal Next of Kin remains as decision maker)   Primary Decision Maker Name Lamine Kinsey   Primary Decision Maker Phone Number [phone]   Primary Decision Maker Relationship to Patient Parent   Confirm Advance Directive None   Patient Would Like to Complete Advance Directive Unable       Medical Interventions: Limited additional interventions   Other Instructions:   Artificially Administered Nutrition: No feeding tube     Other:    As far as possible, the palliative care team has discussed with patient / health care proxy about goals of care / treatment preferences for patient. HISTORY:     History obtained from: chart, family    CHIEF COMPLAINT: SOB    HPI/SUBJECTIVE:    The patient is:   [] Verbal and participatory  [x] Non-participatory due to: patient condition    BIBA with worsening SOB and AMS on day of admission. EMS report pt's Sp02 was 88-90% on 2LPM NC; pt has home 02 that he uses prn. He was placed on NRB with increase in Sp02 to 98% en route, which in turn increased his cognition. Per mother, since dxed with UTI pt has been incontinent, unable to make it to the bathroom on time.   Pt was recently discharged from the hospital for cellulitis of the RLE, which has worsened since discharge. ED w/u:   EXAM: tachypneic, accessory muscle use, increased work for breath, diffuse LLE edema. LABS: pH 7.32, Pc02 92, bicarb 47, h/h 10.4/34.5, plts 121, BUN/Cr 21/0.40, AST 51, pro-BNP 2858. IMAGING: CXR: combination body habitus and poor depth of inspiration accentuates heart size and pulmonary markings, however, pt may have pleural fluid and airspace dz bilaterally greater on left. Clinical Pain Assessment (nonverbal scale for severity on nonverbal patients):   Clinical Pain Assessment  Severity: 0          Duration: for how long has pt been experiencing pain (e.g., 2 days, 1 month, years)  Frequency: how often pain is an issue (e.g., several times per day, once every few days, constant)     FUNCTIONAL ASSESSMENT:     Palliative Performance Scale (PPS):  PPS: 20       PSYCHOSOCIAL/SPIRITUAL SCREENING:     Palliative IDT has assessed this patient for cultural preferences / practices and a referral made as appropriate to needs (Cultural Services, Patient Advocacy, Ethics, etc.)    Advance Care Planning:  Advance Care Planning 4/6/2018   Patient's Healthcare Decision Maker is: Verbal statement (Legal Next of Kin remains as decision maker)   Primary Decision Maker Name Kavitha Curran   Primary Decision Maker Phone Number [phone]   Primary Decision Maker Relationship to Patient Parent   Confirm Advance Directive None   Patient Would Like to Complete Advance Directive Unable       Any spiritual / Yazdanism concerns:  [] Yes /  [] No    Caregiver Burnout:  [] Yes /  [] No /  [] No Caregiver Present      Anticipatory grief assessment:   [] Normal  / [] Maladaptive       ESAS Anxiety: Anxiety: 0     ESAS Depression:   unable to assess due to pt factors       REVIEW OF SYSTEMS:     Positive and pertinent negative findings in ROS are noted above in HPI.   The following systems were [] reviewed / [x] unable to be reviewed as noted in HPI  Other findings are noted below. Systems: constitutional, ears/nose/mouth/throat, respiratory, gastrointestinal, genitourinary, musculoskeletal, integumentary, neurologic, psychiatric, endocrine. Positive findings noted below. Modified ESAS Completed by: provider   Fatigue: 5 Drowsiness: 0     Pain: 0   Anxiety: 0     Anorexia: 8 Dyspnea: 2     Constipation: No     Stool Occurrence(s): 1        PHYSICAL EXAM:     From RN flowsheet:  Wt Readings from Last 3 Encounters:   04/05/18 (!) 358 lb 14.5 oz (162.8 kg)   03/15/18 332 lb 7.3 oz (150.8 kg)   11/23/17 315 lb 4.1 oz (143 kg)     Blood pressure 100/60, pulse 75, temperature 97.3 °F (36.3 °C), resp. rate 20, height 5' 1\" (1.549 m), weight (!) 358 lb 14.5 oz (162.8 kg), SpO2 94 %.     Pain Scale 1: Numeric (0 - 10)  Pain Intensity 1: 0                 Last bowel movement, if known:     Constitutional: lethargic, on bipap  Eyes: pupils equal, anicteric  ENMT: no nasal discharge, dry mucous membranes  Cardiovascular: regular rhythm, distal pulses intact  Respiratory: breathing not labored, symmetric  Gastrointestinal: soft non-tender, +bowel sounds  Musculoskeletal: no deformity, no tenderness to palpation  Skin: warm, dry  Neurologic: following simple commands, moving all extremities  Psychiatric: flat affect       HISTORY:     Active Problems:    Respiratory failure (HCC) (3/30/2018)      Cellulitis (3/30/2018)      Altered mental status (3/30/2018)      Pneumonia (3/30/2018)      Past Medical History:   Diagnosis Date    A-fib ([facility] [address]     Hypothyroid     Hypothyroidism     Mental retardation     Other ill-defined conditions(799.89)     obesity    Other ill-defined conditions(799.89)     mentally disabled    Prader-Willi syndrome     S/P cardiac pacemaker procedure 2/15/2013    heart block      Past Surgical History:   Procedure Laterality Date    HX HEENT      eye surgery as a baby    HX PACEMAKER        Family History   Problem Relation Age of Onset  Hypertension Mother     Heart Disease Father       History reviewed, no pertinent family history.   Social History   Substance Use Topics    Smoking status: Never Smoker    Smokeless tobacco: Never Used    Alcohol use No     No Known Allergies   Current Facility-Administered Medications   Medication Dose Route Frequency    heparin (porcine) injection 5,000 Units  5,000 Units SubCUTAneous Q8H    sodium chloride 0.9 % in dextrose 10% 1,040 mL infusion   IntraVENous CONTINUOUS    hydrocortisone Sod Succ (PF) (SOLU-CORTEF) injection 50 mg  50 mg IntraVENous B9E    folic acid (FOLVITE) tablet 1 mg  1 mg Oral DAILY    balsam peru-castor oil (VENELEX)  mg/gram ointment   Topical BID    levothyroxine (SYNTHROID) tablet 100 mcg  100 mcg Oral 6am    VANCOMYCIN INFORMATION NOTE   Other Rx Dosing/Monitoring    glucose chewable tablet 16 g  4 Tab Oral PRN    dextrose (D50W) injection syrg 12.5-25 g  12.5-25 g IntraVENous PRN    glucagon (GLUCAGEN) injection 1 mg  1 mg IntraMUSCular PRN    miconazole (SECURA) 2 % extra thick cream   Topical BID    sodium chloride (NS) flush 5-10 mL  5-10 mL IntraVENous Q8H    sodium chloride (NS) flush 5-10 mL  5-10 mL IntraVENous PRN    sodium chloride (NS) flush 5-10 mL  5-10 mL IntraVENous Q8H    sodium chloride (NS) flush 5-10 mL  5-10 mL IntraVENous PRN    nystatin (MYCOSTATIN) 100,000 unit/gram powder   Topical BID          LAB AND IMAGING FINDINGS:     Lab Results   Component Value Date/Time    WBC 7.6 04/06/2018 04:05 AM    HGB 9.5 (L) 04/06/2018 04:05 AM    PLATELET 71 (L) 80/08/2820 04:05 AM     Lab Results   Component Value Date/Time    Sodium 140 04/06/2018 04:05 AM    Potassium 4.0 04/06/2018 04:05 AM    Chloride 101 04/06/2018 04:05 AM    CO2 34 (H) 04/06/2018 04:05 AM    BUN 31 (H) 04/06/2018 04:05 AM    Creatinine 3.05 (H) 04/06/2018 04:05 AM    Calcium 7.9 (L) 04/06/2018 04:05 AM    Magnesium 1.9 04/06/2018 04:05 AM    Phosphorus 5.4 (H) 04/06/2018 04:05 AM      Lab Results   Component Value Date/Time    AST (SGOT) 22 04/06/2018 04:05 AM    Alk. phosphatase 59 04/06/2018 04:05 AM    Protein, total 6.9 04/06/2018 04:05 AM    Albumin 1.8 (L) 04/06/2018 04:05 AM    Globulin 5.1 (H) 04/06/2018 04:05 AM     Lab Results   Component Value Date/Time    INR 1.1 03/30/2018 02:06 PM    Prothrombin time 10.7 03/30/2018 02:06 PM    aPTT 27.0 12/04/2014 02:00 PM      No results found for: IRON, FE, TIBC, IBCT, PSAT, FERR   Lab Results   Component Value Date/Time    pH 7.17 (LL) 04/05/2018 11:05 AM    PCO2 96 (H) 04/05/2018 11:05 AM    PO2 88 04/05/2018 11:05 AM     No components found for: [place]   Lab Results   Component Value Date/Time    CK 69 12/04/2014 02:20 PM    CK - MB 1.6 12/04/2014 02:20 PM                Total time:   Counseling / coordination time, spent as noted above:   > 50% counseling / coordination?: y    Prolonged service was provided for  []30 min   []75 min in face to face time in the presence of the patient, spent as noted above. Time Start:   Time End:   Note: this can only be billed with 88061 (initial) or 23906 (follow up). If multiple start / stop times, list each separately.

## 2018-04-06 NOTE — PROGRESS NOTES
Nutrition Assessment:    RECOMMENDATIONS:   Advance diet as medically able  RD to add Ensure Clear TID    DIETITIANS INTERVENTIONS/PLAN:   Advance diet as tolerated  Add PO supplements  Monitor appetite/PO intake    ASSESSMENT:   Pt admitted with respiratory failure. PMH: Prader Willi syndrome. Chart reviewed, case discussed during CCU rounds. Pt has been on and off BiPAP. He has also been encephalopathic, per pt's mother he is still not quite back to baseline. Her appetite has varied depending on his respiratory status. Prader Elvina Flowers pt's typically had a voracious appetite and will gorge themselves till the point of vomiting. This is unlikely to be an issue for this pt until his mental status and respiratory status improves. Nephrology is following, while family decides on whether to start HD. Will add Ensure Clear to better meet protein needs on liquid diet. SUBJECTIVE/OBJECTIVE:   Pt confused, on BiPAP  Diet Order: Full liquids  % Eaten:  Patient Vitals for the past 72 hrs:   % Diet Eaten   04/05/18 1800 15 %   04/05/18 0900 75 %   04/04/18 1400 100 %   04/04/18 1051 0 %     Pertinent Medications:folvite, solucortef, zosyn; IVF([email or web address], [email or web address]). Chemistries:  Lab Results   Component Value Date/Time    Sodium 140 04/06/2018 04:05 AM    Potassium 4.0 04/06/2018 04:05 AM    Chloride 101 04/06/2018 04:05 AM    CO2 34 (H) 04/06/2018 04:05 AM    Anion gap 5 04/06/2018 04:05 AM    Glucose 113 (H) 04/06/2018 04:05 AM    BUN 31 (H) 04/06/2018 04:05 AM    Creatinine 3.05 (H) 04/06/2018 04:05 AM    BUN/Creatinine ratio 10 (L) 04/06/2018 04:05 AM    GFR est AA 26 (L) 04/06/2018 04:05 AM    GFR est non-AA 22 (L) 04/06/2018 04:05 AM    Calcium 7.9 (L) 04/06/2018 04:05 AM    AST (SGOT) 22 04/06/2018 04:05 AM    Alk.  phosphatase 59 04/06/2018 04:05 AM    Protein, total 6.9 04/06/2018 04:05 AM    Albumin 1.8 (L) 04/06/2018 04:05 AM    Globulin 5.1 (H) 04/06/2018 04:05 AM    A-G Ratio 0.4 (L) 04/06/2018 04:05 AM ALT (SGPT) 21 04/06/2018 04:05 AM      Anthropometrics: Height: 5' 1\" (154.9 cm) Weight: (!) 162.8 kg (358 lb 14.5 oz)  []bed scale    []stated   []unknown    IBW (%IBW): 50.9 kg (112 lb 3.4 oz) ( ) UBW (%UBW):   (  %)    BMI: Body mass index is 67.82 kg/(m^2). This BMI is indicative of:  []Underweight   []Normal   []Overweight   [] Obesity   [x] Extreme Obesity (BMI>40)  Estimated Nutrition Needs (Based on): 2209 Kcals/day (MSJ 2341 x 1.2 (-600 for obesity) ) , 79 g (1gPro/kg ABW) Protein  Carbohydrate: At Least 130 g/day  Fluids: 2200 mL/day    Last BM: 4/4   [x]Active     []Hyperactive  []Hypoactive       [] Absent   BS  Skin:    [] Intact   [] Incision  [x] Breakdown (fissure-gluteal fold)  [] DTI   [x] Tears/Excoriation/Abrasion  [x]Edema(nonpitting-generalized; +3-BLE; +3-BUE) [] Other: Wt Readings from Last 30 Encounters:   04/05/18 (!) 162.8 kg (358 lb 14.5 oz)   03/15/18 150.8 kg (332 lb 7.3 oz)   11/23/17 143 kg (315 lb 4.1 oz)   06/29/17 148.5 kg (327 lb 6.1 oz)   06/19/17 148.5 kg (327 lb 6.1 oz)   09/28/16 158.8 kg (350 lb)   09/25/16 146.7 kg (323 lb 6.6 oz)   10/16/15 149 kg (328 lb 7.8 oz)   12/04/14 157.9 kg (348 lb)   11/05/14 143.3 kg (316 lb)   10/14/14 143.3 kg (316 lb)   09/16/14 143.5 kg (316 lb 5.8 oz)   08/24/14 (!) 161.5 kg (356 lb)   06/12/14 156 kg (344 lb)   02/16/14 (!) 181.4 kg (400 lb)   02/15/13 (!) 168.8 kg (372 lb 2 oz)   05/07/12 136.1 kg (300 lb)   04/01/11 132.5 kg (292 lb 3.2 oz)   09/30/10 158.8 kg (350 lb)      NUTRITION DIAGNOSES:   Problem:  Inadequate protein-energy intake      Etiology: related to respiratory status      Signs/Symptoms: as evidenced by pt NPO/liquids x 7 days. NUTRITION INTERVENTIONS:  Meals/Snacks: General/healthful diet   Supplements: Commercial supplement              GOAL:   Pt will consume >50% of meals/supplements in 3-5 days.      Cultural, Oriental orthodox, or Ethnic Dietary Needs: None     LEARNING NEEDS (Diet, Food/Nutrient-Drug Interaction): [x] None Identified   [] Identified and Education Provided/Documented   [] Identified and Pt declined/was not appropriate      [x] Interdisciplinary Care Plan Reviewed/Documented    [x] Participated in Discharge Planning:  To be determined    [x] Interdisciplinary Rounds     NUTRITION RISK:    [] High              [x] Moderate           []  Low  []  Minimal/Uncompromised    PT SEEN FOR:    []  MD Consult: []Calorie Count      []Diabetic Diet Education        []Diet Education     []Electrolyte Management     []General Nutrition Management and Supplements     []Management of Tube Feeding     []TPN Recommendations    []  RN Referral:  []MST score >=2     []Enteral/Parenteral Nutrition PTA     []Pregnant: Gestational DM or Multigestation   []  Low BMI  []  Re-Screen   [x]  LOS   []  NPO/clears x 5 days   []  New TF/TPN    Sophie Kelley RD, [address]    Pager [phone]  Weekend Pager [phone]

## 2018-04-06 NOTE — PROGRESS NOTES
Renal --    S/p conversation with Mr. Taylor Hsieh mother and brother. We are all in agreement to continue with management as is for now, but that we will not proceed with dialysis.      Sylwia Morris

## 2018-04-06 NOTE — PROGRESS NOTES
1930 Bedside and Verbal shift change report given to GAURANG Reddy (oncoming nurse) by Martínez Curran RN (offgoing nurse). Report included the following information SBAR, Kardex, ED Summary, Intake/Output, MAR, Recent Results and Cardiac Rhythm NSR.   2000 Shift assessment complete. 2300 Patient resting on BiPap complaining of no pain. Mother at beside. Griffin output remains less than 30.  0730 Report given to Rod Hoffmann RN.

## 2018-04-06 NOTE — PROGRESS NOTES
0730: Report received from 753Filiberto Don RN.    0900: Pt. Is alert and oriented to place and self. Pt. Has excoriation to all folds, and sacrum. Cream ordered and applied. Wound care following. Bowel sounds active. Breath sounds diminished. Pt. Is PACED on the monitor. Pt. Was able to take his PO morning meds crushed in apple sauce. Pt. Is eating breakfast now. Pt. Has 2 PIV's and a brooks for retention. Pt. Has D10 infusing at 50 ml/hr. 1200: Pt. Reassessed. Patient has been on and off the BIPAP all morning. Pt. Had a bath and one large loose BM.     1600: Pt. Reassessed. No changes at this time. Dr. Milagros Parham and Arvel Rubinstein having family meeting with patient's mother and brother at this time to decide on decision to proceed with dialysis and to decide treatment plan. Pt. Is back on the BIPAP mask due to a RR holding in the 30's.     1800: Pt. Is requesting to go back on NC and off BIPAP. Will do so now. Pt.'s temperature is 96.2. Pt. Is refusing Bare hugger. Will let patient eat dinner, then put the bare hugger and BIPAP back on patient for the night.     1900: Report given to GAURANG Parada.

## 2018-04-07 NOTE — PROGRESS NOTES
2000- Patient on 3.5 L nasal cannula. Paced rhythm. Mother at bedside. 2200- Bipap applied. 0530- Bipap removed per patient's request, 2 L nasal cannula applied. 0600- 219 mL urine output in 12 hours  0700- Report to Amish RN    Patient had no complaints of pain overnight. Periods of rest observed. Patient's mother remained at bedside.

## 2018-04-07 NOTE — PROGRESS NOTES
Hospitalist Progress Note    NAME: Tanisha Parikh   :  1965   MRN:  [id number]       Assessment / Plan:  Acute encephalopathy POA: 2ry to CO2 narcosis vs infection, awake but not back to baseline yet. Seems more sleepy  Acute on chronic respiratory failure/Hypoventilation Sd. POA: on NC today , sounds congested, air entry still poor  Acute HCAP (POA) D/joseph Zosyn, bronchodilators, monitor. Super morbid obesity (Body mass index is 65.94 kg/(m^2). ) due to Prader Willi    Acute Sepsis (POA) due to bilateral HCAP (POA) and RLE celllulits (POA) contributing to hypothermia:  c/w ABX. Monitor. Thrombocytopenia:   appears chronic, monitor labs, start folic acid  Hypothyroidism:   Stable last TSH 1.01 on 3/11/18, Continue synthroid  ARF: due to sepsis vs Vancomycin, poor urine output, c/w gentle hydration, monitor. Nephrology in the case, creatinine continues trending up. He is edematous and his weight is up, BNP is high, CXR shows pulmonary edema, at this rate will need HD soon but I think he is a poor candidate for it, Nephrology help appreciated. Mother had decided for no HD. BP is in the low side  Code status: DNR  Prophylaxis: Lovenox  Recommended Disposition: SNF/LTC and  PT, OT, RN     Prognosis is Poor, may be candidate for Hospice     Subjective:     Chief Complaint / Reason for Physician Visit  Awake, confused unable to provide a meaningful history  Discussed with RN events overnight. Review of Systems:  Symptom Y/N Comments  Symptom Y/N Comments   Fever/Chills    Chest Pain     Poor Appetite    Edema     Cough    Abdominal Pain     Sputum    Joint Pain     SOB/CLEMENTE    Pruritis/Rash     Nausea/vomit    Tolerating PT/OT     Diarrhea    Tolerating Diet     Constipation    Other       Could NOT obtain due to: Prader Willi Sd.     Objective:     VITALS:   Last 24hrs VS reviewed since prior progress note.  Most recent are:  Patient Vitals for the past 24 hrs:   Temp Pulse Resp BP SpO2   18 1354 - 75 23 - 93 %   04/07/18 1300 - 75 18 91/54 96 %   04/07/18 1200 95.8 °F (35.4 °C) 75 24 94/54 96 %   04/07/18 1100 - 75 24 93/55 95 %   04/07/18 1000 - 75 22 99/55 94 %   04/07/18 0900 - 75 22 104/60 95 %   04/07/18 0800 95.7 °F (35.4 °C) 75 19 102/63 97 %   04/07/18 0700 - 75 16 98/56 96 %   04/07/18 0600 - 75 18 101/56 97 %   04/07/18 0500 - 75 21 99/58 95 %   04/07/18 0400 97.3 °F (36.3 °C) 75 17 103/64 95 %   04/07/18 0335 - 75 22 - 95 %   04/07/18 0300 - 75 16 103/63 95 %   04/07/18 0200 - 75 15 101/61 95 %   04/07/18 0100 - 75 20 99/60 94 %   04/07/18 0000 97.2 °F (36.2 °C) 75 18 97/59 96 %   04/06/18 2349 - 75 21 - 96 %   04/06/18 2300 - 75 20 (!) 84/44 96 %   04/06/18 2200 - 75 18 96/48 96 %   04/06/18 2100 - 75 23 104/62 96 %   04/06/18 2000 97.4 °F (36.3 °C) 75 16 106/62 96 %   04/06/18 1900 - 75 19 102/60 96 %   04/06/18 1800 - 75 23 106/64 98 %   04/06/18 1700 - 75 19 103/59 96 %   04/06/18 1600 97.3 °F (36.3 °C) 75 20 100/60 94 %   04/06/18 1500 - 75 23 105/59 96 %       Intake/Output Summary (Last 24 hours) at 04/07/18 1401  Last data filed at 04/07/18 1300   Gross per 24 hour   Intake             1440 ml   Output              417 ml   Net             1023 ml        PHYSICAL EXAM:  General: WD, WN. awake, confused, no acute distress    EENT:  EOMI. Anicteric sclerae. MMM  Resp:  Coarse BS, poor air entry, + crackles  CV:  Regular  rhythm,  + edema  GI:  Soft, Non distended, Non tender.  +Bowel sounds  Neurologic:  Alert and oriented X 1, slow speech,   Psych:   Poor  insight. Not anxious nor agitated  Skin:  No rashes.   No jaundice    Reviewed most current lab test results and cultures  YES  Reviewed most current radiology test results   YES  Review and summation of old records today    NO  Reviewed patient's current orders and MAR    YES  PMH/SH reviewed - no change compared to H&P  ________________________________________________________________________  Care Plan discussed with:    Comments Patient y    Family  y mother   RN y    Care Manager     Consultant                        Multidiciplinary team rounds were held today with , nursing, pharmacist and clinical coordinator. Patient's plan of care was discussed; medications were reviewed and discharge planning was addressed. ________________________________________________________________________  Total NON critical care TIME:  25   Minutes    Total CRITICAL CARE TIME Spent:   Minutes non procedure based      Comments   >50% of visit spent in counseling and coordination of care y    ________________________________________________________________________  Sylwia Li MD     Procedures: see electronic medical records for all procedures/Xrays and details which were not copied into this note but were reviewed prior to creation of Plan. LABS:  I reviewed today's most current labs and imaging studies.   Pertinent labs include:  Recent Labs      04/07/18   0433  04/06/18   0405  04/05/18   0344   WBC  7.4  7.6  7.3   HGB  9.6*  9.5*  10.1*   HCT  31.8*  31.0*  33.4*   PLT  76*  71*  71*     Recent Labs      04/07/18   0433  04/06/18   0405  04/05/18   0344   NA  141  140  138   K  4.1  4.0  4.0   CL  102  101  98   CO2  33*  34*  36*   GLU  115*  113*  123*   BUN  36*  31*  27*   CREA  3.47*  3.05*  2.69*   CA  8.2*  7.9*  7.7*   MG  2.1  1.9  1.8   PHOS  5.6*  5.4*  5.7*   ALB  2.0*  1.8*  2.0*   TBILI  0.3  0.3  0.4   SGOT  29  22  26   ALT  26  21  24       Signed: Sylwia Li MD

## 2018-04-07 NOTE — PROGRESS NOTES
Midline insertion procedure note:  Pt has very limited vascular access. Procedure explained to patient and patient's mother, Rambo Simmons, along with risks and benefits. Procedure teaching completed. Pre procedure assessment done. Patient's mother, Rambo Simmons, denies questions or concerns at this time. Midline information booklet left at beside. Midline sign over the [facility]. Maximum sterile barrier precautions observed throughout procedure. Lidocaine 1% 5ml sc injected to site prior to access the vein. Cannulated left cephalic vein using ultrasound guidance. Inserted 4 Fr. single lumen midline to left arm. Blood return verified and flushed with 20ml normal saline. Sterile dressing applied with Biopatch, StatLock and occlusive dressing as per protocol. Curos caps applied to port. Patient tolerated procedure well with minimal blood loss. Reason for access : Reliable IV access  Complications related to insertion : none  This midline to be removed on or before 5/6/18   See nursing message on [employer] for midline reminders. Midline is CT and MRI compatible. Inserted by : Chary De Jesus RN / Vascular Access Nurse  Assisted by : Raz Benson RN / Vascular Access Nurse  Catheter Length : 12 cm   External Length : 0 cm   Left arm circumference : 59 cm  Catheter occupies 8% of vein. Type of Midline: BARD  Ref#: [id number]  Lot#: [id number]  Expiration Date: 2019-07-31  Informed primary nurse Colt Rosas RN midline is ready for use and to hang new infusion tubing prior to use.     Chary De Jesus RN / Vascular Access Nurse

## 2018-04-07 NOTE — PROGRESS NOTES
0800 assessment completed patient on 3. 5LNC oriented to person/place, answers to voice. Mother at bedside. 1200 Reassessment completed. No changes noted. 1300 Patient desaturating, oxygen saturations decreased to 83-85% on 4LNC Patients breathing more labored, increasing crackles. Patinet placed on bipap for support. Oxygen saturations increased to 993-95%. 46 Dr. Aldon Gibbon called notified that patient was placed back on bipap for respiratory distress decreasing oxygen saturatings. Oxygen saturations improved with bipap, patient is now hypotensive bp 81/42. New orders received for albumin. 1600 Patient placed back on NC 4L tolerating well. BP remaining in 28'Q systolic post albumin administration.      1900  Verbal and bedside report given to [facility]

## 2018-04-07 NOTE — PROGRESS NOTES
[facility]  Pulmonary, Critical Care, and Sleep Medicine    Name: Daniel Faria MRN: [id number]   : 1965 Hospital: [facility]   Date: 2018        Critical Care Patient Consult    IMPRESSION:   · Encephalopathy-per his mother still not back to baseline. He is responsive. Seems more responsive today. · His kidneys and lungs are not improving. Overall prognosis is looking pretty poor. Discussed with pts mother, Dr. Monica Cisneros, Desire to continue current support, All agree would be almost impossible to manage as a Chronic HD pt, Short term dialysis is being considered. He has needed ongoing bipap support, remains hypothermic, hypoglycemic. · Acute Renal failure,No urine output, increased Cr; Urology stated pt was a difficult urinary cath placement , would keep brooks cath in place for now. Not to be on nurse driven removal protocol. Renal is following. Cr is not any better. · Acute on Chronic Respiratory failure-much worse today, Would continue bipap support as long as tolerated, Pt is Do Not intubate. Varun Gallego His CXR demonstrates ongoing pulmonary edema, possible basilar effusions and possible pulmoary infiltrates. On empiric treatment for pneumonia. Overall has not really improved. · Due to pts hypotension, hypothermia, and hypoglycemia has been on hydrocortisone He has not been stable enough to try to decrease. · Hypoglycemia, on D10 infusion. Still not taking in reliable amount by mouth. Will continue the dextrose infusion. Still needs this. · Chronic lymphedema, worse on left that right, has chronic cellulitis of his legs. Right leg seems more red and swollen. Cellulitis seems stable. · Chronic hypoventilation syndrome, now made acutely worse with above. · Chronic thrombocytopenia.    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   · Hypothyroidism  · PPM  · Pt is Do not resuscitate per his mother. · His mother is deciding on further treatment of the acute renal failure and respiratory failure. · Critically ill, High  risk of decompensation. 28 mi CC, EOP remains critically ill, high level of ongoing support. · Prognosis is looking poor. · Pt would not be a good long term HD candidate. · Discussed with nursing, CM, Pharmacy, Dr. Lavonne Hartley. RECOMMENDATIONS:   · Will need  continual bipap due to hypercarbia, will continue with bipap support,   · Palliative care consult appreciated  · Need to follow blood sugars while he is on Dextrose infusion   · Will repeat CXR in am.   · Urology had to place brooks, keep it in. · Acute renal failure. Appreciate Dr. Sima Holstein assistance. Needs ongoing monitoring, ongoing discussions of direction to care for pt. · Will monitor UOP  · Monitor for further hypoglycemia, has POC glucose and D10 infusion. Subjective/History:   Last 24 hrs:Pt remains critically ill, not improving. Remains on warming blanket, bipap, not having any improvement in renal function. He is more alert this am. NO acute changes overnight.         4-5-18:  pt seems to be doing worse. Pt has not been as responsive today. WE check ABG this am and his pco2 was up to 90. His Cr has not improved. Mother is considering what direction to proceed. Pt is not able to take in anything by mouth due to poor mental status. 4-4-18: Pt has been on nc this am. Was on bipap last night. Had a cough which seemed to be new per his mother. Not able to get ROS or HPI from pt due to his Prader Willi syndrome. 4-3-18: hrs: pt is more alert and interactive. Off bipap this am. Due to his underlying condition he is not able to give any hx. His mother is at his bedside. Cc: per his family was altered mental status    HPI: This patient has been seen and evaluated at the request of Dr. Rosette Oh for above. Patient is a 46 y.o. male who presents for above.  He was seen on the bipap the am. NO distress. He is not able to communicate due to Prader Willi syndrome and pt on bipap. Discussed with nurse this am.     Per his mother pt was more sleepy, and confused. He seems to be more alert today. Had some hypotension yesterday. The patient is critically ill and can not provide additional history due to Unable to speak and Unable to comprehend. Past Medical History:   Diagnosis Date    A-fib ([facility] [address]     Hypothyroid     Hypothyroidism     Mental retardation     Other ill-defined conditions(799.89)     obesity    Other ill-defined conditions(799.89)     mentally disabled    Prader-Willi syndrome     S/P cardiac pacemaker procedure 2/15/2013    heart block      Past Surgical History:   Procedure Laterality Date    HX HEENT      eye surgery as a baby    HX PACEMAKER        Prior to Admission medications    Medication Sig Start Date End Date Taking? Authorizing Provider   cephALEXin (KEFLEX) 500 mg capsule Take 500 mg by mouth three (3) times daily. 3/27/18  Yes Vilma Aguilar MD   nystatin (MYCOSTATIN) powder Apply  to affected area two (2) times a day. Patient applies a 'generous amount to groin and underarms.'   Yes Historical Provider   cholecalciferol (VITAMIN D3) 1,000 unit cap Take 1,000 Units by mouth daily. Yes Historical Provider   cranberry extract (CRANBERRY) 450 mg tab Take 1 Tab by mouth daily. Yes Historical Provider   aspirin (ASPIRIN) 325 mg tablet Take 325 mg by mouth every evening. Yes Historical Provider   ascorbic acid (VITAMIN C) 500 mg tablet Take 500 mg by mouth every evening. Yes Historical Provider   ferrous sulfate (IRON) 325 mg (65 mg iron) tablet Take 325 mg by mouth daily (after dinner). Yes Historical Provider   multivitamin (ONE A DAY) tablet Take 1 tablet by mouth every evening. Yes Historical Provider   levothyroxine (SYNTHROID) 100 mcg tablet Take 100 mcg by mouth Daily (before breakfast).    Yes Marquita Juárez MD     Current Facility-Administered Medications   Medication Dose Route Frequency    SALINE PERIPHERAL FLUSH Q8H soln 10 mL  10 mL InterCATHeter Q8H    heparin (porcine) injection 5,000 Units  5,000 Units SubCUTAneous Q8H    sodium chloride 0.9 % in dextrose 10% 1,040 mL infusion   IntraVENous CONTINUOUS    hydrocortisone Sod Succ (PF) (SOLU-CORTEF) injection 50 mg  50 mg IntraVENous V5R    folic acid (FOLVITE) tablet 1 mg  1 mg Oral DAILY    balsam peru-castor oil (VENELEX)  mg/gram ointment   Topical BID    levothyroxine (SYNTHROID) tablet 100 mcg  100 mcg Oral 6am    miconazole (SECURA) 2 % extra thick cream   Topical BID    sodium chloride (NS) flush 5-10 mL  5-10 mL IntraVENous Q8H    nystatin (MYCOSTATIN) 100,000 unit/gram powder   Topical BID     No Known Allergies   Social History   Substance Use Topics    Smoking status: Never Smoker    Smokeless tobacco: Never Used    Alcohol use No      Family History   Problem Relation Age of Onset    Hypertension Mother     Heart Disease Father         Review of Systems:  Review of systems not obtained due to patient factors. Objective:   Vital Signs:    Visit Vitals    BP 91/54 (BP 1 Location: Right arm, BP Patient Position: At rest)    Pulse 75    Temp 95.8 °F (35.4 °C)    Resp 23    Ht 5' 1\" (1.549 m)    Wt (!) 169.2 kg (373 lb 0.3 oz)    SpO2 93%    BMI 70.48 kg/m2       O2 Device: Nasal cannula   O2 Flow Rate (L/min): 3.5 l/min   Temp (24hrs), Av.8 °F (36 °C), Min:95.7 °F (35.4 °C), Max:97.4 °F (36.3 °C)       Intake/Output:   Last shift:      701 - 1900  In: 490 [P.O.:240;  I.V.:250]  Out: 73 [Urine:73]  Last 3 shifts: 1901 -  07  In: 2000 [I.V.:2000]  Out: 587 [Urine:587]    Intake/Output Summary (Last 24 hours) at 18 1420  Last data filed at 18 1300   Gross per 24 hour   Intake           1337.5 ml   Output              417 ml   Net            920.5 ml     Hemodynamics:   PAP:   CO:     Wedge:   CI: CVP:    SVR:       PVR:       Ventilator Settings:  Mode Rate Tidal Volume Pressure FiO2 PEEP   NIPPV      18 cm H2O 40 % 6 cm H20     Peak airway pressure: 17 cm H2O    Minute ventilation: 8 l/min      Physical Exam:    General:  Alert and interactive. no distress, appears stated age. Morbid obese, no distress, bipap is in place. Head:  Normocephalic, without obvious abnormality, atraumatic. Eyes:  Conjunctivae/corneas clear. PERRL, EOMs intact. Nose: Nares normal. Septum midline. Mucosa normal. No drainage or sinus tenderness. Throat: Lips, mucosa, and tongue normal. Teeth and gums normal.   Neck: Supple, symmetrical, trachea midline, no adenopathy, thyroid: no enlargment/tenderness/nodules, no carotid bruit and no JVD. Back:   Symmetric, no curvature. ROM normal.   Lungs:   Clear to auscultation bilaterally. Has decreased BS in bases bilaterally. Breathing comfortably. On NC oxygen. NO distress. Chest wall:  No tenderness or deformity. Heart:  Regular rate and rhythm, S1, S2 normal, no murmur, click, rub or gallop. Abdomen:   Obese, Soft, non-tender. Bowel sounds normal. No masses,  No organomegaly. Extremities: Extremities normal, atraumatic, has 2-3+ Bilateral edema, appear to have chronic lower extremity edema. Has lessredness of the right leg today. Pulses: 2+ and symmetric all extremities. Skin: Skin color, texture, turgor normal. Has areas of bruising over his body. .    Lymph nodes: Cervical, supraclavicular nodes are  normal.   Neurologic: More alert and interactive, moves extremities intermittently. Grossly nonfocal, not able to fully assess due to his underlying medical condition. Psych: no overt anxiety or depression. But again not really able to assess due to his acute medical condition.         Data:     Recent Results (from the past 24 hour(s))   GLUCOSE, POC    Collection Time: 04/06/18  8:22 PM   Result Value Ref Range    Glucose (POC) 126 (H) 65 - 100 mg/dL    Performed by Gaila Spurling    CBC WITH AUTOMATED DIFF    Collection Time: 04/07/18  4:33 AM   Result Value Ref Range    WBC 7.4 4.1 - 11.1 K/uL    RBC 3.14 (L) 4.10 - 5.70 M/uL    HGB 9.6 (L) 12.1 - 17.0 g/dL    HCT 31.8 (L) 36.6 - 50.3 %    .3 (H) 80.0 - 99.0 FL    MCH 30.6 26.0 - 34.0 PG    MCHC 30.2 30.0 - 36.5 g/dL    RDW 14.8 (H) 11.5 - 14.5 %    PLATELET 76 (L) 297 - 400 K/uL    MPV 12.8 8.9 - 12.9 FL    NRBC 0.0 0  WBC    ABSOLUTE NRBC 0.00 0.00 - 0.01 K/uL    NEUTROPHILS 90 (H) 32 - 75 %    LYMPHOCYTES 4 (L) 12 - 49 %    MONOCYTES 5 5 - 13 %    EOSINOPHILS 0 0 - 7 %    BASOPHILS 0 0 - 1 %    IMMATURE GRANULOCYTES 1 (H) 0.0 - 0.5 %    ABS. NEUTROPHILS 6.6 1.8 - 8.0 K/UL    ABS. LYMPHOCYTES 0.3 (L) 0.8 - 3.5 K/UL    ABS. MONOCYTES 0.4 0.0 - 1.0 K/UL    ABS. EOSINOPHILS 0.0 0.0 - 0.4 K/UL    ABS. BASOPHILS 0.0 0.0 - 0.1 K/UL    ABS. IMM. GRANS. 0.1 (H) 0.00 - 0.04 K/UL    DF SMEAR SCANNED      RBC COMMENTS NORMOCYTIC, NORMOCHROMIC     METABOLIC PANEL, COMPREHENSIVE    Collection Time: 04/07/18  4:33 AM   Result Value Ref Range    Sodium 141 136 - 145 mmol/L    Potassium 4.1 3.5 - 5.1 mmol/L    Chloride 102 97 - 108 mmol/L    CO2 33 (H) 21 - 32 mmol/L    Anion gap 6 5 - 15 mmol/L    Glucose 115 (H) 65 - 100 mg/dL    BUN 36 (H) 6 - 20 MG/DL    Creatinine 3.47 (H) 0.70 - 1.30 MG/DL    BUN/Creatinine ratio 10 (L) 12 - 20      GFR est AA 23 (L) >60 ml/min/1.73m2    GFR est non-AA 19 (L) >60 ml/min/1.73m2    Calcium 8.2 (L) 8.5 - 10.1 MG/DL    Bilirubin, total 0.3 0.2 - 1.0 MG/DL    ALT (SGPT) 26 12 - 78 U/L    AST (SGOT) 29 15 - 37 U/L    Alk.  phosphatase 59 45 - 117 U/L    Protein, total 7.0 6.4 - 8.2 g/dL    Albumin 2.0 (L) 3.5 - 5.0 g/dL    Globulin 5.0 (H) 2.0 - 4.0 g/dL    A-G Ratio 0.4 (L) 1.1 - 2.2     MAGNESIUM    Collection Time: 04/07/18  4:33 AM   Result Value Ref Range    Magnesium 2.1 1.6 - 2.4 mg/dL   PHOSPHORUS    Collection Time: 04/07/18  4:33 AM   Result Value Ref Range    Phosphorus 5.6 (H) 2.6 - 4.7 MG/DL   GLUCOSE, POC    Collection Time: 04/07/18  6:27 AM   Result Value Ref Range    Glucose (POC) 110 (H) 65 - 100 mg/dL    Performed by [id number], POC    Collection Time: 04/07/18  1:07 PM   Result Value Ref Range    Glucose (POC) 120 (H) 65 - 100 mg/dL    Performed by Unknown [facility]              Telemetry:NSR. Imaging:  I have personally reviewed the patients radiographs and have reviewed the reports:  4-6-18: INDICATION:  Interstitial edema     COMPARISON:  Prior day     FINDINGS: A portable AP radiograph of the chest was obtained at 0422 hours. The  patient is on a cardiac monitor. There is moderate pulmonary edema with  bilateral pleural effusions.   The cardiac and mediastinal contours are normal.   The bones and soft tissues are grossly within normal limits.      IMPRESSION: Stable moderate pulmonary edema with bilateral effusions          Trini Hawkins MD

## 2018-04-07 NOTE — PROGRESS NOTES
NAME: Bandar Henderson        :  1965        MRN:  [id number]        Assessment :    Plan:  --TRINIDAD    Hypotension    Hypothermia    Anemia, chronic thrombocytopenia    Super morbid obesity/Prader-Willi    Right LE cellulitis/sepsis/bilateral HCAP    Obesity hypoventilation syndrome     Chronic CO2 retention    --Creatinine at baseline is 0.5 (low muscle mass) and continues on the rise (now 3.1 to 3.5)    Oligoanuric; brooks - had to be placed with bedside cytoscopy by Urology    ATN from sepsis/hypotension +/- from vanco    Continue care as is for now, but no HD (s/p long discussions with his Mother and brother)    Miguel Mio to have a mid-line catheter       .       Subjective:     Chief Complaint:  On NC. Alert. Poor historian. I spoke with his Mom and his ICU nurse. Review of Systems:    Symptom Y/N Comments  Symptom Y/N Comments   Fever/Chills    Chest Pain     Poor Appetite    Edema     Cough    Abdominal Pain     Sputum    Joint Pain     SOB/CLEMENTE    Pruritis/Rash     Nausea/vomit    Tolerating PT/OT     Diarrhea    Tolerating Diet     Constipation    Other       Could not obtain due to: See above     Objective:     VITALS:   Last 24hrs VS reviewed since prior progress note. Most recent are:  Visit Vitals    /56    Pulse 75    Temp 97.3 °F (36.3 °C)    Resp 18    Ht 5' 1\" (1.549 m)    Wt (!) 169.2 kg (373 lb 0.3 oz)    SpO2 97%    BMI 70.48 kg/m2       Intake/Output Summary (Last 24 hours) at 182  Last data filed at 18 0600   Gross per 24 hour   Intake             1200 ml   Output              429 ml   Net              771 ml      Telemetry Reviewed:     PHYSICAL EXAM:  General: Obese in the ICU off and on bipap  Resp:  Rhonchi/Rales. No access.  muscle use  CV:  Regular  rhythm,  No murmur (), No Rubs, No Gallops. + edema  GI:  Soft, obese, Non distended, Non tender.  +Bowel sounds, no HSM      Lab Data Reviewed: (see below)    Medications Reviewed: (see below)    PMH/SH reviewed - no change compared to H&P  ________________________________________________________________________  Care Plan discussed with:  Patient y    Family  y    RN y    Care Manager                    Consultant:          Comments   >50% of visit spent in counseling and coordination of care       ________________________________________________________________________  Rubin Ellis MD     Procedures: see electronic medical records for all procedures/Xrays and details which  were not copied into this note but were reviewed prior to creation of Plan. LABS:  Recent Labs      04/07/18 0433 04/06/18 0405   WBC  7.4  7.6   HGB  9.6*  9.5*   HCT  31.8*  31.0*   PLT  76*  71*     Recent Labs      04/07/18 0433 04/06/18   0405 04/05/18   0344   NA  141  140  138   K  4.1  4.0  4.0   CL  102  101  98   CO2  33*  34*  36*   BUN  36*  31*  27*   CREA  3.47*  3.05*  2.69*   GLU  115*  113*  123*   CA  8.2*  7.9*  7.7*   MG  2.1  1.9  1.8   PHOS  5.6*  5.4*  5.7*     Recent Labs      04/07/18 0433 04/06/18 0405  04/05/18   0344   SGOT  29  22  26   AP  59  59  70   TP  7.0  6.9  6.9   ALB  2.0*  1.8*  2.0*   GLOB  5.0*  5.1*  4.9*     No results for input(s): INR, PTP, APTT in the last 72 hours. No lab exists for component: INREXT, INREXT   No results for input(s): FE, TIBC, PSAT, FERR in the last 72 hours. No results found for: FOL, RBCF   Recent Labs      04/05/18   1105   PH  7.17*   PCO2  96*   PO2  88     No results for input(s): CPK, CKMB in the last 72 hours.     No lab exists for component: TROPONINI  No components found for: [place]  Lab Results   Component Value Date/Time    Color YELLOW/STRAW 03/30/2018 10:25 PM    Appearance CLEAR 03/30/2018 10:25 PM    Specific gravity 1.017 03/30/2018 10:25 PM    pH (UA) 5.0 03/30/2018 10:25 PM    Protein NEGATIVE  03/30/2018 10:25 PM    Glucose NEGATIVE  03/30/2018 10:25 PM    Ketone NEGATIVE  03/30/2018 10:25 PM    Bilirubin NEGATIVE  03/30/2018 10:25 PM    Urobilinogen 0.2 03/30/2018 10:25 PM    Nitrites NEGATIVE  03/30/2018 10:25 PM    Leukocyte Esterase SMALL (A) 03/30/2018 10:25 PM    Epithelial cells FEW 03/30/2018 10:25 PM    Bacteria NEGATIVE  03/30/2018 10:25 PM    WBC 0-4 03/30/2018 10:25 PM    RBC 0-5 03/30/2018 10:25 PM       MEDICATIONS:  Current Facility-Administered Medications   Medication Dose Route Frequency    heparin (porcine) injection 5,000 Units  5,000 Units SubCUTAneous Q8H    sodium chloride 0.9 % in dextrose 10% 1,040 mL infusion   IntraVENous CONTINUOUS    hydrocortisone Sod Succ (PF) (SOLU-CORTEF) injection 50 mg  50 mg IntraVENous S3F    folic acid (FOLVITE) tablet 1 mg  1 mg Oral DAILY    balsam peru-castor oil (VENELEX)  mg/gram ointment   Topical BID    levothyroxine (SYNTHROID) tablet 100 mcg  100 mcg Oral 6am    glucose chewable tablet 16 g  4 Tab Oral PRN    dextrose (D50W) injection syrg 12.5-25 g  12.5-25 g IntraVENous PRN    glucagon (GLUCAGEN) injection 1 mg  1 mg IntraMUSCular PRN    miconazole (SECURA) 2 % extra thick cream   Topical BID    sodium chloride (NS) flush 5-10 mL  5-10 mL IntraVENous Q8H    sodium chloride (NS) flush 5-10 mL  5-10 mL IntraVENous PRN    sodium chloride (NS) flush 5-10 mL  5-10 mL IntraVENous Q8H    sodium chloride (NS) flush 5-10 mL  5-10 mL IntraVENous PRN    nystatin (MYCOSTATIN) 100,000 unit/gram powder   Topical BID

## 2018-04-08 NOTE — PROGRESS NOTES
Follow up visit to provide support to patient's mother as physician had just spoken with mother and she is becoming clearer about patient's condition. Mother tearful at times and appeared to want to break down. I held Ms. Ambrocio Perez as she cried some. Ms. Ambrocio Perez appeared somewhat angry at times and began with a life review and questioning \"why\". She spoke of her rodolfo but said she could not help but question why this has happened, why this happened to her son. She shared of his birth when her pediatrician shared with her what he thought patient's condition was several months after his birth by a picture in a book - Lowell Hackett was not as well-known when her son was born. Mother questioning what to do - I encouraged her to take one moment at a time - that there is no easy way through this. Mother appears appreciative of support  - experiencing multitude of emotions. She has another son who has been present but mom feels very alone. Pastoral care will continue to follow to provide emotional and spiritual support as mom continues to process. [phone]. Visit by: Alessandro Stapleton. Carmen Lao.  Sergo Peña MA, [facility]    Lead  Profession Development & Advancement

## 2018-04-08 NOTE — PROGRESS NOTES
NAME: Giovani Solorio        :  1965        MRN:  [id number]        Assessment :    Plan:  --TRINIDAD    Hypotension    Hypothermia    Anemia, chronic thrombocytopenia    Super morbid obesity/Prader-Willi    Right LE cellulitis/sepsis/bilateral HCAP    Obesity hypoventilation syndrome     Chronic CO2 retention    --Creatinine at baseline is 0.5 (low muscle mass) and continues on the rise (now 3.5 to 3.7)    Oligoanuric; brooks - had to be placed with bedside cytoscopy by Urology    ATN from sepsis/hypotension +/- from vanco    Continue care as is for now, but no HD (s/p long discussions with his Mother and brother)               .       Subjective:     Chief Complaint:  On NC. Awake. Poor historian. I spoke with his Mom and his ICU nurse. I told his mom that his kidney function is not better. He will not survive with continued kidney failure and that it doesn't seem that kidneys are getting better. I told her that we should make sure he is comfortable; I encouraged that we not do anything that makes him uncomfortable. She has cared for and lived with Mr. Sherrie Vasquez for 46 years. This is very hard on her. Review of Systems:    Symptom Y/N Comments  Symptom Y/N Comments   Fever/Chills    Chest Pain     Poor Appetite    Edema     Cough    Abdominal Pain     Sputum    Joint Pain     SOB/CLEMENTE    Pruritis/Rash     Nausea/vomit    Tolerating PT/OT     Diarrhea    Tolerating Diet     Constipation    Other       Could not obtain due to: See above     Objective:     VITALS:   Last 24hrs VS reviewed since prior progress note.  Most recent are:  Visit Vitals    /65    Pulse 75    Temp 95.6 °F (35.3 °C)    Resp 18    Ht 5' 1\" (1.549 m)    Wt (!) 170.1 kg (375 lb)    SpO2 95%    BMI 70.86 kg/m2       Intake/Output Summary (Last 24 hours) at 18 0721  Last data filed at 18 0600   Gross per 24 hour   Intake             1510 ml Output              251 ml   Net             1259 ml      Telemetry Reviewed:     PHYSICAL EXAM:  General: Obese in the ICU off and on bipap  Resp:  Rhonchi/Rales. No access. muscle use  CV:  Regular  rhythm,  No murmur (), No Rubs, No Gallops. + edema  GI:  Soft, obese, Non distended, Non tender.  +Bowel sounds, no HSM      Lab Data Reviewed: (see below)    Medications Reviewed: (see below)    PMH/SH reviewed - no change compared to H&P  ________________________________________________________________________  Care Plan discussed with:  Patient y    Family  y    RN y    Care Manager                    Consultant:          Comments   >50% of visit spent in counseling and coordination of care       ________________________________________________________________________  Tawana Winston MD     Procedures: see electronic medical records for all procedures/Xrays and details which  were not copied into this note but were reviewed prior to creation of Plan. LABS:  Recent Labs      04/08/18 0347 04/07/18   0433   WBC  8.4  7.4   HGB  9.6*  9.6*   HCT  31.9*  31.8*   PLT  69*  76*     Recent Labs      04/08/18 0347 04/07/18   0433  04/06/18   0405   NA  143  141  140   K  4.4  4.1  4.0   CL  102  102  101   CO2  33*  33*  34*   BUN  39*  36*  31*   CREA  3.68*  3.47*  3.05*   GLU  124*  115*  113*   CA  8.2*  8.2*  7.9*   MG  2.0  2.1  1.9   PHOS   --   5.6*  5.4*     Recent Labs      04/08/18 0347 04/07/18   0433  04/06/18   0405   SGOT  34  29  22   AP  59  59  59   TP  6.9  7.0  6.9   ALB  2.3*  2.0*  1.8*   GLOB  4.6*  5.0*  5.1*     No results for input(s): INR, PTP, APTT in the last 72 hours. No lab exists for component: INREXT, INREXT   No results for input(s): FE, TIBC, PSAT, FERR in the last 72 hours. No results found for: FOL, RBCF   Recent Labs      04/05/18   1105   PH  7.17*   PCO2  96*   PO2  88     No results for input(s): CPK, CKMB in the last 72 hours.     No lab exists for component: TROPONINI  No components found for: [place]  Lab Results   Component Value Date/Time    Color YELLOW/STRAW 03/30/2018 10:25 PM    Appearance CLEAR 03/30/2018 10:25 PM    Specific gravity 1.017 03/30/2018 10:25 PM    pH (UA) 5.0 03/30/2018 10:25 PM    Protein NEGATIVE  03/30/2018 10:25 PM    Glucose NEGATIVE  03/30/2018 10:25 PM    Ketone NEGATIVE  03/30/2018 10:25 PM    Bilirubin NEGATIVE  03/30/2018 10:25 PM    Urobilinogen 0.2 03/30/2018 10:25 PM    Nitrites NEGATIVE  03/30/2018 10:25 PM    Leukocyte Esterase SMALL (A) 03/30/2018 10:25 PM    Epithelial cells FEW 03/30/2018 10:25 PM    Bacteria NEGATIVE  03/30/2018 10:25 PM    WBC 0-4 03/30/2018 10:25 PM    RBC 0-5 03/30/2018 10:25 PM       MEDICATIONS:  Current Facility-Administered Medications   Medication Dose Route Frequency    saline peripheral flush soln 10 mL  10 mL InterCATHeter PRN    SALINE PERIPHERAL FLUSH Q8H soln 10 mL  10 mL InterCATHeter Q8H    heparin (porcine) injection 5,000 Units  5,000 Units SubCUTAneous Q8H    sodium chloride 0.9 % in dextrose 10% 1,040 mL infusion   IntraVENous CONTINUOUS    hydrocortisone Sod Succ (PF) (SOLU-CORTEF) injection 50 mg  50 mg IntraVENous B5S    folic acid (FOLVITE) tablet 1 mg  1 mg Oral DAILY    balsam peru-castor oil (VENELEX)  mg/gram ointment   Topical BID    levothyroxine (SYNTHROID) tablet 100 mcg  100 mcg Oral 6am    glucose chewable tablet 16 g  4 Tab Oral PRN    dextrose (D50W) injection syrg 12.5-25 g  12.5-25 g IntraVENous PRN    glucagon (GLUCAGEN) injection 1 mg  1 mg IntraMUSCular PRN    miconazole (SECURA) 2 % extra thick cream   Topical BID    sodium chloride (NS) flush 5-10 mL  5-10 mL IntraVENous Q8H    sodium chloride (NS) flush 5-10 mL  5-10 mL IntraVENous PRN    nystatin (MYCOSTATIN) 100,000 unit/gram powder   Topical BID

## 2018-04-08 NOTE — PROGRESS NOTES
[facility], Critical Care, and Sleep Medicine    Name: Geoff Dorado MRN: [id number]   : 1965 Hospital: [facility]   Date: 2018        Critical Care Patient Consult    IMPRESSION:   · Encephalopathy-per his mother still not back to baseline. He is responsive. Seems more responsive today. · His kidneys and lungs are not improving. Overall prognosis is looking pretty poor. Discussed with pts mother, Dr. Bela Cronin, Desire to continue current support, All agree would be almost impossible to manage as a Chronic HD pt, Short term dialysis is being considered. He has needed ongoing bipap support, remains hypothermic, hypoglycemic. · Acute Renal failure,No urine output, increased Cr; Urology stated pt was a difficult urinary cath placement , would keep brooks cath in place for now. Not to be on nurse driven removal protocol. Renal is following. Cr is not any better. HIs Cr  Continues to increase. · Acute on Chronic Respiratory failure-much worse today, Would continue bipap support as long as tolerated, Pt is Do Not intubate. Charley Wang His CXR demonstrates ongoing pulmonary edema, possible basilar effusions and possible pulmoary infiltrates. On empiric treatment for pneumonia. Overall has not really improved. · Due to pts hypotension, hypothermia, and hypoglycemia has been on hydrocortisone He has not been stable enough to try to decrease. · Hypoglycemia, on D10 infusion. Still not taking in reliable amount by mouth. Will continue the dextrose infusion. Still needs this. · Chronic lymphedema, worse on left that right, has chronic cellulitis of his legs. Right leg seems more red and swollen. Cellulitis seems stable. · Chronic hypoventilation syndrome, now made acutely worse with above. · Chronic thrombocytopenia.    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 · Hypothyroidism  · PPM  · Pt is Do not resuscitate per his mother. · His mother is deciding on further treatment of the acute renal failure and respiratory failure. · Critically ill, High  risk of decompensation. 28 mi CC, EOP remains critically ill, high level of ongoing support. · Prognosis is looking poor. · Pt would not be a good long term HD candidate. · Discussed with nursing, CM, Pharmacy, Dr. Josie Spencer. RECOMMENDATIONS:   · Will need  continual bipap due to hypercarbia, will continue with bipap support,   · Palliative care consult appreciated, to see again tomorrow. · Need to follow blood sugars while he is on Dextrose infusion   · Will repeat CXR in am.   · Urology had to place brooks, keep it in. · Acute renal failure. Appreciate Dr. Pati Hallman assistance. Needs ongoing monitoring, ongoing discussions of direction to care for pt. Will need to make decision over next 24 hrs. His mother is going to get rest of family together. · Will monitor UOP  · Monitor for further hypoglycemia, has POC glucose and D10 infusion, will continue. Subjective/History:   Last 24 hrs:Pt remains critically ill, not improving. Remains on warming blanket, bipap at night, not having any improvement in renal function. He is more alert this am. NO acute changes overnight. He is tolerating po intake. His mom has been at his bedside almost constantly. I have discussed his case with Dr. Roberta Caballero and Dr. Josie Spencer today. Will continue current level of care. Cc: per his family was altered mental status    HPI: This patient has been seen and evaluated at the request of Dr. Svitlana Sanon for above. Patient is a 46 y.o. male who presents for above. He was seen on the bipap the am. NO distress. He is not able to communicate due to Prader Willi syndrome and pt on bipap. Discussed with nurse this am.     Per his mother pt was more sleepy, and confused. He seems to be more alert today.    Had some hypotension yesterday. The patient is critically ill and can not provide additional history due to Unable to speak and Unable to comprehend. Past Medical History:   Diagnosis Date    A-fib ([facility] [address]     Hypothyroid     Hypothyroidism     Mental retardation     Other ill-defined conditions(799.89)     obesity    Other ill-defined conditions(799.89)     mentally disabled    Prader-Willi syndrome     S/P cardiac pacemaker procedure 2/15/2013    heart block      Past Surgical History:   Procedure Laterality Date    HX HEENT      eye surgery as a baby    HX PACEMAKER        Prior to Admission medications    Medication Sig Start Date End Date Taking? Authorizing Provider   cephALEXin (KEFLEX) 500 mg capsule Take 500 mg by mouth three (3) times daily. 3/27/18  Yes Dann Preciado MD   nystatin (MYCOSTATIN) powder Apply  to affected area two (2) times a day. Patient applies a 'generous amount to groin and underarms.'   Yes Historical Provider   cholecalciferol (VITAMIN D3) 1,000 unit cap Take 1,000 Units by mouth daily. Yes Historical Provider   cranberry extract (CRANBERRY) 450 mg tab Take 1 Tab by mouth daily. Yes Historical Provider   aspirin (ASPIRIN) 325 mg tablet Take 325 mg by mouth every evening. Yes Historical Provider   ascorbic acid (VITAMIN C) 500 mg tablet Take 500 mg by mouth every evening. Yes Historical Provider   ferrous sulfate (IRON) 325 mg (65 mg iron) tablet Take 325 mg by mouth daily (after dinner). Yes Historical Provider   multivitamin (ONE A DAY) tablet Take 1 tablet by mouth every evening. Yes Historical Provider   levothyroxine (SYNTHROID) 100 mcg tablet Take 100 mcg by mouth Daily (before breakfast).    Yes Marquita Juárez MD     Current Facility-Administered Medications   Medication Dose Route Frequency    SALINE PERIPHERAL FLUSH Q8H soln 10 mL  10 mL InterCATHeter Q8H    heparin (porcine) injection 5,000 Units  5,000 Units SubCUTAneous Q8H    sodium chloride 0.9 % in dextrose 10% 1,040 mL infusion   IntraVENous CONTINUOUS    hydrocortisone Sod Succ (PF) (SOLU-CORTEF) injection 50 mg  50 mg IntraVENous K8Q    folic acid (FOLVITE) tablet 1 mg  1 mg Oral DAILY    balsam peru-castor oil (VENELEX)  mg/gram ointment   Topical BID    levothyroxine (SYNTHROID) tablet 100 mcg  100 mcg Oral 6am    miconazole (SECURA) 2 % extra thick cream   Topical BID    sodium chloride (NS) flush 5-10 mL  5-10 mL IntraVENous Q8H    nystatin (MYCOSTATIN) 100,000 unit/gram powder   Topical BID     No Known Allergies   Social History   Substance Use Topics    Smoking status: Never Smoker    Smokeless tobacco: Never Used    Alcohol use No      Family History   Problem Relation Age of Onset    Hypertension Mother     Heart Disease Father         Review of Systems:  Review of systems not obtained due to patient factors. Objective:   Vital Signs:    Visit Vitals    BP 97/51 (BP 1 Location: Right arm, BP Patient Position: At rest)    Pulse 75    Temp 95.8 °F (35.4 °C)    Resp 25    Ht 5' 1\" (1.549 m)    Wt (!) 170.1 kg (375 lb)    SpO2 92%    BMI 70.86 kg/m2       O2 Device: Nasal cannula   O2 Flow Rate (L/min): 4 l/min   Temp (24hrs), Av.4 °F (35.2 °C), Min:94.1 °F (34.5 °C), Max:95.8 °F (35.4 °C)       Intake/Output:   Last shift:       07 - 1900  In: 700 [P.O.:150;  I.V.:550]  Out: 27 [Urine:27]  Last 3 shifts: 1901 -  0700  In: 2110 [P.O.:360; I.V.:1750]  Out: 470 [Urine:470]    Intake/Output Summary (Last 24 hours) at 18 1552  Last data filed at 18 1500   Gross per 24 hour   Intake             1470 ml   Output              147 ml   Net             1323 ml     Hemodynamics:   PAP:   CO:     Wedge:   CI:     CVP:    SVR:       PVR:       Ventilator Settings:  Mode Rate Tidal Volume Pressure FiO2 PEEP   NIPPV      18 cm H2O 40 % 6 cm H20     Peak airway pressure: 17 cm H2O    Minute ventilation: 8 l/min      Physical Exam:    General:  Alert and interactive. no distress, appears stated age. Morbid obese, no distress, on NC this am when seen. Still on warming blanket. Head:  Normocephalic, without obvious abnormality, atraumatic. Eyes:  Conjunctivae/corneas clear. PERRL, EOMs intact. Nose: Nares normal. Septum midline. Mucosa normal. No drainage or sinus tenderness. Throat: Lips, mucosa, and tongue normal. Teeth and gums normal.   Neck: Supple, symmetrical, trachea midline, no adenopathy, thyroid: no enlargment/tenderness/nodules, no carotid bruit and no JVD. Back:   Symmetric, no curvature. ROM normal.   Lungs:   Clear to auscultation bilaterally. Has decreased BS in bases bilaterally. Breathing comfortably. On NC oxygen. NO distress. Chest wall:  No tenderness or deformity. Heart:  Regular rate and rhythm, S1, S2 normal, no murmur, click, rub or gallop. Abdomen:   Obese, Soft, non-tender. Bowel sounds normal. No masses,  No organomegaly. Extremities: Extremities normal, atraumatic, has 2-3+ Bilateral edema, appear to have chronic lower extremity edema. Has lessredness of the right leg today. Pulses: 2+ and symmetric all extremities. Skin: Skin color, texture, turgor normal. Has areas of bruising over his body. .    Lymph nodes: Cervical, supraclavicular nodes are  normal.   Neurologic: More alert and interactive, moves extremities intermittently. Grossly nonfocal, not able to fully assess due to his underlying medical condition. Psych: no overt anxiety or depression. But again not really able to assess due to his acute medical condition.         Data:     Recent Results (from the past 24 hour(s))   GLUCOSE, POC    Collection Time: 04/07/18  9:15 PM   Result Value Ref Range    Glucose (POC) 138 (H) 65 - 100 mg/dL    Performed by [employer]    CBC WITH AUTOMATED DIFF    Collection Time: 04/08/18  3:47 AM   Result Value Ref Range    WBC 8.4 4.1 - 11.1 K/uL    RBC 3.14 (L) 4.10 - 5.70 M/uL    HGB 9.6 (L) 12.1 - 17.0 g/dL    HCT 31.9 (L) 36.6 - 50.3 %    .6 (H) 80.0 - 99.0 FL    MCH 30.6 26.0 - 34.0 PG    MCHC 30.1 30.0 - 36.5 g/dL    RDW 15.1 (H) 11.5 - 14.5 %    PLATELET 69 (L) 206 - 400 K/uL    NRBC 0.0 0  WBC    ABSOLUTE NRBC 0.00 0.00 - 0.01 K/uL    NEUTROPHILS 88 (H) 32 - 75 %    LYMPHOCYTES 4 (L) 12 - 49 %    MONOCYTES 6 5 - 13 %    EOSINOPHILS 0 0 - 7 %    BASOPHILS 0 0 - 1 %    IMMATURE GRANULOCYTES 2 (H) 0.0 - 0.5 %    ABS. NEUTROPHILS 7.4 1.8 - 8.0 K/UL    ABS. LYMPHOCYTES 0.3 (L) 0.8 - 3.5 K/UL    ABS. MONOCYTES 0.5 0.0 - 1.0 K/UL    ABS. EOSINOPHILS 0.0 0.0 - 0.4 K/UL    ABS. BASOPHILS 0.0 0.0 - 0.1 K/UL    ABS. IMM. GRANS. 0.2 (H) 0.00 - 0.04 K/UL    DF AUTOMATED     MAGNESIUM    Collection Time: 04/08/18  3:47 AM   Result Value Ref Range    Magnesium 2.0 1.6 - 2.4 mg/dL   METABOLIC PANEL, COMPREHENSIVE    Collection Time: 04/08/18  3:47 AM   Result Value Ref Range    Sodium 143 136 - 145 mmol/L    Potassium 4.4 3.5 - 5.1 mmol/L    Chloride 102 97 - 108 mmol/L    CO2 33 (H) 21 - 32 mmol/L    Anion gap 8 5 - 15 mmol/L    Glucose 124 (H) 65 - 100 mg/dL    BUN 39 (H) 6 - 20 MG/DL    Creatinine 3.68 (H) 0.70 - 1.30 MG/DL    BUN/Creatinine ratio 11 (L) 12 - 20      GFR est AA 21 (L) >60 ml/min/1.73m2    GFR est non-AA 17 (L) >60 ml/min/1.73m2    Calcium 8.2 (L) 8.5 - 10.1 MG/DL    Bilirubin, total 0.3 0.2 - 1.0 MG/DL    ALT (SGPT) 33 12 - 78 U/L    AST (SGOT) 34 15 - 37 U/L    Alk. phosphatase 59 45 - 117 U/L    Protein, total 6.9 6.4 - 8.2 g/dL    Albumin 2.3 (L) 3.5 - 5.0 g/dL    Globulin 4.6 (H) 2.0 - 4.0 g/dL    A-G Ratio 0.5 (L) 1.1 - 2.2     GLUCOSE, POC    Collection Time: 04/08/18 11:54 AM   Result Value Ref Range    Glucose (POC) 112 (H) 65 - 100 mg/dL    Performed by Shailesh Yee              Telemetry:NSR.      Imaging:  I have personally reviewed the patients radiographs and have reviewed the reports:  4-6-18: INDICATION:  Interstitial edema     COMPARISON:  Prior day     FINDINGS: A portable AP radiograph of the chest was obtained at 0422 hours. The  patient is on a cardiac monitor. There is moderate pulmonary edema with  bilateral pleural effusions.   The cardiac and mediastinal contours are normal.   The bones and soft tissues are grossly within normal limits.      IMPRESSION: Stable moderate pulmonary edema with bilateral effusions          Meliton Cain MD

## 2018-04-08 NOTE — PROGRESS NOTES
0800 Assessment completed, patients mother at bedside. 1200 Reassessment completed. Patients mother at bedside. 1400 Dr. Sophie Montilla at bedside to round on patient. Spoke with patients mother about condition, Varun Harrington paged to support patients mother at bedside. 1600 Reassessment completed. No changes noted.

## 2018-04-08 NOTE — PROGRESS NOTES
Hospitalist Progress Note    NAME: Jyoti Kim   :  1965   MRN:  [id number]       Assessment / Plan:  Acute encephalopathy POA: 2ry to CO2 narcosis vs infection, lethargic, unable to provide any info  Acute on chronic respiratory failure/Hypoventilation Sd. POA: on NC today , sounds more congested, air entry still poor  Acute HCAP (POA) D/joseph Zosyn, bronchodilators, monitor. Off ABX  Super morbid obesity (Body mass index is 65.94 kg/(m^2). ) due to Prader Willi    Acute Sepsis (POA) due to bilateral HCAP (POA) and RLE celllulits (POA) contributing to hypothermia:  c/w ABX. Monitor. Thrombocytopenia:   appears chronic, monitor labs, c/w  folic acid  Hypothyroidism:   Stable last TSH 1.01 on 3/11/18, Continue synthroid  ARF: due to sepsis vs Vancomycin, poor urine output almost anuric, c/w gentle hydration, monitor. Nephrology in the case, creatinine continues trending up. He is edematous and his weight is up, BNP is high, CXR shows pulmonary edema, at this rate will need HD soon but I think he is a poor candidate for it, Nephrology help appreciated. Mother had decided for no HD. BP is in the low side  Code status: DNR  Prophylaxis: Lovenox  Recommended Disposition: SNF/LTC and HH PT, OT, RN     Prognosis is Poor, may be candidate for Hospice and Comfort, mother wants to talk with son     Subjective:     Chief Complaint / Reason for Physician Visit  Lethargic, confused unable to provide a meaningful history  Discussed with RN events overnight. Review of Systems:  Symptom Y/N Comments  Symptom Y/N Comments   Fever/Chills    Chest Pain     Poor Appetite    Edema     Cough    Abdominal Pain     Sputum    Joint Pain     SOB/CLEMENTE    Pruritis/Rash     Nausea/vomit    Tolerating PT/OT     Diarrhea    Tolerating Diet     Constipation    Other       Could NOT obtain due to: Prader Willi Sd.     Objective:     VITALS:   Last 24hrs VS reviewed since prior progress note.  Most recent are:  Patient Vitals for the past 24 hrs:   Temp Pulse Resp BP SpO2   04/08/18 1400 - 75 24 106/57 93 %   04/08/18 1300 - 75 19 102/54 92 %   04/08/18 1200 95.8 °F (35.4 °C) 75 22 97/58 90 %   04/08/18 1100 - 75 22 100/53 93 %   04/08/18 1000 - 75 22 102/52 92 %   04/08/18 0900 - 75 21 103/67 91 %   04/08/18 0800 (!) 94.1 °F (34.5 °C) 75 14 109/60 95 %   04/08/18 0700 - 75 16 106/65 96 %   04/08/18 0600 - 75 18 101/65 95 %   04/08/18 0500 - 75 22 106/64 94 %   04/08/18 0400 95.6 °F (35.3 °C) 75 19 101/58 95 %   04/08/18 0300 - 75 19 103/56 96 %   04/08/18 0200 - 75 16 102/57 94 %   04/08/18 0100 - 75 15 99/57 95 %   04/08/18 0000 95.7 °F (35.4 °C) 75 22 98/58 94 %   04/07/18 2300 - 75 18 95/59 96 %   04/07/18 2200 - 75 22 96/58 96 %   04/07/18 2100 - 75 15 96/57 96 %   04/07/18 2000 95.7 °F (35.4 °C) 75 21 98/53 96 %   04/07/18 1900 - 83 23 102/63 95 %   04/07/18 1800 - 75 26 103/66 93 %   04/07/18 1700 - 75 21 94/64 92 %   04/07/18 1600 95.6 °F (35.3 °C) 75 26 92/48 97 %   04/07/18 1500 - 75 18 (!) 81/42 95 %       Intake/Output Summary (Last 24 hours) at 04/08/18 1425  Last data filed at 04/08/18 1400   Gross per 24 hour   Intake             1520 ml   Output              162 ml   Net             1358 ml        PHYSICAL EXAM:  General: WD, WN. lethargic, confused, no acute distress    EENT:  EOMI. Anicteric sclerae. MMM  Resp:  Coarse BS, poor air entry, + crackles  CV:  Regular  rhythm,  + edema  GI:  Soft, Non distended, Non tender.  +Bowel sounds  Neurologic:  Alert and oriented X 1, slow speech,   Psych:   Poor  insight. Not anxious nor agitated  Skin:  No rashes.   No jaundice    Reviewed most current lab test results and cultures  YES  Reviewed most current radiology test results   YES  Review and summation of old records today    NO  Reviewed patient's current orders and MAR    YES  PMH/SH reviewed - no change compared to H&P  ________________________________________________________________________  Care Plan discussed with: Comments   Patient y    Family  y mother   RN y    Care Manager     Consultant                        Multidiciplinary team rounds were held today with , nursing, pharmacist and clinical coordinator. Patient's plan of care was discussed; medications were reviewed and discharge planning was addressed. ________________________________________________________________________  Total NON critical care TIME:  25   Minutes    Total CRITICAL CARE TIME Spent:   Minutes non procedure based      Comments   >50% of visit spent in counseling and coordination of care y    ________________________________________________________________________  Chandni Rader MD     Procedures: see electronic medical records for all procedures/Xrays and details which were not copied into this note but were reviewed prior to creation of Plan. LABS:  I reviewed today's most current labs and imaging studies.   Pertinent labs include:  Recent Labs      04/08/18   0347  04/07/18   0433  04/06/18   0405   WBC  8.4  7.4  7.6   HGB  9.6*  9.6*  9.5*   HCT  31.9*  31.8*  31.0*   PLT  69*  76*  71*     Recent Labs      04/08/18   0347  04/07/18   0433  04/06/18   0405   NA  143  141  140   K  4.4  4.1  4.0   CL  102  102  101   CO2  33*  33*  34*   GLU  124*  115*  113*   BUN  39*  36*  31*   CREA  3.68*  3.47*  3.05*   CA  8.2*  8.2*  7.9*   MG  2.0  2.1  1.9   PHOS   --   5.6*  5.4*   ALB  2.3*  2.0*  1.8*   TBILI  0.3  0.3  0.3   SGOT  34  29  22   ALT  33  26  21       Signed: Chandni Rader MD

## 2018-04-09 NOTE — PROGRESS NOTES
Oncology Nursing Communication Tool  7:25 PM  4/9/2018     Bedside shift change report given to Darius Sosa RN (incoming nurse) by Jillian Bobo (outgoing nurse) on [facility]. Report included the following information SBAR, Kardex, Intake/Output, MAR and Recent Results. Shift Summary:       Issues for physician to address: Oncology Shift Note   Admission Date 4/9/2018   Admission Diagnosis Sepsis  Sepsis ([facility] [address]   Code Status DNR   Consults None      Cardiac Monitoring [] Yes [] No      Purposeful Hourly Rounding [] Yes    Octavio Score     Octavio score 3 or > [] Bed Alarm [] Avasys [] 1:1 sitter [] Patient refused (Place signed refusal form in chart)      Pain Managed [] Yes [] No    Key Pain Meds     The patient is on no pain meds. Influenza Vaccine             Oxygen needs? [] Room air Oxygen @  []1L    []2L    []3L   []4L    []5L   []6L     Use home O2? [] Yes [] No  Perform O2 challenge test using  smartphrase (.oxygenchallenge)      Last bowel movement    bowel movement      Urinary Catheter             LDAs               Peripheral IV 03/31/18 Right Antecubital (Active)   Site Assessment Intact 4/9/2018 12:00 PM   Phlebitis Assessment 0 4/9/2018 12:00 PM   Infiltration Assessment 0 4/9/2018 12:00 PM   Dressing Status Intact; Old drainage 4/9/2018 12:00 PM   Dressing Type Transparent;Tape 4/9/2018 12:00 PM   Hub Color/Line Status Pink; Infusing 4/9/2018 12:00 PM   Action Taken Open ports on tubing capped 4/9/2018 12:00 PM   Alcohol Cap Used No 4/8/2018  4:00 AM                         Readmission Risk Assessment Tool Score Low Risk            9       Total Score        3 Has Seen PCP in Last 6 Months (Yes=3, No=0)    4 IP Visits Last 12 Months (1-3=4, 4=9, >4=11)    2 Charlson Comorbidity Score (Age + Comorbid Conditions)        Criteria that do not apply:    . Living with Significant Other. Assisted Living. LTAC. SNF.  or   Rehab    Patient Length of Stay (>5 days = 3)    Pt.  Coverage (Medicare=5 , Medicaid, or Self-Pay=4)       Expected Length of Stay - - -   Actual Length of Stay 0          Diana Bray

## 2018-04-09 NOTE — HOSPICE
[facility]  [phone]    Inpatient Nursing Admission   Patient Name: [place] Vieira   \"Fredo\"  YOB: 1965  Age: 46 y.o. Date of Hospice Admission: 4/9/2018  Hospice Attending Elected by Patient: Pinky Jeronimo MD  Primary Care Physician: Leida Call MD  Admitting RN: Titus Lindsey  :      Level of Care:  Routine   Facility of Care: [facility]  Patient Room: [address] [place] [address] SUMMARY   ER Visits/ Hospitalizations in past year: 6/2017   Cellulitis in setting of chronic LE lymphedema      11/2017  Hypothermia    3/11/18   Sepsis     3/30/18  This admit  Hospice Diagnosis: Sepsis  Sepsis ([facility] [address]  Onset Date of Hospice Diagnosis: Admitted 3/30/18  Summary of Disease Progression Leading to Hospice Diagnosis:     Co-Morbidities:   Patient Active Problem List   Diagnosis Code    Sepsis ([facility] [address] A41.9    Hypothyroidism E03.9    Prader-Willi syndrome Q87.1    Pancytopenia 284.1    Obesity, morbid (more than 100 lbs over ideal weight or BMI > 40) ([facility]) E66.01    Cellulitis of leg L03.119    S/P cardiac pacemaker procedure Z95.0    Cellulitis of leg, right L03.115    Cellulitis of right lower leg L03.115    Irregular heart beat I49.9    Intertrigo L30.4    Mental retardation F79    Respiratory failure ([address] J96.90    Cellulitis L03.90    Altered mental status R41.82    Pneumonia J18.9    Counseling regarding goals of care Z71.89    Acute encephalopathy G93.40     Diagnoses RELATED to the terminal prognosis: Acute renal failure due to ATN, hypoxic respiratory failure   Pulmonary edema  PNA   RLL cellulitis   Morbid obesity   CO2 narcosis  Other Diagnoses: Prader Willi Syndrome     Rationale for a prognosis of life expectancy of 6 months or less if the disease follows its normal course (Disease Specific History):   Admitted 3/30 with lethargy and respiratory failure with hypotension, ARF and sepsis.   Treated with antibiotics, BiPap yet had progressive renal failure and anuria despite IV Lasix   Not good candidate for HD. Bladimir Blair is a 46 y.o. who was admitted to [facility]. The patient's principle diagnosis of Sepsis has resulted in lethargy, respiratory distress, anxiety, marked anasarca. Functionally, the patient's Palliative Performance Scale has declined over a period of  1 month and is estimated at 20    Objective information that support this patients limited prognosis includes:   LABS as below    4/7  CXR  Cardiomediastinal silhouette is stable. Intact pacer lead overlies the expected  location of the right ventricle. There is no pneumothorax. There is persistent  bibasilar opacification consistent with consolidation and bilateral pleural  effusions, unchanged. The patient/family chose comfort measures with the support of Hospice. Patient meets for Routine LOC as evidenced by the fact that patient with tachypnea and use of accessory muscles to breathe as well as twitching and symptom meds had not been given. ASSESSMENT    Patient self-reports:    [x] No    SYMPTOMS: Staff reported patient less alert today and \"was miserable\" with the BiPap mask in place     SIGNS/PHYSICAL FINDINGS: Pale  Opened eyes and made eye contact and smiled appropriately, did not speak yet shook head up and down appropriately, lungs clear RUL and OW minimal sounds without adventitious sounds, abdomen is very rounded as if with fluid, yeast rash in abdominal folds and groins, PICC line right upper brachial vein with site unremarkable. Griffin for minimal amounts of dark pat urine.       KARNOFSKY:    20      CLINICAL INFORMATION     Wt Readings from Last 3 Encounters:   04/08/18 (!) 176.1 kg (388 lb 3.7 oz)   03/15/18 150.8 kg (332 lb 7.3 oz)   11/23/17 143 kg (315 lb 4.1 oz)               LAB VALUES  : BUN/Cr 45/4.07 on 4-9-18  abg 4-5-18   PCO2 96 PO2  88 ph 7.17 on nasal 02    Lab Results   Component Value Date/Time    Protein, total 6.9 2018 05:05 AM    Albumin 2.1 (L) 2018 05:05 AM       Currently this patient has:  [x] Supplemental O2 [x] PICC    [x] Griffin Catheter         PLAN     1. Tachypnea, assessed respiratory distress in setting of renal failure. Hydromorphone 0.2 mg IV q 4 hours and q 1 hr prn  O2 2 lpm nc prn  2. Marked anasarca with potential for large amounts of pulmonary secretions   Scopolamine patch and Glycopyrrolate 0.2 mg IV q 4 hours prn  3. Patient obese   Hyperbaric bed  4. Yeast in abdominal folds and groins    Nystatin powder bid  5. Sacrum pink    Venelex topically bid    Hospice Team Frequency Orders:  Skilled Nurse -   Every other day x 7 days  with 5 PRN visits for symptom control. MSW  1 visit for initial assessment/evaluation for family support and need for volunteer services. Paige Ford  1 visit for initial assessment/evaluation for spiritual support. ADVANCE CARE PLANNING (Complete in ACP Flow Sheet)   Code Status: DNR  Durable DNR:  [x]  No  Code Status Discussed/Confirmed: YES  Preference for Other Life Sustaining Treatment Discussed/Confirmed:  YES  Hospitalization Preference:   family prefers for patient to remain inpatient   [address] [facility] Planning 2018   Patient's Healthcare Decision Maker is: Verbal statement (Legal Next of Kin remains as decision maker)   Primary Decision Maker Name Char Cornell   Primary Decision Maker Phone Number [phone]   Primary Decision Maker Relationship to Patient Parent   Confirm Advance Directive None   Patient Would Like to Complete Advance Directive Unable       Buddhism: Episcopalian   Home:   [address]     1. Discharge Plan: It is expected that patient will pass while inpatient yet did talk about the [facility] as an option  2. Patient/Family teaching:  Education provided on symptoms at end of life    3.  Response to patient/family teaching: understanding and appreciation expressed      SOCIAL/EMOTIONAL/SPIRITUAL NEEDS     Spiritual Issues Identified: None    Psych/ Social/ Emotional Issues Identified: Mother Rebekah Guevara has taken care of [employer] and he was ambulatory until this admit   Rebekah Guevara said there is no way she could care for him at [facility] now    Caregiver Support:  [x] Provided information on End of Iliana Font   Dr. Carlos Enrique Fink contacted, discharge to hospice order received  Dr. Po Brandt  contacted, agrees to serve as attending provider for hospice and provided verbal certification of terminal illness with life expectancy of 6 months or less. Orders for hospice admission, medications and plan of treatment received. Medication reconciliation completed.   MEDS: See medication list below  DME: Per hospital  Supplies: Per hospital  IDT communication to include MD, SN, SW, CH and support team    ALLERGIES AND MEDICATIONS     Allergies: No Known Allergies  Current Facility-Administered Medications   Medication Dose Route Frequency    LORazepam (ATIVAN) injection 0.5 mg  0.5 mg IntraVENous Q1H PRN    HYDROmorphone (DILAUDID) injection 0.2 mg  0.2 mg IntraVENous Q4H    HYDROmorphone (DILAUDID) injection 0.2 mg  0.2 mg IntraVENous Q1H PRN    scopolamine (TRANSDERM-SCOP) 1 mg over 3 days 1 Patch  1 Patch TransDERmal Q72H    glycopyrrolate (ROBINUL) injection 0.2 mg  0.2 mg IntraVENous Q4H PRN    bisacodyl (DULCOLAX) suppository 10 mg  10 mg Rectal DAILY PRN    saline peripheral flush soln 5 mL  5 mL InterCATHeter PRN    ondansetron (ZOFRAN) injection 4 mg  4 mg IntraVENous Q4H PRN    ketorolac (TORADOL) injection 15 mg  15 mg IntraVENous Q6H PRN

## 2018-04-09 NOTE — DISCHARGE SUMMARY
Hospitalist Discharge Summary     Patient ID:  Galina Szymanski  [id number]  52 y.o.  1965    PCP on record: Theodore Tran MD    Admit date: 3/30/2018  Discharge date and time: 4/9/2018      DISCHARGE DIAGNOSIS:    Acute Respiratory Failure, Pulmonary Edema, Anasarca, Anuric Renal Failure, Pneumonia, Super Morbid Obesity, Sepsis, Thrombocytopenia, Hypothyroidism, Prader Romy Robin Sd      CONSULTATIONS:  IP CONSULT TO NEPHROLOGY  IP CONSULT TO PALLIATIVE CARE - PROVIDER    Excerpted HPI from H&P of Amber Ngo MD:  Patient is a 55-year-old male with a history of morbid obesity secondary to Prader-Willi syndrome. The patient admitted to the hospital with mother. The patient was recently discharged a couple of weeks back for cellulitis of the right lower extremity. The patient was doing fine until this morning when she started noticing the patient seems confused and increased lethargy. The physical therapy was at the home for home PT had come and also noted the patient being confused. Patient was transferred to the ER, was found to be in CO2 narcosis. However, was responding to the verbal and painful stimuli. The patient has been able to open the eyes. Was open to help some degree. The eyes with spontaneous eye secondary to verbal stimuli. At the time of the admission, the patient was also found to be hypothermic. As per the mother, the patient's right lower extremity is swelling and some cellulitis had not been improving. In fact feels it might have gotten worse over the course of the last couple of days. Patient is unable to give any history because of the condition.       ______________________________________________________________________  DISCHARGE SUMMARY/HOSPITAL COURSE:  for full details see H&P, daily progress notes, labs, consult notes.    Acute encephalopathy POA: 2ry to CO2 narcosis vs infection, lethargic, unable to provide any info, spoke with mother and brother they want him to be comfortable, and get Hospice on board  Acute on chronic respiratory failure/Hypoventilation Sd. POA: on NC today , sounds more congested, air entry still poor  Acute HCAP (POA) D/joseph Zosyn, bronchodilators, monitor. Off ABX  Super morbid obesity (Body mass index is 65.94 kg/(m^2). ) due to Dakota Curiel    Acute Sepsis (POA) due to bilateral HCAP (POA) and RLE celllulits (POA) contributing to hypothermia:  off ABX. Monitor. Thrombocytopenia:   appears chronic, monitor labs, c/w  folic acid  Hypothyroidism:   Stable last TSH 1.01 on 3/11/18, Continue synthroid  Anuric ARF: due to sepsis vs Vancomycin, poor urine output almost anuric, c/w gentle hydration, monitor. Nephrology in the case, creatinine continues trending up. He is edematous and his weight is up, BNP is high, CXR shows pulmonary edema, at this rate will need HD soon but I think he is a poor candidate for it, Nephrology help appreciated. Mother had decided for no HD. BP is in the low side, urine output is very low, family had decided for comfort and Hospice  Code status: DNR  Prophylaxis: Lovenox  Recommended Disposition: SNF/LTC and HH PT, OT, RN      Prognosis is Poor, family had decided for comfort and Hospice  _______________________________________________________________________  Patient seen and examined by me on discharge day.   Pertinent Findings:  Gen:    SOB  Chest: Coarse BS, crackles in both sides  CVS:   Regular rhythm.  +  edema  Abd:  Soft, not distended, not tender  Neuro:  Lethargic, confused, GCS [id number]  _______________________________________________________________________  DISCHARGE MEDICATIONS:   Current Discharge Medication List      STOP taking these medications       cephALEXin (KEFLEX) 500 mg capsule Comments:   Reason for Stopping:         nystatin (MYCOSTATIN) powder Comments:   Reason for Stopping:         cholecalciferol (VITAMIN D3) 1,000 unit cap Comments:   Reason for Stopping:         cranberry extract (CRANBERRY) 450 mg tab Comments:   Reason for Stopping:         aspirin (ASPIRIN) 325 mg tablet Comments:   Reason for Stopping:         ascorbic acid (VITAMIN C) 500 mg tablet Comments:   Reason for Stopping:         ferrous sulfate (IRON) 325 mg (65 mg iron) tablet Comments:   Reason for Stopping:         multivitamin (ONE A DAY) tablet Comments:   Reason for Stopping:         levothyroxine (SYNTHROID) 100 mcg tablet Comments:   Reason for Stopping:         nystatin (MYCOSTATIN) powder Comments:   Reason for Stopping:               My Recommended Diet, Activity, Wound Care, and follow-up labs are listed in the patient's Discharge Insturctions which I have personally completed and reviewed.     ______________________________________________________________________    Risk of deterioration: High    Condition at Discharge:  Stable  ______________________________________________________________________    Disposition  IP Hospice  ______________________________________________________________________    Care Plan discussed with:   Patient, Family, RN, Care Manager, Consultant    ______________________________________________________________________    Code Status: DNR/DNI  ______________________________________________________________________      To be admitted to inpatient Hospice      Total time in minutes spent coordinating this discharge (includes going over instructions, follow-up, prescriptions, and preparing report for sign off to her PCP) :  35 minutes    Signed:  Marley Ibrahim MD

## 2018-04-09 NOTE — PROGRESS NOTES
[facility]  Pulmonary, Critical Care, and Sleep Medicine    Name: Anamaria Collier MRN: [id number]   : 1965 Hospital: [facility]   Date: 2018        Critical Care Patient Consult    IMPRESSION:   · Pt is hospital day number 9 without any improvement. I agree with Dr. Graham Anand and Dr. Candi Jin on recommendation for hospice for the patient. He has acute renal failure, acute on chronic respiratory failure and has not improved with respect to his kidney failure. At this point mother is considering Hospice. Will eval how pt does over next 24 hrs. NO further bipap, no interventions. ·  His kidneys and lungs are not improving. Overall prognosis is looking pretty poor. Discussed with pts mother, Dr. Marilyn Chung and today Dr. Graham Anand, Dr. Candi Jin and pts mother. Desire to consider hospice nad pursue comfort measures, no Dialysis. All agree would be almost impossible to manage as a Chronic HD pt, Short term dialysis is being considered. He has needed ongoing bipap support, remains hypothermic, hypoglycemic. · Encephalopathy-per his mother still not back to baseline. He is responsive. · Acute Renal failure,No urine output, increased Cr; Urology stated pt was a difficult urinary cath placement , would keep brooks cath in place for now. Not to be on nurse driven removal protocol. Renal is following. Cr is not any better. HIs Cr  Continues to increase. · Acute on Chronic Respiratory failure- Will stop any further bipap. His CXR demonstrates ongoing pulmonary edema, possible basilar effusions and possible pulmoary infiltrates. On empiric treatment for pneumonia. Overall has not really improved. · Due to pts hypotension, hypothermia, and hypoglycemia has been on hydrocortisone. · Hypoglycemia, on D10 infusion. Still not taking in reliable amount by mouth. Will continue the dextrose infusion. Still needs this.    · Chronic lymphedema, worse on left that right, has chronic cellulitis of his legs. Right leg seems more red and swollen. Cellulitis seems stable. · Chronic hypoventilation syndrome, now made acutely worse with above. · Chronic thrombocytopenia. 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   · Hypothyroidism  · PPM  · Pt is Do not resuscitate per his mother. · Prognosis is looking poor. · Discussed with nursing, CM, Pharmacy, Dr. Rupinder Stone. RECOMMENDATIONS:   · Now to have hospice evaluation. · Palliative care was following   · Urology had to place brooks, keep it in. · Placed orders for comfort, stopped non comfort meds  · Discussed with pts mother. Subjective/History:   Last 24 hrs:Pt remains critically ill, not improving. Remains on warming blanket, He did not use the bipap last  night, not having any improvement in renal function. He is more alert this am. NO acute changes overnight. He is tolerating po intake. His mom has been at his bedside almost constantly. I have discussed his case with Dr. Greg Riggs and Dr. Colten Pérez today. All agree with at least considering the option of hospice. Will not be pursuing any dialysis. Cc: per his family was altered mental status    HPI: This patient has been seen and evaluated at the request of Dr. Dennis Hidden for above. Patient is a 46 y.o. male who presents for above. He was seen on the bipap the am. NO distress. He is not able to communicate due to Prader Willi syndrome and pt on bipap. Discussed with nurse this am.     Per his mother pt was more sleepy, and confused. He seems to be more alert today. Had some hypotension yesterday. The patient is critically ill and can not provide additional history due to Unable to speak and Unable to comprehend.      Past Medical History:   Diagnosis Date    A-fib ([facility]    Patricia Villaseñorant Hypothyroid     Hypothyroidism     Mental retardation     Other ill-defined conditions(939.89)     obesity    Other ill-defined conditions(799.89)     mentally disabled    Prader-Willi syndrome     S/P cardiac pacemaker procedure 2/15/2013    heart block      Past Surgical History:   Procedure Laterality Date    HX HEENT      eye surgery as a baby    HX PACEMAKER        Prior to Admission medications    Medication Sig Start Date End Date Taking? Authorizing Provider   cephALEXin (KEFLEX) 500 mg capsule Take 500 mg by mouth three (3) times daily. 3/27/18  Yes Bernadette Martinez MD   nystatin (MYCOSTATIN) powder Apply  to affected area two (2) times a day. Patient applies a 'generous amount to groin and underarms.'   Yes Historical Provider   cholecalciferol (VITAMIN D3) 1,000 unit cap Take 1,000 Units by mouth daily. Yes Historical Provider   cranberry extract (CRANBERRY) 450 mg tab Take 1 Tab by mouth daily. Yes Historical Provider   aspirin (ASPIRIN) 325 mg tablet Take 325 mg by mouth every evening. Yes Historical Provider   ascorbic acid (VITAMIN C) 500 mg tablet Take 500 mg by mouth every evening. Yes Historical Provider   ferrous sulfate (IRON) 325 mg (65 mg iron) tablet Take 325 mg by mouth daily (after dinner). Yes Historical Provider   multivitamin (ONE A DAY) tablet Take 1 tablet by mouth every evening. Yes Historical Provider   levothyroxine (SYNTHROID) 100 mcg tablet Take 100 mcg by mouth Daily (before breakfast).    Yes Marquita Juárez MD     Current Facility-Administered Medications   Medication Dose Route Frequency    SALINE PERIPHERAL FLUSH Q8H soln 10 mL  10 mL InterCATHeter Q8H    heparin (porcine) injection 5,000 Units  5,000 Units SubCUTAneous Q8H    sodium chloride 0.9 % in dextrose 10% 1,040 mL infusion   IntraVENous CONTINUOUS    balsam peru-castor oil (VENELEX)  mg/gram ointment   Topical BID    levothyroxine (SYNTHROID) tablet 100 mcg  100 mcg Oral 6am    miconazole (SECURA) 2 % extra thick cream   Topical BID    sodium chloride (NS) flush 5-10 mL  5-10 mL IntraVENous Q8H    nystatin (MYCOSTATIN) 100,000 unit/gram powder   Topical BID     No Known Allergies   Social History   Substance Use Topics    Smoking status: Never Smoker    Smokeless tobacco: Never Used    Alcohol use No      Family History   Problem Relation Age of Onset    Hypertension Mother     Heart Disease Father         Review of Systems:  Review of systems not obtained due to patient factors. Objective:   Vital Signs:    Visit Vitals    /56    Pulse 75    Temp 96.9 °F (36.1 °C)    Resp 20    Ht 5' 1\" (1.549 m)    Wt (!) 176.1 kg (388 lb 3.7 oz)    SpO2 91%    BMI 73.36 kg/m2       O2 Device: Nasal cannula   O2 Flow Rate (L/min): 4 l/min   Temp (24hrs), Av.3 °F (35.7 °C), Min:95.1 °F (35.1 °C), Max:96.9 °F (36.1 °C)       Intake/Output:   Last shift:       07 - 1900  In: 250 [I.V.:250]  Out: 60 [Urine:60]  Last 3 shifts: 1901 -  07  In: 6997 [P.O.:225; I.V.:2000]  Out: 190 [Urine:190]    Intake/Output Summary (Last 24 hours) at 18 1331  Last data filed at 18 1200   Gross per 24 hour   Intake             1275 ml   Output              155 ml   Net             1120 ml     Hemodynamics:   PAP:   CO:     Wedge:   CI:     CVP:    SVR:       PVR:       Ventilator Settings:  Mode Rate Tidal Volume Pressure FiO2 PEEP   NIPPV      18 cm H2O 40 % 6 cm H20     Peak airway pressure: 17 cm H2O    Minute ventilation: 8 l/min      Physical Exam:    General:  Alert and interactive. no distress, appears stated age. Morbid obese, no distress, on NC this am when seen. Still on warming blanket. Head:  Normocephalic, without obvious abnormality, atraumatic. Eyes:  Conjunctivae/corneas clear. PERRL, EOMs intact. Nose: Nares normal. Septum midline. Mucosa normal. No drainage or sinus tenderness.    Throat: Lips, mucosa, and tongue normal. Teeth and gums normal.   Neck: Supple, symmetrical, trachea midline, no adenopathy, thyroid: no enlargment/tenderness/nodules, no carotid bruit and no JVD. Back:   Symmetric, no curvature. ROM normal.   Lungs:   Clear to auscultation bilaterally. Has decreased BS in bases bilaterally. Breathing comfortably. On NC oxygen. NO distress. Chest wall:  No tenderness or deformity. Heart:  Regular rate and rhythm, S1, S2 normal, no murmur, click, rub or gallop. Abdomen:   Obese, Soft, non-tender. Bowel sounds normal. No masses,  No organomegaly. Extremities: Extremities normal, atraumatic, has 2-3+ Bilateral edema, appear to have chronic lower extremity edema. Has lessredness of the right leg today. Pulses: 2+ and symmetric all extremities. Skin: Skin color, texture, turgor normal. Has areas of bruising over his body. .    Lymph nodes: Cervical, supraclavicular nodes are  normal.   Neurologic: More alert and interactive, moves extremities intermittently. Grossly nonfocal, not able to fully assess due to his underlying medical condition. Psych: no overt anxiety or depression. But again not really able to assess due to his acute medical condition.         Data:     Recent Results (from the past 24 hour(s))   GLUCOSE, POC    Collection Time: 04/08/18  8:18 PM   Result Value Ref Range    Glucose (POC) 134 (H) 65 - 100 mg/dL    Performed by Stephanie Mckeon" Ernestina    GLUCOSE, POC    Collection Time: 04/09/18  5:02 AM   Result Value Ref Range    Glucose (POC) 110 (H) 65 - 100 mg/dL    Performed by Stephanie Mckeon" Ernestina    CBC WITH AUTOMATED DIFF    Collection Time: 04/09/18  5:05 AM   Result Value Ref Range    WBC 9.3 4.1 - 11.1 K/uL    RBC 2.99 (L) 4.10 - 5.70 M/uL    HGB 9.3 (L) 12.1 - 17.0 g/dL    HCT 30.3 (L) 36.6 - 50.3 %    .3 (H) 80.0 - 99.0 FL    MCH 31.1 26.0 - 34.0 PG    MCHC 30.7 30.0 - 36.5 g/dL    RDW 14.8 (H) 11.5 - 14.5 %    PLATELET 66 (L) 509 - 400 K/uL    MPV 12.3 8.9 - 12.9 FL    NRBC 0.0 0  WBC    ABSOLUTE NRBC 0.00 0.00 - 0.01 K/uL    NEUTROPHILS 88 (H) 32 - 75 %    LYMPHOCYTES 4 (L) 12 - 49 %    MONOCYTES 6 5 - 13 %    EOSINOPHILS 0 0 - 7 %    BASOPHILS 0 0 - 1 %    IMMATURE GRANULOCYTES 2 (H) 0.0 - 0.5 %    ABS. NEUTROPHILS 8.2 (H) 1.8 - 8.0 K/UL    ABS. LYMPHOCYTES 0.3 (L) 0.8 - 3.5 K/UL    ABS. MONOCYTES 0.6 0.0 - 1.0 K/UL    ABS. EOSINOPHILS 0.0 0.0 - 0.4 K/UL    ABS. BASOPHILS 0.0 0.0 - 0.1 K/UL    ABS. IMM. GRANS. 0.2 (H) 0.00 - 0.04 K/UL    DF AUTOMATED     METABOLIC PANEL, COMPREHENSIVE    Collection Time: 04/09/18  5:05 AM   Result Value Ref Range    Sodium 144 136 - 145 mmol/L    Potassium 4.4 3.5 - 5.1 mmol/L    Chloride 104 97 - 108 mmol/L    CO2 30 21 - 32 mmol/L    Anion gap 10 5 - 15 mmol/L    Glucose 103 (H) 65 - 100 mg/dL    BUN 45 (H) 6 - 20 MG/DL    Creatinine 4.07 (H) 0.70 - 1.30 MG/DL    BUN/Creatinine ratio 11 (L) 12 - 20      GFR est AA 19 (L) >60 ml/min/1.73m2    GFR est non-AA 16 (L) >60 ml/min/1.73m2    Calcium 8.2 (L) 8.5 - 10.1 MG/DL    Bilirubin, total 0.3 0.2 - 1.0 MG/DL    ALT (SGPT) 30 12 - 78 U/L    AST (SGOT) 27 15 - 37 U/L    Alk. phosphatase 58 45 - 117 U/L    Protein, total 6.9 6.4 - 8.2 g/dL    Albumin 2.1 (L) 3.5 - 5.0 g/dL    Globulin 4.8 (H) 2.0 - 4.0 g/dL    A-G Ratio 0.4 (L) 1.1 - 2.2     MAGNESIUM    Collection Time: 04/09/18  5:05 AM   Result Value Ref Range    Magnesium 2.1 1.6 - 2.4 mg/dL   PHOSPHORUS    Collection Time: 04/09/18  5:05 AM   Result Value Ref Range    Phosphorus 6.5 (H) 2.6 - 4.7 MG/DL   PROTHROMBIN TIME + INR    Collection Time: 04/09/18  5:05 AM   Result Value Ref Range    INR 1.1 0.9 - 1.1      Prothrombin time 11.0 9.0 - 11.1 sec   GLUCOSE, POC    Collection Time: 04/09/18 12:19 PM   Result Value Ref Range    Glucose (POC) 110 (H) 65 - 100 mg/dL    Performed by Wrapp              Telemetry:NSR. Imaging:  I have personally reviewed the patients radiographs and have reviewed the reports:  4-6-18: INDICATION:  Interstitial edema     COMPARISON:  Prior day     FINDINGS: A portable AP radiograph of the chest was obtained at 0422 hours. The  patient is on a cardiac monitor. There is moderate pulmonary edema with  bilateral pleural effusions.   The cardiac and mediastinal contours are normal.   The bones and soft tissues are grossly within normal limits.      IMPRESSION: Stable moderate pulmonary edema with bilateral effusions          Noris Bean MD

## 2018-04-09 NOTE — HOSPICE
[facility] RN consultation visit note. Order for hospice noted. History and events of this hospitalization reviewed. TC to mother Kari Hoffmann at [phone] and message left and asked for return telephone call.     Please call [phone] if questions or concerns    Jossue Mclaughlin, RN [facility]

## 2018-04-09 NOTE — PROGRESS NOTES
TRANSFER - IN REPORT:    Verbal [facility]  being received from CCU(unit) for routine progression of care      Report consisted of patients Situation, Background, Assessment and   Recommendations(SBAR). Information from the following report(s) SBAR, Kardex, Intake/Output, MAR and Recent Results was reviewed with the receiving nurse. Opportunity for questions and clarification was provided. Assessment completed upon patients arrival to unit and care assumed.

## 2018-04-09 NOTE — PROGRESS NOTES
1900 Report received from Tate Mckeon RN.    10 [facility] hugger turned on for axillary temp of 95.1.     0630 Uneventful shift, VSS. Pt on 4L NC overnight (no bipap), RR 15-22, O2 sats 90-94%. Pt alert to self & place only. Minimal UOP-MDs aware. Pt's mother remained at bedside. 0700 Report given to Lauyr Suh RN.

## 2018-04-09 NOTE — PROGRESS NOTES
Care Management:    Inpt Hospice referral noted and consult sent via [address] to [facility]. Mom Dana Daigle is nok and has been patients caregiver his entire life . Her contact number is . Chart reviewed and we will cont to follow for discharge needs as appropriate.     Elian Gonzalez [facility] acm [id number]

## 2018-04-09 NOTE — HOSPICE
[facility] RN consultation visit note. Order for hospice noted. History and events of this hospitalization reviewed. In to meet with patient Rajinder Dent, mother Lila Silverio and only sibling, brother Jerome Smallwood. Support given. Rajinder Dent is awake, RR 38 when awake and 28 when resting with eyes closed. Lungs with good air movement right upper, OW minimal sounds throughout. No adventitious sounds audible. Abdomen rounded with distant bowel sounds. Griffin for sm amounts of dark pat urine. Marked anasarca. Asked mother if his legs looked like that at home and she reported they did not. Said they were not small but that they were not at al that large. Hospice philosophy and scope of services presented. Questions and concerns addressed. Dr. Brian Christianson advised and order received to admit to inpatient hospice with dx of Sepsis. Dr. Candido Antony advised and deferred to Dr. Corey Nageotte to be hospice attending MD.    Thank you for asking us to be a part of the care for this loved gentleman and his family. Please call [phone] if questions or concerns    Rad Mobley RN [facility]      Family accepted hospice support.

## 2018-04-09 NOTE — H&P
[facility] Help to Those in Need  [phone]    Patient Name: Rolando Ny  YOB: 1965    Date of Provider Hospice Visit: 04/09/18    Level of Care:   [] General Inpatient (GIP)    [x] Routine   [] Respite    Current Location of Care:  [] [facility] [] [facility] [x] [address] [] [facility] [] [facility], patient referred from:  [] [facility] [] [facility] [] [address] [] [facility] [] Home [] Other:     Date of [facility] Admission: 4-9-18  Hospice Medical Director at time of admission: Dr. Chun Szymanski Diagnosis: sepsis  Diagnoses RELATED to the terminal prognosis: acute renal failure due to ATN, hypoxic hypercarbic respiratory failure, Pulmonary edema, pneumonia , Right leg cellulits, morbid obesity  Other Diagnoses: Prashantel Valadezuber Syndrom     HOSPICE SUMMARY   Do not cut and paste chart information other than imaging findings    Rolando Ny is a 46y.o. year old who was admitted to [facility]. Pt with hx morbid obesity d/t Shahab Pickle, recent hospitalization for right leg cellulitis was admitted to [address] on 3/30/18 with lethargy and hypoxic hypercarbic respiratory failure and also with hypotension, acute renal failure and sepsis. Treated iv iv abx, bipap nut has had progressive renal failure and anuria despite iv lasix. Not a good dialysis candidate. Most recent PC02 level 96 on 4/5/18. Has been mostly lethargic though intermittently alert and able to communicate with his mother. Currently denies pain or SOB sxs. creatiine up to 45/4.07 today. Family has decided upon comfort care and hospice for patient  The patient's principle diagnosis has resulted in acute renal failure, hypoxic hypercarbic respiratory failure with C02 narcosis. Refer to LCD     Functionally, the patient's Karnofsky and/or Palliative Performance Scale has declined over a period of weeks and is estimated at 20.  The patient is dependent on the following ADLs:all    Objective information that support this patients limited prognosis includes: BUN/Cr 45/4.07 on 4-9-18  abg 4-5-18   PCO2 96 PO2  88 ph 7.17 on nasal 02    The patient/family chose comfort measures with the support of Hospice. HOSPICE DIAGNOSES   Active Symptoms:  1. Dyspnea/tachypnea     PLAN   1. Admit to hospice routine level of care  2. Dilaudid 0.2mg iv q4 and prn  3. Ativan 0.5 mg q1hr prn  4. Continue nasal 02 for comfort  5. May feed for comfort    6.  and SW to support family needs  7. Disposition: to home with hospice but appears imminently dying    Prognosis estimated based on 04/09/18 clinical assessment is:   [] Hours to Days    [] Days to Weeks    [] Other:    Communicated plan of care with: Hospice Case Manager;  Hospice IDT; Care Team     GOALS OF CARE     Resuscitation Status: DNR  Durable DNR: [] Yes [] No    Advance Care Planning 4/6/2018   Patient's Healthcare Decision Maker is: Verbal statement (Legal Next of Kin remains as decision maker)   Primary Decision Maker Name Siri Galindo   Primary Decision Maker Phone Number [phone]   Primary Decision Maker Relationship to Patient Parent   Confirm Advance Directive None   Patient Would Like to Complete Advance Directive Unable        HISTORY     History obtained from: chart,hospice RN and pt's family    CHIEF COMPLAINT: none  The patient is:   [] Verbal  [x] Nonverbal  [] Unresponsive    HPI/SUBJECTIVE:    RR 20-30's  Has been lethargic last several days off bipap       REVIEW OF SYSTEMS     The following systems were: [] reviewed  [x] unable to be reviewed    Positive ROS include:  Constitutional: fatigue, weakness, in pain, short of breath  Ears/nose/mouth/throat : increased airway secretions  Respiratory:  shortness of breath, wheezing  Gastrointestinal:poor appetite, nausea, vomiting, abdominal pain, constipation, diarrhea  Musculoskeletal :pain, positive deformities, swelling legs  Neurologic:confusion,no  hallucinations, weakness  Psychiatric:no anxiety, feeling depressed, poor sleep  Endocrine:     Adult Non-Verbal Pain Assessment Score: 1    Face  [x] 0   No particular expression or smile  [] 1   Occasional grimace, tearing, frowning, wrinkled forehead  [] 2   Frequent grimace, tearing, frowning, wrinkled forehead    Activity (movement)  [x] 0   Lying quietly, normal position  [] 1   Seeking attention through movement or slow, cautious movement  [] 2   Restless, excessive activity and/or withdrawal reflexes    Guarding  [x] 0   Lying quietly, no positioning of hands over areas of body  [] 1   Splinting areas of the body, tense  [] 2   Rigid, stiff    Physiology (vital signs)  [x] 0   Stable vital signs  [] 1   Change in any of the following: SBP > 20mm Hg; HR > 20/minute  [] 2   Change in any of the following: SBP > 30mm Hg; HR > 25/minute    Respiratory  [] 0   Baseline RR/SpO2, compliant with ventilator  [x] 1   RR > 10 above baseline, or 5% drop SpO2, mild asynchrony with ventilator  [] 2   RR > 20 above baseline, or 10% drop SpO2, asynchrony with ventilator     FUNCTIONAL ASSESSMENT     Palliative Performance Scale (PPS):20     PSYCHOSOCIAL/SPIRITUAL ASSESSMENT     Active Problems:    Sepsis ([facility] (3/29/2011)      Past Medical History:   Diagnosis Date    [facility]     Hypothyroid     Hypothyroidism     Mental retardation     Other ill-defined conditions(799.89)     obesity    Other ill-defined conditions(799.89)     mentally disabled    Prader-Willi syndrome     S/P cardiac pacemaker procedure 2/15/2013    heart block      Past Surgical History:   Procedure Laterality Date    HX HEENT      eye surgery as a baby    HX PACEMAKER        Social History   Substance Use Topics    Smoking status: Never Smoker    Smokeless tobacco: Never Used    Alcohol use No     Family History   Problem Relation Age of Onset    Hypertension Mother     Heart Disease Father       No Known Allergies   Current Facility-Administered Medications   Medication Dose Route Frequency  LORazepam (ATIVAN) injection 0.5 mg  0.5 mg IntraVENous Q1H PRN    HYDROmorphone (DILAUDID) injection 0.2 mg  0.2 mg IntraVENous Q4H    HYDROmorphone (DILAUDID) injection 0.2 mg  0.2 mg IntraVENous Q1H PRN    scopolamine (TRANSDERM-SCOP) 1 mg over 3 days 1 Patch  1 Patch TransDERmal Q72H    glycopyrrolate (ROBINUL) injection 0.2 mg  0.2 mg IntraVENous Q4H PRN    bisacodyl (DULCOLAX) suppository 10 mg  10 mg Rectal DAILY PRN    saline peripheral flush soln 5 mL  5 mL InterCATHeter PRN    ondansetron (ZOFRAN) injection 4 mg  4 mg IntraVENous Q4H PRN    ketorolac (TORADOL) injection 15 mg  15 mg IntraVENous Q6H PRN        PHYSICAL EXAM     Wt Readings from Last 3 Encounters:   04/08/18 (!) 388 lb 3.7 oz (176.1 kg)   03/15/18 332 lb 7.3 oz (150.8 kg)   11/23/17 315 lb 4.1 oz (143 kg)       There were no vitals taken for this visit.     Supplemental O2  [x] Yes  [] NO  Last bowel movement:     Currently this patient has:  [x] Peripheral IV [] PICC  [] PORT [] ICD    [x] Griffin Catheter [] NG Tube   [] PEG Tube    [] Rectal Tube [] Drain  [x] Other: pacemaker  Constitutional:morbidly obese, lethargic but able to nod and turn head to answer yes/no  Eyes: perrl  ENMT: clear  Cardiovascular: rrr  Respiratory: tachypneic RR 20-30's, labored  Gastrointestinal: soft obese  Musculoskeletal: edema of arms and legs 3plus in legs  Skin:erythema of right lower leg  Neurologic:very weak  Psychiatric:   Other:       Pertinent Lab and or Imaging Tests:  Lab Results   Component Value Date/Time    Sodium 144 04/09/2018 05:05 AM    Potassium 4.4 04/09/2018 05:05 AM    Chloride 104 04/09/2018 05:05 AM    CO2 30 04/09/2018 05:05 AM    Anion gap 10 04/09/2018 05:05 AM    Glucose 103 (H) 04/09/2018 05:05 AM    BUN 45 (H) 04/09/2018 05:05 AM    Creatinine 4.07 (H) 04/09/2018 05:05 AM    BUN/Creatinine ratio 11 (L) 04/09/2018 05:05 AM    GFR est AA 19 (L) 04/09/2018 05:05 AM    GFR est non-AA 16 (L) 04/09/2018 05:05 AM    Calcium 8.2 (L) 04/09/2018 05:05 AM     Lab Results   Component Value Date/Time    Protein, total 6.9 04/09/2018 05:05 AM    Albumin 2.1 (L) 04/09/2018 05:05 AM           Total time: 50 min  Counseling / coordination time: 20 min met with pt's mother, brother and hospice RN and discussed pt's condition and POC  > 50% counseling / coordination?: no

## 2018-04-09 NOTE — HOSPICE
[facility]  [phone]     Patient Name: Consuelo Rangel  YOB: 1965  Age: 46 y.o. [address] [facility] MSW Note:  Hospice consult received, reviewing chart. Will follow up with Unit Nurse and Care Manager to discuss plan of care, patient status and discharge disposition within the hour. Will be meeting withpt and mother at 4 pm today. Mother's cell # [phone]. Thank you for the opportunity to be of service to this patient.     Nayan Loera MSW, [address] [place] [address]  [phone]

## 2018-04-09 NOTE — PROGRESS NOTES
Critical care interdisciplinary rounds held on 04/09/2018. Following members present, Pharmacy, Diabetes Treatment, Case Management, Respiratory Therapy, Clinical Care Lead and Nutrition. Led by PALLAVI Garibay RN and Dr. Charleen Wiggins. Plan of care discussed. See clinical pathway for plan of care and interventions and desired outcomes.

## 2018-04-09 NOTE — PROGRESS NOTES
Hospitalist Progress Note    NAME: Phillip Manuel   :  1965   MRN:  [id number]       Assessment / Plan:  Acute encephalopathy POA: 2ry to CO2 narcosis vs infection, lethargic, unable to provide any info, spoke with mother and brother they want him to be comfortable, and get Hospice on board  Acute on chronic respiratory failure/Hypoventilation Sd. POA: on NC today , sounds more congested, air entry still poor  Acute HCAP (POA) D/joseph Zosyn, bronchodilators, monitor. Off ABX  Super morbid obesity (Body mass index is 65.94 kg/(m^2). ) due to Prader Willi    Acute Sepsis (POA) due to bilateral HCAP (POA) and RLE celllulits (POA) contributing to hypothermia:  off ABX. Monitor. Thrombocytopenia:   appears chronic, monitor labs, c/w  folic acid  Hypothyroidism:   Stable last TSH 1.01 on 3/11/18, Continue synthroid  Anuric ARF: due to sepsis vs Vancomycin, poor urine output almost anuric, c/w gentle hydration, monitor. Nephrology in the case, creatinine continues trending up. He is edematous and his weight is up, BNP is high, CXR shows pulmonary edema, at this rate will need HD soon but I think he is a poor candidate for it, Nephrology help appreciated. Mother had decided for no HD. BP is in the low side, urine output is very low, family had decided for comfort and Hospice  Code status: DNR  Prophylaxis: Lovenox  Recommended Disposition: SNF/LTC and  PT, OT, RN     Prognosis is Poor, family had decided for comfort and Hospice     Subjective:     Chief Complaint / Reason for Physician Visit  Lethargic, confused unable to provide a meaningful history  Discussed with RN events overnight.      Review of Systems:  Symptom Y/N Comments  Symptom Y/N Comments   Fever/Chills    Chest Pain     Poor Appetite    Edema     Cough    Abdominal Pain     Sputum    Joint Pain     SOB/CLEMENTE    Pruritis/Rash     Nausea/vomit    Tolerating PT/OT     Diarrhea    Tolerating Diet     Constipation    Other       Could NOT obtain due to: Prader Arlene Junes Sd.     Objective:     VITALS:   Last 24hrs VS reviewed since prior progress note. Most recent are:  Patient Vitals for the past 24 hrs:   Temp Pulse Resp BP SpO2   04/09/18 1400 - 75 25 100/49 (!) 89 %   04/09/18 1300 - 75 20 101/56 91 %   04/09/18 1200 96.9 °F (36.1 °C) 75 21 104/57 90 %   04/09/18 1100 - 75 21 111/61 90 %   04/09/18 1000 - 75 22 105/54 90 %   04/09/18 0900 - 75 22 108/55 90 %   04/09/18 0800 96.9 °F (36.1 °C) 75 22 110/51 93 %   04/09/18 0700 - 75 23 104/53 91 %   04/09/18 0600 - 75 22 105/58 92 %   04/09/18 0500 - 75 17 100/59 93 %   04/09/18 0400 96.3 °F (35.7 °C) 75 18 104/57 93 %   04/09/18 0300 - 75 17 106/56 93 %   04/09/18 0200 - 75 21 109/59 92 %   04/09/18 0100 - 75 15 107/54 93 %   04/09/18 0000 96.1 °F (35.6 °C) 75 20 102/55 92 %   04/08/18 2300 - 75 20 101/54 93 %   04/08/18 2200 - 75 21 94/50 94 %   04/08/18 2100 - 75 21 98/52 92 %   04/08/18 2000 95.1 °F (35.1 °C) 75 22 99/48 90 %   04/08/18 1900 - 75 25 100/54 91 %   04/08/18 1800 - 75 22 105/48 90 %   04/08/18 1700 - 75 23 97/49 92 %   04/08/18 1600 96.6 °F (35.9 °C) 75 23 97/54 92 %       Intake/Output Summary (Last 24 hours) at 04/09/18 1547  Last data filed at 04/09/18 1400   Gross per 24 hour   Intake             1445 ml   Output              158 ml   Net             1287 ml        PHYSICAL EXAM:  General: WD, WN. lethargic, confused, no acute distress    EENT:  EOMI. Anicteric sclerae. MMM  Resp:  Coarse BS, poor air entry, + crackles  CV:  Regular  rhythm,  + edema  GI:  Soft, Non distended, Non tender.  +Bowel sounds  Neurologic:  Alert and oriented X 1, slow speech,   Psych:   Poor  insight. Not anxious nor agitated  Skin:  No rashes.   No jaundice    Reviewed most current lab test results and cultures  YES  Reviewed most current radiology test results   YES  Review and summation of old records today    NO  Reviewed patient's current orders and MAR    YES  PMH/SH reviewed - no change compared to H&P  ________________________________________________________________________  Care Plan discussed with:    Comments   Patient y    Family  y mother   RN y    Care Manager     Consultant                        Multidiciplinary team rounds were held today with , nursing, pharmacist and clinical coordinator. Patient's plan of care was discussed; medications were reviewed and discharge planning was addressed. ________________________________________________________________________  Total NON critical care TIME:  25   Minutes    Total CRITICAL CARE TIME Spent:   Minutes non procedure based      Comments   >50% of visit spent in counseling and coordination of care y    ________________________________________________________________________  Rosa Elena Quinn MD     Procedures: see electronic medical records for all procedures/Xrays and details which were not copied into this note but were reviewed prior to creation of Plan. LABS:  I reviewed today's most current labs and imaging studies.   Pertinent labs include:  Recent Labs      04/09/18   0505  04/08/18 0347 04/07/18   0433   WBC  9.3  8.4  7.4   HGB  9.3*  9.6*  9.6*   HCT  30.3*  31.9*  31.8*   PLT  66*  69*  76*     Recent Labs      04/09/18   0505  04/08/18 0347 04/07/18   0433   NA  144  143  141   K  4.4  4.4  4.1   CL  104  102  102   CO2  30  33*  33*   GLU  103*  124*  115*   BUN  45*  39*  36*   CREA  4.07*  3.68*  3.47*   CA  8.2*  8.2*  8.2*   MG  2.1  2.0  2.1   PHOS  6.5*   --   5.6*   ALB  2.1*  2.3*  2.0*   TBILI  0.3  0.3  0.3   SGOT  27  34  29   ALT  30  33  26   INR  1.1   --    --        Signed: Rosa Elena Quinn MD

## 2018-04-09 NOTE — PROGRESS NOTES
NAME: Ashlyn Phillips        :  1965        MRN:  [id number]        Assessment :    Plan:  --TRINIDAD    Hypotension    Hypothermia    Anemia, chronic thrombocytopenia    Super morbid obesity/Prader-Willi    Right LE cellulitis/sepsis/bilateral HCAP    Obesity hypoventilation syndrome     Chronic CO2 retention    --Creatinine at baseline is 0.5 (low muscle mass) and continues on the rise (now 3.5 to 3.7 to 5,1)    Oligoanuric; brooks - had to be placed with bedside cytoscopy by Urology    ATN from sepsis/hypotension +/- from [facility]    Appropriate hospice candidate-d/w mother, she agrees-                 .       Subjective:     Chief Complaint:  On NC. Awake. Poor historian. I spoke with his mother today about treatment options-she does not want dialysis, and I agree- she is open to hospice-she does not want to him suffer-  Review of Systems:    Symptom Y/N Comments  Symptom Y/N Comments   Fever/Chills    Chest Pain     Poor Appetite    Edema     Cough    Abdominal Pain     Sputum    Joint Pain     SOB/CLEMENTE    Pruritis/Rash     Nausea/vomit    Tolerating PT/OT     Diarrhea    Tolerating Diet     Constipation    Other       Could not obtain due to: See above     Objective:     VITALS:   Last 24hrs VS reviewed since prior progress note. Most recent are:  Visit Vitals    /55    Pulse 75    Temp 96.9 °F (36.1 °C)    Resp 22    Ht 5' 1\" (1.549 m)    Wt (!) 176.1 kg (388 lb 3.7 oz)    SpO2 90%    BMI 73.36 kg/m2       Intake/Output Summary (Last 24 hours) at 18 1238  Last data filed at 18 0900   Gross per 24 hour   Intake             1200 ml   Output              105 ml   Net             1095 ml      Telemetry Reviewed:     PHYSICAL EXAM:  General: Obese   Resp:  Rhonchi/Rales. No access. muscle use  CV:  Regular  rhythm, . + edema  GI:  Soft, obese, Non distended, Non tender.                      Brooks      Lab Data Reviewed: (see below)    Medications Reviewed: (see below)    PMH/SH reviewed - no change compared to H&P  ________________________________________________________________________  Care Plan discussed with:  Patient y    Family  y    RN y    Care Manager                    Consultant:          Comments   >50% of visit spent in counseling and coordination of care       ________________________________________________________________________  Dayday Jesus MD     Procedures: see electronic medical records for all procedures/Xrays and details which  were not copied into this note but were reviewed prior to creation of Plan. LABS:  Recent Labs      04/09/18   0505  04/08/18   0347   WBC  9.3  8.4   HGB  9.3*  9.6*   HCT  30.3*  31.9*   PLT  66*  69*     Recent Labs      04/09/18   0505  04/08/18 0347  04/07/18   0433   NA  144  143  141   K  4.4  4.4  4.1   CL  104  102  102   CO2  30  33*  33*   BUN  45*  39*  36*   CREA  4.07*  3.68*  3.47*   GLU  103*  124*  115*   CA  8.2*  8.2*  8.2*   MG  2.1  2.0  2.1   PHOS  6.5*   --   5.6*     Recent Labs      04/09/18   0505  04/08/18 0347  04/07/18   0433   SGOT  27  34  29   AP  58  59  59   TP  6.9  6.9  7.0   ALB  2.1*  2.3*  2.0*   GLOB  4.8*  4.6*  5.0*     Recent Labs      04/09/18   0505   INR  1.1   PTP  11.0      No results for input(s): FE, TIBC, PSAT, FERR in the last 72 hours. No results found for: FOL, RBCF   No results for input(s): PH, PCO2, PO2 in the last 72 hours. No results for input(s): CPK, CKMB in the last 72 hours.     No lab exists for component: TROPONINI  No components found for: [place]  Lab Results   Component Value Date/Time    Color YELLOW/STRAW 03/30/2018 10:25 PM    Appearance CLEAR 03/30/2018 10:25 PM    Specific gravity 1.017 03/30/2018 10:25 PM    pH (UA) 5.0 03/30/2018 10:25 PM    Protein NEGATIVE  03/30/2018 10:25 PM    Glucose NEGATIVE  03/30/2018 10:25 PM    Ketone NEGATIVE  03/30/2018 10:25 PM    Bilirubin NEGATIVE 03/30/2018 10:25 PM    Urobilinogen 0.2 03/30/2018 10:25 PM    Nitrites NEGATIVE  03/30/2018 10:25 PM    Leukocyte Esterase SMALL (A) 03/30/2018 10:25 PM    Epithelial cells FEW 03/30/2018 10:25 PM    Bacteria NEGATIVE  03/30/2018 10:25 PM    WBC 0-4 03/30/2018 10:25 PM    RBC 0-5 03/30/2018 10:25 PM       MEDICATIONS:  Current Facility-Administered Medications   Medication Dose Route Frequency    saline peripheral flush soln 10 mL  10 mL InterCATHeter PRN    SALINE PERIPHERAL FLUSH Q8H soln 10 mL  10 mL InterCATHeter Q8H    heparin (porcine) injection 5,000 Units  5,000 Units SubCUTAneous Q8H    sodium chloride 0.9 % in dextrose 10% 1,040 mL infusion   IntraVENous CONTINUOUS    balsam peru-castor oil (VENELEX)  mg/gram ointment   Topical BID    levothyroxine (SYNTHROID) tablet 100 mcg  100 mcg Oral 6am    glucose chewable tablet 16 g  4 Tab Oral PRN    dextrose (D50W) injection syrg 12.5-25 g  12.5-25 g IntraVENous PRN    glucagon (GLUCAGEN) injection 1 mg  1 mg IntraMUSCular PRN    miconazole (SECURA) 2 % extra thick cream   Topical BID    sodium chloride (NS) flush 5-10 mL  5-10 mL IntraVENous Q8H    sodium chloride (NS) flush 5-10 mL  5-10 mL IntraVENous PRN    nystatin (MYCOSTATIN) 100,000 unit/gram powder   Topical BID

## 2018-04-09 NOTE — PROGRESS NOTES
0720- Bedside and Verbal shift change report given to Jing Caro Nicol (oncoming nurse) by Vilma Wells (offgoing nurse). Report included the following information SBAR, Kardex, Intake/Output, Recent Results, Cardiac Rhythm NSR and Alarm Parameters . 0800- Shift assessment completed; see flow sheet for details. Pt sleeping; awakens to verbal command; once awaken pt is A/V/Ox2; speech is somewhat slurred and delayed; cross eyed; follows simple commands. Appears to have a developmental delay; pt has Prader Willi Syndrome. Currently paced; BP stable. Lungs are diminished; RR are shallow; currently on NC at 4LPM; sats maintaining in low 90s. Pt is obese; ABD is semi-soft; BS are active; appetite poor for meals. Skin is warm; excoriated areas to ABD folds; groin; pannus and armpits; abrasion to Lft knee all with treatment orders in place. Pt denies pain/discomfort at this time. 8007- Dr. Dino Arellano at the bedside; see progress note for details. Pt's mother has decided on Hospice care for the pt; hospice consult placed. 1205- Reassessment completed; see flow sheet for details. No changes noted from previous assessment; pt repositioned for comfort. Cleaned of medium size BM.    1610- Hospice nurse at the bedside; see progress note for details.

## 2018-04-10 NOTE — HOSPICE
[facility] Those [facility]  [phone]    Patient Name: Kelton Major  YOB: 1965    Date of Provider Hospice Visit: 04/10/18    Level of Care:   [] General Inpatient (GIP)    [x] Routine   [] Respite    Current Location of Care:  [] [facility] [] [facility] [x] [facility] [] [facility] [] Hospice [facility], patient referred from:  [] [facility] [] [facility] [] [facility] [] [facility] [] Home [] Other:     Date of [facility] Admission: 4-9-18  Hospice Medical Director at time of admission: Dr. Hector Wilson Diagnosis: sepsis  Diagnoses RELATED to the terminal prognosis: acute renal failure due to ATN, hypoxic hypercarbic respiratory failure, Pulmonary edema, pneumonia , Right leg cellulits, morbid obesity  Other Diagnoses: Prader Jaycee Ar Syndrom     HOSPICE SUMMARY   Do not cut and paste chart information other than imaging findings    Kelton Major is a 46y.o. year old who was admitted to [facility]. Pt with hx morbid obesity d/t Caitlin Muck, recent hospitalization for right leg cellulitis was admitted to [address] on 3/30/18 with lethargy and hypoxic hypercarbic respiratory failure and also with hypotension, acute renal failure and sepsis. Treated iv iv abx, bipap nut has had progressive renal failure and anuria despite iv lasix. Not a good dialysis candidate. Most recent PC02 level 96 on 4/5/18. Has been mostly lethargic though intermittently alert and able to communicate with his mother. Currently denies pain or SOB sxs. creatiine up to 45/4.07 today. Family has decided upon comfort care and hospice for patient  The patient's principle diagnosis has resulted in acute renal failure, hypoxic hypercarbic respiratory failure with C02 narcosis. Refer to LCD     Functionally, the patient's Karnofsky and/or Palliative Performance Scale has declined over a period of weeks and is estimated at 20.  The patient is dependent on the following ADLs:all    Objective information that support this patients limited prognosis includes: BUN/Cr 45/4.07 on 4-9-18  abg 4-5-18   PCO2 96 PO2  88 ph 7.17 on nasal 02    The patient/family chose comfort measures with the support of Hospice. HOSPICE DIAGNOSES   Active Symptoms:  1. Dyspnea/tachypnea     PLAN   1. Admitted to hospice routine level of care  2. Dilaudid 0.5mg iv q4 and prn  3. Ativan 0.5 mg q4 hrs and prn  4. Continue nasal 02 for comfort    5.  and SW to support family needs  6. Disposition: to home with hospice but appears imminently dying    Prognosis estimated based on 04/10/18 clinical assessment is:   [x] Hours to Days    [] Days to Weeks    [] Other:    Communicated plan of care with: Hospice Case Manager;  Hospice IDT; Care Team     GOALS OF CARE     Resuscitation Status: DNR  Durable DNR: [] Yes [] No    Advance Care Planning 4/6/2018   Patient's Healthcare Decision Maker is: Verbal statement (Legal Next of Kin remains as decision maker)   Primary Decision Maker Name Vanesa Mott   Primary Decision Maker Phone Number [phone]   Primary Decision Maker Relationship to Patient Parent   Confirm Advance Directive None   Patient Would Like to Complete Advance Directive Unable        HISTORY     History obtained from: chart,hospice RN and pt's family    CHIEF COMPLAINT: none  The patient is:   [] Verbal  [x] Nonverbal  [] Unresponsive    HPI/SUBJECTIVE:    RR 20-30's  Has appeared with facial flushing earlier today per staff, appears more comfortable after med adjustments       REVIEW OF SYSTEMS     The following systems were: [] reviewed  [x] unable to be reviewed    Positive ROS include:  Constitutional: fatigue, weakness, in pain, short of breath  Ears/nose/mouth/throat : increased airway secretions  Respiratory:  shortness of breath, wheezing  Gastrointestinal:poor appetite, nausea, vomiting, abdominal pain, constipation, diarrhea  Musculoskeletal :pain, positive deformities, swelling legs  Neurologic:confusion,no hallucinations, weakness  Psychiatric:no anxiety, feeling depressed, poor sleep  Endocrine:     Adult Non-Verbal Pain Assessment Score: 1    Face  [x] 0   No particular expression or smile  [] 1   Occasional grimace, tearing, frowning, wrinkled forehead  [] 2   Frequent grimace, tearing, frowning, wrinkled forehead    Activity (movement)  [x] 0   Lying quietly, normal position  [] 1   Seeking attention through movement or slow, cautious movement  [] 2   Restless, excessive activity and/or withdrawal reflexes    Guarding  [x] 0   Lying quietly, no positioning of hands over areas of body  [] 1   Splinting areas of the body, tense  [] 2   Rigid, stiff    Physiology (vital signs)  [x] 0   Stable vital signs  [] 1   Change in any of the following: SBP > 20mm Hg; HR > 20/minute  [] 2   Change in any of the following: SBP > 30mm Hg; HR > 25/minute    Respiratory  [] 0   Baseline RR/SpO2, compliant with ventilator  [x] 1   RR > 10 above baseline, or 5% drop SpO2, mild asynchrony with ventilator  [] 2   RR > 20 above baseline, or 10% drop SpO2, asynchrony with ventilator     FUNCTIONAL ASSESSMENT     Palliative Performance Scale (PPS):20     PSYCHOSOCIAL/SPIRITUAL ASSESSMENT     Active Problems:    Sepsis ([facility] (3/29/2011)      Past Medical History:   Diagnosis Date    A-fib ([facility]     Hypothyroid     Hypothyroidism     Mental retardation     Other ill-defined conditions(799.89)     obesity    Other ill-defined conditions(799.89)     mentally disabled    Prader-Willi syndrome     S/P cardiac pacemaker procedure 2/15/2013    heart block      Past Surgical History:   Procedure Laterality Date    HX HEENT      eye surgery as a baby    HX PACEMAKER        Social History   Substance Use Topics    Smoking status: Never Smoker    Smokeless tobacco: Never Used    Alcohol use No     Family History   Problem Relation Age of Onset    Hypertension Mother     Heart Disease Father       No Known Allergies   Current Facility-Administered Medications   Medication Dose Route Frequency    HYDROmorphone (PF) (DILAUDID) injection 0.4 mg  0.4 mg IntraVENous Q4H    LORazepam (ATIVAN) injection 0.5 mg  0.5 mg IntraVENous Q4H    LORazepam (ATIVAN) injection 0.5 mg  0.5 mg IntraVENous Q1H PRN    HYDROmorphone (PF) (DILAUDID) injection 0.2 mg  0.2 mg IntraVENous Q1H PRN    scopolamine (TRANSDERM-SCOP) 1 mg over 3 days 1 Patch  1 Patch TransDERmal Q72H    glycopyrrolate (ROBINUL) injection 0.2 mg  0.2 mg IntraVENous Q4H PRN    bisacodyl (DULCOLAX) suppository 10 mg  10 mg Rectal DAILY PRN    saline peripheral flush soln 5 mL  5 mL InterCATHeter PRN    ondansetron (ZOFRAN) injection 4 mg  4 mg IntraVENous Q4H PRN    ketorolac (TORADOL) injection 15 mg  15 mg IntraVENous Q6H PRN    nystatin (MYCOSTATIN) 100,000 unit/gram powder   Topical BID    balsam peru-castor oil (VENELEX)  mg/gram ointment   Topical BID        PHYSICAL EXAM     Wt Readings from Last 3 Encounters:   04/08/18 (!) 388 lb 3.7 oz (176.1 kg)   03/15/18 332 lb 7.3 oz (150.8 kg)   11/23/17 315 lb 4.1 oz (143 kg)       Visit Vitals    BP (!) 88/43 (BP 1 Location: Right arm, BP Patient Position: At rest)    Pulse 73    Temp 96.3 °F (35.7 °C)    Resp 18    SpO2 91%       Supplemental O2  [x] Yes  [] NO  Last bowel movement:     Currently this patient has:  [x] Peripheral IV [] PICC  [] PORT [] ICD    [x] Griffin Catheter [] NG Tube   [] PEG Tube    [] Rectal Tube [] Drain  [x] Other: pacemaker  Constitutional:morbidly obese, lethargic but able to nod and turn head to answer yes/no  Eyes: perrl  ENMT: clear  Cardiovascular: rrr  Respiratory: tachypneic RR 20-30's, labored  Gastrointestinal: soft obese  Musculoskeletal: edema of arms and legs 3plus in legs  Skin:erythema of right lower leg  Neurologic:very weak  Psychiatric:   Other:       Pertinent Lab and or Imaging Tests:  Lab Results   Component Value Date/Time    Sodium 144 04/09/2018 05:05 AM    Potassium 4.4 04/09/2018 05:05 AM    Chloride 104 04/09/2018 05:05 AM    CO2 30 04/09/2018 05:05 AM    Anion gap 10 04/09/2018 05:05 AM    Glucose 103 (H) 04/09/2018 05:05 AM    BUN 45 (H) 04/09/2018 05:05 AM    Creatinine 4.07 (H) 04/09/2018 05:05 AM    BUN/Creatinine ratio 11 (L) 04/09/2018 05:05 AM    GFR est AA 19 (L) 04/09/2018 05:05 AM    GFR est non-AA 16 (L) 04/09/2018 05:05 AM    Calcium 8.2 (L) 04/09/2018 05:05 AM     Lab Results   Component Value Date/Time    Protein, total 6.9 04/09/2018 05:05 AM    Albumin 2.1 (L) 04/09/2018 05:05 AM           Total time: 20 min  Counseling / coordination time: 20 min met with pt's mother  > 50% counseling / coordination?: no

## 2018-04-10 NOTE — HOSPICE
[facility]  [phone]    Social Work Admission Note  Patient Name: Shruthi Maradiaga  YOB: 1965  Age: 46 y.o. Date of Visit: 04/10/18  Facility of Care: [address] [facility]  Patient Room: 1112/01     Hospice Attending: Rajat Mathews MD  Hospice Diagnosis: Sepsis  Sepsis ([address]    Level of Care:    []  GIP    []  Respite   [x]  Routine    NARRATIVE   This MSW met with pts mother bhavya for initial SW assessment. Pt is minimally repsonsive. Pt is a 45 y/o CM with a hospice diagnosis of sepsis with comorbid Prader-Willi syndrome, morbid obesity, mental retardation, hx of a-fib, acute encephalopathy, and acute renal failure. Pt has been living with his mother since birth. Mother Silverio Goss is  since 1983, she and her  have 2 sons; pt and his older brother Fay Giron. Fay Giron lives in [place], [place]. He has been at bedside, but left to go home to get change of clothing and is expected to return tonight. Pt was attending a day program with [facility]. Mother reports pts health began to decline in 11/17 and pt stopped working at that time. Mother describes pt as a happy person, who is social and can be stubborn at [employer]. Mother notes pt has a beautiful smile and is well known by hospital staff. Mother is coping as well as can be expected. Pt has had multiple health problems throughout his life. Mothers other son and do not live locally, they live in [place], [place]. Mother reports having friends, ne friend Shawn Blount, is supportive, nieces, and good Druze support. Mother is realistic; her identified goal is that pt not suffer. MSW provided active listening and emotional support to mother for coping anticipatory grief and loss associated with pts terminal prognosis. Mother and pt are particularly close as she has been pts caregiver since birth. Pt and mother are [facility], they attend [employer]. MSW will continue to assess and monitor pt and family needs. IDG note: Mother expressed understanding of POC. Pt is minimally responsive, making moaning sounds. MSW met with pts mother Reinier Lan. MSW will provide grief counseling/education for mother in processing and coping with anticipatory grief re to pts imminent death. MSW will assess for emotional distress of Reinier Lan in coping with pts EOL as she has been his caregiver since birth. Mother and pt are particularly close due to the care she has provided him throughout the years. MSW will provide emotional support and supportive counseling for mother in processing decline and impending loss of son. MSW will assess needs of pts mother Reinier Lan and assess supports, as remaining son live out of town. MSW will address any needs of pts mother and connect to needed resources including bereavement services for ongoing support. MSW will assess patient's mental status in the context of his mental retardation as it relates to his EOL comfort and support and provide appropriate support. Moderate risk for bereavement for mother.      ADVANCE CARE PLANNING    Code Status: DNR  Durable DNR: _ Yes  X_ No  Advance Care Planning 2018   Patient's Healthcare Decision Maker is: Verbal statement (Legal Next of Kin remains as decision maker)   Primary Decision Maker Name Jonathan Alexander   Primary Decision Maker Phone Number [phone]   Primary Decision Maker Relationship to Patient Parent   Confirm Advance Directive None   Patient Would Like to Complete Advance Directive Unable       Relationship Status:  [x]  Single     []        []      []  Domestic Partner     []  /  []  Common Law  []    []  Unknown    If in a relationship, name of partner/spouse:  Duration of relationship:    Rastafari: NO PREFERENCE     Home: [place] [address]  Resources Provided: [address] Initial Assessment     Gender:  male    Race/Ethnicity: (rosette all that apply)  []  [place] ([facility]) or [place] Native  []   []  Black or   []   or   []   or [place]  [x]  White  []  Unknown      Service:    []  Yes   [x]  No       []  Unknown  Appropriate for Pinning Ceremony:   []  Yes      []  No  Is patient using VA benefits?    []  Yes      []  No     Primary Language: ENGLISH  []   Needed  []   utilized during visit    Ability to express thoughts/needs/feelings  []  Expressed thoughts/feelings/needs without difficulty  []  Requires extra time and cuing  []  Speech limited single words  []  Uses only gestures (eye, blinking eye or head movement/pointing)  []  Unable to express thoughts/feelings/needs (speech unintelligible or inappropriate)  [x]  MINMALLY RESPONSIVE Unresponsive  Notes:      Mental Status:  []  Alert-oriented to:     []  Person     []  Place     []  Time  []  Comatose-responds to:    []   Verbal stimuli    []  Tactile stimuli    []  Painful stimuli  []  Forgetful  []  Disoriented/Confused  []  Lethargic  []  Agitated  [x]  Other (specify):  MINMALLY RESPONSIVE  Notes:      Patients description of Illness/Current Health Status:    [x]  Patient unable to discuss  []  Patient unwilling to discuss  [x]  (Specify)   Wali Gutiérrez      Knowledge/Understanding of Disease Process  Patient:    []  Demonstrates knowledge/understanding of disease process   []  Demonstrates knowledge/understanding of treatment plan   []  Demonstrates knowledge/understanding of prognosis   []  Demonstrates acceptance of prognosis   []  Demonstrates knowledge/understanding of resuscitation status   [x]  Other (specify) MINMALLY RESPONSIVE  Caregiver:   [x]  Demonstrates knowledge/understanding of disease process   [x]  Demonstrates knowledge/understanding of treatment plan   [x]  Demonstrates knowledge/understanding of prognosis   [x]  Demonstrates acceptance of prognosis   [x]  Demonstrates knowledge/understanding of resuscitation status   []  Other (specify)  Notes:      Patients living arrangement/care setting:  Use the PRIOR COLUMN when the PATIENTS current health status necessitated a change in his/her primary residence. Prior Current Response              [x]             []    Patients own home/residence              []             []    Home of family member/friend              []             []    Boarding home              []             []    Assisted living facility/senior living center              [x]             []    Hospital/Acute care facility              []             []    Skilled nursing facility              []             []    Long term care facility/Nursing home              []             [x]    Hospice in Patient      Primary Caregiver:  []  No Primary Caregiver  Name of Primary Caregiver: Valeri Tuttle  Relationship or Primary Caregiver:    [x]  Spouse/Significant other       []  Natural Child        []  Step child       []  Sibling   []  Parent   []  Friend/Neighbor   []  Community/Shinto Volunteer   []  Paid help   []  Other (specify):___________  Notes:       Family members/Significant others:  Ronnell Shukla  Relationship:MOTHER  Phone Number:[phone]/ [phone]  Actively involved in care? [x]  Yes  []  No    Name: Claudia Garcia  Relationship: SON  Phone Number:  Actively involved in care? []  Yes  []  No    Name:  Relationship:  Phone Number:  Actively involved in care?   []  Yes  []  No    Social support systems: (select ONE best description)  [x]  Excellent social support system which includes three or more family members or friends  []  Good social support system which includes two or less members or friends  []  [address] support which includes one family member or friend  []  Poor social support; no family members or friends; basically ALONE  Notes:      Emotional Status: (rosette all that apply)    Patient Caregiver Response                 [x]                [x]    Mood/Affect stable and appropriate                   [] []    Angry                 []                []    Anxious                 []                []    Apprehensive                 []                []    Avoidant                 []                []    Clinging                 []                []    Depressed                 []                []    Distraught                 []                []    Elated                 []                []    Euphoric                 []                []    Fearful                 []                []    Flat Affect                 []                []    Helpless                 []                []    Hostile                 []                []    Impulsive                 []                []    Irritable                 []                []    Labile                 []                []    Manic                 []                []    Restlessness                 []                []    Sad                 []                []    Suspicious                 []                []    Tearful                 []                []    WiIThdrawn     Notes:     Coping Skills (strengths/weakness):    Patient: Coping Skills (strength/weakness):  MINMALLY RESPONSIVE   Family/caregiver (strength/weakness): GOOD FAMILY, FRIEND AND Synagogue SUPPORT, REALISTIC, / OTHER SON LIVES IN [place].,  PT WITH LIFE LONG ILLNESS,        [place] of care (rosette all that apply):     [x]  No burden evident   []  Family must administer medications   []  Illness causing financial strain   []  Family/Support feels overwhelmed   []  Family/Support sleep disturbed with patients care   []  Patients care causes extra physical stress  of death  []  Illness causes changes in family lifestyle  []  Illness impacting family/support employment  []  Family experiencing increased time demands  []  Patients behavior endangers family  []  Denial of patients illness  []  Concern over outcome of illness/fear  []  Patients behavior embarrassing to family   Notes:    Risk Factors: (rosette all that apply):    [x]  No burden evident   []  Alcohol abuse  []  Financial resources inadequate to meet basic needs (food/house/etc)  []  Financial resources inadequate to meet health care needs (supplies/equipment/medications)  []  Food/nutrition resources inadequate  []  Home environment unsafe/inadequate for home care  []  Homicidal risk  []  Lives alone or without concerned relatives  []  Multiple medications/complex schedule  []  Physical limitations increase likelihood of falls  []  Plan of care/treatments complicated  []  Substance use/abuse  []  Suicidal risk  []  Visual impairment threatens safety/ability to perform self-care  []  Other (specify):     Abuse/Neglect (actual/potential risks):  [x]  No signs of abuse/neglect  []  History of abuse/neglect                 []  NLPGRQOU          []  Sexual  []  History of domestic violence  []  Lacks adequate physical care  []  Lacks emotional nurturing/support  []  Lacks appropriate stimulation/cognitive experiences  []  Left alone inappropriately  []  Lacks necessary supervision  []  Inadequate or delayed medical care  []  Unsafe environment (i.e guns/drug use/history of violence in the home/etc.)  []  Bruising or other physical signs of injury present  []  Other (specify):  Notes:   []  Refer to child/adult protective services      Current Sources of Stress (in Addition to Current Illness):   [x]  None reported  []  Bills/Debt    []  Career/Job change    []   (short term)    []   (long term)    []  Death of a child (recent)    []  Death of a parent (recent)   []  Death of a spouse (recent)   []  Employment status changed   []  Family discord    []  Financial loss/Inadequate inther (specify):come  []  Job loss  []  Legal issues unresolved  []  Lifestyle change  []  Marital discord  []  Marriage within the last year  []  Paperwork (insurance/legal/etc) overwhelming  []  Separation/Divorce  []  Other (specify):  Notes:      Current Community Resources Being Utilized     [place]       Interventions/Plan of Care     1. Assess social and emotional factors related to coping with end of life issues  2. Community resource planning/referral   3. Relocation to different care setting if/when symptoms stabilize  4. Discharge Planning     1.  WILL LIKELY PASS AT [facility]    MSW Assessment Completed by: Jan Jean  04/10/18    Time In: 12:45 PM       Time Out:2;00 PM

## 2018-04-10 NOTE — PROGRESS NOTES
Oncology Nursing Communication Tool  7:24 PM  4/10/2018     Bedside and Verbal shift change report given to West Valdovinos RN (incoming nurse) by Regan Monet (outgoing nurse) on [facility]. Report included the following information SBAR, Kardex, Procedure Summary, Intake/Output, MAR, Accordion and Recent Results. Shift Summary:       Issues for physician to address: Oncology Shift Note   Admission Date 4/9/2018   Admission Diagnosis Sepsis  Sepsis ([address]   Code Status DNR   Consults None      Cardiac Monitoring [] Yes [] No      Purposeful Hourly Rounding [] Yes    Octavio Score Total Score: 3   Octavio score 3 or > [] Bed Alarm [] Avasys [] 1:1 sitter [] Patient refused (Place signed refusal form in chart)      Pain Managed [] Yes [] No    Key Pain Meds     The patient is on no pain meds. Influenza Vaccine             Oxygen needs?  [] Room air Oxygen @  []1L    []2L    []3L   []4L    []5L   []6L     Use home O2? [] Yes [] No  Perform O2 challenge test using  smartphrase (.oxygenchallenge)      Last bowel movement Last Bowel Movement Date: 04/09/18  bowel movement      Urinary Catheter Urinary Catheter 04/02/18 Griffin-Indications for Use: End of life care     Urinary Catheter 04/02/18 Griffin-Urine Output (mL): 100 ml     LDAs               Peripheral IV 03/31/18 Right Antecubital (Active)   Site Assessment Intact;Drainage (comment) 4/10/2018  3:04 PM   Phlebitis Assessment 0 4/10/2018  3:04 PM   Infiltration Assessment 0 4/10/2018  3:04 PM   Dressing Status Old drainage 4/10/2018  3:04 PM   Dressing Type Transparent;Tape 4/10/2018  3:04 PM   Hub Color/Line Status Pink;Flushed;Patent 4/10/2018  3:04 PM   Action Taken Open ports on tubing capped 4/9/2018 12:00 PM   Alcohol Cap Used No 4/8/2018  4:00 AM                         Readmission Risk Assessment Tool Score Low Risk            9       Total Score        3 Has Seen PCP in Last 6 Months (Yes=3, No=0)    4 IP Visits Last 12 Months (1-3=4, 4=9, >4=11)    2 Charlson Comorbidity Score (Age + Comorbid Conditions)        Criteria that do not apply:    . Living with Significant Other. Assisted Living. LTAC. SNF. or   Rehab    Patient Length of Stay (>5 days = 3)    Pt.  Coverage (Medicare=5 , Medicaid, or Self-Pay=4)       Expected Length of Stay - - -   Actual Length of Stay [address]

## 2018-04-10 NOTE — HOSPICE
[facility]  [phone]    Patient Name: Sandy Reese  YOB: 1965  Age: 46 y.o. [facility] RN Note:      Courtesy visit to pt. Mother Kari Hoffmann at bedside. Pt minimally responsive to physical stimuli. Occasional movement of hands. Mother is happy with care and is spending the night with pt. Encouraged her to take breaks and allow others to help her with saldaña. Call to Dr. Gris Huitron. New orders received. Thank you for the opportunity to be of service to this patient.     Tyler Mcdonnell, RN [facility]

## 2018-04-11 NOTE — PROGRESS NOTES
Paged by pt's nurse and informed of pt's death. Offered support to pt's mother Jayson Prieto who was present at bedside. Jayson Prieto was quietly tearful as she offered expressions of love towards her son. Offered listening presence as Jayson Prieto shared some about pt's life and things that brought him katharine. Affirmed her love for her son and the care that she has provided throughout pt's life. Pt's brother Cristie Felty arrived, and family left a short time later.   Words of comfort and assurance offered as family left the hospital.    Thomas Pederson, [address] [place] [address] Road

## 2018-04-11 NOTE — PROGRESS NOTES
Rounded on pt. Pt was absent of spontaneous respirations and heart sounds. TOD 0247. Mother at the bedside was woken and notified of pts death. Johnna Orozco with  services at bedside to support the family. Dr. Margaretha Runner at bedside to pronounce. [facility] called. Pt not suitable for donation. Able to release body to  home. Post mortem care provided. Pt awaiting transport to [facility].

## 2018-04-11 NOTE — PROGRESS NOTES
Called to pronounce patient. No response to noxious stimuli. No spontaneous breath sounds nor cardiac sounds. Pupils fixed and dilated. TOD: 2:47am.  Family notified and grieving appropriately.    D/C Summary to be dictated by Attending MD.    ________________________________________________________________________  Robbie Barillas MD

## 2018-04-11 NOTE — HOSPICE
[address] [facility] Help to Those in Need  [phone]    Discharge/Death Nursing Note   Patient Name: Nas Roper  YOB: 1965  Age: 46 y.o. Date of Death: 18  Admitted Date: 2018  Time of Death: [address]: [address]  Level of Care: Routine  Patient Room: [address] [place] [address]     Hospice Attending: Dr. Diogo Wilde Diagnosis: Sepsis  Sepsis [facility])    Death Pronouncement   Pronouncement of death completed by: Viviane Ybarra MD    Agency staff was not present at the time of death    At the time of death the patient was documented as not breathing, pulseless    The pt  within [address]    The following were notified of the patient's death: Mother at bedside, MD, Chaplaiin,     Medications were disposed of per facility protocol     Discharge Summary   Discharge Reason: Death    Summary of Care Provided:    [x] Post mortem care provided by nursing staff  [x] Notification of  home by charge nurse  [x] Referrals/Community resources provided:   [] Goals completed  [] Durable Medical Equipment vendor notified     Disciplines involved: [x] RN [] SW [x]  [] CASTILLO [] Vol [] PT [] OT [] ST [] BC    [x] IDT communication/notification    Attending Physician, Dr. Marta Arevalo, notified of death    Bereaved   Verify bereaved identified with name, address, telephone number and risk level      Advance Care Planning 2018   Patient's Healthcare Decision Maker is: Verbal statement (Legal Next of Kin remains as decision maker)   Primary Decision Maker Name Anamaria Bell   Primary Decision Maker Phone Number [phone]   Primary Decision Maker Relationship to Patient Parent   Confirm Advance Directive None   Patient Would Like to Complete Advance Directive [facility]  Street to serve. Bereavement low.

## 2018-04-12 NOTE — DISCHARGE SUMMARY
Hospice Discharge Summary    [facility]  [facility] to Those in Need        Date of Admission: 4/9/2018  Date of Discharge: 4/11/2018    Mckenzie Capps is a 46y.o. year old who was admitted to [facility] at [address] with a Hospice diagnosis of Sepsis; Sepsis ([facility] [address] The patient's care was focused on comfort and the patient passed away on 4/11/2018. Add in Hospice Summary through Plan from most recent Progress Note    Mckenzie Capps is a 46y.o. year old who was admitted to [facility]. Pt with hx morbid obesity d/t Sully Vieira, recent hospitalization for right leg cellulitis was admitted [address] on 3/30/18 with lethargy and hypoxic hypercarbic respiratory failure and also with hypotension, acute renal failure and sepsis. Treated iv iv abx, bipap nut has had progressive renal failure and anuria despite iv lasix. Not a good dialysis candidate. Most recent PC02 level 96 on 4/5/18. Has been mostly lethargic though intermittently alert and able to communicate with his mother. Currently denies pain or SOB sxs. creatiine up to 45/4.07 today. Family has decided upon comfort care and hospice for patient  The patient's principle diagnosis has resulted in acute renal failure, hypoxic hypercarbic respiratory failure with C02 narcosis.

## 2020-01-29 NOTE — DISCHARGE SUMMARY
Hospitalist Discharge Summary     Patient ID:  Veena Ward  [id number]  72 y.o.  1965    PCP on record: Radha Menjivar MD    Admit date: 3/11/2018  Discharge date and time: 3/15/2018      DISCHARGE DIAGNOSIS:  Sepsis (resolved)  Right lower extremity cellulitis, improving  Hypothyroidism   Prader [place] syndrome with Intellectual Disability   Morbid Obesity    Chronic candida intertrigo   S/p Permanent pacemaker placement due to hx of Afib with bradycardia   Hypokalemia -improved after repletion  Mild Hypernatremia    CONSULTATIONS:  IP CONSULT TO INFECTIOUS DISEASES    Excerpted HPI from H&P of Nhan Anderson MD:  Melvin Andre pt has had previous episodes of cellulitis. The pt is unable to provide any meaningful information. His mother reports that he was fine until last evening when he missed his meal - that was unusual.   This morning she noticed that the pt has redness starting from the RIGHT upper medial thigh that is spreading rapidly. She brought him to the ER and workup here is suggestive of sepsis with leukocytosis, obvious source of infection, lactic acidosis etc. Code S initiated. \"     ______________________________________________________________________  DISCHARGE SUMMARY/HOSPITAL COURSE:  for full details see H&P, daily progress notes, labs, consult notes.      Hospital stay per problem below:  Sepsis (resolved)due to   Right lower extremity rapidly progressive cellulitis, improving-3/12/18 CT RLE without underlying abscess, doppler on admit negative for DVT^  -received Vancomycin while in hospital and transitioned to Zyvox as outpatient per ID recommendations (no copay on Zyvox)  -Unasyn discontinued after 3 days  -Cx's unremarkable to date; UCx with Pseudomonas in low colony count but since at risk for UTI will be given Levaquin on discharge  -Afebrile since admission, Leukocytosis/Bandemia normalized  -Hibiclens bath daily x 3 days completed      Hypothyroidism   -Continue synthroid as was PTA   -TSH and T4 wnl      Prader Willi syndrome with Intellectual Disability   -supportive care.       Morbid Obesity -Body mass index is 66.13 kg/(m^2).    -supportive care       Chronic candida intertrigo   -topical antifungal Rx.       S/p Permanent pacemaker placement due to hx of Afib with bradycardia   -clinically now stable      Hypokalemia:  -improved after repletion     Mild Hypernatremia:  -monitor     Body mass index is 65.57 kg/(m^2).     Recommended Disposition: Home w/Family and HH PT per my discussion with mother; SNF (as recommended per PT/OT eval not desired)        _______________________________________________________________________  Patient seen and examined by me on discharge day. Pertinent Findings:  Gen:    Not in distress  Chest: No accessory muscle use  CVS:   Normal rate  Abd:  Increased girth secondary to body habitus  Neuro:  Alert  Skin: Cellulitis slowly improving  _______________________________________________________________________  DISCHARGE MEDICATIONS:   Current Discharge Medication List      START taking these medications    Details   levoFLOXacin (LEVAQUIN) 750 mg tablet Take 1 Tab by mouth every twenty-four (24) hours for 3 days. Qty: 3 Tab, Refills: 0      linezolid (ZYVOX) 600 mg tablet Take 1 Tab by mouth two (2) times a day. Qty: 10 Tab, Refills: 0         CONTINUE these medications which have NOT CHANGED    Details   OXYGEN-AIR DELIVERY SYSTEMS 2 L by Nasal route nightly. whenever lying down in bed      cholecalciferol (VITAMIN D3) 1,000 unit cap Take 1,000 Units by mouth daily. cranberry extract (CRANBERRY) 450 mg tab Take 1 Tab by mouth daily. aspirin (ASPIRIN) 325 mg tablet Take 325 mg by mouth every evening. ascorbic acid (VITAMIN C) 500 mg tablet Take 500 mg by mouth every evening. ferrous sulfate (IRON) 325 mg (65 mg iron) tablet Take 325 mg by mouth daily (after dinner).       multivitamin (ONE A DAY) tablet Take 1 tablet by mouth every evening. nystatin (MYCOSTATIN) powder Apply 100,000 Units to affected area two (2) times a day. Qty: 1 Bottle, Refills: 1      levothyroxine (SYNTHROID) 100 mcg tablet Take 100 mcg by mouth Daily (before breakfast). My Recommended Diet, Activity, Wound Care, and follow-up labs are listed in the patient's Discharge Insturctions which I have personally completed and reviewed. ______________________________________________________________________    Risk of deterioration: Low    Condition at Discharge:  Stable  ______________________________________________________________________    Disposition  Home with family and home health services  ______________________________________________________________________    Care Plan discussed with:   Patient, Family    ____________________________________________________last discussed plan with Dr. Lasandra Opitz on 3/15__________________    Code Status: Full Code  ______________________________________________________________________      Follow up with:   PCP : Safia Grubbs MD  Follow-up Information     Follow up With Details Comments [id number] [facility] On 3/13/2018 THIS  [facility] [address]   IF YOU DO NOT HEAR FROM THEM WITHIN 24-48HRS, PLEASE CONTACT THEM DIRECTLY [id number] [facility]  1st Floor  [facility] [id number]  [address] [place] [address]  [phone]                Total time in minutes spent coordinating this discharge (includes going over instructions, follow-up, prescriptions, and preparing report for sign off to her PCP) :  35 minutes    Signed:  Es Hawthorne MD declines

## 2023-05-15 NOTE — PROGRESS NOTES
Pharmacy Clarification of Prior to Admission Medication Regimen     The patient was NOT interviewed regarding clarification of the prior to admission medication regimen. Patient's mother was interviewed about patient's current medications and patient's use of any other inhalers, topical products, over the counter medications, herbal medications, vitamin products or ophthalmic/nasal/otic medications. Information Obtained From: Patient's mother, Home medication list    Pertinent Pharmacy Findings: None. PTA medication list was corrected to the following:     Prior to Admission Medications   Prescriptions Last Dose Informant Patient Reported? Taking?   ascorbic acid (VITAMIN C) 500 mg tablet 11/19/2017 at Unknown time Parent Yes Yes   Sig: Take 500 mg by mouth every evening. aspirin (ASPIRIN) 325 mg tablet 11/19/2017 at Unknown time Parent Yes Yes   Sig: Take 325 mg by mouth every evening. cholecalciferol (VITAMIN D3) 1,000 unit cap 11/19/2017 at Unknown time Parent Yes Yes   Sig: Take 1,000 Units by mouth daily. cranberry extract (CRANBERRY) 450 mg tab 11/19/2017 at Unknown time Parent Yes Yes   Sig: Take 1 Tab by mouth daily. ferrous sulfate (IRON) 325 mg (65 mg iron) tablet 11/19/2017 at Unknown time Parent Yes Yes   Sig: Take 325 mg by mouth daily (after dinner). levothyroxine (SYNTHROID) 100 mcg tablet 11/19/2017 at Unknown time Parent Yes Yes   Sig: Take 100 mcg by mouth Daily (before breakfast). multivitamin (ONE A DAY) tablet 11/19/2017 at Unknown time Parent Yes Yes   Sig: Take 1 tablet by mouth every evening. nystatin (MYCOSTATIN) powder 11/19/2017 at Unknown time Parent No Yes   Sig: Apply 100,000 Units to affected area two (2) times a day. Facility-Administered Medications: None          Thank you,  Marta Reid. [employer] Candidate 2018  V [facility] rx refill failed, resent med refill